# Patient Record
Sex: FEMALE | Race: BLACK OR AFRICAN AMERICAN | NOT HISPANIC OR LATINO | Employment: FULL TIME | URBAN - METROPOLITAN AREA
[De-identification: names, ages, dates, MRNs, and addresses within clinical notes are randomized per-mention and may not be internally consistent; named-entity substitution may affect disease eponyms.]

---

## 2017-05-15 ENCOUNTER — HOSPITAL ENCOUNTER (INPATIENT)
Facility: HOSPITAL | Age: 37
LOS: 1 days | Discharge: HOME | DRG: 287 | End: 2017-05-16
Attending: INTERNAL MEDICINE | Admitting: INTERNAL MEDICINE
Payer: MEDICAID

## 2017-05-15 ENCOUNTER — HOSPITAL ENCOUNTER (EMERGENCY)
Facility: OTHER | Age: 37
Discharge: ANOTHER HOSPITAL | End: 2017-05-15
Attending: EMERGENCY MEDICINE
Payer: MEDICAID

## 2017-05-15 VITALS
SYSTOLIC BLOOD PRESSURE: 163 MMHG | WEIGHT: 185 LBS | BODY MASS INDEX: 29.03 KG/M2 | OXYGEN SATURATION: 97 % | HEART RATE: 103 BPM | RESPIRATION RATE: 20 BRPM | DIASTOLIC BLOOD PRESSURE: 109 MMHG | HEIGHT: 67 IN

## 2017-05-15 DIAGNOSIS — R07.9 CHEST PAIN: ICD-10-CM

## 2017-05-15 DIAGNOSIS — I11.9 HYPERTENSIVE HEART DISEASE WITHOUT HEART FAILURE: ICD-10-CM

## 2017-05-15 DIAGNOSIS — I16.0 HYPERTENSIVE URGENCY: ICD-10-CM

## 2017-05-15 DIAGNOSIS — I21.3 ST ELEVATION MYOCARDIAL INFARCTION (STEMI), UNSPECIFIED ARTERY: Primary | ICD-10-CM

## 2017-05-15 DIAGNOSIS — I44.7 NEW ONSET LEFT BUNDLE BRANCH BLOCK (LBBB): Primary | ICD-10-CM

## 2017-05-15 LAB
ALBUMIN SERPL-MCNC: 3.5 G/DL (ref 3.3–5.5)
ALP SERPL-CCNC: 73 U/L (ref 42–141)
BILIRUB SERPL-MCNC: 0.5 MG/DL (ref 0.2–1.6)
BUN SERPL-MCNC: 14 MG/DL (ref 7–22)
CALCIUM SERPL-MCNC: 9.1 MG/DL (ref 8–10.3)
CHLORIDE SERPL-SCNC: 103 MMOL/L (ref 98–108)
CREAT SERPL-MCNC: 1 MG/DL (ref 0.6–1.2)
GLUCOSE SERPL-MCNC: 100 MG/DL (ref 73–118)
POC ALT (SGPT): 19 U/L (ref 10–47)
POC AST (SGOT): 26 U/L (ref 11–38)
POC B-TYPE NATRIURETIC PEPTIDE: 49.1 PG/ML (ref 0–100)
POC CARDIAC TROPONIN I: 0 NG/ML
POC PTINR: 1 (ref 0.9–1.2)
POC PTWBT: 11.7 SEC (ref 9.7–14.3)
POC TCO2: 26 MMOL/L (ref 18–33)
POTASSIUM BLD-SCNC: 3.7 MMOL/L (ref 3.6–5.1)
PROTEIN, POC: 7.8 G/DL (ref 6.4–8.1)
SAMPLE: NORMAL
SAMPLE: NORMAL
SODIUM BLD-SCNC: 140 MMOL/L (ref 128–145)

## 2017-05-15 PROCEDURE — 96374 THER/PROPH/DIAG INJ IV PUSH: CPT

## 2017-05-15 PROCEDURE — 25000003 PHARM REV CODE 250

## 2017-05-15 PROCEDURE — B215YZZ FLUOROSCOPY OF LEFT HEART USING OTHER CONTRAST: ICD-10-PCS | Performed by: INTERNAL MEDICINE

## 2017-05-15 PROCEDURE — 96375 TX/PRO/DX INJ NEW DRUG ADDON: CPT

## 2017-05-15 PROCEDURE — 25000003 PHARM REV CODE 250: Performed by: EMERGENCY MEDICINE

## 2017-05-15 PROCEDURE — 25000003 PHARM REV CODE 250: Performed by: INTERNAL MEDICINE

## 2017-05-15 PROCEDURE — 20000000 HC ICU ROOM

## 2017-05-15 PROCEDURE — 99285 EMERGENCY DEPT VISIT HI MDM: CPT

## 2017-05-15 PROCEDURE — C1769 GUIDE WIRE: HCPCS

## 2017-05-15 PROCEDURE — 99291 CRITICAL CARE FIRST HOUR: CPT | Mod: 25

## 2017-05-15 PROCEDURE — 63600175 PHARM REV CODE 636 W HCPCS: Performed by: INTERNAL MEDICINE

## 2017-05-15 PROCEDURE — B211YZZ FLUOROSCOPY OF MULTIPLE CORONARY ARTERIES USING OTHER CONTRAST: ICD-10-PCS | Performed by: INTERNAL MEDICINE

## 2017-05-15 PROCEDURE — 99152 MOD SED SAME PHYS/QHP 5/>YRS: CPT | Mod: ,,, | Performed by: INTERNAL MEDICINE

## 2017-05-15 PROCEDURE — 93010 ELECTROCARDIOGRAM REPORT: CPT | Mod: ,,, | Performed by: INTERNAL MEDICINE

## 2017-05-15 PROCEDURE — 99999 HC NO LEVEL OF SERVICE - ED ONLY: CPT

## 2017-05-15 PROCEDURE — 93005 ELECTROCARDIOGRAM TRACING: CPT

## 2017-05-15 PROCEDURE — 99152 MOD SED SAME PHYS/QHP 5/>YRS: CPT

## 2017-05-15 PROCEDURE — 4A023N7 MEASUREMENT OF CARDIAC SAMPLING AND PRESSURE, LEFT HEART, PERCUTANEOUS APPROACH: ICD-10-PCS | Performed by: INTERNAL MEDICINE

## 2017-05-15 PROCEDURE — 93458 L HRT ARTERY/VENTRICLE ANGIO: CPT | Mod: 26,,, | Performed by: INTERNAL MEDICINE

## 2017-05-15 PROCEDURE — 63600175 PHARM REV CODE 636 W HCPCS

## 2017-05-15 RX ORDER — HYDROCODONE BITARTRATE AND ACETAMINOPHEN 5; 325 MG/1; MG/1
1 TABLET ORAL EVERY 4 HOURS PRN
Status: DISCONTINUED | OUTPATIENT
Start: 2017-05-15 | End: 2017-05-16 | Stop reason: HOSPADM

## 2017-05-15 RX ORDER — ONDANSETRON 2 MG/ML
4 INJECTION INTRAMUSCULAR; INTRAVENOUS EVERY 12 HOURS PRN
Status: DISCONTINUED | OUTPATIENT
Start: 2017-05-15 | End: 2017-05-16 | Stop reason: HOSPADM

## 2017-05-15 RX ORDER — ASPIRIN 325 MG
TABLET ORAL
Status: COMPLETED
Start: 2017-05-15 | End: 2017-05-15

## 2017-05-15 RX ORDER — CLOPIDOGREL 300 MG/1
600 TABLET, FILM COATED ORAL
Status: COMPLETED | OUTPATIENT
Start: 2017-05-15 | End: 2017-05-15

## 2017-05-15 RX ORDER — DIPHENHYDRAMINE HCL 25 MG
25 CAPSULE ORAL EVERY 6 HOURS PRN
Status: DISCONTINUED | OUTPATIENT
Start: 2017-05-15 | End: 2017-05-16 | Stop reason: HOSPADM

## 2017-05-15 RX ORDER — HEPARIN SODIUM 5000 [USP'U]/ML
5000 INJECTION, SOLUTION INTRAVENOUS; SUBCUTANEOUS
Status: COMPLETED | OUTPATIENT
Start: 2017-05-15 | End: 2017-05-15

## 2017-05-15 RX ORDER — ENOXAPARIN SODIUM 100 MG/ML
40 INJECTION SUBCUTANEOUS EVERY 24 HOURS
Status: DISCONTINUED | OUTPATIENT
Start: 2017-05-15 | End: 2017-05-16 | Stop reason: HOSPADM

## 2017-05-15 RX ORDER — ACETAMINOPHEN 325 MG/1
650 TABLET ORAL EVERY 4 HOURS PRN
Status: DISCONTINUED | OUTPATIENT
Start: 2017-05-15 | End: 2017-05-16 | Stop reason: HOSPADM

## 2017-05-15 RX ORDER — ASPIRIN 81 MG/1
81 TABLET ORAL DAILY
Status: DISCONTINUED | OUTPATIENT
Start: 2017-05-16 | End: 2017-05-16 | Stop reason: HOSPADM

## 2017-05-15 RX ORDER — NITROGLYCERIN 0.4 MG/1
TABLET SUBLINGUAL
Status: COMPLETED
Start: 2017-05-15 | End: 2017-05-15

## 2017-05-15 RX ORDER — ATROPINE SULFATE 0.1 MG/ML
0.5 INJECTION INTRAVENOUS
Status: DISCONTINUED | OUTPATIENT
Start: 2017-05-15 | End: 2017-05-16 | Stop reason: HOSPADM

## 2017-05-15 RX ORDER — ASPIRIN 325 MG
325 TABLET ORAL
Status: COMPLETED | OUTPATIENT
Start: 2017-05-15 | End: 2017-05-15

## 2017-05-15 RX ORDER — MAG HYDROX/ALUMINUM HYD/SIMETH 200-200-20
15 SUSPENSION, ORAL (FINAL DOSE FORM) ORAL EVERY 6 HOURS PRN
Status: DISCONTINUED | OUTPATIENT
Start: 2017-05-15 | End: 2017-05-16 | Stop reason: HOSPADM

## 2017-05-15 RX ORDER — ZOLPIDEM TARTRATE 5 MG/1
5 TABLET ORAL NIGHTLY PRN
Status: DISCONTINUED | OUTPATIENT
Start: 2017-05-15 | End: 2017-05-16 | Stop reason: HOSPADM

## 2017-05-15 RX ORDER — METOPROLOL TARTRATE 1 MG/ML
5 INJECTION, SOLUTION INTRAVENOUS
Status: COMPLETED | OUTPATIENT
Start: 2017-05-15 | End: 2017-05-15

## 2017-05-15 RX ORDER — AMLODIPINE BESYLATE 5 MG/1
10 TABLET ORAL DAILY
Status: DISCONTINUED | OUTPATIENT
Start: 2017-05-15 | End: 2017-05-16 | Stop reason: HOSPADM

## 2017-05-15 RX ORDER — ALPRAZOLAM 0.25 MG/1
0.25 TABLET ORAL EVERY 8 HOURS PRN
Status: DISCONTINUED | OUTPATIENT
Start: 2017-05-15 | End: 2017-05-16 | Stop reason: HOSPADM

## 2017-05-15 RX ORDER — METOPROLOL SUCCINATE 25 MG/1
25 TABLET, EXTENDED RELEASE ORAL DAILY
Status: DISCONTINUED | OUTPATIENT
Start: 2017-05-15 | End: 2017-05-16 | Stop reason: HOSPADM

## 2017-05-15 RX ORDER — SODIUM CHLORIDE 9 MG/ML
1.5 INJECTION, SOLUTION INTRAVENOUS CONTINUOUS
Status: ACTIVE | OUTPATIENT
Start: 2017-05-15 | End: 2017-05-16

## 2017-05-15 RX ORDER — MORPHINE SULFATE 10 MG/ML
2 INJECTION INTRAMUSCULAR; INTRAVENOUS; SUBCUTANEOUS EVERY 10 MIN PRN
Status: DISCONTINUED | OUTPATIENT
Start: 2017-05-15 | End: 2017-05-16 | Stop reason: HOSPADM

## 2017-05-15 RX ADMIN — Medication 325 MG: at 06:05

## 2017-05-15 RX ADMIN — ENOXAPARIN SODIUM 40 MG: 100 INJECTION SUBCUTANEOUS at 09:05

## 2017-05-15 RX ADMIN — HEPARIN SODIUM 5000 UNITS: 5000 INJECTION, SOLUTION INTRAVENOUS; SUBCUTANEOUS at 06:05

## 2017-05-15 RX ADMIN — CLOPIDOGREL BISULFATE 600 MG: 300 TABLET, FILM COATED ORAL at 06:05

## 2017-05-15 RX ADMIN — SODIUM CHLORIDE 1.5 ML/KG/HR: 0.9 INJECTION, SOLUTION INTRAVENOUS at 08:05

## 2017-05-15 RX ADMIN — ASPIRIN 325 MG ORAL TABLET 325 MG: 325 PILL ORAL at 06:05

## 2017-05-15 RX ADMIN — METOPROLOL SUCCINATE 25 MG: 25 TABLET, EXTENDED RELEASE ORAL at 09:05

## 2017-05-15 RX ADMIN — NITROGLYCERIN 1 INCH: 20 OINTMENT TOPICAL at 06:05

## 2017-05-15 RX ADMIN — METOPROLOL TARTRATE 5 MG: 1 INJECTION, SOLUTION INTRAVENOUS at 06:05

## 2017-05-15 RX ADMIN — AMLODIPINE BESYLATE 10 MG: 5 TABLET ORAL at 09:05

## 2017-05-15 RX ADMIN — NITROGLYCERIN 0.4 MG: 0.4 TABLET SUBLINGUAL at 06:05

## 2017-05-15 NOTE — ED PROVIDER NOTES
Encounter Date: 5/15/2017       History   No chief complaint on file.    Review of patient's allergies indicates:  No Known Allergies  Patient is a 36 y.o. female presenting with the following complaint: chest pain. The history is provided by the patient.   Chest Pain   The current episode started 2 to 3 hours ago. Chest pain occurs intermittently. The chest pain is worsening. The pain is associated with exertion. At its most intense, the chest pain is at 10/10. The chest pain is currently at 7/10. The quality of the pain is described as tightness and pressure-like. The pain does not radiate. Chest pain is worsened by exertion. Primary symptoms include shortness of breath and nausea.   The shortness of breath began today. The shortness of breath developed suddenly.   Nausea began today. She tried nothing for the symptoms. Risk factors include obesity.   Her past medical history is significant for hypertension (Not treated).     Past Medical History:   Diagnosis Date    Hypertension      No past surgical history on file.  Family History   Problem Relation Age of Onset    Hypertension Mother     Thyroid disease Mother     Appendicitis Mother     Hypertension Father      Social History   Substance Use Topics    Smoking status: Never Smoker    Smokeless tobacco: Not on file    Alcohol use Yes      Comment: Occasionally     Review of Systems   Constitutional: Negative.    HENT: Negative.    Eyes: Negative.    Respiratory: Positive for shortness of breath.    Cardiovascular: Positive for chest pain.   Gastrointestinal: Positive for nausea.   Endocrine: Negative.    Genitourinary: Negative.    Musculoskeletal: Negative.    Skin: Negative.    Allergic/Immunologic: Negative.    Neurological: Negative.    Hematological: Negative.    Psychiatric/Behavioral: Negative.    All other systems reviewed and are negative.      Physical Exam   Initial Vitals   BP Pulse Resp Temp SpO2   -- -- -- -- --            Physical  Exam    Nursing note and vitals reviewed.  Constitutional: Vital signs are normal. She appears well-developed. She is active and cooperative.   Very anxious   HENT:   Head: Normocephalic and atraumatic.   Eyes: Conjunctivae, EOM and lids are normal. Pupils are equal, round, and reactive to light.   Neck: Trachea normal and full passive range of motion without pain. Neck supple. No thyroid mass present.   Cardiovascular: Regular rhythm, S1 normal, S2 normal, normal heart sounds, intact distal pulses and normal pulses. Tachycardia present.    Abdominal: Soft. Normal appearance, normal aorta and bowel sounds are normal.   Musculoskeletal: Normal range of motion.   Lymphadenopathy:     She has no axillary adenopathy.   Neurological: She is alert and oriented to person, place, and time.   Skin: Skin is warm, dry and intact.   Psychiatric: She has a normal mood and affect. Her speech is normal and behavior is normal. Judgment and thought content normal. Cognition and memory are normal.         ED Course   Procedures  Labs Reviewed   POCT CBC   POCT CMP   POCT PROTIME-INR   POCT B-TYPE NATRIURETIC PEPTIDE (BNP)   POCT TROPONIN     EKG Readings: (Independently Interpreted)   Initial Reading: STEMI. Rhythm: Sinus Tachycardia. Heart Rate: 120. Ectopy: No Ectopy. Conduction: LBBB. ST Segment Elevation: AVR, V1 and V2. Axis: Normal. Clinical Impression: Normal Sinus Rhythm with LBBB Other Impression: Cannot rule out STEMI as I have no old EKG          Medical Decision Making:   ED Management:  Contacted the transfer center immediately.  I then spoke with the emergency department at the [facility] to expedite patient care in transfer.    The patient received aspirin and Nitropaste heparin Plavix and Lopressor.  Patient has been accepted by cardiology at [facility] to go directly to the catheter lab.                   ED Course     Clinical Impression:   The encounter diagnosis was ST elevation myocardial infarction  (STEMI), unspecified artery.          Dez Gabriel MD  05/15/17 5956

## 2017-05-15 NOTE — IP AVS SNAPSHOT
[place] [address] [place] [address]  Phone: [phone]           Patient Discharge Instructions   Our goal is to set you up for success. This packet includes information on your condition, medications, and your home care.  It will help you care for yourself to prevent having to return to the hospital.     Please ask your nurse if you have any questions.      There are many details to remember when preparing to leave the hospital. Here is what you will need to do:    1. Take your medicine. If you are prescribed medications, review your Medication List on the following pages. You may have new medications to  at the pharmacy and others that you'll need to stop taking. Review the instructions for how and when to take your medications. Talk with your doctor or nurses if you are unsure of what to do.     2. Go to your follow-up appointments. Specific follow-up information is listed in the following pages. Your may be contacted by a nurse or clinical provider about future appointments. Be sure we have all of the phone numbers to reach you. Please contact your provider's office if you are unable to make an appointment.     3. Watch for warning signs. Your doctor or nurse will give you detailed warning signs to watch for and when to call for assistance. These instructions may also include educational information about your condition. If you experience any of warning signs to your health, call your doctor.           [facility]  Unless otherwise directed by your provider, please   contact [facility], our nurse care line   that is available for 24/7 assistance.     [phone] (toll-free)     Registered nurses in the [facility]   provide: appointment scheduling, clinical advisement, health education, and other advisory services.                  ** Verify the list of medication(s) below is accurate and up to date. Carry this with you in case of emergency.  If your medications have changed, please notify your healthcare provider.             Medication List      START taking these medications        Additional Info                      metoprolol succinate 25 MG 24 hr tablet   Commonly known as:  TOPROL-XL   Quantity:  30 tablet   Refills:  11   Dose:  25 mg    Last time this was given:  25 mg on 5/16/2017  9:32 AM   Instructions:  Take 1 tablet (25 mg total) by mouth once daily.     Begin Date    AM    Noon    PM    Bedtime         CHANGE how you take these medications        Additional Info                      * amlodipine 10 MG tablet   Commonly known as:  NORVASC   Quantity:  30 tablet   Refills:  2   Dose:  10 mg   What changed:  Another medication with the same name was added. Make sure you understand how and when to take each.   Comments:  This prescription was filled today(2/24/2017). Any refills authorized will be placed on file.    Last time this was given:  10 mg on 5/16/2017  9:32 AM   Instructions:  Take 1 tablet (10 mg total) by mouth once daily.     Begin Date    AM    Noon    PM    Bedtime       * amlodipine 10 MG tablet   Commonly known as:  NORVASC   Quantity:  30 tablet   Refills:  11   Dose:  10 mg   What changed:  You were already taking a medication with the same name, and this prescription was added. Make sure you understand how and when to take each.    Last time this was given:  10 mg on 5/16/2017  9:32 AM   Instructions:  Take 1 tablet (10 mg total) by mouth once daily.     Begin Date    AM    Noon    PM    Bedtime       * Notice:  This list has 2 medication(s) that are the same as other medications prescribed for you. Read the directions carefully, and ask your doctor or other care provider to review them with you.      CONTINUE taking these medications        Additional Info                      amitriptyline 10 MG tablet   Commonly known as:  ELAVIL   Quantity:  30 tablet   Refills:  1   Dose:  10 mg    Instructions:  Take 1 tablet (10 mg  total) by mouth nightly as needed for Pain.     Begin Date    AM    Noon    PM    Bedtime       * azithromycin 250 MG tablet   Commonly known as:  Z-LAMONT   Quantity:  6 tablet   Refills:  0   Dose:  250 mg    Instructions:  Take 1 tablet (250 mg total) by mouth once daily. 1 pack, take as directed     Begin Date    AM    Noon    PM    Bedtime       * azithromycin 250 MG tablet   Commonly known as:  Z-LAMONT   Quantity:  6 tablet   Refills:  0   Dose:  250 mg    Instructions:  Take 1 tablet (250 mg total) by mouth once daily. 1 pack, take as directed     Begin Date    AM    Noon    PM    Bedtime       * cyclobenzaprine 5 MG tablet   Commonly known as:  FLEXERIL   Quantity:  30 tablet   Refills:  0   Dose:  5 mg    Instructions:  Take 1 tablet (5 mg total) by mouth nightly.     Begin Date    AM    Noon    PM    Bedtime       * cyclobenzaprine 10 MG tablet   Commonly known as:  FLEXERIL   Quantity:  30 tablet   Refills:  0   Dose:  10 mg    Instructions:  Take 1 tablet (10 mg total) by mouth every evening.     Begin Date    AM    Noon    PM    Bedtime       * cyclobenzaprine 10 MG tablet   Commonly known as:  FLEXERIL   Quantity:  30 tablet   Refills:  0   Dose:  10 mg    Instructions:  Take 1 tablet (10 mg total) by mouth every evening.     Begin Date    AM    Noon    PM    Bedtime       ENGERIX-B (PF) 20 mcg/mL Susp   Refills:  0   Generic drug:  hepatitis B virus vacc.rec(PF)      Begin Date    AM    Noon    PM    Bedtime       ergocalciferol 50,000 unit Cap   Commonly known as:  ERGOCALCIFEROL   Quantity:  12 capsule   Refills:  2   Dose:  28110 Units    Instructions:  Take 1 capsule (50,000 Units total) by mouth every 7 days.     Begin Date    AM    Noon    PM    Bedtime       * fluticasone 50 mcg/actuation nasal spray   Commonly known as:  FLONASE   Quantity:  16 g   Refills:  1   Dose:  1 spray    Instructions:  1 spray by Each Nare route once daily.     Begin Date    AM    Noon    PM    Bedtime       * fluticasone  50 mcg/actuation nasal spray   Commonly known as:  FLONASE   Quantity:  16 g   Refills:  1   Dose:  1 spray    Instructions:  1 spray by Each Nare route once daily.     Begin Date    AM    Noon    PM    Bedtime       * fluticasone 50 mcg/actuation nasal spray   Commonly known as:  FLONASE   Quantity:  16 g   Refills:  1   Dose:  1 spray    Instructions:  1 spray by Each Nare route once daily.     Begin Date    AM    Noon    PM    Bedtime       * loratadine 10 mg tablet   Commonly known as:  CLARITIN   Quantity:  30 tablet   Refills:  0   Dose:  10 mg    Instructions:  Take 1 tablet (10 mg total) by mouth once daily.     Begin Date    AM    Noon    PM    Bedtime       * loratadine 10 mg tablet   Commonly known as:  CLARITIN   Quantity:  30 tablet   Refills:  0   Dose:  10 mg    Instructions:  Take 1 tablet (10 mg total) by mouth once daily.     Begin Date    AM    Noon    PM    Bedtime       * naproxen 500 MG tablet   Commonly known as:  NAPROSYN   Quantity:  30 tablet   Refills:  1   Dose:  500 mg    Instructions:  Take 1 tablet (500 mg total) by mouth 2 (two) times daily with meals.     Begin Date    AM    Noon    PM    Bedtime       * naproxen 500 MG tablet   Commonly known as:  NAPROSYN   Quantity:  30 tablet   Refills:  1   Dose:  500 mg    Instructions:  Take 1 tablet (500 mg total) by mouth 2 (two) times daily with meals.     Begin Date    AM    Noon    PM    Bedtime       omeprazole 20 MG capsule   Commonly known as:  PRILOSEC   Quantity:  30 capsule   Refills:  1   Dose:  20 mg    Instructions:  Take 1 capsule (20 mg total) by mouth once daily.     Begin Date    AM    Noon    PM    Bedtime       * Notice:  This list has 12 medication(s) that are the same as other medications prescribed for you. Read the directions carefully, and ask your doctor or other care provider to review them with you.         Where to Get Your Medications      These medications were sent to [facility] - KIMI Brown  - [address] [place]  [address] [place] [id number]     Phone:  [phone]     amlodipine 10 MG tablet    metoprolol succinate 25 MG 24 hr tablet                  Please bring to all follow up appointments:    1. A copy of your discharge instructions.  2. All medicines you are currently taking in their original bottles.  3. Identification and insurance card.    Please arrive 15 minutes ahead of scheduled appointment time.    Please call 24 hours in advance if you must reschedule your appointment and/or time.        Your Scheduled Appointments     May 23, 2017  2:00 PM CDT   Established Patient Visit with Dez Hayden MD   [facility] - Cardiology ([facility])    [address] [facility] [address] [place] [address] [phone]            May 26, 2017 12:00 PM CDT   Follow Up with Hebert Shepherd MD   [facility] ([facility])    [address] [place] [address] [phone]              Follow-up Information     Follow up with Dez Hayden MD On 5/23/2017.    Specialties:  Cardiology, INTERVENTIONAL CARDIOLOGY    Why:  2 pm (Tuesday)--arrive 15 minutes early, bring ALL your medications, your discharge summary, your picture ID and insurance cards    Contact information:    [address] [place] [address] [address]  [place] [id number]  [phone]          Follow up with Hebert Shepherd MD On 5/26/2017.    Specialty:  Family Medicine    Why:  noon-(Friday) as previously scheduled--arrive 15 minutes early, bring ALL your medications, your discharge summary, your picture ID and your insurance card    Contact information:    [address] [place] [address] [phone]            Primary Diagnosis     Your primary diagnosis was:  New Onset Left Bundle Branch Block (Lbbb)      Admission Information     Date & Time Provider Department CSN    5/15/2017  7:07 PM Dez Hayden MD [facility] [id number]      Care Providers     Provider Role  "Specialty Primary office phone    Dez Hayden MD Attending Provider Cardiology [phone]      Your Vitals Were     BP Pulse Temp Resp Height Weight    140/79 (BP Location: Left arm, Patient Position: Lying, BP Method: Automatic) 82 98.3 °F (36.8 °C) (Oral) 16 5' 7" (1.702 m) 84.3 kg (185 lb 13.6 oz)    SpO2 BMI             100% 29.11 kg/m2         Recent Lab Values        3/28/2017                           9:42 AM           A1C 5.8 (H)           Comment for A1C at  9:42 AM on 3/28/2017:           Pre-diabetes: 5.7 - 6.4           Diabetes: >6.4           Glycemic control for adults with diabetes: <7.0        Allergies as of 5/16/2017     No Known Allergies      Advance Directives     An advance directive is a document which, in the event you are no longer able to make decisions for yourself, tells your healthcare team what kind of treatment you do or do not want to receive, or who you would like to make those decisions for you.  If you do not currently have an advance directive, [facility] encourages you to create one.  For more information call:  [phone]), [phone] ([phone]),  or log on to [email or web address].        Language Assistance Services     ATTENTION: Language assistance services are available, free of charge. Please call [phone].      ATENCIÓN: Si habla español, tiene a lindsay disposición servicios gratuitos de asistencia lingüística. Llame al [phone].     [facility] Ý: N?u b?n nói Ti?ng Vi?t, có các d?ch v? h? tr? ngôn ng? mi?n phí dành cho b?n. G?i s? [phone].        [facility] Sign-Up     Activating your [facility] account is as easy as 1-2-3!     1) Visit [email or web address], select Sign Up Now, enter this activation code and your date of birth, then select Next.  [email or web address]  Expires: 6/10/2017 11:32 AM      2) Create a username and password to use when you visit [facility] in the future and select a security question in case you lose your password " and select Next.    3) Enter your e-mail address and click Sign Up!    Additional Information  If you have questions, please e-mail [email or web address] or call [phone] to talk to our [facility] staff. Remember, [facility] is NOT to be used for urgent needs. For medical emergencies, dial 911.          [facility] complies with applicable Federal civil rights laws and does not discriminate on the basis of race, color, national origin, age, disability, or sex.

## 2017-05-15 NOTE — ED AVS SNAPSHOT
[facility] EMERGENCY DEPARTMENT  [address]  [place] [address]               Hollie RODRIGUEZ Les   5/15/2017  6:01 PM   ED    Description:  Female : 1980   Department:  [facility] Emergency Department           Your Care was Coordinated By:     Provider Role From To    Dez Gabriel MD Attending Provider 05/15/17 0746 --      Diagnoses this Visit        Comments    ST elevation myocardial infarction (STEMI), unspecified artery    -  Primary       ED Disposition     ED Disposition Condition Comment    Transfer to Another Facility  Hollie Saldana should be transferred out to [facility] cardiology.           To Do List           [facility]     [facility] On Call Nurse Care Line -  Assistance  Unless otherwise directed by your provider, please contact [facility], our nurse care line that is available for  assistance.     Registered nurses in the [facility] On Call Center provide: appointment scheduling, clinical advisement, health education, and other advisory services.  Call: [phone] (toll free)               Medications           These medications were administered today        Dose Freq    aspirin 325 MG tablet      Notes to Pharmacy: Created by cabinet override    nitroGLYCERIN (NITROSTAT) 0.4 MG SL tablet      Notes to Pharmacy: Created by cabinet override    aspirin tablet 325 mg 325 mg ED 1 Time    Sig: Take 1 tablet (325 mg total) by mouth ED 1 Time.    Class: Normal    Route: Oral    nitroGLYCERIN 2% TD oint ointment 1 inch 1 inch ED 1 Time    Sig: Apply 1 inch topically ED 1 Time.    Class: Normal    Route: Topical    heparin (porcine) injection 5,000 Units 5,000 Units ED 1 Time    Sig: Inject 1 mL (5,000 Units total) into the vein ED 1 Time.    Class: Normal    Route: Intravenous    clopidogrel tablet 600 mg 600 mg ED 1 Time    Sig: Take 2 tablets (600 mg total) by mouth ED 1 Time.    Class: Normal    Route: Oral    metoprolol injection 5 mg 5 mg ED  1 Time    Sig: Inject 5 mLs (5 mg total) into the vein ED 1 Time.    Class: Normal    Route: Intravenous           Verify that the below list of medications is an accurate representation of the medications you are currently taking.  If none reported, the list may be blank. If incorrect, please contact your healthcare provider. Carry this list with you in case of emergency.           Current Medications     amitriptyline (ELAVIL) 10 MG tablet Take 1 tablet (10 mg total) by mouth nightly as needed for Pain.    amlodipine (NORVASC) 10 MG tablet Take 1 tablet (10 mg total) by mouth once daily.    aspirin tablet 325 mg Take 1 tablet (325 mg total) by mouth ED 1 Time.    azithromycin (Z-LAMONT) 250 MG tablet Take 1 tablet (250 mg total) by mouth once daily. 1 pack, take as directed    azithromycin (Z-LAMONT) 250 MG tablet Take 1 tablet (250 mg total) by mouth once daily. 1 pack, take as directed    cyclobenzaprine (FLEXERIL) 10 MG tablet Take 1 tablet (10 mg total) by mouth every evening.    cyclobenzaprine (FLEXERIL) 10 MG tablet Take 1 tablet (10 mg total) by mouth every evening.    cyclobenzaprine (FLEXERIL) 5 MG tablet Take 1 tablet (5 mg total) by mouth nightly.    dexamethasone injection 8 mg Inject 2 mLs (8 mg total) into the muscle once.    ENGERIX-B, PF, 20 mcg/mL Susp     ergocalciferol (ERGOCALCIFEROL) 50,000 unit Cap Take 1 capsule (50,000 Units total) by mouth every 7 days.    fluticasone (FLONASE) 50 mcg/actuation nasal spray 1 spray by Each Nare route once daily.    fluticasone (FLONASE) 50 mcg/actuation nasal spray 1 spray by Each Nare route once daily.    fluticasone (FLONASE) 50 mcg/actuation nasal spray 1 spray by Each Nare route once daily.    loratadine (CLARITIN) 10 mg tablet Take 1 tablet (10 mg total) by mouth once daily.    loratadine (CLARITIN) 10 mg tablet Take 1 tablet (10 mg total) by mouth once daily.    metoprolol injection 5 mg Inject 5 mLs (5 mg total) into the vein ED 1 Time.    naproxen  "(NAPROSYN) 500 MG tablet Take 1 tablet (500 mg total) by mouth 2 (two) times daily with meals.    naproxen (NAPROSYN) 500 MG tablet Take 1 tablet (500 mg total) by mouth 2 (two) times daily with meals.    omeprazole (PRILOSEC) 20 MG capsule Take 1 capsule (20 mg total) by mouth once daily.           Clinical Reference Information           Your Vitals Were     Height Weight BMI          5' 7" (1.702 m) 83.9 kg (185 lb) 28.98 kg/m2        Allergies as of 5/15/2017     No Known Allergies      Immunizations Administered on Date of Encounter - 5/15/2017     None      ED Micro, Lab, POCT     Start Ordered       Status Ordering Provider    05/15/17 1815 05/15/17 1815  POCT CMP  Once      Final result     05/15/17 1810 05/15/17 1810  POCT Troponin  Once      Completed     05/15/17 1809 05/15/17 1810  POCT CBC  Once      Final result     05/15/17 1809 05/15/17 1810  POCT CMP  Once      Completed     05/15/17 1809 05/15/17 1810  POCT Protime-INR  Once      Completed     05/15/17 1809 05/15/17 1810  POCT B-type natriuretic peptide (BNP)  Once      Completed       ED Imaging Orders     Start Ordered       Status Ordering Provider    05/15/17 1810 05/15/17 1810  X-Ray Chest AP Portable  1 time imaging      Final result       Your Scheduled Appointments     May 26, 2017 12:00 PM CDT   Follow Up with Hebert Shepherd MD   [facility] ([facility])    [address] [place] [address] [phone]              [facility] Sign-Up     Activating your [facility] account is as easy as 1-2-3!     1) Visit [email or web address], select Sign Up Now, enter this activation code and your date of birth, then select Next.  [email or web address]  Expires: 6/10/2017 11:32 AM      2) Create a username and password to use when you visit [facility] in the future and select a security question in case you lose your password and select Next.    3) Enter your e-mail address and click Sign Up!    Additional Information  If you have " questions, please e-mail [email or web address] or call [phone] to talk to our [facility] staff. Remember, [facility] is NOT to be used for urgent needs. For medical emergencies, dial 911.          [facility] Emergency Department complies with applicable Federal civil rights laws and does not discriminate on the basis of race, color, national origin, age, disability, or sex.        Language Assistance Services     ATTENTION: Language assistance services are available, free of charge. Please call [phone].      ATENCIÓN: Si habla español, tiene a lindsay disposición servicios gratuitos de asistencia lingüística. Llame al [phone].     CHÚ Ý: N?u b?n nói Ti?ng Vi?t, có các d?ch v? h? tr? ngôn ng? mi?n phí dành cho b?n. G?i s? [phone].

## 2017-05-15 NOTE — ED NOTES
"Upon pt arrival to waiting  informed of chest pain / pt ambulatory to room #1  And EKG done / [place] to room and abnormal EKG noted / CDOE STEMI ACTIVATION    Pt identifiers checked and correct.    LOC: The patient is awake, alert and aware of environment with an appropriate affect, the patient is oriented x 3 and speaking appropriately.   APPEARANCE: Patient resting comfortably and in no acute distress, patient is clean and well groomed, patient's clothing is properly fastened.   SKIN: The skin is warm and dry, color consistent with ethnicity, patient has normal skin turgor and moist mucus membranes, skin intact, no breakdown or bruising noted.   MUSCULOSKELETAL: Patient moving all extremities spontaneously, no obvious swelling or deformities noted.   RESPIRATORY: Airway is open and patent, respirations are spontaneous, patient has a normal effort and rate, no accessory muscle use noted.   CARDIAC: Patient has a rapid rate and regular rhythm, no periphreal edema noted, capillary refill < 3 seconds. C/O "pressure" to the chest   ABDOMEN: Soft and non tender to palpation, no distention noted, active bowel sounds present in all four quadrants.   NEUROLOGIC: PERRL, 3 mm bilaterally, eyes open spontaneously, behavior appropriate to situation, follows commands, facial expression symmetrical, bilateral hand grasp equal and even, purposeful motor response noted, normal sensation in all extremities when touched with a finger.    "

## 2017-05-15 NOTE — ED NOTES
Called Referral Line and EMS ETA Reports dispatching EMS / [facility] arrived and to transport pt

## 2017-05-15 NOTE — ED NOTES
Report called to Marzena OLIVAS Charge at [facility] and informed pt should be arriving to go to cath lab

## 2017-05-16 VITALS
RESPIRATION RATE: 16 BRPM | WEIGHT: 185.88 LBS | HEIGHT: 67 IN | HEART RATE: 82 BPM | TEMPERATURE: 99 F | BODY MASS INDEX: 29.17 KG/M2 | OXYGEN SATURATION: 100 % | DIASTOLIC BLOOD PRESSURE: 79 MMHG | SYSTOLIC BLOOD PRESSURE: 140 MMHG

## 2017-05-16 LAB
ANION GAP SERPL CALC-SCNC: 6 MMOL/L
BASOPHILS # BLD AUTO: 0.02 K/UL
BASOPHILS NFR BLD: 0.3 %
BUN SERPL-MCNC: 8 MG/DL
CALCIUM SERPL-MCNC: 8.8 MG/DL
CHLORIDE SERPL-SCNC: 107 MMOL/L
CHOLEST/HDLC SERPL: 2.7 {RATIO}
CK MB SERPL-MCNC: 3.3 NG/ML
CK MB SERPL-RTO: 2.5 %
CK SERPL-CCNC: 133 U/L
CO2 SERPL-SCNC: 24 MMOL/L
CREAT SERPL-MCNC: 0.7 MG/DL
DIASTOLIC DYSFUNCTION: YES
DIFFERENTIAL METHOD: ABNORMAL
EOSINOPHIL # BLD AUTO: 0.1 K/UL
EOSINOPHIL NFR BLD: 1.6 %
ERYTHROCYTE [DISTWIDTH] IN BLOOD BY AUTOMATED COUNT: 16.1 %
EST. GFR  (AFRICAN AMERICAN): >60 ML/MIN/1.73 M^2
EST. GFR  (NON AFRICAN AMERICAN): >60 ML/MIN/1.73 M^2
ESTIMATED PA SYSTOLIC PRESSURE: 13.37
GLUCOSE SERPL-MCNC: 84 MG/DL
HCT VFR BLD AUTO: 37.5 %
HDL/CHOLESTEROL RATIO: 37.2 %
HDLC SERPL-MCNC: 199 MG/DL
HDLC SERPL-MCNC: 74 MG/DL
HGB BLD-MCNC: 12.3 G/DL
LDLC SERPL CALC-MCNC: 114 MG/DL
LYMPHOCYTES # BLD AUTO: 2.8 K/UL
LYMPHOCYTES NFR BLD: 34.9 %
MCH RBC QN AUTO: 26.5 PG
MCHC RBC AUTO-ENTMCNC: 32.8 %
MCV RBC AUTO: 81 FL
MITRAL VALVE REGURGITATION: ABNORMAL
MONOCYTES # BLD AUTO: 0.6 K/UL
MONOCYTES NFR BLD: 7.9 %
NEUTROPHILS # BLD AUTO: 4.4 K/UL
NEUTROPHILS NFR BLD: 55.2 %
NONHDLC SERPL-MCNC: 125 MG/DL
PLATELET # BLD AUTO: 304 K/UL
PMV BLD AUTO: 10.4 FL
POTASSIUM SERPL-SCNC: 3.3 MMOL/L
RBC # BLD AUTO: 4.64 M/UL
RETIRED EF AND QEF - SEE NOTES: 45 (ref 55–65)
SODIUM SERPL-SCNC: 137 MMOL/L
TRICUSPID VALVE REGURGITATION: ABNORMAL
TRIGL SERPL-MCNC: 55 MG/DL
TROPONIN I SERPL DL<=0.01 NG/ML-MCNC: 0.2 NG/ML
WBC # BLD AUTO: 7.89 K/UL

## 2017-05-16 PROCEDURE — 80048 BASIC METABOLIC PNL TOTAL CA: CPT

## 2017-05-16 PROCEDURE — 93306 TTE W/DOPPLER COMPLETE: CPT

## 2017-05-16 PROCEDURE — 99238 HOSP IP/OBS DSCHRG MGMT 30/<: CPT | Mod: ,,, | Performed by: INTERNAL MEDICINE

## 2017-05-16 PROCEDURE — 93306 TTE W/DOPPLER COMPLETE: CPT | Mod: 26,,, | Performed by: INTERNAL MEDICINE

## 2017-05-16 PROCEDURE — 25000003 PHARM REV CODE 250: Performed by: INTERNAL MEDICINE

## 2017-05-16 PROCEDURE — 85025 COMPLETE CBC W/AUTO DIFF WBC: CPT

## 2017-05-16 PROCEDURE — 80061 LIPID PANEL: CPT

## 2017-05-16 PROCEDURE — 84484 ASSAY OF TROPONIN QUANT: CPT

## 2017-05-16 PROCEDURE — 36415 COLL VENOUS BLD VENIPUNCTURE: CPT

## 2017-05-16 PROCEDURE — 82553 CREATINE MB FRACTION: CPT

## 2017-05-16 RX ORDER — AMLODIPINE BESYLATE 10 MG/1
10 TABLET ORAL DAILY
Qty: 30 TABLET | Refills: 11 | Status: SHIPPED | OUTPATIENT
Start: 2017-05-16 | End: 2017-05-23

## 2017-05-16 RX ORDER — METOPROLOL SUCCINATE 25 MG/1
25 TABLET, EXTENDED RELEASE ORAL DAILY
Qty: 30 TABLET | Refills: 11 | Status: SHIPPED | OUTPATIENT
Start: 2017-05-16 | End: 2018-08-21 | Stop reason: SDUPTHER

## 2017-05-16 RX ADMIN — ASPIRIN 81 MG: 81 TABLET, COATED ORAL at 09:05

## 2017-05-16 RX ADMIN — METOPROLOL SUCCINATE 25 MG: 25 TABLET, EXTENDED RELEASE ORAL at 09:05

## 2017-05-16 RX ADMIN — ZOLPIDEM TARTRATE 5 MG: 5 TABLET, FILM COATED ORAL at 12:05

## 2017-05-16 RX ADMIN — AMLODIPINE BESYLATE 10 MG: 5 TABLET ORAL at 09:05

## 2017-05-16 NOTE — ASSESSMENT & PLAN NOTE
BP now better controlled with IV lopressor given in cath lab  CP resolved  Cath with essentially normal cors  Will cont norvasc and initiate Toprol XL  Repeat CE in am to exclude coronary event  Check echo in am

## 2017-05-16 NOTE — PLAN OF CARE
05/16/17 1023   Final Note   Assessment Type Final Discharge Note   Discharge Disposition Home   Discharge planning education complete? Yes   What phone number can be called within the next 1-3 days to see how you are doing after discharge? [phone]   Hospital Follow Up  Appt(s) scheduled? Yes   Discharge plans and expectations educations in teach back method with documentation complete? Yes   Offered [facility] -- Bedside Delivery? n/a   Discharge/Hospital Encounter Summary to (non-[facility]) PCP Yes   Referral to Outpatient Case Management complete? n/a   Referral to / orders for Home Health Complete? n/a   30 day supply of medicines given at discharge, if documented non-compliance / non-adherence? n/a   Any social issues identified prior to discharge? n/a   Did you assess the readiness or willingness of the family or caregiver to support self management of care? n/a   Right Care Referral Info   Post Acute Recommendation No Care   Educated regarding appointment schedule. Inquired regarding understanding of signs/symptoms of problems. Informed charge BRENDON Andino that KAMILAH has completed d/c tasks.

## 2017-05-16 NOTE — NURSING
Upon placement of discharge orders, IV's were removed. Follow-up appointments; signs & symptoms were explained; medication dosing explained, patient verbalized understanding to all. Patient was able to dress herself. RN wheeled patient down to [facility] where her  picked her up in their personal vehicle.

## 2017-05-16 NOTE — SUBJECTIVE & OBJECTIVE
Past Medical History:   Diagnosis Date    Hypertension        No past surgical history on file.    Review of patient's allergies indicates:  No Known Allergies    Current Facility-Administered Medications on File Prior to Encounter   Medication    [COMPLETED] aspirin tablet 325 mg    [COMPLETED] clopidogrel tablet 600 mg    [COMPLETED] heparin (porcine) injection 5,000 Units    [COMPLETED] metoprolol injection 5 mg    [COMPLETED] nitroGLYCERIN (NITROSTAT) 0.4 MG SL tablet    [COMPLETED] nitroGLYCERIN 2% TD oint ointment 1 inch     Current Outpatient Prescriptions on File Prior to Encounter   Medication Sig    amitriptyline (ELAVIL) 10 MG tablet Take 1 tablet (10 mg total) by mouth nightly as needed for Pain.    amlodipine (NORVASC) 10 MG tablet Take 1 tablet (10 mg total) by mouth once daily.    azithromycin (Z-LAMONT) 250 MG tablet Take 1 tablet (250 mg total) by mouth once daily. 1 pack, take as directed    azithromycin (Z-LAMONT) 250 MG tablet Take 1 tablet (250 mg total) by mouth once daily. 1 pack, take as directed    cyclobenzaprine (FLEXERIL) 10 MG tablet Take 1 tablet (10 mg total) by mouth every evening.    cyclobenzaprine (FLEXERIL) 10 MG tablet Take 1 tablet (10 mg total) by mouth every evening.    cyclobenzaprine (FLEXERIL) 5 MG tablet Take 1 tablet (5 mg total) by mouth nightly.    ENGERIX-B, PF, 20 mcg/mL Susp     ergocalciferol (ERGOCALCIFEROL) 50,000 unit Cap Take 1 capsule (50,000 Units total) by mouth every 7 days.    fluticasone (FLONASE) 50 mcg/actuation nasal spray 1 spray by Each Nare route once daily.    fluticasone (FLONASE) 50 mcg/actuation nasal spray 1 spray by Each Nare route once daily.    fluticasone (FLONASE) 50 mcg/actuation nasal spray 1 spray by Each Nare route once daily.    loratadine (CLARITIN) 10 mg tablet Take 1 tablet (10 mg total) by mouth once daily.    loratadine (CLARITIN) 10 mg tablet Take 1 tablet (10 mg total) by mouth once daily.    naproxen (NAPROSYN)  500 MG tablet Take 1 tablet (500 mg total) by mouth 2 (two) times daily with meals.    naproxen (NAPROSYN) 500 MG tablet Take 1 tablet (500 mg total) by mouth 2 (two) times daily with meals.    omeprazole (PRILOSEC) 20 MG capsule Take 1 capsule (20 mg total) by mouth once daily.     Family History     Problem Relation (Age of Onset)    Appendicitis Mother    Hypertension Mother, Father    Thyroid disease Mother        Social History Main Topics    Smoking status: Never Smoker    Smokeless tobacco: Not on file    Alcohol use Yes      Comment: Occasionally    Drug use: No    Sexual activity: Not on file     Review of Systems   Unable to perform ROS: acuity of condition     Objective:     Vital Signs (Most Recent):  Temp:  (unable to obtain) (05/15/17 1904)  Pulse: 99 (05/15/17 1904)  Resp: 18 (05/15/17 1904)  BP: (!) 163/105 (05/15/17 1904)  SpO2: 100 % (05/15/17 1904) Vital Signs (24h Range):  Pulse:  [] 99  Resp:  [18-20] 18  SpO2:  [97 %-100 %] 100 %  BP: (163)/(105-109) 163/105     Weight: 84.4 kg (186 lb)  Body mass index is 29.13 kg/(m^2).    SpO2: 100 %  O2 Device (Oxygen Therapy): room air    No intake or output data in the 24 hours ending 05/15/17 1943    Lines/Drains/Airways     Peripheral Intravenous Line                 Peripheral IV - Single Lumen 05/15/17 1819 Left Antecubital less than 1 day         Peripheral IV - Single Lumen 05/15/17 1820 Right Antecubital less than 1 day         Peripheral IV - Single Lumen 05/15/17 1830 Left Antecubital less than 1 day         Peripheral IV - Single Lumen 05/15/17 1830 Right Antecubital less than 1 day                Physical Exam   Constitutional: She is oriented to person, place, and time. She appears well-developed and well-nourished. No distress.   HENT:   Head: Normocephalic and atraumatic.   Mouth/Throat: No oropharyngeal exudate.   Eyes: Conjunctivae and EOM are normal. Pupils are equal, round, and reactive to light. No scleral icterus.   Neck:  Normal range of motion. Neck supple. No JVD present. No tracheal deviation present.   Cardiovascular: Normal rate, regular rhythm, S1 normal and S2 normal.  Exam reveals no gallop and no friction rub.    No murmur heard.  R rad [place] normal   Pulmonary/Chest: Effort normal and breath sounds normal. No respiratory distress. She has no wheezes. She has no rales. She exhibits no tenderness.   Abdominal: Soft. Bowel sounds are normal. There is no tenderness.   Musculoskeletal: Normal range of motion. She exhibits no edema.   Neurological: She is alert and oriented to person, place, and time. No cranial nerve deficit.   Skin: Skin is warm and dry. She is not diaphoretic.   Psychiatric: She has a normal mood and affect. Her behavior is normal. Judgment normal.       Current Medications:   amlodipine  10 mg Oral Daily        zolpidem    Laboratory:  CBC:    Recent Labs  Lab 03/28/17  0942   WHITE BLOOD CELL COUNT 5.3   HEMOGLOBIN 12.8   HEMATOCRIT 40.1   PLATELETS 353       CHEMISTRIES:    Recent Labs  Lab 03/28/17  0942   GLUCOSE 85   SODIUM 139   POTASSIUM 3.7   BUN BLD 10   CREATININE 0.75   EGFR IF NON- 104   CALCIUM 9.5       CARDIAC BIOMARKERS:        COAGS:        LIPIDS/LFTS:    Recent Labs  Lab 03/28/17  0942   CHOLESTEROL 213 H   TRIGLYCERIDES 45      LDL CALC 98   AST 14   ALT 12           Diagnostic Results:  ECG (personally reviewed tracings):   5/15/17 EDMA , LBBB (presumably new)    Chest X-Ray (personally reviewed image(s)): 5/15/17 NAD    Cath 5/15/17:   EDP 23  EF 55%, normal WM  LM: normal  LAD: normal with MLI at apex  Ramus: normal  LCx: Normal  RCA: (dom) normal  Imp:   CP with new LBBB  Essentially normal cors/LVEF  R rad vasband for hemostasis  Plan:  Observe in ICU  BP control  Likely home in 24-48 hrs

## 2017-05-16 NOTE — H&P
"[facility]  Cardiology  History and Physical     Patient Name: Hollie Saldana  MRN: [id number]  Admission Date: 5/15/2017  Code Status: No Order   Attending Provider: Dez Hayden MD   Primary Care Physician: Hebert Shepherd MD  Principal Problem:New onset left bundle branch block (LBBB)    Patient information was obtained from patient and ER records.     Subjective:     Chief Complaint:  CP/new LBBB    HPI:  Chief complaint: Chest pain, new left bundle branch block    The patient is a very pleasant 36-year-old -American woman with no prior cardiovascular history, and reported history of hypertension on Norvasc.  She presented to the [place] emergency room complaining of chest discomfort "all day".  There she was noted to have sinus tachycardia and hypertension with a blood pressure 170/110 per my discussion with the ER attending.  NuLev bundle branch block was noted on her electrocardiogram, when compared with an old EKG from [facility] from 1 year prior.  Given the ongoing chest pain, new left bundle-branch block, there was obvious concern for ST elevation myocardial infarction.  The patient was emergently transferred to the [facility] catheter lab.  Risks benefits and alternatives of catheterization were discussed with the patient, and she agreed to proceed with diagnostic catheterization and potential coronary intervention.  Permit was signed.  Her chest discomfort was described as a pressure sensation, however her chest discomfort had resolved upon arrival to the catheter lab.  That being said, her EKG still noted a wide QRS complex consistent with left bundle branch block.    Risks, benefits and alternatives of the catheterization procedure were discussed with the patient which include but are not limited to: bleeding, infection, death, heart attack, arrhythmia, kidney failure, stroke, need for emergency surgery, etc.  Patient understands and and agrees to " proceed.  Consent was placed on the chart.      Diagnostic catheterization revealed ejection fraction of 55% without wall motion abnormalities.  End-diastolic pressure was 23 mmHg.  There was no significant coronary artery disease, and in fact the coronary arteries were essentially normal save for the transapical LAD which appeared to have minor irregularity.  A right radial artery fast band was placed, the patient was transferred to the intensive care unit in stable condition.    Family history appears to be negative for premature CAD.    The patient does not appear to be a current smoker.      Past Medical History:   Diagnosis Date    Hypertension        No past surgical history on file.    Review of patient's allergies indicates:  No Known Allergies    Current Facility-Administered Medications on File Prior to Encounter   Medication    [COMPLETED] aspirin tablet 325 mg    [COMPLETED] clopidogrel tablet 600 mg    [COMPLETED] heparin (porcine) injection 5,000 Units    [COMPLETED] metoprolol injection 5 mg    [COMPLETED] nitroGLYCERIN (NITROSTAT) 0.4 MG SL tablet    [COMPLETED] nitroGLYCERIN 2% TD oint ointment 1 inch     Current Outpatient Prescriptions on File Prior to Encounter   Medication Sig    amitriptyline (ELAVIL) 10 MG tablet Take 1 tablet (10 mg total) by mouth nightly as needed for Pain.    amlodipine (NORVASC) 10 MG tablet Take 1 tablet (10 mg total) by mouth once daily.    azithromycin (Z-LAMONT) 250 MG tablet Take 1 tablet (250 mg total) by mouth once daily. 1 pack, take as directed    azithromycin (Z-LAMONT) 250 MG tablet Take 1 tablet (250 mg total) by mouth once daily. 1 pack, take as directed    cyclobenzaprine (FLEXERIL) 10 MG tablet Take 1 tablet (10 mg total) by mouth every evening.    cyclobenzaprine (FLEXERIL) 10 MG tablet Take 1 tablet (10 mg total) by mouth every evening.    cyclobenzaprine (FLEXERIL) 5 MG tablet Take 1 tablet (5 mg total) by mouth nightly.    ENGERIX-B, PF, 20  mcg/mL Susp     ergocalciferol (ERGOCALCIFEROL) 50,000 unit Cap Take 1 capsule (50,000 Units total) by mouth every 7 days.    fluticasone (FLONASE) 50 mcg/actuation nasal spray 1 spray by Each Nare route once daily.    fluticasone (FLONASE) 50 mcg/actuation nasal spray 1 spray by Each Nare route once daily.    fluticasone (FLONASE) 50 mcg/actuation nasal spray 1 spray by Each Nare route once daily.    loratadine (CLARITIN) 10 mg tablet Take 1 tablet (10 mg total) by mouth once daily.    loratadine (CLARITIN) 10 mg tablet Take 1 tablet (10 mg total) by mouth once daily.    naproxen (NAPROSYN) 500 MG tablet Take 1 tablet (500 mg total) by mouth 2 (two) times daily with meals.    naproxen (NAPROSYN) 500 MG tablet Take 1 tablet (500 mg total) by mouth 2 (two) times daily with meals.    omeprazole (PRILOSEC) 20 MG capsule Take 1 capsule (20 mg total) by mouth once daily.     Family History     Problem Relation (Age of Onset)    Appendicitis Mother    Hypertension Mother, Father    Thyroid disease Mother        Social History Main Topics    Smoking status: Never Smoker    Smokeless tobacco: Not on file    Alcohol use Yes      Comment: Occasionally    Drug use: No    Sexual activity: Not on file     Review of Systems   Unable to perform ROS: acuity of condition     Objective:     Vital Signs (Most Recent):  Temp:  (unable to obtain) (05/15/17 1904)  Pulse: 99 (05/15/17 1904)  Resp: 18 (05/15/17 1904)  BP: (!) 163/105 (05/15/17 1904)  SpO2: 100 % (05/15/17 1904) Vital Signs (24h Range):  Pulse:  [] 99  Resp:  [18-20] 18  SpO2:  [97 %-100 %] 100 %  BP: (163)/(105-109) 163/105     Weight: 84.4 kg (186 lb)  Body mass index is 29.13 kg/(m^2).    SpO2: 100 %  O2 Device (Oxygen Therapy): room air    No intake or output data in the 24 hours ending 05/15/17 1943    Lines/Drains/Airways     Peripheral Intravenous Line                 Peripheral IV - Single Lumen 05/15/17 1819 Left Antecubital less than 1 day          Peripheral IV - Single Lumen 05/15/17 1820 Right Antecubital less than 1 day         Peripheral IV - Single Lumen 05/15/17 1830 Left Antecubital less than 1 day         Peripheral IV - Single Lumen 05/15/17 1830 Right Antecubital less than 1 day                Physical Exam   Constitutional: She is oriented to person, place, and time. She appears well-developed and well-nourished. No distress.   HENT:   Head: Normocephalic and atraumatic.   Mouth/Throat: No oropharyngeal exudate.   Eyes: Conjunctivae and EOM are normal. Pupils are equal, round, and reactive to light. No scleral icterus.   Neck: Normal range of motion. Neck supple. No JVD present. No tracheal deviation present.   Cardiovascular: Normal rate, regular rhythm, S1 normal and S2 normal.  Exam reveals no gallop and no friction rub.    No murmur heard.  R rad [place] normal   Pulmonary/Chest: Effort normal and breath sounds normal. No respiratory distress. She has no wheezes. She has no rales. She exhibits no tenderness.   Abdominal: Soft. Bowel sounds are normal. There is no tenderness.   Musculoskeletal: Normal range of motion. She exhibits no edema.   Neurological: She is alert and oriented to person, place, and time. No cranial nerve deficit.   Skin: Skin is warm and dry. She is not diaphoretic.   Psychiatric: She has a normal mood and affect. Her behavior is normal. Judgment normal.       Current Medications:   amlodipine  10 mg Oral Daily        zolpidem    Laboratory:  CBC:    Recent Labs  Lab 03/28/17  0942   WHITE BLOOD CELL COUNT 5.3   HEMOGLOBIN 12.8   HEMATOCRIT 40.1   PLATELETS 353       CHEMISTRIES:    Recent Labs  Lab 03/28/17  0942   GLUCOSE 85   SODIUM 139   POTASSIUM 3.7   BUN BLD 10   CREATININE 0.75   EGFR IF NON- 104   CALCIUM 9.5       CARDIAC BIOMARKERS:        COAGS:        LIPIDS/LFTS:    Recent Labs  Lab 03/28/17  0942   CHOLESTEROL 213 H   TRIGLYCERIDES 45      LDL CALC 98   AST 14   ALT 12            Diagnostic Results:  ECG (personally reviewed tracings):   5/15/17 EDMA , LBBB (presumably new)    Chest X-Ray (personally reviewed image(s)): 5/15/17 NAD    Cath 5/15/17:   EDP 23  EF 55%, normal WM  LM: normal  LAD: normal with MLI at apex  Ramus: normal  LCx: Normal  RCA: (dom) normal  Imp:   CP with new LBBB  Essentially normal cors/LVEF  R rad vasband for hemostasis  Plan:  Observe in ICU  BP control  Likely home in 24-48 hrs    Assessment and Plan:     Hypertensive urgency  BP now better controlled with IV lopressor given in cath lab  CP resolved  Cath with essentially normal cors  Will cont norvasc and initiate Toprol XL  Repeat CE in am to exclude coronary event  Check echo in am    * New onset left bundle branch block (LBBB)  As above  Check echo      VTE Risk Mitigation     None        Will add enox 40mg qd for VTE prophylaxis    Critical care time 35 min (including d/w other providers, exclusive of cath)    Dez Hayden MD  Cardiology   [facility]

## 2017-05-16 NOTE — PLAN OF CARE
Problem: Patient Care Overview  Goal: Plan of Care Review  Outcome: Ongoing (interventions implemented as appropriate)  Pt remain stable in the ICU throughout the night. NADN. Remain free from injury. S/P emergent cath via right radial site. No interventions noted. VATS band removed per protocol and pressure dressing applied to site. Site CDI. No hematoma noted. No chest pain noted. Possible echo this morning. Plan of care reviewed with patient.

## 2017-05-16 NOTE — DISCHARGE SUMMARY
"[facility] [place]  Discharge Summary      Admit Date: 5/15/2017    Discharge Date and Time:  05/16/2017 8:43 AM    Attending Physician: Dez Hayden MD     Reason for Admission: New LBBB    Procedures Performed: * No surgery found *    Hospital Course (synopsis of major diagnoses, care, treatment, and services provided during the course of the hospital stay):     The patient is a very pleasant 36-year-old -American woman with no prior cardiovascular history, and reported history of hypertension on Norvasc. She presented to the [place] emergency room complaining of chest discomfort "all day". There she was noted to have sinus tachycardia and hypertension with a blood pressure 170/110 per my discussion with the ER attending. NuLev bundle branch block was noted on her electrocardiogram, when compared with an old EKG from [facility] from 1 year prior. Given the ongoing chest pain, new left bundle-branch block, there was obvious concern for ST elevation myocardial infarction. The patient was emergently transferred to the [facility] catheter lab. Risks benefits and alternatives of catheterization were discussed with the patient, and she agreed to proceed with diagnostic catheterization and potential coronary intervention. Permit was signed. Her chest discomfort was described as a pressure sensation, however her chest discomfort had resolved upon arrival to the catheter lab. That being said, her EKG still noted a wide QRS complex consistent with left bundle branch block.     Risks, benefits and alternatives of the catheterization procedure were discussed with the patient which include but are not limited to: bleeding, infection, death, heart attack, arrhythmia, kidney failure, stroke, need for emergency surgery, etc. Patient understands and and agrees to proceed. Consent was placed on the chart.        Diagnostic catheterization revealed ejection fraction of 55% without wall " motion abnormalities. End-diastolic pressure was 23 mmHg. There was no significant coronary artery disease, and in fact the coronary arteries were essentially normal save for the transapical LAD which appeared to have minor irregularity. A right radial artery fast band was placed, the patient was transferred to the intensive care unit in stable condition.    Echo prelim - low normal EF, no major valvular abnormalities    Consults: none    Significant Diagnostic Studies: Labs: All labs within the past 24 hours have been reviewed    Final Diagnoses:    Principal Problem: New onset left bundle branch block (LBBB)   Secondary Diagnoses:   Active Hospital Problems    Diagnosis  POA    *New onset left bundle branch block (LBBB) [I44.7]  Yes    Hypertensive urgency [I16.0]  Yes      Resolved Hospital Problems    Diagnosis Date Resolved POA   No resolved problems to display.       Discharged Condition: good    Disposition: Home or Self Care    Follow Up/Patient Instructions:     Medications:  Reconciled Home Medications:   Current Discharge Medication List      START taking these medications    Details   metoprolol succinate (TOPROL-XL) 25 MG 24 hr tablet Take 1 tablet (25 mg total) by mouth once daily.  Qty: 30 tablet, Refills: 11         CONTINUE these medications which have CHANGED    Details   !! amlodipine (NORVASC) 10 MG tablet Take 1 tablet (10 mg total) by mouth once daily.  Qty: 30 tablet, Refills: 11       !! - Potential duplicate medications found. Please discuss with provider.      CONTINUE these medications which have NOT CHANGED    Details   amitriptyline (ELAVIL) 10 MG tablet Take 1 tablet (10 mg total) by mouth nightly as needed for Pain.  Qty: 30 tablet, Refills: 1      !! amlodipine (NORVASC) 10 MG tablet Take 1 tablet (10 mg total) by mouth once daily.  Qty: 30 tablet, Refills: 2    Comments: This prescription was filled today(2/24/2017). Any refills authorized will be placed on file.      !!  azithromycin (Z-LAMONT) 250 MG tablet Take 1 tablet (250 mg total) by mouth once daily. 1 pack, take as directed  Qty: 6 tablet, Refills: 0      !! azithromycin (Z-LAMONT) 250 MG tablet Take 1 tablet (250 mg total) by mouth once daily. 1 pack, take as directed  Qty: 6 tablet, Refills: 0      !! cyclobenzaprine (FLEXERIL) 10 MG tablet Take 1 tablet (10 mg total) by mouth every evening.  Qty: 30 tablet, Refills: 0      !! cyclobenzaprine (FLEXERIL) 10 MG tablet Take 1 tablet (10 mg total) by mouth every evening.  Qty: 30 tablet, Refills: 0      !! cyclobenzaprine (FLEXERIL) 5 MG tablet Take 1 tablet (5 mg total) by mouth nightly.  Qty: 30 tablet, Refills: 0      ENGERIX-B, PF, 20 mcg/mL Susp       ergocalciferol (ERGOCALCIFEROL) 50,000 unit Cap Take 1 capsule (50,000 Units total) by mouth every 7 days.  Qty: 12 capsule, Refills: 2      !! fluticasone (FLONASE) 50 mcg/actuation nasal spray 1 spray by Each Nare route once daily.  Qty: 16 g, Refills: 1      !! fluticasone (FLONASE) 50 mcg/actuation nasal spray 1 spray by Each Nare route once daily.  Qty: 16 g, Refills: 1      !! fluticasone (FLONASE) 50 mcg/actuation nasal spray 1 spray by Each Nare route once daily.  Qty: 16 g, Refills: 1      !! loratadine (CLARITIN) 10 mg tablet Take 1 tablet (10 mg total) by mouth once daily.  Qty: 30 tablet, Refills: 0      !! loratadine (CLARITIN) 10 mg tablet Take 1 tablet (10 mg total) by mouth once daily.  Qty: 30 tablet, Refills: 0      !! naproxen (NAPROSYN) 500 MG tablet Take 1 tablet (500 mg total) by mouth 2 (two) times daily with meals.  Qty: 30 tablet, Refills: 1      !! naproxen (NAPROSYN) 500 MG tablet Take 1 tablet (500 mg total) by mouth 2 (two) times daily with meals.  Qty: 30 tablet, Refills: 1      omeprazole (PRILOSEC) 20 MG capsule Take 1 capsule (20 mg total) by mouth once daily.  Qty: 30 capsule, Refills: 1       !! - Potential duplicate medications found. Please discuss with provider.        Activity ad matthew  OV with  Dr Hayden 1 week  Cardiac diet  Medications as above      No discharge procedures on file.

## 2017-05-16 NOTE — PROGRESS NOTES
[facility]    WRITTEN HEALTHCARE and DISCHARGE INFORMATION   FROM YOUR CARE MANAGER  Follow-up Information     Follow up with Dez Hayden MD On 5/23/2017.    Specialties:  Cardiology, INTERVENTIONAL CARDIOLOGY    Why:  2 pm (Tuesday)--arrive 15 minutes early, bring ALL your medications, your discharge summary, your picture ID and insurance cards    Contact information:    [address]  [address]  [place] [address]  [phone]          Follow up with Hebert Shepherd MD On 5/26/2017.    Specialty:  Family Medicine    Why:  noon-(Friday) as previously scheduled--arrive 15 minutes early, bring ALL your medications, your discharge summary, your picture ID and your insurance card    Contact information:    [address] [address]  [phone]        [facility] Care Line - 24/7 Assistance  Registered [facility] nurses can provide appointment booking, health education, clinical advisement, and other advisory services.   Call for this free service at [phone].    Thank you for choosing [facility] for your care.  Within 48-72 hours after leaving the hospital you will receive a call from [facility] Nurses following up to see how you are doing. The team will ask you a few questions and the call will last approximately 20 minutes.     Please answer any calls you may receive from [facility] we want to continue to support you as you manage your healthcare needs. [facility] is happy to have the opportunity to serve you.     Sincerely,  Your [facility] Team,   THANK YOU FOR LETTING ME ASSIST WITH YOUR DISCHARGE PLANNING,     Radha Newton [facility]   [place]  [phone]

## 2017-05-23 ENCOUNTER — OFFICE VISIT (OUTPATIENT)
Dept: CARDIOLOGY | Facility: CLINIC | Age: 37
End: 2017-05-23
Payer: MEDICAID

## 2017-05-23 VITALS
SYSTOLIC BLOOD PRESSURE: 136 MMHG | OXYGEN SATURATION: 100 % | WEIGHT: 185.63 LBS | BODY MASS INDEX: 29.83 KG/M2 | HEIGHT: 66 IN | HEART RATE: 88 BPM | RESPIRATION RATE: 15 BRPM | DIASTOLIC BLOOD PRESSURE: 79 MMHG

## 2017-05-23 DIAGNOSIS — I44.7 NEW ONSET LEFT BUNDLE BRANCH BLOCK (LBBB): Primary | ICD-10-CM

## 2017-05-23 DIAGNOSIS — I11.9 BENIGN HYPERTENSIVE HEART DISEASE WITHOUT HEART FAILURE: ICD-10-CM

## 2017-05-23 DIAGNOSIS — E66.9 NON MORBID OBESITY, UNSPECIFIED OBESITY TYPE: ICD-10-CM

## 2017-05-23 PROCEDURE — 99999 PR PBB SHADOW E&M-EST. PATIENT-LVL III: CPT | Mod: PBBFAC,,, | Performed by: INTERNAL MEDICINE

## 2017-05-23 PROCEDURE — 99214 OFFICE O/P EST MOD 30 MIN: CPT | Mod: S$PBB,,, | Performed by: INTERNAL MEDICINE

## 2017-05-23 PROCEDURE — 99213 OFFICE O/P EST LOW 20 MIN: CPT | Mod: PBBFAC | Performed by: INTERNAL MEDICINE

## 2017-05-23 NOTE — PROGRESS NOTES
CARDIOVASCULAR PROGRESS NOTE    REASON FOR CONSULT:   Hollie Saldana is a 36 y.o. female who presents for follow up of recent hospitalization.    PCP: Joao  HISTORY OF PRESENT ILLNESS:   The patient comes into the office today for follow-up of her catheterization and recent hospitalization with chest pain, hypertension, and presumably new left bundle-branch block.  She was referred from the [place] ED for emergent catheterization which revealed essentially normal coronaries.  The patient's had no further chest pain, nor any shortness of breath.  She denies palpitations, lightheadedness, dizziness, or syncope.  There's been no PND, orthopnea, or lower extremity edema.  She denies melena, hematuria, or claudicant symptoms.  There've been no comp occasions related to her right radial access site.    CARDIOVASCULAR HISTORY:   LBBB (normal cath 5/2017)    PAST MEDICAL HISTORY:     Past Medical History:   Diagnosis Date    Hypertension        PAST SURGICAL HISTORY:   History reviewed. No pertinent surgical history.    ALLERGIES AND MEDICATION:   Review of patient's allergies indicates:  No Known Allergies  Previous Medications    AMLODIPINE (NORVASC) 10 MG TABLET    Take 1 tablet (10 mg total) by mouth once daily.    METOPROLOL SUCCINATE (TOPROL-XL) 25 MG 24 HR TABLET    Take 1 tablet (25 mg total) by mouth once daily.       SOCIAL HISTORY:     Social History     Social History    Marital status: Single     Spouse name: N/A    Number of children: N/A    Years of education: N/A     Occupational History    Not on file.     Social History Main Topics    Smoking status: Never Smoker    Smokeless tobacco: Not on file    Alcohol use Yes      Comment: Occasionally    Drug use: No    Sexual activity: Not on file     Other Topics Concern    Not on file     Social History Narrative    No narrative on file       FAMILY HISTORY:     Family History   Problem Relation Age of Onset    Hypertension Mother     Thyroid  "disease Mother     Appendicitis Mother     Hypertension Father        REVIEW OF SYSTEMS:   Review of Systems   Constitutional: Negative for chills, diaphoresis and fever.   HENT: Negative for nosebleeds.    Eyes: Negative for blurred vision, double vision and photophobia.   Respiratory: Negative for hemoptysis, shortness of breath and wheezing.    Cardiovascular: Negative for chest pain, palpitations, orthopnea, claudication, leg swelling and PND.   Gastrointestinal: Negative for abdominal pain, blood in stool, heartburn, melena, nausea and vomiting.   Genitourinary: Negative for flank pain and hematuria.   Musculoskeletal: Negative for falls, myalgias and neck pain.   Skin: Negative for rash.   Neurological: Negative for dizziness, seizures, loss of consciousness, weakness and headaches.   Endo/Heme/Allergies: Negative for polydipsia. Does not bruise/bleed easily.   Psychiatric/Behavioral: Negative for depression and memory loss. The patient is not nervous/anxious.        PHYSICAL EXAM:     Vitals:    05/23/17 1409   BP: 136/79   Pulse: 88   Resp: 15    Body mass index is 29.96 kg/m².  Weight: 84.2 kg (185 lb 10 oz)   Height: 5' 6" (167.6 cm)     Physical Exam   Constitutional: She is oriented to person, place, and time. She appears well-developed and well-nourished. She is cooperative.  Non-toxic appearance. No distress.   HENT:   Head: Normocephalic and atraumatic.   Eyes: Conjunctivae and EOM are normal. Pupils are equal, round, and reactive to light. No scleral icterus.   Neck: Trachea normal and normal range of motion. Neck supple. Normal carotid pulses and no JVD present. Carotid bruit is not present. No no neck rigidity. No edema present. No thyromegaly present.   Cardiovascular: Normal rate, regular rhythm, S1 normal and S2 normal.  PMI is not displaced.  Exam reveals no gallop and no friction rub.    No murmur heard.  Pulses:       Carotid pulses are 2+ on the right side, and 2+ on the left side.  R " radial access site c/d/i.  No hematoma.   Pulmonary/Chest: Effort normal and breath sounds normal. No respiratory distress. She has no wheezes. She has no rales. She exhibits no tenderness.   Abdominal: Soft. Bowel sounds are normal. She exhibits no distension and no mass. There is no hepatosplenomegaly. There is no tenderness.   Musculoskeletal: She exhibits no edema or tenderness.   Feet:   Right Foot:   Skin Integrity: Negative for ulcer.   Left Foot:   Skin Integrity: Negative for ulcer.   Neurological: She is alert and oriented to person, place, and time. No cranial nerve deficit.   Skin: Skin is warm and dry. No rash noted. No erythema.   Psychiatric: She has a normal mood and affect. Her speech is normal and behavior is normal.   Vitals reviewed.      DATA:   EKG: (personally reviewed tracing)  5/15/17 EDMA , LBBB (presumably new)    Laboratory:  CBC:    Recent Labs  Lab 03/28/17  0942 05/16/17  0351   WHITE BLOOD CELL COUNT 5.3 7.89   HEMOGLOBIN 12.8 12.3   HEMATOCRIT 40.1 37.5   PLATELETS 353 304       CHEMISTRIES:    Recent Labs  Lab 03/28/17  0942 05/16/17  0351   GLUCOSE 85 84   SODIUM 139 137   POTASSIUM 3.7 3.3 L   BUN BLD 10 8   CREATININE 0.75 0.7   EGFR IF   --  >60   EGFR IF NON- 104 >60   CALCIUM 9.5 8.8       CARDIAC BIOMARKERS:    Recent Labs  Lab 05/16/17  0351      CPK MB 3.3   TROPONIN I 0.198 H       COAGS:        LIPIDS/LFTS:    Recent Labs  Lab 03/28/17  0942 05/16/17  0351   CHOLESTEROL 213 H 199   TRIGLYCERIDES 45 55    74   LDL CALC 98  --    LDL CHOLESTEROL  --  114.0   NON-HDL CHOLESTEROL  --  125   AST 14  --    ALT 12  --        Cardiovascular Testing:  Echo 5/16/17    1 - Mildly depressed left ventricular systolic function (EF 45-50%).     2 - Impaired LV relaxation, normal LAP (grade 1 diastolic dysfunction).     3 - Trivial mitral regurgitation.     4 - Trivial tricuspid regurgitation.     5 - Trivial pulmonic regurgitation.      Cath 5/15/17:   EDP 23  EF 55%, normal WM  LM: normal  LAD: normal with MLI at apex  Ramus: normal  LCx: Normal  RCA: (dom) normal  Imp:   CP with new LBBB  Essentially normal cors/LVEF  R rad vasband for hemostasis    ASSESSMENT:   # HTN, controlled  # LBBB  # BMI 30    PLAN:   Cont med rx htn  Diet/exercise/weight loss  Avoid Na+  RTC prn    Dez Hayden MD, FACC

## 2018-05-26 ENCOUNTER — HOSPITAL ENCOUNTER (EMERGENCY)
Facility: HOSPITAL | Age: 38
Discharge: HOME | End: 2018-05-26
Attending: EMERGENCY MEDICINE
Payer: MEDICAID

## 2018-05-26 VITALS
WEIGHT: 185 LBS | DIASTOLIC BLOOD PRESSURE: 92 MMHG | OXYGEN SATURATION: 99 % | BODY MASS INDEX: 29.86 KG/M2 | RESPIRATION RATE: 18 BRPM | SYSTOLIC BLOOD PRESSURE: 140 MMHG | TEMPERATURE: 98 F | HEART RATE: 90 BPM

## 2018-05-26 DIAGNOSIS — J30.2 SEASONAL ALLERGIC RHINITIS, UNSPECIFIED TRIGGER: Primary | ICD-10-CM

## 2018-05-26 DIAGNOSIS — J30.9 ALLERGIC RHINITIS WITH POSTNASAL DRIP: ICD-10-CM

## 2018-05-26 DIAGNOSIS — R09.82 ALLERGIC RHINITIS WITH POSTNASAL DRIP: ICD-10-CM

## 2018-05-26 DIAGNOSIS — J01.00 ACUTE MAXILLARY SINUSITIS, RECURRENCE NOT SPECIFIED: ICD-10-CM

## 2018-05-26 LAB
B-HCG UR QL: NEGATIVE
BILIRUBIN, POC UA: NEGATIVE
BLOOD, POC UA: ABNORMAL
CLARITY, POC UA: CLEAR
COLOR, POC UA: YELLOW
CTP QC/QA: YES
GLUCOSE, POC UA: NEGATIVE
KETONES, POC UA: NEGATIVE
LEUKOCYTE EST, POC UA: NEGATIVE
NITRITE, POC UA: NEGATIVE
PH UR STRIP: 7.5 [PH]
PROTEIN, POC UA: ABNORMAL
SPECIFIC GRAVITY, POC UA: 1.02
UROBILINOGEN, POC UA: 1 E.U./DL

## 2018-05-26 PROCEDURE — 81003 URINALYSIS AUTO W/O SCOPE: CPT

## 2018-05-26 PROCEDURE — 99284 EMERGENCY DEPT VISIT MOD MDM: CPT | Mod: 25

## 2018-05-26 PROCEDURE — 81025 URINE PREGNANCY TEST: CPT | Performed by: EMERGENCY MEDICINE

## 2018-05-26 RX ORDER — LORATADINE 10 MG/1
10 TABLET ORAL DAILY
Qty: 60 TABLET | Refills: 0 | Status: SHIPPED | OUTPATIENT
Start: 2018-05-26 | End: 2021-01-31 | Stop reason: ALTCHOICE

## 2018-05-26 RX ORDER — BENZONATATE 100 MG/1
100 CAPSULE ORAL 3 TIMES DAILY PRN
Qty: 20 CAPSULE | Refills: 0 | Status: SHIPPED | OUTPATIENT
Start: 2018-05-26 | End: 2018-06-05

## 2018-05-26 RX ORDER — AMOXICILLIN AND CLAVULANATE POTASSIUM 875; 125 MG/1; MG/1
1 TABLET, FILM COATED ORAL 2 TIMES DAILY
Qty: 20 TABLET | Refills: 0 | Status: SHIPPED | OUTPATIENT
Start: 2018-05-26 | End: 2018-06-05

## 2018-05-26 RX ORDER — FLUTICASONE PROPIONATE 50 MCG
1 SPRAY, SUSPENSION (ML) NASAL 2 TIMES DAILY
Qty: 15 G | Refills: 0 | Status: SHIPPED | OUTPATIENT
Start: 2018-05-26 | End: 2018-06-16

## 2018-05-26 RX ORDER — IBUPROFEN 600 MG/1
600 TABLET ORAL EVERY 6 HOURS PRN
Qty: 20 TABLET | Refills: 0 | Status: SHIPPED | OUTPATIENT
Start: 2018-05-26 | End: 2020-02-06 | Stop reason: CLARIF

## 2018-05-26 NOTE — ED PROVIDER NOTES
Encounter Date: 5/26/2018       History     Chief Complaint   Patient presents with    Generalized Body Aches     Pt reports body aches, congestion, and dry cough since Thursday, taking benadryl w/o relief.     37-year-old female chief complaint runny nose, congestion, sinus is acting up.  Now she has a cough.  Patient states she has a nonproductive cough, body aches, and postnasal drip.  Patient states symptoms have been going on for the last few days.  Patient states a nonproductive cough.  Patient denies any fevers.          Review of patient's allergies indicates:  No Known Allergies  Past Medical History:   Diagnosis Date    Hypertension     LBBB (left bundle branch block)      Past Surgical History:   Procedure Laterality Date    CARDIAC CATHETERIZATION       Family History   Problem Relation Age of Onset    Hypertension Mother     Thyroid disease Mother     Appendicitis Mother     Hypertension Father      Social History   Substance Use Topics    Smoking status: Never Smoker    Smokeless tobacco: Not on file    Alcohol use Yes      Comment: Occasionally     Review of Systems   Constitutional: Positive for chills. Negative for fever.   HENT: Positive for congestion, rhinorrhea and sinus pressure. Negative for sore throat.    Respiratory: Positive for cough. Negative for shortness of breath and wheezing.    Cardiovascular: Negative for chest pain.   Gastrointestinal: Negative for nausea.   Genitourinary: Negative for dysuria.   Musculoskeletal: Negative for back pain.   Skin: Negative for rash.   Neurological: Negative for weakness.   Hematological: Does not bruise/bleed easily.   All other systems reviewed and are negative.      Physical Exam     Initial Vitals [05/26/18 1731]   BP Pulse Resp Temp SpO2   (!) 153/111 105 18 98.2 °F (36.8 °C) 100 %      MAP       125         Physical Exam    Nursing note and vitals reviewed.  Constitutional: She appears well-developed and well-nourished.   HENT:    Head: Normocephalic and atraumatic.   Right Ear: Tympanic membrane and external ear normal.   Left Ear: Tympanic membrane and external ear normal.   Nose: Mucosal edema and rhinorrhea present. Right sinus exhibits maxillary sinus tenderness. Left sinus exhibits maxillary sinus tenderness.   Mouth/Throat: Uvula is midline and mucous membranes are normal. Posterior oropharyngeal erythema present. No oropharyngeal exudate or posterior oropharyngeal edema.   Postnasal drip.   Eyes: Conjunctivae and EOM are normal. Pupils are equal, round, and reactive to light. Right eye exhibits no discharge. Left eye exhibits no discharge.   Neck: Normal range of motion. Neck supple.   Cardiovascular: Normal rate, regular rhythm, normal heart sounds and intact distal pulses. Exam reveals no gallop and no friction rub.    No murmur heard.  Pulmonary/Chest: Breath sounds normal. No respiratory distress. She has no wheezes. She has no rhonchi. She has no rales. She exhibits no tenderness.   Abdominal: Soft. Bowel sounds are normal. She exhibits no distension and no mass. There is no tenderness. There is no rebound and no guarding.   Musculoskeletal: Normal range of motion. She exhibits no edema or tenderness.   Lymphadenopathy:     She has no cervical adenopathy.   Neurological: She is alert and oriented to person, place, and time. She has normal strength and normal reflexes. She displays normal reflexes. No cranial nerve deficit or sensory deficit.   Skin: Skin is warm and dry. No rash and no abscess noted. No erythema. No pallor.   Psychiatric: She has a normal mood and affect.         ED Course   Procedures  Labs Reviewed   POCT URINALYSIS W/O SCOPE - Abnormal; Notable for the following:        Result Value    Glucose, UA Negative (*)     Bilirubin, UA Negative (*)     Ketones, UA Negative (*)     Blood, UA 1+ (*)     Protein, UA 1+ (*)     Nitrite, UA Negative (*)     Leukocytes, UA Negative (*)     All other components within  normal limits   POCT URINE PREGNANCY   POCT URINALYSIS W/O SCOPE                             Medical decision making   Chief complaint:  Sinus acting up, runny nose, postnasal drip, congestion, and nonproductive cough.  Differential diagnosis:  URI, viral illness, exudative pharyngitis, seasonal allergies, postnasal drip, bronchitis, pneumonia.  Treatment in the ED Physical Exam   Discussed labs, and imaging results.    Fill and take prescriptions as directed.  Return to the ED if symptoms worsen or do not resolve.   Answered questions and discussed discharge plan.    Patient feels much better and is ready for discharge.  Follow up with PCP in 1 days.       Clinical Impression:   The primary encounter diagnosis was Seasonal allergic rhinitis, unspecified trigger. Diagnoses of Allergic rhinitis with postnasal drip and Acute maxillary sinusitis, recurrence not specified were also pertinent to this visit.                           Fariha Barragan DO  05/27/18 1815

## 2018-09-06 ENCOUNTER — OFFICE VISIT (OUTPATIENT)
Dept: URGENT CARE | Facility: CLINIC | Age: 38
End: 2018-09-06
Payer: MEDICAID

## 2018-09-06 VITALS
DIASTOLIC BLOOD PRESSURE: 94 MMHG | TEMPERATURE: 99 F | SYSTOLIC BLOOD PRESSURE: 153 MMHG | BODY MASS INDEX: 30.18 KG/M2 | WEIGHT: 187 LBS | HEART RATE: 87 BPM | OXYGEN SATURATION: 98 %

## 2018-09-06 DIAGNOSIS — M25.561 ACUTE PAIN OF RIGHT KNEE: Primary | ICD-10-CM

## 2018-09-06 PROCEDURE — 99214 OFFICE O/P EST MOD 30 MIN: CPT | Mod: S$GLB,,, | Performed by: SURGERY

## 2018-09-06 RX ORDER — TRAMADOL HYDROCHLORIDE 50 MG/1
50 TABLET ORAL EVERY 4 HOURS PRN
Qty: 15 TABLET | Refills: 0 | Status: SHIPPED | OUTPATIENT
Start: 2018-09-06 | End: 2018-09-09

## 2018-09-06 RX ORDER — DEXAMETHASONE SODIUM PHOSPHATE 100 MG/10ML
10 INJECTION INTRAMUSCULAR; INTRAVENOUS ONCE
Status: COMPLETED | OUTPATIENT
Start: 2018-09-06 | End: 2018-09-06

## 2018-09-06 RX ADMIN — DEXAMETHASONE SODIUM PHOSPHATE 10 MG: 100 INJECTION INTRAMUSCULAR; INTRAVENOUS at 09:09

## 2018-09-06 NOTE — PATIENT INSTRUCTIONS
Knee Pain  Knee pain is very common. Its especially common in active people who put a lot of pressure on their knees, like runners. It affects women more often than men.  Your kneecap (patella) is a thick, round bone. It covers and protects the front portion of your knee joint. It moves along a groove in your thighbone (femur) as part of the patellofemoral joint. A layer of cartilage surrounds the underside of your kneecap. This layer protects it from grinding against your femur.  When this cartilage softens and breaks down, it can cause knee pain. This is partly because of repetitive stress. The stress irritates the lining of the joint. This causes pain in the underlying bone.  What causes knee pain?  Many things can cause knee pain. You may have more than one cause. Some of these include:  · Overuse of the knee joint  · The kneecap doesnt line up with the tissue around it  · Damage to small nerves in the area  · Damage to the ligament-like structure that holds the kneecap in place (retinaculum)  · Breakdown of the bone under the cartilage  · Swelling in the soft tissues around the kneecap  · Injury  You might be more likely to have knee pain if you:  · Exercise a lot  · Recently increased the intensity of your workouts  · Have a body mass index (BMI) greater than 25  · Have poor alignment of your kneecap  · Walk with your feet turned overly outward or inward  · Have weakness in surrounding muscle groups (inner quad or hip adductor muscles)  · Have too much tightness in surrounding muscle groups (hamstrings or iliotibial band)  · Have a recent history of injury to the area  · Are female  Symptoms of knee pain  This type of knee pain is a dull, aching pain in the front of the knee in the area under and around the kneecap. This pain may start quickly or slowly. Your pain might be worse when you squat, run, or sit for a long time. You might also sometimes feel like your knee is giving out. You may have symptoms in  one or both of your knees.  Diagnosing knee pain  Your healthcare provider will ask about your medical history and your symptoms. Be sure to describe any activities that make your knee pain worse. He or she will look at your knee. This will include tests of your range of motion, strength, and areas of pain of your knee. Your knee alignment will be checked.  Your healthcare provider will need to rule out other causes of your knee pain, such as arthritis. You may need an imaging test, such as an X-ray or MRI.  Treatment for knee pain  Treatments that can help ease your symptoms may include:  · Avoiding activities for a while that make your pain worse, returning to activity over time  · Icing the outside of your knee when it causes you pain  · Taking over-the-counter pain medicine  · Wearing a knee brace or taping your knee to support it  · Wearing special shoe inserts to help keep your feet in the proper alignment  · Doing special exercises to stretch and strengthen the muscles around your hip and your knee  These steps help most people manage knee pain. But some cases of knee pain need to be treated with surgery. You may need surgery right away. Or you may need it later if other treatments dont work. Your healthcare provider may refer you to an orthopedic surgeon. He or she will talk with you about your choices.  Preventing knee pain  Losing weight and correcting excess muscle tightness or muscle weakness may help lower your risk.  In some cases, you can prevent knee pain. To help prevent a flare-up of knee pain, you do these things:  · Regularly do all the exercises your doctor or physical therapist advises  · Support your knee as advised by your doctor or physical therapist  · Increase training gradually, and ease up on training when needed  · Have an expert check your gait for running or other sporting activities  · Stretch properly before and after exercise  · Replace your running shoes regularly  · Lose excess  weight     When to call your healthcare provider  Call your healthcare provider right away if:  · Your symptoms dont get better after a few weeks of treatment  · You have any new symptoms   Date Last Reviewed: 4/1/2017  © 4312-7048 [employer]. [address] [place] [address], [place] [address]. All rights reserved. This information is not intended as a substitute for professional medical care. Always follow your healthcare professional's instructions.

## 2018-09-06 NOTE — PROGRESS NOTES
Subjective:       Patient ID: Hollie Saldana is a 37 y.o. female.    Vitals:  weight is 84.8 kg (187 lb). Her temperature is 98.6 °F (37 °C). Her blood pressure is 153/94 (abnormal) and her pulse is 87. Her oxygen saturation is 98%.     Chief Complaint: Leg Pain    Leg Pain    The incident occurred 12 to 24 hours ago. There was no injury mechanism. The pain is present in the right leg. The quality of the pain is described as burning, aching, cramping, shooting and stabbing. The pain is at a severity of 6/10. The pain is moderate. Associated symptoms include tingling. Associated symptoms comments: Ankle area. She reports no foreign bodies present. The symptoms are aggravated by movement and weight bearing. She has tried NSAIDs and heat for the symptoms. The treatment provided no relief.     Review of Systems   Constitution: Negative for chills and fever.   HENT: Negative for sore throat.    Eyes: Negative for blurred vision.   Cardiovascular: Negative for chest pain.   Respiratory: Negative for shortness of breath.    Skin: Negative for rash.   Musculoskeletal: Negative for back pain and joint pain.   Gastrointestinal: Negative for abdominal pain, diarrhea, nausea and vomiting.   Neurological: Positive for tingling. Negative for headaches.   Psychiatric/Behavioral: The patient is not nervous/anxious.        Objective:      Physical Exam   Constitutional: She is oriented to person, place, and time. She appears well-developed and well-nourished. She is cooperative.  Non-toxic appearance. She does not appear ill. No distress.   HENT:   Head: Normocephalic and atraumatic.   Right Ear: Hearing, tympanic membrane, external ear and ear canal normal.   Left Ear: Hearing, tympanic membrane, external ear and ear canal normal.   Nose: Nose normal. No mucosal edema, rhinorrhea or nasal deformity. No epistaxis. Right sinus exhibits no maxillary sinus tenderness and no frontal sinus tenderness. Left sinus exhibits no maxillary  sinus tenderness and no frontal sinus tenderness.   Mouth/Throat: Uvula is midline, oropharynx is clear and moist and mucous membranes are normal. No trismus in the jaw. Normal dentition. No uvula swelling. No posterior oropharyngeal erythema.   Eyes: Conjunctivae and lids are normal. Right eye exhibits no discharge. Left eye exhibits no discharge. No scleral icterus.   Sclera clear bilat   Neck: Trachea normal, normal range of motion, full passive range of motion without pain and phonation normal. Neck supple.   Cardiovascular: Normal rate, regular rhythm, normal heart sounds, intact distal pulses and normal pulses.   Pulmonary/Chest: Effort normal and breath sounds normal. No respiratory distress.   Abdominal: Soft. Normal appearance and bowel sounds are normal. She exhibits no distension, no pulsatile midline mass and no mass. There is no tenderness.   Musculoskeletal: She exhibits no edema.        Right knee: She exhibits decreased range of motion. She exhibits no swelling and no deformity. Tenderness found. Medial joint line tenderness noted.   Neurological: She is alert and oriented to person, place, and time. She exhibits normal muscle tone. Coordination normal.   Skin: Skin is warm, dry and intact. She is not diaphoretic. No pallor.   Psychiatric: She has a normal mood and affect. Her speech is normal and behavior is normal. Judgment and thought content normal. Cognition and memory are normal.   Nursing note and vitals reviewed.      Assessment:       1. Acute pain of right knee        Plan:         Acute pain of right knee  -     dexamethasone injection 10 mg; Inject 1 mL (10 mg total) into the muscle once.  -     KNEE BRACE FOR HOME USE  -     traMADol (ULTRAM) 50 mg tablet; Take 1 tablet (50 mg total) by mouth every 4 (four) hours as needed for Pain.  Dispense: 15 tablet; Refill: 0      Patient Instructions       Knee Pain  Knee pain is very common. Its especially common in active people who put a lot of  pressure on their knees, like runners. It affects women more often than men.  Your kneecap (patella) is a thick, round bone. It covers and protects the front portion of your knee joint. It moves along a groove in your thighbone (femur) as part of the patellofemoral joint. A layer of cartilage surrounds the underside of your kneecap. This layer protects it from grinding against your femur.  When this cartilage softens and breaks down, it can cause knee pain. This is partly because of repetitive stress. The stress irritates the lining of the joint. This causes pain in the underlying bone.  What causes knee pain?  Many things can cause knee pain. You may have more than one cause. Some of these include:  · Overuse of the knee joint  · The kneecap doesnt line up with the tissue around it  · Damage to small nerves in the area  · Damage to the ligament-like structure that holds the kneecap in place (retinaculum)  · Breakdown of the bone under the cartilage  · Swelling in the soft tissues around the kneecap  · Injury  You might be more likely to have knee pain if you:  · Exercise a lot  · Recently increased the intensity of your workouts  · Have a body mass index (BMI) greater than 25  · Have poor alignment of your kneecap  · Walk with your feet turned overly outward or inward  · Have weakness in surrounding muscle groups (inner quad or hip adductor muscles)  · Have too much tightness in surrounding muscle groups (hamstrings or iliotibial band)  · Have a recent history of injury to the area  · Are female  Symptoms of knee pain  This type of knee pain is a dull, aching pain in the front of the knee in the area under and around the kneecap. This pain may start quickly or slowly. Your pain might be worse when you squat, run, or sit for a long time. You might also sometimes feel like your knee is giving out. You may have symptoms in one or both of your knees.  Diagnosing knee pain  Your healthcare provider will ask about your  medical history and your symptoms. Be sure to describe any activities that make your knee pain worse. He or she will look at your knee. This will include tests of your range of motion, strength, and areas of pain of your knee. Your knee alignment will be checked.  Your healthcare provider will need to rule out other causes of your knee pain, such as arthritis. You may need an imaging test, such as an X-ray or MRI.  Treatment for knee pain  Treatments that can help ease your symptoms may include:  · Avoiding activities for a while that make your pain worse, returning to activity over time  · Icing the outside of your knee when it causes you pain  · Taking over-the-counter pain medicine  · Wearing a knee brace or taping your knee to support it  · Wearing special shoe inserts to help keep your feet in the proper alignment  · Doing special exercises to stretch and strengthen the muscles around your hip and your knee  These steps help most people manage knee pain. But some cases of knee pain need to be treated with surgery. You may need surgery right away. Or you may need it later if other treatments dont work. Your healthcare provider may refer you to an orthopedic surgeon. He or she will talk with you about your choices.  Preventing knee pain  Losing weight and correcting excess muscle tightness or muscle weakness may help lower your risk.  In some cases, you can prevent knee pain. To help prevent a flare-up of knee pain, you do these things:  · Regularly do all the exercises your doctor or physical therapist advises  · Support your knee as advised by your doctor or physical therapist  · Increase training gradually, and ease up on training when needed  · Have an expert check your gait for running or other sporting activities  · Stretch properly before and after exercise  · Replace your running shoes regularly  · Lose excess weight     When to call your healthcare provider  Call your healthcare provider right away  if:  · Your symptoms dont get better after a few weeks of treatment  · You have any new symptoms   Date Last Reviewed: 4/1/2017  © 9627-0820 [employer] [address] [place] [address], [place] [address]. All rights reserved. This information is not intended as a substitute for professional medical care. Always follow your healthcare professional's instructions.

## 2019-01-25 ENCOUNTER — OCCUPATIONAL HEALTH (OUTPATIENT)
Dept: URGENT CARE | Facility: CLINIC | Age: 39
End: 2019-01-25

## 2019-01-25 DIAGNOSIS — Z02.1 PRE-EMPLOYMENT DRUG SCREENING: Primary | ICD-10-CM

## 2019-01-25 PROCEDURE — 80305 PR DRUG SCREEN - 1: ICD-10-PCS | Mod: S$GLB,,, | Performed by: NURSE PRACTITIONER

## 2019-01-25 PROCEDURE — 80305 DRUG TEST PRSMV DIR OPT OBS: CPT | Mod: S$GLB,,, | Performed by: NURSE PRACTITIONER

## 2019-02-07 ENCOUNTER — HOSPITAL ENCOUNTER (EMERGENCY)
Facility: HOSPITAL | Age: 39
Discharge: HOME | End: 2019-02-07
Attending: EMERGENCY MEDICINE
Payer: MEDICAID

## 2019-02-07 VITALS
SYSTOLIC BLOOD PRESSURE: 140 MMHG | HEART RATE: 78 BPM | DIASTOLIC BLOOD PRESSURE: 80 MMHG | TEMPERATURE: 98 F | BODY MASS INDEX: 28.12 KG/M2 | OXYGEN SATURATION: 100 % | HEIGHT: 66 IN | RESPIRATION RATE: 20 BRPM | WEIGHT: 175 LBS

## 2019-02-07 DIAGNOSIS — R07.9 CHEST PAIN: ICD-10-CM

## 2019-02-07 DIAGNOSIS — R07.89 CHEST WALL PAIN: Primary | ICD-10-CM

## 2019-02-07 LAB
ALBUMIN SERPL-MCNC: 3.5 G/DL (ref 3.3–5.5)
ALP SERPL-CCNC: 65 U/L (ref 42–141)
B-HCG UR QL: NEGATIVE
BILIRUB SERPL-MCNC: 0.3 MG/DL (ref 0.2–1.6)
BUN SERPL-MCNC: 8 MG/DL (ref 7–22)
CALCIUM SERPL-MCNC: 8.9 MG/DL (ref 8–10.3)
CHLORIDE SERPL-SCNC: 103 MMOL/L (ref 98–108)
CREAT SERPL-MCNC: 0.6 MG/DL (ref 0.6–1.2)
CTP QC/QA: YES
GLUCOSE SERPL-MCNC: 90 MG/DL (ref 73–118)
POC ALT (SGPT): 23 U/L (ref 10–47)
POC AST (SGOT): 34 U/L (ref 11–38)
POC B-TYPE NATRIURETIC PEPTIDE: 58.8 PG/ML (ref 0–100)
POC CARDIAC TROPONIN I: 0 NG/ML
POC TCO2: 24 MMOL/L (ref 18–33)
POTASSIUM BLD-SCNC: 3.8 MMOL/L (ref 3.6–5.1)
PROTEIN, POC: 7.6 G/DL (ref 6.4–8.1)
SAMPLE: NORMAL
SODIUM BLD-SCNC: 139 MMOL/L (ref 128–145)

## 2019-02-07 PROCEDURE — 83880 ASSAY OF NATRIURETIC PEPTIDE: CPT | Mod: ER

## 2019-02-07 PROCEDURE — 63600175 PHARM REV CODE 636 W HCPCS: Mod: ER | Performed by: EMERGENCY MEDICINE

## 2019-02-07 PROCEDURE — 99285 EMERGENCY DEPT VISIT HI MDM: CPT | Mod: 25,ER

## 2019-02-07 PROCEDURE — 96372 THER/PROPH/DIAG INJ SC/IM: CPT | Mod: 59,ER

## 2019-02-07 PROCEDURE — 80053 COMPREHEN METABOLIC PANEL: CPT | Mod: ER

## 2019-02-07 PROCEDURE — 93010 ELECTROCARDIOGRAM REPORT: CPT | Mod: ,,, | Performed by: INTERNAL MEDICINE

## 2019-02-07 PROCEDURE — 84484 ASSAY OF TROPONIN QUANT: CPT | Mod: ER

## 2019-02-07 PROCEDURE — 93005 ELECTROCARDIOGRAM TRACING: CPT | Mod: ER

## 2019-02-07 PROCEDURE — 85025 COMPLETE CBC W/AUTO DIFF WBC: CPT | Mod: ER

## 2019-02-07 PROCEDURE — 93010 EKG 12-LEAD: ICD-10-PCS | Mod: ,,, | Performed by: INTERNAL MEDICINE

## 2019-02-07 PROCEDURE — 81025 URINE PREGNANCY TEST: CPT | Mod: ER | Performed by: EMERGENCY MEDICINE

## 2019-02-07 RX ORDER — INDOMETHACIN 25 MG/1
50 CAPSULE ORAL
Qty: 30 CAPSULE | Refills: 0 | Status: SHIPPED | OUTPATIENT
Start: 2019-02-07 | End: 2020-02-06 | Stop reason: CLARIF

## 2019-02-07 RX ORDER — KETOROLAC TROMETHAMINE 30 MG/ML
60 INJECTION, SOLUTION INTRAMUSCULAR; INTRAVENOUS
Status: COMPLETED | OUTPATIENT
Start: 2019-02-07 | End: 2019-02-07

## 2019-02-07 RX ORDER — KETOROLAC TROMETHAMINE 30 MG/ML
30 INJECTION, SOLUTION INTRAMUSCULAR; INTRAVENOUS
Status: DISCONTINUED | OUTPATIENT
Start: 2019-02-07 | End: 2019-02-07

## 2019-02-07 RX ADMIN — KETOROLAC TROMETHAMINE 60 MG: 30 INJECTION, SOLUTION INTRAMUSCULAR at 09:02

## 2019-02-07 NOTE — ED PROVIDER NOTES
"Encounter Date: 2/7/2019    SCRIBE #1 NOTE: I, Mishel Donnelly, am scribing for, and in the presence of,  Dr. Ford. I have scribed the following portions of the note - Other sections scribed: HPI, ROS, PE.       History     Chief Complaint   Patient presents with    Chest Pain     patient reports having chest pain left sided, patient reports pain as constant pain.  patient reports pain present X2 days.       Hollie Saldana is a 38 y.o. female with history of HTN and LBBB who presents to the ED complaining of intermittent left upper chest pain for years, but the pain is worse with movement this morning. Describes pain as "stomping". Pain has been constant since this morning, but varies in intensity. [place]azariegos is worse with deep breathing and bending over. Pain is 5/10 currently. Denies any strenuous activity or exercising. Denies taking medication for pain. Pain typically lasts for more than 2 hours and resolves on its own. She has had this same pain before and had cardiac workup at [facility] in 2017 with angiogram. No acute findings. Saw a cardiologist and was told she didn't need to follow up.    She states she has some sinus problems, mainly sneezing. Denies pain or swelling to legs, cough, fever, shortness of breath, urinary symptoms, headache, dizziness, abdominal pain, and N/V/D.          The history is provided by the patient. No  was used.     Review of patient's allergies indicates:  No Known Allergies  Past Medical History:   Diagnosis Date    Hypertension     LBBB (left bundle branch block)      Past Surgical History:   Procedure Laterality Date    CARDIAC CATHETERIZATION       Family History   Problem Relation Age of Onset    Hypertension Mother     Thyroid disease Mother     Appendicitis Mother     Hypertension Father      Social History     Tobacco Use    Smoking status: Never Smoker    Smokeless tobacco: Never Used   Substance Use Topics    Alcohol use: Yes     " Comment: Occasionally    Drug use: No     Review of Systems   Constitutional: Negative for fever.   HENT: Positive for sneezing.    Respiratory: Negative for cough and shortness of breath.    Cardiovascular: Positive for chest pain. Negative for leg swelling.   Gastrointestinal: Negative for abdominal pain, diarrhea, nausea and vomiting.   Genitourinary: Negative for dysuria, frequency and hematuria.   Neurological: Negative for dizziness and headaches.       Physical Exam     Initial Vitals   BP Pulse Resp Temp SpO2   02/07/19 0838 02/07/19 0837 02/07/19 0838 02/07/19 0838 02/07/19 0837   (!) 171/98 (!) 114 18 98.4 °F (36.9 °C) 100 %      MAP       --                Physical Exam    Nursing note and vitals reviewed.  Constitutional: She appears well-developed and well-nourished.   HENT:   Head: Normocephalic and atraumatic.   Eyes: Conjunctivae are normal.   Neck: Normal range of motion. Neck supple. No JVD present.   Cardiovascular: Normal rate, regular rhythm, normal heart sounds and intact distal pulses. Exam reveals no gallop and no friction rub.    No murmur heard.  Pulmonary/Chest: Effort normal and breath sounds normal. No respiratory distress. She has no wheezes. She has no rhonchi. She has no rales. She exhibits tenderness.       Abdominal: Soft. There is no tenderness.   Musculoskeletal: Normal range of motion. She exhibits no edema or tenderness.   No calf tenderness.   Neurological: She is alert and oriented to person, place, and time.   Skin: Skin is warm and dry.   Psychiatric: She has a normal mood and affect.         ED Course   Procedures  Labs Reviewed   TROPONIN ISTAT   POCT URINE PREGNANCY   POCT CBC   POCT CMP   POCT TROPONIN   POCT B-TYPE NATRIURETIC PEPTIDE (BNP)   POCT CMP   POCT B-TYPE NATRIURETIC PEPTIDE (BNP)     EKG Readings: (Independently Interpreted)   Initial Reading: No STEMI. Previous EKG: Compared with most recent EKG Previous EKG Date: 2018. Rhythm: Normal Sinus Rhythm.  Conduction: LBBB.   Left bundle branch block, heart rate 99, normal sinus rhythm, no ST segment elevation     ECG Results          EKG 12-lead (In process)  Result time 02/07/19 09:17:41    In process by Interface, Lab In [facility] (02/07/19 09:17:41)                 Narrative:    Test Reason : cp    Vent. Rate : 099 BPM     Atrial Rate : 099 BPM     P-R Int : 152 ms          QRS Dur : 132 ms      QT Int : 386 ms       P-R-T Axes : 066 010 035 degrees     QTc Int : 495 ms    Normal sinus rhythm with sinus arrhythmia  Possible Left atrial enlargement  Left bundle branch block  Abnormal ECG  When compared with ECG of 26-MAY-2018 18:08,  T wave inversion now evident in Lateral leads    Referred By: [email or web address]   SELF           Confirmed By:                             Imaging Results          X-Ray Chest PA And Lateral (Final result)  Result time 02/07/19 09:13:23    Final result by Nathen Ashby MD (02/07/19 09:13:23)                 Impression:      No acute chest disease identified.      Electronically signed by: Nathen Ashby MD  Date:    02/07/2019  Time:    09:13             Narrative:    EXAMINATION:  XR CHEST PA AND LATERAL    CLINICAL HISTORY:  Chest pain, unspecified    TECHNIQUE:  PA and lateral views of the chest were performed.    COMPARISON:  05/26/2018.    FINDINGS:  The cardiac silhouette and superior mediastinal structures are unremarkable. Pulmonary vasculature is within normal limits. The lungs are well aerated and free of focal consolidations. There is no evidence for pneumothorax or pleural effusions. Bony structures are grossly intact.                                 Medical Decision Making:   History:   Old Medical Records: I decided to obtain old medical records.  Initial Assessment:   This is a 38 y.o. female who presents to the ED complaining of intermittent left upper chest pain for years, worse today. History of LBBB and HTN. Had normal  cardiac work up in 2017. Denies pain or swelling to  legs, cough, fever, shortness of breath, urinary symptoms, headache, dizziness, abdominal pain, and N/V/D.  Physical Exam is positive for chest tenderness. No calf tenderness.  Independently Interpreted Test(s):   I have ordered and independently interpreted EKG Reading(s) - see prior notes  Clinical Tests:   Lab Tests: Ordered and Reviewed  Radiological Study: Ordered and Reviewed  Medical Tests: Ordered and Reviewed  ED Management:  Will order EKG, CXR, and labs including BNP, Troponin, CMP, UA, and CBC. Will treat with Toradol.     All labs within normal limits. No acute findings on chest x-ray.  Chart review shows that the patient did have a normal angiogram in 2017.  She had a left bundle branch block present on previous EKGs.  Will discharge patient home with Indocin.            Scribe Attestation:   Scribe #1: I performed the above scribed service and the documentation accurately describes the services I performed. I attest to the accuracy of the note.       I, Dr. Sandi Ford, personally performed the services described in this documentation. All medical record entries made by the scribe were at my direction and in my presence.  I have reviewed the chart and agree that the record reflects my personal performance and is accurate and complete. Sandi Ford MD.  1:22 PM 02/07/2019             Clinical Impression:     1. Chest wall pain    2. Chest pain                                   Sandi Ford MD  02/07/19 2022

## 2019-02-07 NOTE — ED NOTES
Patient stated about 8 pm last night she was laying down and she started with left sided chest pain   Patient stated she started with chest pain 2 years ago and found out she had a left bundle branch block  Patient's pain is a 5/10 now

## 2020-01-16 ENCOUNTER — OFFICE VISIT (OUTPATIENT)
Dept: URGENT CARE | Facility: CLINIC | Age: 40
End: 2020-01-16
Payer: MEDICAID

## 2020-01-16 VITALS
HEART RATE: 66 BPM | SYSTOLIC BLOOD PRESSURE: 140 MMHG | OXYGEN SATURATION: 99 % | HEIGHT: 66 IN | BODY MASS INDEX: 31.34 KG/M2 | TEMPERATURE: 97 F | WEIGHT: 195 LBS | DIASTOLIC BLOOD PRESSURE: 90 MMHG

## 2020-01-16 DIAGNOSIS — J32.9 BACTERIAL SINUSITIS: Primary | ICD-10-CM

## 2020-01-16 DIAGNOSIS — B96.89 BACTERIAL SINUSITIS: Primary | ICD-10-CM

## 2020-01-16 PROCEDURE — 99214 PR OFFICE/OUTPT VISIT, EST, LEVL IV, 30-39 MIN: ICD-10-PCS | Mod: S$GLB,,, | Performed by: FAMILY MEDICINE

## 2020-01-16 PROCEDURE — 99214 OFFICE O/P EST MOD 30 MIN: CPT | Mod: S$GLB,,, | Performed by: FAMILY MEDICINE

## 2020-01-16 RX ORDER — AMOXICILLIN AND CLAVULANATE POTASSIUM 875; 125 MG/1; MG/1
1 TABLET, FILM COATED ORAL EVERY 12 HOURS
Qty: 14 TABLET | Refills: 0 | Status: SHIPPED | OUTPATIENT
Start: 2020-01-16 | End: 2020-01-23

## 2020-01-16 NOTE — PROGRESS NOTES
"Subjective:       Patient ID: Hollie Saldana is a 39 y.o. female.    Vitals:  height is 5' 6" (1.676 m) and weight is 88.5 kg (195 lb). Her temperature is 97.1 °F (36.2 °C). Her blood pressure is 140/90 (abnormal) and her pulse is 66. Her oxygen saturation is 99%.     Chief Complaint: Sinus Problem    Pt states for 2 weeks having a cough, congestion, post nasal drip. Sneezing, sinus pressure. Worsening. Doing coricidin and zyrtec and nyquil    Sinus Problem   This is a new problem. The current episode started 1 to 4 weeks ago. The problem has been gradually worsening since onset. Associated symptoms include congestion, coughing, sinus pressure and sneezing. Pertinent negatives include no chills, headaches, shortness of breath or sore throat. Treatments tried: nyquil,  The treatment provided mild relief.       Constitution: Negative for chills, fatigue and fever.   HENT: Positive for congestion, postnasal drip and sinus pressure. Negative for sore throat.    Neck: Negative for painful lymph nodes.   Cardiovascular: Negative for chest pain and leg swelling.   Eyes: Negative for double vision and blurred vision.   Respiratory: Positive for cough and sputum production. Negative for shortness of breath.    Gastrointestinal: Negative for nausea, vomiting and diarrhea.   Genitourinary: Negative for dysuria, frequency, urgency and history of kidney stones.   Musculoskeletal: Negative for joint pain, joint swelling, muscle cramps and muscle ache.   Skin: Negative for color change, pale, rash and bruising.   Allergic/Immunologic: Positive for sneezing. Negative for seasonal allergies.   Neurological: Negative for dizziness, history of vertigo, light-headedness, passing out and headaches.   Hematologic/Lymphatic: Negative for swollen lymph nodes.   Psychiatric/Behavioral: Negative for nervous/anxious, sleep disturbance and depression. The patient is not nervous/anxious.        Objective:      Physical Exam   Constitutional: " She is oriented to person, place, and time. She appears well-developed and well-nourished. She is cooperative.  Non-toxic appearance. She does not have a sickly appearance. She does not appear ill. No distress.   HENT:   Head: Normocephalic and atraumatic.   Right Ear: Hearing, tympanic membrane, external ear and ear canal normal. No middle ear effusion.   Left Ear: Hearing, tympanic membrane, external ear and ear canal normal.  No middle ear effusion.   Nose: Mucosal edema present. No rhinorrhea or nasal deformity. No epistaxis. Right sinus exhibits no maxillary sinus tenderness and no frontal sinus tenderness. Left sinus exhibits no maxillary sinus tenderness and no frontal sinus tenderness.   Mouth/Throat: Uvula is midline, oropharynx is clear and moist and mucous membranes are normal. No trismus in the jaw. Normal dentition. No uvula swelling. No oropharyngeal exudate, posterior oropharyngeal edema or posterior oropharyngeal erythema.   Bilateral maxillary sinus pressure   Eyes: Conjunctivae and lids are normal. No scleral icterus.   Neck: Trachea normal, full passive range of motion without pain and phonation normal. Neck supple. No neck rigidity. No edema and no erythema present.   Cardiovascular: Normal rate, regular rhythm, normal heart sounds, intact distal pulses and normal pulses.   Pulmonary/Chest: Effort normal and breath sounds normal. No respiratory distress. She has no decreased breath sounds. She has no wheezes. She has no rhonchi. She has no rales.   Abdominal: Normal appearance.   Musculoskeletal: Normal range of motion. She exhibits no edema or deformity.   Neurological: She is alert and oriented to person, place, and time. She exhibits normal muscle tone. Coordination normal.   Skin: Skin is warm, dry, intact, not diaphoretic and not pale.   Psychiatric: She has a normal mood and affect. Her speech is normal and behavior is normal. Judgment and thought content normal. Cognition and memory are  normal.   Nursing note and vitals reviewed.        Assessment:       1. Bacterial sinusitis        Plan:         Bacterial sinusitis  -     amoxicillin-clavulanate 875-125mg (AUGMENTIN) 875-125 mg per tablet; Take 1 tablet by mouth every 12 (twelve) hours. for 7 days  Dispense: 14 tablet; Refill: 0      Patient Instructions   PLEASE READ YOUR DISCHARGE INSTRUCTIONS ENTIRELY AS IT CONTAINS IMPORTANT INFORMATION.      Please drink plenty of fluids.    Please get plenty of rest.    Please return here or go to the Emergency Department for any concerns or worsening of condition.    Please take an over the counter antihistamine medication (allegra/Claritin/Zyrtec) of your choice as directed.      If you do have Hypertension or palpitations, it is safe to take Coricidin HBP for relief of sinus symptoms.    If not allergic, please take over the counter Tylenol (Acetaminophen) and/or Motrin (Ibuprofen) as directed for control of pain and/or fever.  Please follow up with your primary care doctor or specialist as needed.    Sore throat recommendations: Warm fluids, warm salt water gargles, throat lozenges, tea, honey, soup, rest, hydration.    Use over the counter flonase: one spray each nostril twice daily OR two sprays each nostril once daily.     Sinus rinses DO NOT USE TAP WATER, if you must, water must be a rolling boil for 1 minute, let it cool, then use.  May use distilled water, or over the counter nasal saline rinses.  Vics vapor rub in shower to help open nasal passages.  May use nasal gel to keep passages moisturized.  May use Nasal saline sprays during the day for added relief of congestion.   For those who go to the gym, please do not use the sauna or steam room now to clear sinuses.    If you  smoke, please stop smoking.      Please return or see your primary care doctor if you develop new or worsening symptoms.     Please arrange follow up with your primary medical clinic as soon as possible. You must understand  that you've received an Urgent Care treatment only and that you may be released before all of your medical problems are known or treated. You, the patient, will arrange for follow up as instructed. If your symptoms worsen or fail to improve you should go to the Emergency Room.    Sinusitis (Antibiotic Treatment)    The sinuses are air-filled spaces within the bones of the face. They connect to the inside of the nose. Sinusitis is an inflammation of the tissue lining the sinus cavity. Sinus inflammation can occur during a cold. It can also be due to allergies to pollens and other particles in the air. Sinusitis can cause symptoms of sinus congestion and fullness. A sinus infection causes fever, headache and facial pain. There is often green or yellow drainage from the nose or into the back of the throat (post-nasal drip). You have been given antibiotics to treat this condition.  Home care:  · Take the full course of antibiotics as instructed. Do not stop taking them, even if you feel better.  · Drink plenty of water, hot tea, and other liquids. This may help thin mucus. It also may promote sinus drainage.  · Heat may help soothe painful areas of the face. Use a towel soaked in hot water. Or,  the shower and direct the hot spray onto your face. Using a vaporizer along with a menthol rub at night may also help.   · An expectorant containing guaifenesin may help thin the mucus and promote drainage from the sinuses.  · Over-the-counter decongestants may be used unless a similar medicine was prescribed. Nasal sprays work the fastest. Use one that contains phenylephrine or oxymetazoline. First blow the nose gently. Then use the spray. Do not use these medicines more often than directed on the label or symptoms may get worse. You may also use tablets containing pseudoephedrine. Avoid products that combine ingredients, because side effects may be increased. Read labels. You can also ask the pharmacist for help.  (NOTE: Persons with high blood pressure should not use decongestants. They can raise blood pressure.)  · Over-the-counter antihistamines may help if allergies contributed to your sinusitis.    · Do not use nasal rinses or irrigation during an acute sinus infection, unless told to by your health care provider. Rinsing may spread the infection to other sinuses.  · Use acetaminophen or ibuprofen to control pain, unless another pain medicine was prescribed. (If you have chronic liver or kidney disease or ever had a stomach ulcer, talk with your doctor before using these medicines. Aspirin should never be used in anyone under 18 years of age who is ill with a fever. It may cause severe liver damage.)  · Don't smoke. This can worsen symptoms.  Follow-up care  Follow up with your healthcare provider or our staff if you are not improving within the next week.  When to seek medical advice  Call your healthcare provider if any of these occur:  · Facial pain or headache becoming more severe  · Stiff neck  · Unusual drowsiness or confusion  · Swelling of the forehead or eyelids  · Vision problems, including blurred or double vision  · Fever of 100.4ºF (38ºC) or higher, or as directed by your healthcare provider  · Seizure  · Breathing problems  · Symptoms not resolving within 10 days  Date Last Reviewed: 4/13/2015  © 6939-9987 [employer]. [address] [place] [address], [place] [address]. All rights reserved. This information is not intended as a substitute for professional medical care. Always follow your healthcare professional's instructions.

## 2020-01-16 NOTE — PATIENT INSTRUCTIONS

## 2020-02-06 ENCOUNTER — HOSPITAL ENCOUNTER (EMERGENCY)
Facility: HOSPITAL | Age: 40
Discharge: HOME | End: 2020-02-06
Attending: EMERGENCY MEDICINE
Payer: MEDICAID

## 2020-02-06 VITALS
DIASTOLIC BLOOD PRESSURE: 85 MMHG | TEMPERATURE: 98 F | HEIGHT: 66 IN | OXYGEN SATURATION: 100 % | BODY MASS INDEX: 29.73 KG/M2 | SYSTOLIC BLOOD PRESSURE: 147 MMHG | HEART RATE: 86 BPM | RESPIRATION RATE: 20 BRPM | WEIGHT: 185 LBS

## 2020-02-06 DIAGNOSIS — R03.0 ELEVATED BLOOD PRESSURE READING: ICD-10-CM

## 2020-02-06 DIAGNOSIS — R07.89 CHEST WALL DISCOMFORT: ICD-10-CM

## 2020-02-06 DIAGNOSIS — K21.9 GASTROESOPHAGEAL REFLUX DISEASE WITHOUT ESOPHAGITIS: ICD-10-CM

## 2020-02-06 DIAGNOSIS — R07.9 LEFT-SIDED CHEST PAIN: Primary | ICD-10-CM

## 2020-02-06 LAB
ALBUMIN SERPL-MCNC: 3.6 G/DL (ref 3.3–5.5)
ALP SERPL-CCNC: 83 U/L (ref 42–141)
B-HCG UR QL: NEGATIVE
BILIRUB SERPL-MCNC: 0.4 MG/DL (ref 0.2–1.6)
BUN SERPL-MCNC: 9 MG/DL (ref 7–22)
CALCIUM SERPL-MCNC: 9 MG/DL (ref 8–10.3)
CHLORIDE SERPL-SCNC: 103 MMOL/L (ref 98–108)
CREAT SERPL-MCNC: 0.8 MG/DL (ref 0.6–1.2)
CTP QC/QA: YES
GLUCOSE SERPL-MCNC: 105 MG/DL (ref 73–118)
POC ALT (SGPT): 26 U/L (ref 10–47)
POC AST (SGOT): 31 U/L (ref 11–38)
POC B-TYPE NATRIURETIC PEPTIDE: 29.8 PG/ML (ref 0–100)
POC CARDIAC TROPONIN I: 0 NG/ML
POC TCO2: 29 MMOL/L (ref 18–33)
POTASSIUM BLD-SCNC: 3.6 MMOL/L (ref 3.6–5.1)
PROTEIN, POC: 7.7 G/DL (ref 6.4–8.1)
SAMPLE: NORMAL
SODIUM BLD-SCNC: 140 MMOL/L (ref 128–145)

## 2020-02-06 PROCEDURE — 36000 PLACE NEEDLE IN VEIN: CPT | Mod: ER

## 2020-02-06 PROCEDURE — 93010 ELECTROCARDIOGRAM REPORT: CPT | Mod: ,,, | Performed by: INTERNAL MEDICINE

## 2020-02-06 PROCEDURE — 83880 ASSAY OF NATRIURETIC PEPTIDE: CPT | Mod: ER

## 2020-02-06 PROCEDURE — 84484 ASSAY OF TROPONIN QUANT: CPT | Mod: ER

## 2020-02-06 PROCEDURE — 80053 COMPREHEN METABOLIC PANEL: CPT | Mod: ER

## 2020-02-06 PROCEDURE — 93005 ELECTROCARDIOGRAM TRACING: CPT | Mod: ER

## 2020-02-06 PROCEDURE — 81025 URINE PREGNANCY TEST: CPT | Mod: ER | Performed by: EMERGENCY MEDICINE

## 2020-02-06 PROCEDURE — 85025 COMPLETE CBC W/AUTO DIFF WBC: CPT | Mod: ER

## 2020-02-06 PROCEDURE — 99284 EMERGENCY DEPT VISIT MOD MDM: CPT | Mod: 25,ER

## 2020-02-06 PROCEDURE — 93010 EKG 12-LEAD: ICD-10-PCS | Mod: ,,, | Performed by: INTERNAL MEDICINE

## 2020-02-06 PROCEDURE — 25000003 PHARM REV CODE 250: Mod: ER | Performed by: EMERGENCY MEDICINE

## 2020-02-06 RX ORDER — FAMOTIDINE 20 MG/1
20 TABLET, FILM COATED ORAL 2 TIMES DAILY
Qty: 20 TABLET | Refills: 0 | Status: SHIPPED | OUTPATIENT
Start: 2020-02-06 | End: 2020-07-21 | Stop reason: SDUPTHER

## 2020-02-06 RX ORDER — ASPIRIN 325 MG
325 TABLET ORAL
Status: COMPLETED | OUTPATIENT
Start: 2020-02-06 | End: 2020-02-06

## 2020-02-06 RX ORDER — SYRING-NEEDL,DISP,INSUL,0.3 ML 29 G X1/2"
296 SYRINGE, EMPTY DISPOSABLE MISCELLANEOUS ONCE AS NEEDED
Qty: 295 ML | Refills: 0 | COMMUNITY
Start: 2020-02-06 | End: 2020-02-06

## 2020-02-06 RX ORDER — NAPROXEN SODIUM 220 MG/1
81 TABLET, FILM COATED ORAL DAILY
Qty: 30 TABLET | Refills: 0 | Status: SHIPPED | OUTPATIENT
Start: 2020-02-06 | End: 2020-07-21 | Stop reason: SDUPTHER

## 2020-02-06 RX ADMIN — ASPIRIN 325 MG ORAL TABLET 325 MG: 325 PILL ORAL at 08:02

## 2020-02-07 NOTE — ED PROVIDER NOTES
"Encounter Date: 2/6/2020    SCRIBE #1 NOTE: I, Michelle De La Garza, am scribing for, and in the presence of,  Dr. Andujar. I have scribed the following portions of the note - Other sections scribed: HPI, ROS, PE.       History     Chief Complaint   Patient presents with    CHEST DISCOMFORT     PT REPORTS PINCHING SENSATION TO LEFT CHEST EVERY 10 SECONDS SINCE 0500 THIS AM, DENIES ANY OTHER ASSOCIATED SYMPTOMS, PT REPORTS CURRENTLY ON AMOXICILLIN FOR SINUS INFECTION FOR APPROX 1 WEEK     Hollie Saldana is a 39 y.o. female who presents to the ED complaining of acute left-sided "pinching" pain to chest, onset this morning when she was lying down. Each episode would last for a few seconds, but the pain continued until 16:00. Patient also reports having severe "acid reflux" last night while she 1st laid out of bed but denies taking medication for acid reflux.  Symptoms currently resolved. Patient states that when she presses on the area, it feels like a muscular pain. Denies any recent injury or strain. She is right-handed. Denies back pain, neck pain, arm pain, shortness of breath, n/v, dizziness, numbness. She has had a similar problem in the past, and had a left bundle branch block in 2017. She has not seen her cardiologist since 2017. Denies smoking, drug use. Occasionally drinks alcohol.    The history is provided by the patient. No  was used.     Review of patient's allergies indicates:  No Known Allergies  Past Medical History:   Diagnosis Date    Hypertension     LBBB (left bundle branch block)      Past Surgical History:   Procedure Laterality Date    CARDIAC CATHETERIZATION  2017     Family History   Problem Relation Age of Onset    Hypertension Mother     Thyroid disease Mother     Appendicitis Mother     Hypertension Father      Social History     Tobacco Use    Smoking status: Never Smoker    Smokeless tobacco: Never Used   Substance Use Topics    Alcohol use: Yes     Comment: " Occasionally    Drug use: No     Review of Systems   Respiratory: Negative for shortness of breath.    Cardiovascular: Positive for chest pain.   Gastrointestinal: Positive for constipation. Negative for abdominal pain, diarrhea, nausea and vomiting.   Musculoskeletal: Negative for back pain, myalgias and neck pain.   Neurological: Negative for dizziness and numbness.   All other systems reviewed and are negative.      Physical Exam     Initial Vitals [02/06/20 1935]   BP Pulse Resp Temp SpO2   (!) 171/100 88 20 98 °F (36.7 °C) 99 %      MAP       --         Physical Exam    Nursing note and vitals reviewed.  Constitutional: She appears well-developed and well-nourished.   HENT:   Head: Normocephalic and atraumatic.   Eyes: Conjunctivae are normal.   Neck: Normal range of motion and phonation normal. Neck supple.   Cardiovascular: Normal rate, regular rhythm and intact distal pulses.   Murmur heard.   Diastolic murmur is present.  Pulmonary/Chest: Effort normal. No stridor. No respiratory distress. She exhibits no tenderness.   Musculoskeletal: Normal range of motion.   Neurological: She is alert and oriented to person, place, and time.   Skin: Skin is warm and dry. Capillary refill takes less than 2 seconds. No rash noted.   Psychiatric: She has a normal mood and affect. Her behavior is normal.         ED Course   Procedures  Labs Reviewed   TROPONIN ISTAT   POCT URINE PREGNANCY   POCT CBC   POCT CMP   POCT B-TYPE NATRIURETIC PEPTIDE (BNP)   POCT TROPONIN   POCT CMP   POCT B-TYPE NATRIURETIC PEPTIDE (BNP)     EKG Readings: (Independently Interpreted)   Initial Reading: No STEMI. Rhythm: Normal Sinus Rhythm. Heart Rate: 86. Ectopy: No Ectopy. Conduction: LBBB. ST Segments: Normal ST Segments. T Waves: Normal. Clinical Impression: Normal Sinus Rhythm Other Impression: Compared with prior EKG on 02-07-19. LBBB and left atrial enlargement also appear on previous EKG.       Imaging Results          X-Ray Chest PA And  Lateral (Final result)  Result time 02/06/20 20:29:50    Final result by Carmelo Cooney MD (02/06/20 20:29:50)                 Impression:      1. No acute cardiopulmonary process.      Electronically signed by: Carmelo Cooney MD  Date:    02/06/2020  Time:    20:29             Narrative:    EXAMINATION:  XR CHEST PA AND LATERAL    CLINICAL HISTORY:  Chest pain, unspecified    TECHNIQUE:  PA and lateral views of the chest were performed.    COMPARISON:  02/07/2019    FINDINGS:  The cardiomediastinal silhouette is not enlarged..  There is no pleural effusion.  The trachea is midline.  The lungs are symmetrically expanded bilaterally without evidence of acute parenchymal process. No large focal consolidation seen.  There is no pneumothorax.  The osseous structures are unremarkable.                                 Medical Decision Making:   History:   Old Medical Records: I decided to obtain old medical records.  Independently Interpreted Test(s):   I have ordered and independently interpreted X-rays - see prior notes.  I have ordered and independently interpreted EKG Reading(s) - see prior notes  Clinical Tests:   Lab Tests: Ordered and Reviewed  Radiological Study: Ordered and Reviewed  Medical Tests: Ordered and Reviewed  ED Management:  Chart review: 2D echo done 05-16-17. Grade I diastolic dysfunction. EF 45-50%. Trivial regurgitation of M/T/P valves.         Labs Reviewed  Admission on 02/06/2020   Component Date Value Ref Range Status    POC Preg Test, Ur 02/06/2020 Negative  Negative Final     Acceptable 02/06/2020 Yes   Final    Albumin, POC 02/06/2020 3.6  3.3 - 5.5 g/dL Final    Alkaline Phosphatase, POC 02/06/2020 83  42 - 141 U/L Final    ALT (SGPT), POC 02/06/2020 26  10 - 47 U/L Final    AST (SGOT), POC 02/06/2020 31  11 - 38 U/L Final    POC BUN 02/06/2020 9  7 - 22 mg/dL Final    Calcium, POC 02/06/2020 9.0  8 - 10.3 mg/dL Final    POC Chloride 02/06/2020 103  98 - 108  mmol/L Final    POC Creatinine 02/06/2020 0.8  0.6 - 1.2 mg/dL Final    POC Glucose 02/06/2020 105  73 - 118 mg/dL Final    POC Potassium 02/06/2020 3.6  3.6 - 5.1 mmol/L Final    POC Sodium 02/06/2020 140  128 - 145 mmol/L Final    Bilirubin 02/06/2020 0.4  0.2 - 1.6 mg/dL Final    POC TCO2 02/06/2020 29  18 - 33 mmol/L Final    Protein 02/06/2020 7.7  6.4 - 8.1 g/dL Final    POC Cardiac Troponin I 02/06/2020 0.00  <0.09 ng/mL Final    Sample 02/06/2020 unknown   Final    POC B-Type Natriuretic Peptide 02/06/2020 29.8  0 - 100 pg/mL Final        Imaging Reviewed    Imaging Results          X-Ray Chest PA And Lateral (Final result)  Result time 02/06/20 20:29:50    Final result by Carmelo Cooney MD (02/06/20 20:29:50)                 Impression:      1. No acute cardiopulmonary process.      Electronically signed by: Carmelo Cooney MD  Date:    02/06/2020  Time:    20:29             Narrative:    EXAMINATION:  XR CHEST PA AND LATERAL    CLINICAL HISTORY:  Chest pain, unspecified    TECHNIQUE:  PA and lateral views of the chest were performed.    COMPARISON:  02/07/2019    FINDINGS:  The cardiomediastinal silhouette is not enlarged..  There is no pleural effusion.  The trachea is midline.  The lungs are symmetrically expanded bilaterally without evidence of acute parenchymal process. No large focal consolidation seen.  There is no pneumothorax.  The osseous structures are unremarkable.                                Medications given in ED    Medications   (pyxis) gi cocktail (mylanta 30 mL, lidocaine 2 % viscous 10 mL, dicyclomine 10 mL) 50 mL (has no administration in time range)   aspirin tablet 325 mg (325 mg Oral Given 2/6/20 2059)       This document was produced by a scribe under my direction and in my presence. I agree with the content of the note and have made any necessary edits.     Elsie Andujar MD         Note was created using voice recognition software. Note may have occasional  typographical errors that may not have been identified and edited despite good jimbo initial review prior to signing.            Scribe Attestation:   Scribe #1: I performed the above scribed service and the documentation accurately describes the services I performed. I attest to the accuracy of the note.                       Discharge Medications     Medication List with Changes/Refills   New Medications    ASPIRIN 81 MG CHEW    Take 1 tablet (81 mg total) by mouth once daily.    FAMOTIDINE (PEPCID) 20 MG TABLET    Take 1 tablet (20 mg total) by mouth 2 (two) times daily.    MAGNESIUM CITRATE SOLUTION    Take 296 mLs by mouth once as needed (constipation).   Current Medications    AMLODIPINE (NORVASC) 10 MG TABLET    Take 1 tablet (10 mg total) by mouth once daily.    CETIRIZINE (ZYRTEC) 10 MG TABLET    Take 1 tablet (10 mg total) by mouth every evening.    ERGOCALCIFEROL (ERGOCALCIFEROL) 50,000 UNIT CAP    Take 1 capsule (50,000 Units total) by mouth every 7 days.    LORATADINE (CLARITIN) 10 MG TABLET    Take 1 tablet (10 mg total) by mouth once daily.    METOPROLOL SUCCINATE (TOPROL-XL) 50 MG 24 HR TABLET    Take 1 tablet (50 mg total) by mouth once daily.    PHENTERMINE (ADIPEX-P) 37.5 MG TABLET    Take 0.5 tablets (18.75 mg total) by mouth before breakfast.   Discontinued Medications    CYCLOBENZAPRINE (FLEXERIL) 10 MG TABLET    Take 1 tablet (10 mg total) by mouth every evening.    DOCUSATE SODIUM (COLACE) 100 MG CAPSULE    Take 1 capsule (100 mg total) by mouth once daily.    FLUTICASONE (FLONASE) 50 MCG/ACTUATION NASAL SPRAY    1 spray (50 mcg total) by Each Nare route once daily.    IBUPROFEN (ADVIL,MOTRIN) 600 MG TABLET    Take 1 tablet (600 mg total) by mouth every 6 (six) hours as needed for Pain (Take with food as needed for mild-to-moderate pain).    INDOMETHACIN (INDOCIN) 25 MG CAPSULE    Take 2 capsules (50 mg total) by mouth 3 (three) times daily with meals.    NAPROXEN (NAPROSYN) 500 MG TABLET     Take 1 tablet (500 mg total) by mouth 2 (two) times daily with meals.             Patient discharged to home in stable condition with instructions to:   1. Please take all meds as prescribed.  2. Follow-up with your primary care doctor   3. Return precautions discussed and patient and/or family/caretaker understands to return to the emergency room for any concerns including worsening of your current symptoms, fever, chills, night sweats, worsening pain, chest pain, shortness of breath, nausea, vomiting, diarrhea, bleeding, headache, difficulty talking, visual disturbances, weakness, numbness or any other acute concerns      Clinical Impression:     1. Left-sided chest pain    2. Chest wall discomfort    3. Elevated blood pressure reading    4. Gastroesophageal reflux disease without esophagitis            Disposition:   Disposition: Discharged  Condition: Stable                     Elsie Andujar MD  02/10/20 [id number]

## 2020-09-27 ENCOUNTER — HOSPITAL ENCOUNTER (EMERGENCY)
Facility: HOSPITAL | Age: 40
Discharge: LEFT BEFORE DISCHARGE | End: 2020-09-27
Attending: EMERGENCY MEDICINE
Payer: MEDICAID

## 2020-09-27 VITALS
HEART RATE: 80 BPM | DIASTOLIC BLOOD PRESSURE: 80 MMHG | SYSTOLIC BLOOD PRESSURE: 140 MMHG | RESPIRATION RATE: 18 BRPM | OXYGEN SATURATION: 99 % | WEIGHT: 180 LBS | TEMPERATURE: 98 F | HEIGHT: 66 IN | BODY MASS INDEX: 28.93 KG/M2

## 2020-09-27 DIAGNOSIS — R07.9 CHEST PAIN: ICD-10-CM

## 2020-09-27 DIAGNOSIS — I10 HYPERTENSION, UNSPECIFIED TYPE: Primary | ICD-10-CM

## 2020-09-27 DIAGNOSIS — I44.7 LBBB (LEFT BUNDLE BRANCH BLOCK): ICD-10-CM

## 2020-09-27 LAB
ALBUMIN SERPL-MCNC: 4 G/DL (ref 3.3–5.5)
ALP SERPL-CCNC: 82 U/L (ref 42–141)
B-HCG UR QL: NEGATIVE
BILIRUB SERPL-MCNC: 0.6 MG/DL (ref 0.2–1.6)
BUN SERPL-MCNC: 10 MG/DL (ref 7–22)
CALCIUM SERPL-MCNC: 8.9 MG/DL (ref 8–10.3)
CHLORIDE SERPL-SCNC: 103 MMOL/L (ref 98–108)
CREAT SERPL-MCNC: 0.7 MG/DL (ref 0.6–1.2)
CTP QC/QA: YES
CTP QC/QA: YES
GLUCOSE SERPL-MCNC: 92 MG/DL (ref 73–118)
POC ALT (SGPT): 28 U/L (ref 10–47)
POC AST (SGOT): 30 U/L (ref 11–38)
POC B-TYPE NATRIURETIC PEPTIDE: 37.8 PG/ML (ref 0–100)
POC CARDIAC TROPONIN I: 0 NG/ML
POC CARDIAC TROPONIN I: 0.01 NG/ML
POC PTINR: 0.9 (ref 0.9–1.2)
POC PTWBT: 11.2 SEC (ref 9.7–14.3)
POC TCO2: 25 MMOL/L (ref 18–33)
POTASSIUM BLD-SCNC: 3.3 MMOL/L (ref 3.6–5.1)
PROTEIN, POC: 8 G/DL (ref 6.4–8.1)
SAMPLE: NORMAL
SARS-COV-2 RDRP RESP QL NAA+PROBE: NEGATIVE
SODIUM BLD-SCNC: 140 MMOL/L (ref 128–145)

## 2020-09-27 PROCEDURE — 81025 URINE PREGNANCY TEST: CPT | Mod: ER | Performed by: EMERGENCY MEDICINE

## 2020-09-27 PROCEDURE — 93010 EKG 12-LEAD: ICD-10-PCS | Mod: ,,, | Performed by: INTERNAL MEDICINE

## 2020-09-27 PROCEDURE — 25000242 PHARM REV CODE 250 ALT 637 W/ HCPCS: Mod: ER | Performed by: EMERGENCY MEDICINE

## 2020-09-27 PROCEDURE — 93005 ELECTROCARDIOGRAM TRACING: CPT | Mod: ER

## 2020-09-27 PROCEDURE — 25000003 PHARM REV CODE 250: Mod: ER | Performed by: EMERGENCY MEDICINE

## 2020-09-27 PROCEDURE — U0002 COVID-19 LAB TEST NON-CDC: HCPCS | Mod: ER | Performed by: EMERGENCY MEDICINE

## 2020-09-27 PROCEDURE — 84484 ASSAY OF TROPONIN QUANT: CPT | Mod: ER

## 2020-09-27 PROCEDURE — 36000 PLACE NEEDLE IN VEIN: CPT | Mod: ER

## 2020-09-27 PROCEDURE — 85610 PROTHROMBIN TIME: CPT | Mod: ER

## 2020-09-27 PROCEDURE — 80053 COMPREHEN METABOLIC PANEL: CPT | Mod: ER

## 2020-09-27 PROCEDURE — 83880 ASSAY OF NATRIURETIC PEPTIDE: CPT | Mod: ER

## 2020-09-27 PROCEDURE — 93010 ELECTROCARDIOGRAM REPORT: CPT | Mod: ,,, | Performed by: INTERNAL MEDICINE

## 2020-09-27 PROCEDURE — 99291 CRITICAL CARE FIRST HOUR: CPT | Mod: 25,ER

## 2020-09-27 PROCEDURE — 85025 COMPLETE CBC W/AUTO DIFF WBC: CPT | Mod: ER

## 2020-09-27 RX ORDER — POTASSIUM CHLORIDE 20 MEQ/1
40 TABLET, EXTENDED RELEASE ORAL
Status: COMPLETED | OUTPATIENT
Start: 2020-09-27 | End: 2020-09-27

## 2020-09-27 RX ORDER — ASPIRIN 325 MG
325 TABLET ORAL
Status: COMPLETED | OUTPATIENT
Start: 2020-09-27 | End: 2020-09-27

## 2020-09-27 RX ORDER — NITROGLYCERIN 0.4 MG/1
0.4 TABLET SUBLINGUAL EVERY 5 MIN PRN
Status: DISCONTINUED | OUTPATIENT
Start: 2020-09-27 | End: 2020-09-27 | Stop reason: HOSPADM

## 2020-09-27 RX ADMIN — ASPIRIN 325 MG ORAL TABLET 325 MG: 325 PILL ORAL at 12:09

## 2020-09-27 RX ADMIN — POTASSIUM CHLORIDE 40 MEQ: 1500 TABLET, EXTENDED RELEASE ORAL at 02:09

## 2020-09-27 RX ADMIN — NITROGLYCERIN 0.4 MG: 0.4 TABLET, ORALLY DISINTEGRATING SUBLINGUAL at 12:09

## 2020-09-27 NOTE — Clinical Note
"Hollie Morrellra"Les was seen and treated in our emergency department on 9/27/2020.  She may return to work after being cleared by follow-up physician 09/30/2020.  May return to work after being cleared by Cardiology     If you have any questions or concerns, please don't hesitate to call.      Fariha Barragan, DO"

## 2020-09-27 NOTE — LETTER
Patient: Hollie Saldana  YOB: 1980  Date: 9/27/2020 Time: 4:24 PM  Location: [facility] Emergency Department    Leaving the Hospital Against Medical Advice    Chart #:[id number]    This will certify that I, the undersigned,    ______________________________________________________________________    A patient in the above named medical center, having requested discharge and removal from the medical center against the advice of my attending physician(s), hereby release [facility], [place] physicians, officers and employees, severally and individually, from any and all liability of any nature whatsoever for any injury or harm or complication of any kind that may result directly or indirectly, by reason of my terminating my stay as a patient at [facility] Emergency Department and my departure from [facility], and hereby waive any and all rights of action I may now have or later acquire as a result of my voluntary departure from [facility] and the termination of my stay as a patient therein.    This release is made with the full knowledge of the danger that may result from the action which I am taking.      Date:_______________________                         ___________________________                                                                                    Patient/Legal Representative    Witness:        ____________________________                          ___________________________  Nurse                                                                        Physician

## 2020-09-27 NOTE — ED PROVIDER NOTES
"Encounter Date: 9/27/2020    SCRIBE #1 NOTE: I, Ekta Lazo, am scribing for, and in the presence of,  Dr. Barragan. I have scribed the following portions of the note - Other sections scribed: HPI, ROS, PE.       History     Chief Complaint   Patient presents with    Chest Pain     "when I push on left the left side my chest it hurts, it feels like a pulled muscle." Since yesterday.      Hollie Saldana is a 39 y.o. female with HTN who presents to the ED complaining of a constant "pinching" left sided chest pain that began yesterday evening around 7pm. Pain does not radiate. Rates 6/10 in ED. No exacerbating factors. States she had similar pain previously and was told it was muscular pain. Denies fever, chills, cough, nausea, vomiting, diarrhea, and dysuria. No FHx of MI. Doesn't smoke or use drugs. Occasional alcohol. No known sick contacts. No known allergies.    The history is provided by the patient. No  was used.     Review of patient's allergies indicates:  No Known Allergies  Past Medical History:   Diagnosis Date    Hypertension     LBBB (left bundle branch block)      Past Surgical History:   Procedure Laterality Date    CARDIAC CATHETERIZATION  2017     Family History   Problem Relation Age of Onset    Hypertension Mother     Thyroid disease Mother     Appendicitis Mother     Hypertension Father      Social History     Tobacco Use    Smoking status: Never Smoker    Smokeless tobacco: Never Used   Substance Use Topics    Alcohol use: Yes     Comment: Occasionally    Drug use: No     Review of Systems   Constitutional: Negative for chills and fever.   Respiratory: Negative for cough and shortness of breath.    Cardiovascular: Positive for chest pain.   Gastrointestinal: Negative for diarrhea, nausea and vomiting.   Genitourinary: Negative for dysuria.   All other systems reviewed and are negative.      Physical Exam     Initial Vitals [09/27/20 1234]   BP Pulse Resp Temp " SpO2   (!) 170/94 84 20 98.3 °F (36.8 °C) 100 %      MAP       --         Patient gave consent to have physical exam performed.    Physical Exam    Nursing note and vitals reviewed.  Constitutional: She appears well-developed and well-nourished.   HENT:   Head: Normocephalic and atraumatic.   Right Ear: External ear normal.   Left Ear: External ear normal.   Nose: Nose normal.   Mouth/Throat: Oropharynx is clear and moist.   Eyes: Conjunctivae and EOM are normal. Pupils are equal, round, and reactive to light.   Neck: Normal range of motion and phonation normal. Neck supple.   Cardiovascular: Normal rate, regular rhythm, normal heart sounds and intact distal pulses. Exam reveals no gallop and no friction rub.    No murmur heard.  Pulmonary/Chest: Effort normal and breath sounds normal. No stridor. No respiratory distress. She has no wheezes. She has no rhonchi. She has no rales. She exhibits no tenderness.   Abdominal: Soft. Bowel sounds are normal. She exhibits no distension. There is no abdominal tenderness. There is no rigidity, no rebound and no guarding.   Musculoskeletal: Normal range of motion. No tenderness or edema.   Neurological: She is alert and oriented to person, place, and time. She has normal strength. No cranial nerve deficit or sensory deficit. GCS score is 15. GCS eye subscore is 4. GCS verbal subscore is 5. GCS motor subscore is 6.   Skin: Skin is warm and dry. Capillary refill takes less than 2 seconds. No rash noted.   Psychiatric: She has a normal mood and affect. Her behavior is normal.         ED Course   Critical Care    Date/Time: 9/27/2020 4:29 PM  Performed by: Fariha Barragan DO  Authorized by: Fariha Barragan DO   Direct patient critical care time: 8 minutes  Additional history critical care time: 8 minutes  Ordering / reviewing critical care time: 8 minutes  Documentation critical care time: 8 minutes  Consulting other physicians critical care time: 8 minutes  Total critical care time  (exclusive of procedural time) : 40 minutes  Critical care was necessary to treat or prevent imminent or life-threatening deterioration of the following conditions: cardiac failure.  Critical care was time spent personally by me on the following activities: blood draw for specimens, development of treatment plan with patient or surrogate, discussions with consultants, interpretation of cardiac output measurements, evaluation of patient's response to treatment, examination of patient, obtaining history from patient or surrogate, ordering and performing treatments and interventions, ordering and review of laboratory studies, ordering and review of radiographic studies, pulse oximetry, re-evaluation of patient's condition and review of old charts.        Labs Reviewed   POCT CMP - Abnormal; Notable for the following components:       Result Value    POC Potassium 3.3 (*)     All other components within normal limits   TROPONIN ISTAT   TROPONIN ISTAT   POCT URINE PREGNANCY   POCT CBC   SARS-COV-2 RDRP GENE   POCT CMP   POCT PROTIME-INR   POCT TROPONIN   POCT B-TYPE NATRIURETIC PEPTIDE (BNP)   ISTAT PROCEDURE   POCT B-TYPE NATRIURETIC PEPTIDE (BNP)   POCT TROPONIN         EKG Readings: (Independently Interpreted)   Initial Reading: No STEMI. Rhythm: Normal Sinus Rhythm. Heart Rate: 80. Conduction: LBBB. Axis: Normal.   Abnormal EKG. QTc normal at 472. When compared to prior EKG dated 2/6/2020 rate decreased by 6 bpm and LBBB was previously present.     Other EKG Interpretations: Repeat EKG at 14:54  No STEMI. Rate of 74. Normal Sinus Rhythm. LBBB. Normal Axis. Abnormal EKG. QTc normal at 488. When compared to earlier EKG rate decreased by 6 bpm.       Imaging Results          X-Ray Chest PA And Lateral (Final result)  Result time 09/27/20 13:21:49    Final result by Alejandra Thomas MD (09/27/20 13:21:49)                 Impression:      No acute cardiopulmonary abnormality.      Electronically signed by: Alejandra  William  Date:    09/27/2020  Time:    13:21             Narrative:    EXAMINATION:  XR CHEST PA AND LATERAL    CLINICAL HISTORY:  Chest Pain;    TECHNIQUE:  PA and lateral views of the chest were performed.    COMPARISON:  Multiple priors, most recent 02/06/2020    FINDINGS:  The lungs are clear.  No focal consolidation, pleural effusion or pneumothorax.    Normal cardiomediastinal contour.    No acute or aggressive osseous abnormality.                                 Medical Decision Making:   History:   Old Medical Records: I decided to obtain old medical records.  Clinical Tests:   Lab Tests: Ordered and Reviewed  Radiological Study: Ordered and Reviewed  Medical Tests: Ordered and Reviewed  Chief complaint: chest pain  Differential diagnosis: STEMI, NSTEMI, pneumonia, musculoskeletal pain, pregnancy  Treatment in the ED: PE, nitroglycerin, aspirin, potassium chloride  Patient reports feeling better after treatment in the ER.      Rates chest pain 2/10 after nitroglycerin.   Patient preffered not to be admitted to hospital today and would like 2 hour repeat of cardiac enzymes.     Discussed treatment, prescriptions, labs, and imaging results.    3:42 PM Patient is now agreeable to transfer and admission to [facility].    I spoke with utilization Management, at 3:43 PM.   Requesting consultation with hospitalist for services not available at this facility.   Discussed patient's presentation, past medical history, physical exam, labs, radiology results, vital signs, and ED course.  Consultation with Neil CRUZ on call for DR Perez for transfer and admission at [facility].  At this time patient will be transferred & admitted.  Patient will be transferred via EMS to accepting facility.      Prior to transfer she informed me that she does not want to be admitted the hospital.  Spoke at length with kit patient regarding concerns for death, disability, worsening condition, loss of heart function, acute myocardial  "infarction, patient wants to sign out AMA.  Patient is awake alert orient x4 speaking clearly in full sentences.  Patient states she will follow-up with her primary care and Cardiology tomorrow.  I advised patient to call 911 and return immediately to the ED if her chest pain returns or worsens.  Patient reports significant relief of chest pain after nitroglycerin.    Additional MDM:   Heart Score:    History:          Moderately suspicious.  ECG:             Nonspecific repolarisation disturbance  Age:               Less than 45 years  Risk factors: >= 3 risk factors or history of atherosclerotic disease  Troponin:       Less than or equal to normal limit  Final Score: 4      Date: 9/27/2020 Patient: Hollie Saldana Admitted: 9/27/2020 12:26 PM Attending Provider: Fariha Barragan DO Hollie Saldana or her authorized caregiver has made the decision for the patient to leave the emergency department against the advice of the emergency department staff. She or her authorized caregiver has been informed and understands the inherent risks, including death, disability worsening condition, heart failure, heart attack, and loss of heart function. She or her authorized caregiver has decided to accept the responsibility for this decision. Hollie Saldana and all necessary parties have been advised that she may return for further evaluation or treatment. Her condition at time of discharge was awake alert orient x4 speaking clearly in full sentences. Hollie Saldana had current vital signs as follows: /83  Pulse 83  Temp 98.3 °F (36.8 °C) (Oral)  Resp 20  Ht 5' 6" (1.676 m)  Wt 81.6 kg (180 lb)  LMP 09/17/2020        Scribe Attestation:   Scribe #1: I performed the above scribed service and the documentation accurately describes the services I performed. I attest to the accuracy of the note.     I, Dr. Fariha Barragan, personally performed the services described in this documentation. This document was " produced by a scribe under my direction and in my presence. All medical record entries made by the scribe were at my direction and in my presence.  I have reviewed the chart and agree that the record reflects my personal performance and is accurate and complete. Fariha Barragan DO.     09/27/2020 3:11 PM                    Clinical Impression:       ICD-10-CM ICD-9-CM   1. Hypertension, unspecified type  I10 401.9   2. Chest pain  R07.9 786.50   3. LBBB (left bundle branch block)  I44.7 426.3                                               Fariha Barragan DO  09/29/20 1245

## 2020-09-27 NOTE — DISCHARGE INSTRUCTIONS
I am concernedYou are having a heart attack today.  If her chest pain persists or does not resolve Please return to the ED immediately for further care and evaluation of your chest pain.  Please call 911 and return to the ER.

## 2020-09-27 NOTE — ED NOTES
Pt states does not want to stay to be admitted due to , MD at bedside for discussion. PT agrees to sign AMA.

## 2020-09-28 ENCOUNTER — HOSPITAL ENCOUNTER (EMERGENCY)
Facility: HOSPITAL | Age: 40
Discharge: HOME | End: 2020-09-28
Attending: EMERGENCY MEDICINE
Payer: MEDICAID

## 2020-09-28 VITALS
OXYGEN SATURATION: 100 % | WEIGHT: 180 LBS | BODY MASS INDEX: 28.93 KG/M2 | HEART RATE: 71 BPM | TEMPERATURE: 99 F | DIASTOLIC BLOOD PRESSURE: 77 MMHG | RESPIRATION RATE: 21 BRPM | HEIGHT: 66 IN | SYSTOLIC BLOOD PRESSURE: 136 MMHG

## 2020-09-28 DIAGNOSIS — R07.9 CHEST PAIN: ICD-10-CM

## 2020-09-28 DIAGNOSIS — S29.011A MUSCLE STRAIN OF CHEST WALL, INITIAL ENCOUNTER: Primary | ICD-10-CM

## 2020-09-28 LAB
ALBUMIN SERPL BCP-MCNC: 3.9 G/DL (ref 3.5–5.2)
ALP SERPL-CCNC: 82 U/L (ref 55–135)
ALT SERPL W/O P-5'-P-CCNC: 17 U/L (ref 10–44)
ANION GAP SERPL CALC-SCNC: 11 MMOL/L (ref 8–16)
AST SERPL-CCNC: 20 U/L (ref 10–40)
BASOPHILS # BLD AUTO: 0.05 K/UL (ref 0–0.2)
BASOPHILS NFR BLD: 0.7 % (ref 0–1.9)
BILIRUB SERPL-MCNC: 0.4 MG/DL (ref 0.1–1)
BNP SERPL-MCNC: 51 PG/ML (ref 0–99)
BUN SERPL-MCNC: 8 MG/DL (ref 6–20)
CALCIUM SERPL-MCNC: 9 MG/DL (ref 8.7–10.5)
CHLORIDE SERPL-SCNC: 104 MMOL/L (ref 95–110)
CO2 SERPL-SCNC: 23 MMOL/L (ref 23–29)
CREAT SERPL-MCNC: 0.8 MG/DL (ref 0.5–1.4)
DIFFERENTIAL METHOD: ABNORMAL
EOSINOPHIL # BLD AUTO: 0.2 K/UL (ref 0–0.5)
EOSINOPHIL NFR BLD: 2.2 % (ref 0–8)
ERYTHROCYTE [DISTWIDTH] IN BLOOD BY AUTOMATED COUNT: 15.7 % (ref 11.5–14.5)
EST. GFR  (AFRICAN AMERICAN): >60 ML/MIN/1.73 M^2
EST. GFR  (NON AFRICAN AMERICAN): >60 ML/MIN/1.73 M^2
GLUCOSE SERPL-MCNC: 96 MG/DL (ref 70–110)
HCT VFR BLD AUTO: 38.2 % (ref 37–48.5)
HGB BLD-MCNC: 12.2 G/DL (ref 12–16)
IMM GRANULOCYTES # BLD AUTO: 0.03 K/UL (ref 0–0.04)
IMM GRANULOCYTES NFR BLD AUTO: 0.4 % (ref 0–0.5)
LYMPHOCYTES # BLD AUTO: 2 K/UL (ref 1–4.8)
LYMPHOCYTES NFR BLD: 28.1 % (ref 18–48)
MCH RBC QN AUTO: 26.5 PG (ref 27–31)
MCHC RBC AUTO-ENTMCNC: 31.9 G/DL (ref 32–36)
MCV RBC AUTO: 83 FL (ref 82–98)
MONOCYTES # BLD AUTO: 0.8 K/UL (ref 0.3–1)
MONOCYTES NFR BLD: 11.4 % (ref 4–15)
NEUTROPHILS # BLD AUTO: 4.1 K/UL (ref 1.8–7.7)
NEUTROPHILS NFR BLD: 57.2 % (ref 38–73)
NRBC BLD-RTO: 0 /100 WBC
PLATELET # BLD AUTO: 325 K/UL (ref 150–350)
PMV BLD AUTO: 9.4 FL (ref 9.2–12.9)
POTASSIUM SERPL-SCNC: 3.3 MMOL/L (ref 3.5–5.1)
PROT SERPL-MCNC: 8 G/DL (ref 6–8.4)
RBC # BLD AUTO: 4.6 M/UL (ref 4–5.4)
SODIUM SERPL-SCNC: 138 MMOL/L (ref 136–145)
TROPONIN I SERPL DL<=0.01 NG/ML-MCNC: <0.006 NG/ML (ref 0–0.03)
WBC # BLD AUTO: 7.22 K/UL (ref 3.9–12.7)

## 2020-09-28 PROCEDURE — 99285 EMERGENCY DEPT VISIT HI MDM: CPT | Mod: 25

## 2020-09-28 PROCEDURE — 93005 ELECTROCARDIOGRAM TRACING: CPT

## 2020-09-28 PROCEDURE — 93010 EKG 12-LEAD: ICD-10-PCS | Mod: ,,, | Performed by: INTERNAL MEDICINE

## 2020-09-28 PROCEDURE — 80053 COMPREHEN METABOLIC PANEL: CPT

## 2020-09-28 PROCEDURE — 93010 ELECTROCARDIOGRAM REPORT: CPT | Mod: ,,, | Performed by: INTERNAL MEDICINE

## 2020-09-28 PROCEDURE — 25000003 PHARM REV CODE 250: Performed by: PHYSICIAN ASSISTANT

## 2020-09-28 PROCEDURE — 83880 ASSAY OF NATRIURETIC PEPTIDE: CPT

## 2020-09-28 PROCEDURE — 85025 COMPLETE CBC W/AUTO DIFF WBC: CPT

## 2020-09-28 PROCEDURE — 84484 ASSAY OF TROPONIN QUANT: CPT

## 2020-09-28 RX ORDER — ASPIRIN 325 MG
325 TABLET ORAL
Status: COMPLETED | OUTPATIENT
Start: 2020-09-28 | End: 2020-09-28

## 2020-09-28 RX ADMIN — SODIUM CHLORIDE 500 ML: 0.9 INJECTION, SOLUTION INTRAVENOUS at 06:09

## 2020-09-28 RX ADMIN — ASPIRIN 325 MG ORAL TABLET 325 MG: 325 PILL ORAL at 06:09

## 2020-09-28 NOTE — ED TRIAGE NOTES
Pt arrived to the ED complaining of a headache and left sided chest pains that started Saturday. Pt starts that she went to [facility] yesterday and they wanted to keep her because her cardiac enzymes were elevated. Pt denies SOB and n/v.

## 2020-09-28 NOTE — Clinical Note
"Hollie Morrelllaura Saldana was seen and treated in our emergency department on 9/28/2020.  She may return to work on 09/29/2020.       If you have any questions or concerns, please don't hesitate to call.      Kina Thayer RN    "

## 2020-09-28 NOTE — ED PROVIDER NOTES
"Encounter Date: 9/28/2020    SCRIBE #1 NOTE: I, Lorena Perez, am scribing for, and in the presence of,  QUIANA Martínez. I have scribed the following portions of the note - Other sections scribed: HPI, ROS.       History     Chief Complaint   Patient presents with    Chest Pain     Seen at [facility] on yesterday, reports having an elevated Troponin level, but had to leave AMA.  Presents this morning with same symptoms as yesterday and wants to continue with workup. Nonradiating LCW pain. Denies SOB. Hx of HTN      This is a 39 y.o. female with a PMHx of HTN and LBBB who presents to the ED complaining of intermittent left-sided chest pain that started 2 days ago. She reports that the pain is sharp and positional. She notes that she went to the [facility] ED yesterday for the pain but she had to leave AMA. She states that the pain suddenly started again yesterday at 2100 and only lasted for a few seconds. She notes that it's worse with movement and palpation. She denies having any pain currently. She reports that she took an Aspirin last night but none this morning. She states that she had a cardiac stent placed in 2017 and at that time she had a constant "pinching" chest pain. She notes that this pain does feel like a "pinching" pain too but it's not constant. She denies a PMHx of DVT or PE. She denies taking any birth control medication. She denies any recent long distance travel. She denies tobacco use. Denies fever, rhinorrhea, cough, SOB, nausea, and vomiting. No other associated symptoms.     The history is provided by the patient. No  was used.     Review of patient's allergies indicates:  No Known Allergies  Past Medical History:   Diagnosis Date    Hypertension     LBBB (left bundle branch block)      Past Surgical History:   Procedure Laterality Date    CARDIAC CATHETERIZATION  2017     Family History   Problem Relation Age of Onset    Hypertension Mother     Thyroid " disease Mother     Appendicitis Mother     Hypertension Father      Social History     Tobacco Use    Smoking status: Never Smoker    Smokeless tobacco: Never Used   Substance Use Topics    Alcohol use: Yes     Comment: Occasionally    Drug use: No     Review of Systems   Constitutional: Negative for fever.   HENT: Negative for rhinorrhea and sore throat.    Respiratory: Negative for cough and shortness of breath.    Cardiovascular: Positive for chest pain (resolved).   Gastrointestinal: Negative for nausea and vomiting.   Genitourinary: Negative for dysuria.   Musculoskeletal: Negative for back pain.   Skin: Negative for rash.   Neurological: Negative for weakness.   Hematological: Does not bruise/bleed easily.       Physical Exam     Initial Vitals [09/28/20 0601]   BP Pulse Resp Temp SpO2   (!) 158/97 105 20 98.8 °F (37.1 °C) 99 %      MAP       --         Physical Exam    Nursing note and vitals reviewed.  Constitutional: She appears well-developed and well-nourished. She is not diaphoretic. No distress.   HENT:   Head: Normocephalic and atraumatic.   Nose: Nose normal.   Eyes: Conjunctivae and EOM are normal. Right eye exhibits no discharge. Left eye exhibits no discharge.   Neck: Normal range of motion. No tracheal deviation present. No JVD present.   Cardiovascular: Normal rate, regular rhythm and normal heart sounds. Exam reveals no friction rub.    No murmur heard.  Pulmonary/Chest: Breath sounds normal. No stridor. No respiratory distress. She has no wheezes. She has no rhonchi. She has no rales. She exhibits no tenderness.   Musculoskeletal: No edema.      Comments: No lower extremity swelling or TTP.     Reproducible L chest wall pain with palpation and distracting movements.    Neurological: She is alert and oriented to person, place, and time.   Skin: Skin is warm and dry. No rash and no abscess noted. No erythema. No pallor.         ED Course   Procedures  Labs Reviewed   CBC W/ AUTO  DIFFERENTIAL - Abnormal; Notable for the following components:       Result Value    Mean Corpuscular Hemoglobin 26.5 (*)     Mean Corpuscular Hemoglobin Conc 31.9 (*)     RDW 15.7 (*)     All other components within normal limits   COMPREHENSIVE METABOLIC PANEL - Abnormal; Notable for the following components:    Potassium 3.3 (*)     All other components within normal limits   TROPONIN I   B-TYPE NATRIURETIC PEPTIDE   POCT URINE PREGNANCY     EKG Readings: (Independently Interpreted)   Initial Reading: No STEMI. Previous EKG: Compared with most recent EKG Rhythm: Normal Sinus Rhythm. Heart Rate: 97. Axis: Normal.   LBBB stable from yesterday and 2/2020     ECG Results          EKG 12-lead (Chest Pain) Age >30 (In process)  Result time 09/28/20 06:43:59    In process by Interface, Lab In [facility] (09/28/20 06:43:59)                 Narrative:    Test Reason : R07.9,    Vent. Rate : 097 BPM     Atrial Rate : 097 BPM     P-R Int : 158 ms          QRS Dur : 132 ms      QT Int : 378 ms       P-R-T Axes : 081 039 066 degrees     QTc Int : 480 ms    Normal sinus rhythm  Possible Left atrial enlargement  Left bundle branch block  Abnormal ECG  When compared with ECG of 27-SEP-2020 14:54,  No significant change was found    Referred By: [email or web address]   SELF           Confirmed By:                             Imaging Results          X-Ray Chest AP Portable (Final result)  Result time 09/28/20 07:24:10    Final result by Melvin Goodwin MD (09/28/20 07:24:10)                 Impression:      Stable normal chest.      Electronically signed by: Melvin Goodwin MD  Date:    09/28/2020  Time:    07:24             Narrative:    EXAMINATION:  XR CHEST AP PORTABLE    CLINICAL HISTORY:  Chest Pain;    TECHNIQUE:  Single frontal view of the chest was performed.    COMPARISON:  09/27/2020    FINDINGS:  Heart normal.  Lungs clear.                                 Medical Decision Making:   History:   Old Medical Records: I decided  to obtain old medical records.  Independently Interpreted Test(s):   I have ordered and independently interpreted X-rays - see prior notes.  I have ordered and independently interpreted EKG Reading(s) - see prior notes  Clinical Tests:   Lab Tests: Ordered and Reviewed  Radiological Study: Ordered and Reviewed  Medical Tests: Ordered and Reviewed  ED Management:  Patient is completely asymptomatic and well appearing while in the ED. Vital stable.  Chest pain consistent with musculoskeletal process.  HEART = 2.  PERC negative.  Chest x-ray shows no pneumonia, pneumothorax, effusion, or widening mediastinum.  No pericarditis.  EKG shows stable LBBB.     Advising follow-up with PCP. Strict return precautions discussed with patient who is agreeable to the plan.  Other:   I have discussed this case with another health care provider.       <> Summary of the Discussion: Discussed with attending    Additional MDM:   PERC Rule:   Age is greater than or equal to 50 = 0.0  Heart Rate is greater than or equal to 100 = 0.0  SaO2 on room air < 95% = 0.0  Unilateral leg swelling = 0.0  Hemoptysis = 0.0  Recent surgery or trauma = 0.0  Prior PE or DVT =  0.0  Hormone use = 0.00  PERC Score = 0  Heart Score:    History:          Slightly suspicious.  ECG:             Nonspecific repolarisation disturbance  Age:               Less than 45 years  Risk factors: 1-2 risk factors  Troponin:       Less than or equal to normal limit  Final Score: 2             Scribe Attestation:   Scribe #1: I performed the above scribed service and the documentation accurately describes the services I performed. I attest to the accuracy of the note.                      Clinical Impression:       ICD-10-CM ICD-9-CM   1. Muscle strain of chest wall, initial encounter  S29.011A 848.8   2. Chest pain  R07.9 786.50                          ED Disposition Condition    Discharge Stable        ED Prescriptions     None        Follow-up Information     Follow up  With Specialties Details Why Contact Info    Hebert Shepherd MD Family Medicine Schedule an appointment as soon as possible for a visit in 1 day For re-evaluation [address] [facility] [id number] [phone]      [facility] Emergency Medicine Go to  If symptoms worsen [address] [place] [address] [phone]                      Scribnory attestation: I, Shahab Park PA-C, personally performed the services described in this documentation. All medical record entries made by the scribe were at my direction and in my presence. I have reviewed the chart and agree that the record reflects my personal performance and is accurate and complete.                 Shahab Park PA-C  09/28/20 0719

## 2020-10-23 ENCOUNTER — OFFICE VISIT (OUTPATIENT)
Dept: CARDIOLOGY | Facility: CLINIC | Age: 40
End: 2020-10-23
Payer: MEDICAID

## 2020-10-23 VITALS
SYSTOLIC BLOOD PRESSURE: 162 MMHG | DIASTOLIC BLOOD PRESSURE: 106 MMHG | BODY MASS INDEX: 29.9 KG/M2 | OXYGEN SATURATION: 99 % | RESPIRATION RATE: 15 BRPM | WEIGHT: 186.06 LBS | HEART RATE: 84 BPM | HEIGHT: 66 IN

## 2020-10-23 DIAGNOSIS — I44.7 LBBB (LEFT BUNDLE BRANCH BLOCK): ICD-10-CM

## 2020-10-23 DIAGNOSIS — E66.9 NON MORBID OBESITY, UNSPECIFIED OBESITY TYPE: ICD-10-CM

## 2020-10-23 DIAGNOSIS — I10 ESSENTIAL HYPERTENSION: Primary | ICD-10-CM

## 2020-10-23 DIAGNOSIS — Z76.89 ESTABLISHING CARE WITH NEW DOCTOR, ENCOUNTER FOR: ICD-10-CM

## 2020-10-23 DIAGNOSIS — I42.8 NONISCHEMIC CARDIOMYOPATHY: ICD-10-CM

## 2020-10-23 PROCEDURE — 93005 ELECTROCARDIOGRAM TRACING: CPT | Mod: PBBFAC,PO | Performed by: INTERNAL MEDICINE

## 2020-10-23 PROCEDURE — 93010 ELECTROCARDIOGRAM REPORT: CPT | Mod: S$PBB,,, | Performed by: INTERNAL MEDICINE

## 2020-10-23 PROCEDURE — 93010 EKG 12-LEAD: ICD-10-PCS | Mod: S$PBB,,, | Performed by: INTERNAL MEDICINE

## 2020-10-23 PROCEDURE — 99999 PR PBB SHADOW E&M-EST. PATIENT-LVL IV: CPT | Mod: PBBFAC,,, | Performed by: INTERNAL MEDICINE

## 2020-10-23 PROCEDURE — 99204 PR OFFICE/OUTPT VISIT, NEW, LEVL IV, 45-59 MIN: ICD-10-PCS | Mod: S$PBB,,, | Performed by: INTERNAL MEDICINE

## 2020-10-23 PROCEDURE — 99214 OFFICE O/P EST MOD 30 MIN: CPT | Mod: PBBFAC,PO | Performed by: INTERNAL MEDICINE

## 2020-10-23 PROCEDURE — 99204 OFFICE O/P NEW MOD 45 MIN: CPT | Mod: S$PBB,,, | Performed by: INTERNAL MEDICINE

## 2020-10-23 PROCEDURE — 99999 PR PBB SHADOW E&M-EST. PATIENT-LVL IV: ICD-10-PCS | Mod: PBBFAC,,, | Performed by: INTERNAL MEDICINE

## 2020-10-23 RX ORDER — METOPROLOL SUCCINATE 100 MG/1
100 TABLET, EXTENDED RELEASE ORAL DAILY
Qty: 90 TABLET | Refills: 3 | Status: SHIPPED | OUTPATIENT
Start: 2020-10-23 | End: 2021-08-05 | Stop reason: SDUPTHER

## 2020-10-23 NOTE — PROGRESS NOTES
CARDIOVASCULAR CONSULTATION    REASON FOR CONSULT:   Hollie Saldana is a 39 y.o. female who presents for follow up of HTN, LBBB, ER visit.    PCP: Joao  HISTORY OF PRESENT ILLNESS:   Last seen May 2017.    Patient comes in today for follow-up of her recent ER visit.  Her last visit with me was greater than 3 years ago.  She presented to the emergency room with complaints of chest discomfort.  Her troponin was negative.  She does have a chronic left bundle-branch block.  She tells me the chest discomfort was of a tightness type sensation.  She is not having this at present.  There has been no palpitations or syncope.  She denies PND, orthopnea, or lower extremity edema.  There has been no melena, hematuria, or claudicant symptoms.  The patient does have a normal heart catheterization from May 2017.    Family history is negative for premature CAD in first-degree relatives.    The patient is a nonsmoker.  She denies alcohol excess or illicit drug use.  She works as a  in retail.    CARDIOVASCULAR HISTORY:   LBBB (normal cath 5/2017)    PAST MEDICAL HISTORY:     Past Medical History:   Diagnosis Date    Hypertension     LBBB (left bundle branch block)        PAST SURGICAL HISTORY:     Past Surgical History:   Procedure Laterality Date    CARDIAC CATHETERIZATION  2017       ALLERGIES AND MEDICATION:   Review of patient's allergies indicates:  No Known Allergies  Previous Medications    AMLODIPINE (NORVASC) 10 MG TABLET    Take 1 tablet (10 mg total) by mouth once daily.    AMOXICILLIN (AMOXIL) 875 MG TABLET    TAKE 1 TABLET BY MOUTH EVERY 12 hours FOR 7 days    ASPIRIN (ECOTRIN) 81 MG EC TABLET    TAKE 1 TABLET BY MOUTH ONCE A DAY    CETIRIZINE (ZYRTEC) 10 MG TABLET    Take 1 tablet (10 mg total) by mouth every evening.    ERGOCALCIFEROL (ERGOCALCIFEROL) 50,000 UNIT CAP    Take 1 capsule (50,000 Units total) by mouth every 7 days.    FAMOTIDINE (PEPCID) 20 MG TABLET    Take 1 tablet (20 mg total) by mouth  2 (two) times daily.    LORATADINE (CLARITIN) 10 MG TABLET    Take 1 tablet (10 mg total) by mouth once daily.    METOPROLOL SUCCINATE (TOPROL-XL) 50 MG 24 HR TABLET    Take 1 tablet (50 mg total) by mouth once daily.       SOCIAL HISTORY:     Social History     Socioeconomic History    Marital status: Single     Spouse name: Not on file    Number of children: Not on file    Years of education: Not on file    Highest education level: Not on file   Occupational History    Not on file   Social Needs    Financial resource strain: Not on file    Food insecurity     Worry: Not on file     Inability: Not on file    Transportation needs     Medical: Not on file     Non-medical: Not on file   Tobacco Use    Smoking status: Never Smoker    Smokeless tobacco: Never Used   Substance and Sexual Activity    Alcohol use: Yes     Comment: Occasionally    Drug use: No    Sexual activity: Not on file   Lifestyle    Physical activity     Days per week: Not on file     Minutes per session: Not on file    Stress: Not on file   Relationships    Social connections     Talks on phone: Not on file     Gets together: Not on file     Attends [employer]ism service: Not on file     Active member of club or organization: Not on file     Attends meetings of clubs or organizations: Not on file     Relationship status: Not on file   Other Topics Concern    Not on file   Social History Narrative    Not on file       FAMILY HISTORY:     Family History   Problem Relation Age of Onset    Hypertension Mother     Thyroid disease Mother     Appendicitis Mother     Hypertension Father        REVIEW OF SYSTEMS:   Review of Systems   Constitutional: Negative for chills, diaphoresis and fever.   HENT: Negative for nosebleeds.    Eyes: Negative for blurred vision, double vision and photophobia.   Respiratory: Negative for hemoptysis, shortness of breath and wheezing.    Cardiovascular: Positive for chest pain. Negative for palpitations,  "orthopnea, claudication, leg swelling and PND.   Gastrointestinal: Negative for abdominal pain, blood in stool, heartburn, melena, nausea and vomiting.   Genitourinary: Negative for flank pain and hematuria.   Musculoskeletal: Negative for falls, myalgias and neck pain.   Skin: Negative for rash.   Neurological: Negative for dizziness, seizures, loss of consciousness, weakness and headaches.   Endo/Heme/Allergies: Negative for polydipsia. Does not bruise/bleed easily.   Psychiatric/Behavioral: Negative for depression and memory loss. The patient is not nervous/anxious.        PHYSICAL EXAM:     Vitals:    10/23/20 1434   BP: (!) 162/106   Pulse: 84   Resp: 15    Body mass index is 30.03 kg/m².  Weight: 84.4 kg (186 lb 1.1 oz)   Height: 5' 6" (167.6 cm)     Physical Exam  Vitals signs reviewed.   Constitutional:       General: She is not in acute distress.     Appearance: She is well-developed. She is obese. She is not ill-appearing, toxic-appearing or diaphoretic.   HENT:      Head: Normocephalic and atraumatic.   Eyes:      General: No scleral icterus.     Conjunctiva/sclera: Conjunctivae normal.      Pupils: Pupils are equal, round, and reactive to light.   Neck:      Musculoskeletal: Normal range of motion and neck supple. No edema or neck rigidity.      Thyroid: No thyromegaly.      Vascular: Normal carotid pulses. No carotid bruit or JVD.      Trachea: Trachea normal.   Cardiovascular:      Rate and Rhythm: Normal rate and regular rhythm.      Pulses:           Carotid pulses are 2+ on the right side and 2+ on the left side.     Heart sounds: S1 normal and S2 normal. No murmur. No friction rub. No gallop.    Pulmonary:      Effort: Pulmonary effort is normal. No respiratory distress.      Breath sounds: Normal breath sounds. No stridor. No wheezing, rhonchi or rales.   Chest:      Chest wall: No tenderness.   Abdominal:      General: There is no distension.      Palpations: Abdomen is soft.   Musculoskeletal: " Normal range of motion.         General: No swelling or tenderness.   Feet:      Right foot:      Skin integrity: No ulcer.      Left foot:      Skin integrity: No ulcer.   Skin:     General: Skin is warm and dry.      Findings: No erythema or rash.   Neurological:      Mental Status: She is alert and oriented to person, place, and time.      Cranial Nerves: No cranial nerve deficit.   Psychiatric:         Speech: Speech normal.         Behavior: Behavior normal. Behavior is cooperative.         DATA:   EKG: (personally reviewed tracing(s))  10/23/20 SR 76, LBBB, similar to 9/28/20    Laboratory:  CBC:  Recent Labs   Lab 07/19/18  1554 01/28/20  1413 09/28/20  0619   WBC 5.9 6.8 7.22   Hemoglobin 12.9 12.4 12.2   Hematocrit 41.2 39.1 38.2   Platelets 379 334 325       CHEMISTRIES:  Recent Labs   Lab 07/19/18  1554 01/28/20  1413 09/28/20  0619   Glucose 85 83 96   Sodium 140 137 138   Potassium 3.8 4.1 3.3 L   BUN, Bld 10 8 8   Creatinine 0.86 0.84 0.8   eGFR if   --   --  >60   eGFR if non  87 88 >60   Calcium 9.4 9.3 9.0       CARDIAC BIOMARKERS:  Recent Labs   Lab 09/28/20  0619   Troponin I <0.006       COAGS:        LIPIDS/LFTS:  Recent Labs   Lab 07/19/18  1554 01/28/20  1413 09/28/20  0619   Cholesterol 207 H 215 H  --    Triglycerides 95 82  --    HDL 74 80  --    LDL Calculated 114 H 119 H  --    AST 17 25 20   ALT 13 23 17       Cardiovascular Testing:  Echo 5/16/17    1 - Mildly depressed left ventricular systolic function (EF 45-50%).     2 - Impaired LV relaxation, normal LAP (grade 1 diastolic dysfunction).     3 - Trivial mitral regurgitation.     4 - Trivial tricuspid regurgitation.     5 - Trivial pulmonic regurgitation.     Cath 5/15/17:   EDP 23  EF 55%, normal WM  LM: normal  LAD: normal with MLI at apex  Ramus: normal  LCx: Normal  RCA: (dom) normal  Imp:   CP with new LBBB  Essentially normal cors/LVEF  R rad vasband for hemostasis    ASSESSMENT:   # CP, atyp.   Cath 2017 with normal cors.  ?r/t uncontrolled HTN or GI.  # HTN, uncontrolled  # LBBB, old  # NICM, last EF 45%  # BMI 30    PLAN:   Cont med rx  Inc toprol 100mg qd  Check echo  Check renal art US  Diet/exercise/weight loss  RTC 1 month    Dez Hayden MD, FACC

## 2020-11-10 ENCOUNTER — HOSPITAL ENCOUNTER (OUTPATIENT)
Dept: CARDIOLOGY | Facility: HOSPITAL | Age: 40
Discharge: HOME | End: 2020-11-10
Attending: INTERNAL MEDICINE
Payer: MEDICAID

## 2020-11-10 VITALS — WEIGHT: 186 LBS | HEIGHT: 66 IN | BODY MASS INDEX: 29.89 KG/M2

## 2020-11-10 DIAGNOSIS — I10 ESSENTIAL HYPERTENSION: ICD-10-CM

## 2020-11-10 LAB
ABDOMINAL AORTA PROX EDV: 0 CM/S
ABDOMINAL AORTA PROX PSV: 75 CM/S
AORTIC ROOT ANNULUS: 2.88 CM
AORTIC VALVE CUSP SEPERATION: 2.12 CM
ASCENDING AORTA: 2.35 CM
AV INDEX (PROSTH): 0.6
AV MEAN GRADIENT: 7 MMHG
AV PEAK GRADIENT: 11 MMHG
AV VALVE AREA: 1.39 CM2
AV VELOCITY RATIO: 0.67
BSA FOR ECHO PROCEDURE: 1.98 M2
CV ECHO LV RWT: 0.5 CM
DOP CALC AO PEAK VEL: 1.68 M/S
DOP CALC AO VTI: 38.79 CM
DOP CALC LVOT AREA: 2.3 CM2
DOP CALC LVOT DIAMETER: 1.71 CM
DOP CALC LVOT PEAK VEL: 1.12 M/S
DOP CALC LVOT STROKE VOLUME: 53.85 CM3
DOP CALCLVOT PEAK VEL VTI: 23.46 CM
E WAVE DECELERATION TIME: 139.28 MSEC
E/A RATIO: 1.04
ECHO LV POSTERIOR WALL: 1.23 CM (ref 0.6–1.1)
FRACTIONAL SHORTENING: 29 % (ref 28–44)
INTERVENTRICULAR SEPTUM: 1.28 CM (ref 0.6–1.1)
IVRT: 156.99 MSEC
LA MAJOR: 5.6 CM
LA MINOR: 5.28 CM
LA WIDTH: 4.8 CM
LEFT ATRIUM SIZE: 3.53 CM
LEFT ATRIUM VOLUME INDEX: 40.4 ML/M2
LEFT ATRIUM VOLUME: 78.28 CM3
LEFT INTERNAL DIMENSION IN SYSTOLE: 3.47 CM (ref 2.1–4)
LEFT RENAL DIST DIAS: 21 CM/S
LEFT RENAL DIST SYS: 52 CM/S
LEFT RENAL MID DIAS: 20 CM/S
LEFT RENAL MID SYS: 59 CM/S
LEFT RENAL ORIGIN DIAS: 14 CM/S
LEFT RENAL ORIGIN SYS: 44 CM/S
LEFT RENAL PROX DIAS: 21 CM/S
LEFT RENAL PROX RAR: 0.69
LEFT RENAL PROX SYS: 52 CM/S
LEFT RENAL ULTRASOUND ACCELERATION TIME MEASUREMENT 1: 198 MS
LEFT RENAL ULTRASOUND ACCELERATION TIME MEASUREMENT 2: 162 MS
LEFT RENAL ULTRASOUND ACCELERATION TIME MEASUREMENT 3: 174 MS
LEFT RENAL ULTRASOUND ACCELERATION TIME MEASUREMENT AVERAGE: 198 MS
LEFT RENAL ULTRASOUND KIDNEY SIZE MEASUREMENT 1: 10.8 CM
LEFT RENAL ULTRASOUND KIDNEY SIZE MEASUREMENT AVERAGE: 10.8 CM
LEFT RENAL ULTRASOUND RESISTIVE INDEX MEASUREMENT 1: 0.59
LEFT RENAL ULTRASOUND RESISTIVE INDEX MEASUREMENT 2: 0.58
LEFT RENAL ULTRASOUND RESISTIVE INDEX MEASUREMENT 3: 0.56
LEFT RENAL ULTRASOUND RESISTIVE INDEX MEASUREMENT AVERAGE: 0.59
LEFT VENTRICLE DIASTOLIC VOLUME INDEX: 57.94 ML/M2
LEFT VENTRICLE DIASTOLIC VOLUME: 112.36 ML
LEFT VENTRICLE MASS INDEX: 124 G/M2
LEFT VENTRICLE SYSTOLIC VOLUME INDEX: 25.7 ML/M2
LEFT VENTRICLE SYSTOLIC VOLUME: 49.89 ML
LEFT VENTRICULAR INTERNAL DIMENSION IN DIASTOLE: 4.89 CM (ref 3.5–6)
LEFT VENTRICULAR MASS: 240.46 G
MV PEAK A VEL: 0.74 M/S
MV PEAK E VEL: 0.77 M/S
MV STENOSIS PRESSURE HALF TIME: 40.39 MS
MV VALVE AREA P 1/2 METHOD: 5.45 CM2
OHS CV LEFT RENAL RAR: 0.79
OHS CV RIGHT RENAL RAR: 1.13
OHS CV US LEFT RENAL HIGHEST EDV: 21
OHS CV US LEFT RENAL HIGHEST PSV: 59
OHS CV US RIGHT RENAL HIGHEST EDV: 41
OHS CV US RIGHT RENAL HIGHEST PSV: 85
PISA TR MAX VEL: 2.03 M/S
PULM VEIN S/D RATIO: 2.09
PV PEAK D VEL: 0.23 M/S
PV PEAK S VEL: 0.48 M/S
PV PEAK VELOCITY: 1.36 CM/S
RA MAJOR: 5.31 CM
RA PRESSURE: 8 MMHG
RA WIDTH: 4.53 CM
RIGHT RENAL DIST DIAS: 41 CM/S
RIGHT RENAL DIST SYS: 85 CM/S
RIGHT RENAL MID DIAS: 41 CM/S
RIGHT RENAL MID SYS: 81 CM/S
RIGHT RENAL ORIGIN DIAS: 19 CM/S
RIGHT RENAL ORIGIN SYS: 48 CM/S
RIGHT RENAL PROX DIAS: 24 CM/S
RIGHT RENAL PROX RAR: 0.79
RIGHT RENAL PROX SYS: 59 CM/S
RIGHT RENAL ULTRASOUND ACCELERATION TIME MEASUREMENT 1: 222 MS
RIGHT RENAL ULTRASOUND ACCELERATION TIME MEASUREMENT 2: 168 MS
RIGHT RENAL ULTRASOUND ACCELERATION TIME MEASUREMENT 3: 180 MS
RIGHT RENAL ULTRASOUND ACCELERATION TIME MEASUREMENT AVERAGE: 222 MS
RIGHT RENAL ULTRASOUND KIDNEY SIZE MEASUREMENT 1: 10.7 CM
RIGHT RENAL ULTRASOUND KIDNEY SIZE MEASUREMENT AVERAGE: 10.7 CM
RIGHT RENAL ULTRASOUND RESISTIVE INDEX MEASUREMENT 1: 0.67
RIGHT RENAL ULTRASOUND RESISTIVE INDEX MEASUREMENT 2: 0.55
RIGHT RENAL ULTRASOUND RESISTIVE INDEX MEASUREMENT 3: 0.61
RIGHT RENAL ULTRASOUND RESISTIVE INDEX MEASUREMENT AVERAGE: 0.67
RIGHT VENTRICULAR END-DIASTOLIC DIMENSION: 3.64 CM
SINUS: 2.8 CM
STJ: 2.48 CM
TR MAX PG: 16 MMHG
TRICUSPID ANNULAR PLANE SYSTOLIC EXCURSION: 2.24 CM
TV REST PULMONARY ARTERY PRESSURE: 24 MMHG

## 2020-11-10 PROCEDURE — 93306 ECHO (CUPID ONLY): ICD-10-PCS | Mod: 26,,, | Performed by: INTERNAL MEDICINE

## 2020-11-10 PROCEDURE — 93306 TTE W/DOPPLER COMPLETE: CPT | Mod: 26,,, | Performed by: INTERNAL MEDICINE

## 2020-11-10 PROCEDURE — 93975 VASCULAR STUDY: CPT | Mod: 26,,, | Performed by: INTERNAL MEDICINE

## 2020-11-10 PROCEDURE — 93306 TTE W/DOPPLER COMPLETE: CPT

## 2020-11-10 PROCEDURE — 93975 VASCULAR STUDY: CPT

## 2020-11-10 PROCEDURE — 93975 CV US RENAL ARTERY STENOSIS HYPERTENSION COMPLETE (CUPID ONLY): ICD-10-PCS | Mod: 26,,, | Performed by: INTERNAL MEDICINE

## 2020-11-23 ENCOUNTER — OFFICE VISIT (OUTPATIENT)
Dept: CARDIOLOGY | Facility: CLINIC | Age: 40
End: 2020-11-23
Payer: MEDICAID

## 2020-11-23 VITALS
DIASTOLIC BLOOD PRESSURE: 90 MMHG | HEART RATE: 75 BPM | RESPIRATION RATE: 15 BRPM | WEIGHT: 194.88 LBS | BODY MASS INDEX: 31.32 KG/M2 | HEIGHT: 66 IN | OXYGEN SATURATION: 98 % | SYSTOLIC BLOOD PRESSURE: 142 MMHG

## 2020-11-23 DIAGNOSIS — I42.8 NONISCHEMIC CARDIOMYOPATHY: Primary | ICD-10-CM

## 2020-11-23 DIAGNOSIS — I44.7 LBBB (LEFT BUNDLE BRANCH BLOCK): ICD-10-CM

## 2020-11-23 DIAGNOSIS — I11.9 BENIGN HYPERTENSIVE HEART DISEASE WITHOUT HEART FAILURE: ICD-10-CM

## 2020-11-23 DIAGNOSIS — E66.9 NON MORBID OBESITY, UNSPECIFIED OBESITY TYPE: ICD-10-CM

## 2020-11-23 DIAGNOSIS — I10 ESSENTIAL HYPERTENSION: ICD-10-CM

## 2020-11-23 PROCEDURE — 99214 PR OFFICE/OUTPT VISIT, EST, LEVL IV, 30-39 MIN: ICD-10-PCS | Mod: S$PBB,,, | Performed by: INTERNAL MEDICINE

## 2020-11-23 PROCEDURE — 99999 PR PBB SHADOW E&M-EST. PATIENT-LVL III: ICD-10-PCS | Mod: PBBFAC,,, | Performed by: INTERNAL MEDICINE

## 2020-11-23 PROCEDURE — 99213 OFFICE O/P EST LOW 20 MIN: CPT | Mod: PBBFAC | Performed by: INTERNAL MEDICINE

## 2020-11-23 PROCEDURE — 99999 PR PBB SHADOW E&M-EST. PATIENT-LVL III: CPT | Mod: PBBFAC,,, | Performed by: INTERNAL MEDICINE

## 2020-11-23 PROCEDURE — 99214 OFFICE O/P EST MOD 30 MIN: CPT | Mod: S$PBB,,, | Performed by: INTERNAL MEDICINE

## 2020-11-23 RX ORDER — LISINOPRIL 10 MG/1
10 TABLET ORAL DAILY
Qty: 90 TABLET | Refills: 3 | Status: SHIPPED | OUTPATIENT
Start: 2020-11-23 | End: 2020-12-29 | Stop reason: SDUPTHER

## 2020-11-23 NOTE — PROGRESS NOTES
CARDIOVASCULAR PROGRESS NOTE    REASON FOR CONSULT:   Hollie Saldana is a 39 y.o. female who presents for follow up of HTN, LBBB, testing.    PCP: Joao  HISTORY OF PRESENT ILLNESS:   Patient returns for follow-up of her testing.  She continues to complain of episodic pinching left-sided chest discomfort which will occur predominantly at rest.  She denies any exertional symptoms.  She has had no dyspnea, palpitations, or syncope.  She denies any PND, orthopnea, or lower extremity edema.  There has been no melena, hematuria, or claudicant symptoms.    I reviewed the results of her echocardiogram and renal artery ultrasound.  The renal ultrasound was negative for renal artery stenosis.  The echo notes an EF of 35% which is down from 45% back in 2017.    CARDIOVASCULAR HISTORY:   LBBB (normal cath 5/2017)  NICM    PAST MEDICAL HISTORY:     Past Medical History:   Diagnosis Date    Hypertension     LBBB (left bundle branch block)        PAST SURGICAL HISTORY:     Past Surgical History:   Procedure Laterality Date    CARDIAC CATHETERIZATION  2017       ALLERGIES AND MEDICATION:   Review of patient's allergies indicates:  No Known Allergies  Previous Medications    AMLODIPINE (NORVASC) 10 MG TABLET    Take 1 tablet (10 mg total) by mouth once daily.    AMOXICILLIN (AMOXIL) 875 MG TABLET    TAKE 1 TABLET BY MOUTH EVERY 12 hours FOR 7 days    ASPIRIN (ECOTRIN) 81 MG EC TABLET    TAKE 1 TABLET BY MOUTH ONCE A DAY    CETIRIZINE (ZYRTEC) 10 MG TABLET    Take 1 tablet (10 mg total) by mouth every evening.    ERGOCALCIFEROL (ERGOCALCIFEROL) 50,000 UNIT CAP    Take 1 capsule (50,000 Units total) by mouth every 7 days.    FAMOTIDINE (PEPCID) 20 MG TABLET    Take 1 tablet (20 mg total) by mouth 2 (two) times daily.    LORATADINE (CLARITIN) 10 MG TABLET    Take 1 tablet (10 mg total) by mouth once daily.    METOPROLOL SUCCINATE (TOPROL-XL) 100 MG 24 HR TABLET    Take 1 tablet (100 mg total) by mouth once daily.       SOCIAL  HISTORY:     Social History     Socioeconomic History    Marital status: Single     Spouse name: Not on file    Number of children: Not on file    Years of education: Not on file    Highest education level: Not on file   Occupational History    Not on file   Social Needs    Financial resource strain: Not on file    Food insecurity     Worry: Not on file     Inability: Not on file    Transportation needs     Medical: Not on file     Non-medical: Not on file   Tobacco Use    Smoking status: Never Smoker    Smokeless tobacco: Never Used   Substance and Sexual Activity    Alcohol use: Yes     Comment: Occasionally    Drug use: No    Sexual activity: Not on file   Lifestyle    Physical activity     Days per week: Not on file     Minutes per session: Not on file    Stress: Not on file   Relationships    Social connections     Talks on phone: Not on file     Gets together: Not on file     Attends Scientology service: Not on file     Active member of club or organization: Not on file     Attends meetings of clubs or organizations: Not on file     Relationship status: Not on file   Other Topics Concern    Not on file   Social History Narrative    Not on file       FAMILY HISTORY:     Family History   Problem Relation Age of Onset    Hypertension Mother     Thyroid disease Mother     Appendicitis Mother     Hypertension Father        REVIEW OF SYSTEMS:   Review of Systems   Constitutional: Negative for chills, diaphoresis and fever.   HENT: Negative for nosebleeds.    Eyes: Negative for blurred vision, double vision and photophobia.   Respiratory: Negative for hemoptysis, shortness of breath and wheezing.    Cardiovascular: Positive for chest pain. Negative for palpitations, orthopnea, claudication, leg swelling and PND.   Gastrointestinal: Negative for abdominal pain, blood in stool, heartburn, melena, nausea and vomiting.   Genitourinary: Negative for flank pain and hematuria.   Musculoskeletal: Negative  "for falls, myalgias and neck pain.   Skin: Negative for rash.   Neurological: Negative for dizziness, seizures, loss of consciousness, weakness and headaches.   Endo/Heme/Allergies: Negative for polydipsia. Does not bruise/bleed easily.   Psychiatric/Behavioral: Negative for depression and memory loss. The patient is not nervous/anxious.        PHYSICAL EXAM:     Vitals:    11/23/20 0958   BP: (!) 142/90   Pulse: 75   Resp: 15    Body mass index is 31.46 kg/m².  Weight: 88.4 kg (194 lb 14.2 oz)   Height: 5' 6" (167.6 cm)     Physical Exam  Vitals signs reviewed.   Constitutional:       General: She is not in acute distress.     Appearance: She is well-developed. She is obese. She is not ill-appearing, toxic-appearing or diaphoretic.   HENT:      Head: Normocephalic and atraumatic.   Eyes:      General: No scleral icterus.     Conjunctiva/sclera: Conjunctivae normal.      Pupils: Pupils are equal, round, and reactive to light.   Neck:      Musculoskeletal: Normal range of motion and neck supple. No edema or neck rigidity.      Thyroid: No thyromegaly.      Vascular: Normal carotid pulses. No carotid bruit or JVD.      Trachea: Trachea normal.   Cardiovascular:      Rate and Rhythm: Normal rate and regular rhythm.      Pulses:           Carotid pulses are 2+ on the right side and 2+ on the left side.     Heart sounds: S1 normal and S2 normal. No murmur. No friction rub. No gallop.    Pulmonary:      Effort: Pulmonary effort is normal. No respiratory distress.      Breath sounds: Normal breath sounds. No stridor. No wheezing, rhonchi or rales.   Chest:      Chest wall: No tenderness.   Abdominal:      General: There is no distension.      Palpations: Abdomen is soft.   Musculoskeletal: Normal range of motion.         General: No swelling or tenderness.   Feet:      Right foot:      Skin integrity: No ulcer.      Left foot:      Skin integrity: No ulcer.   Skin:     General: Skin is warm and dry.      Findings: No " erythema or rash.   Neurological:      Mental Status: She is alert and oriented to person, place, and time.      Cranial Nerves: No cranial nerve deficit.   Psychiatric:         Speech: Speech normal.         Behavior: Behavior normal. Behavior is cooperative.         DATA:   EKG: (personally reviewed tracing(s))  10/23/20 SR 76, LBBB, similar to 9/28/20    Laboratory:  CBC:  Recent Labs   Lab 07/19/18  1554 01/28/20  1413 09/28/20  0619   WBC 5.9 6.8 7.22   Hemoglobin 12.9 12.4 12.2   Hematocrit 41.2 39.1 38.2   Platelets 379 334 325       CHEMISTRIES:  Recent Labs   Lab 07/19/18  1554 01/28/20  1413 09/28/20  0619   Glucose 85 83 96   Sodium 140 137 138   Potassium 3.8 4.1 3.3 L   BUN 10 8 8   Creatinine 0.86 0.84 0.8   eGFR if   --   --  >60   eGFR if non  87 88 >60   Calcium 9.4 9.3 9.0       CARDIAC BIOMARKERS:  Recent Labs   Lab 09/28/20  0619   Troponin I <0.006       COAGS:        LIPIDS/LFTS:  Recent Labs   Lab 07/19/18  1554 01/28/20  1413 09/28/20  0619   Cholesterol 207 H 215 H  --    Triglycerides 95 82  --    HDL 74 80  --    LDL Calculated 114 H 119 H  --    AST 17 25 20   ALT 13 23 17     Lab Results   Component Value Date    TSH 0.580 08/20/2020         Cardiovascular Testing:  Echo 11/10/20 (EF decreased from 45% on report 5/2017)  · The left ventricle is normal in size with moderately decreased systolic function. The estimated ejection fraction is 35%.  · There is abnormal septal wall motion consistent with left bundle branch block. Global hypokinesis.  · There is mild left ventricular concentric hypertrophy.  · Grade I diastolic dysfunction.  · Normal right ventricular size with normal right ventricular systolic function.  · Mild mitral regurgitation.  · The estimated PA systolic pressure is 24 mmHg.    Renal art US 11/10/20  · There is insignificant stenosis (0-59%) in the Right Renal Artery.  · Right kidney 10.70 cm.  · There is insignificant stenosis (0-59%) in  the Left Renal Artery.  · Left kidney 10.80 cm.    Cath 5/15/17:   EDP 23  EF 55%, normal WM  LM: normal  LAD: normal with MLI at apex  Ramus: normal  LCx: Normal  RCA: (dom) normal  Imp:   CP with new LBBB  Essentially normal cors/LVEF  R rad vasband for hemostasis    ASSESSMENT:   # CP, atyp.  Cath 2017 with normal cors.  ?r/t uncontrolled HTN or GI.  # HTN, uncontrolled.  Renal art US 11/2020 neg.  # LBBB, old  # NICM, EF now 35% on echo 11/2020  # BMI 31, up 1 unit vs last OV    PLAN:   Cont med rx  Add lisinopril 10mg qd  Check BMP 2 weeks  Diet/exercise/weight loss  Consider PPI +/- Gi eval if CP persists despite control of BP  RTC 1 month  Repeat echo 3 months (late Feb 2021).  If EF remains depressed, consider repeat cath +/- RHC.    Dez Hayden MD, FACC

## 2020-12-08 ENCOUNTER — LAB VISIT (OUTPATIENT)
Dept: LAB | Facility: HOSPITAL | Age: 40
End: 2020-12-08
Attending: INTERNAL MEDICINE
Payer: MEDICAID

## 2020-12-08 DIAGNOSIS — I11.9 BENIGN HYPERTENSIVE HEART DISEASE WITHOUT HEART FAILURE: ICD-10-CM

## 2020-12-08 DIAGNOSIS — I10 ESSENTIAL HYPERTENSION: ICD-10-CM

## 2020-12-08 DIAGNOSIS — I42.8 NONISCHEMIC CARDIOMYOPATHY: ICD-10-CM

## 2020-12-08 LAB
ANION GAP SERPL CALC-SCNC: 8 MMOL/L (ref 8–16)
BUN SERPL-MCNC: 13 MG/DL (ref 6–20)
CALCIUM SERPL-MCNC: 8.5 MG/DL (ref 8.7–10.5)
CHLORIDE SERPL-SCNC: 106 MMOL/L (ref 95–110)
CO2 SERPL-SCNC: 28 MMOL/L (ref 23–29)
CREAT SERPL-MCNC: 1 MG/DL (ref 0.5–1.4)
EST. GFR  (AFRICAN AMERICAN): >60 ML/MIN/1.73 M^2
EST. GFR  (NON AFRICAN AMERICAN): >60 ML/MIN/1.73 M^2
GLUCOSE SERPL-MCNC: 73 MG/DL (ref 70–110)
POTASSIUM SERPL-SCNC: 4 MMOL/L (ref 3.5–5.1)
SODIUM SERPL-SCNC: 142 MMOL/L (ref 136–145)

## 2020-12-08 PROCEDURE — 36415 COLL VENOUS BLD VENIPUNCTURE: CPT

## 2020-12-08 PROCEDURE — 80048 BASIC METABOLIC PNL TOTAL CA: CPT

## 2020-12-29 ENCOUNTER — OFFICE VISIT (OUTPATIENT)
Dept: CARDIOLOGY | Facility: CLINIC | Age: 40
End: 2020-12-29
Payer: MEDICAID

## 2020-12-29 VITALS
HEART RATE: 78 BPM | BODY MASS INDEX: 31.22 KG/M2 | WEIGHT: 194.25 LBS | RESPIRATION RATE: 15 BRPM | DIASTOLIC BLOOD PRESSURE: 90 MMHG | HEIGHT: 66 IN | OXYGEN SATURATION: 99 % | SYSTOLIC BLOOD PRESSURE: 144 MMHG

## 2020-12-29 DIAGNOSIS — K21.9 GASTROESOPHAGEAL REFLUX DISEASE, UNSPECIFIED WHETHER ESOPHAGITIS PRESENT: ICD-10-CM

## 2020-12-29 DIAGNOSIS — E66.9 NON MORBID OBESITY, UNSPECIFIED OBESITY TYPE: ICD-10-CM

## 2020-12-29 DIAGNOSIS — I10 ESSENTIAL HYPERTENSION: ICD-10-CM

## 2020-12-29 DIAGNOSIS — I11.9 BENIGN HYPERTENSIVE HEART DISEASE WITHOUT HEART FAILURE: ICD-10-CM

## 2020-12-29 DIAGNOSIS — I42.8 NONISCHEMIC CARDIOMYOPATHY: Primary | ICD-10-CM

## 2020-12-29 DIAGNOSIS — I44.7 LBBB (LEFT BUNDLE BRANCH BLOCK): ICD-10-CM

## 2020-12-29 PROCEDURE — 99214 OFFICE O/P EST MOD 30 MIN: CPT | Mod: S$PBB,,, | Performed by: INTERNAL MEDICINE

## 2020-12-29 PROCEDURE — 99214 PR OFFICE/OUTPT VISIT, EST, LEVL IV, 30-39 MIN: ICD-10-PCS | Mod: S$PBB,,, | Performed by: INTERNAL MEDICINE

## 2020-12-29 PROCEDURE — 99999 PR PBB SHADOW E&M-EST. PATIENT-LVL III: CPT | Mod: PBBFAC,,, | Performed by: INTERNAL MEDICINE

## 2020-12-29 PROCEDURE — 99999 PR PBB SHADOW E&M-EST. PATIENT-LVL III: ICD-10-PCS | Mod: PBBFAC,,, | Performed by: INTERNAL MEDICINE

## 2020-12-29 PROCEDURE — 99213 OFFICE O/P EST LOW 20 MIN: CPT | Mod: PBBFAC | Performed by: INTERNAL MEDICINE

## 2020-12-29 RX ORDER — LISINOPRIL 20 MG/1
20 TABLET ORAL DAILY
Qty: 90 TABLET | Refills: 3 | Status: SHIPPED | OUTPATIENT
Start: 2020-12-29 | End: 2021-08-05 | Stop reason: SDUPTHER

## 2020-12-29 NOTE — PROGRESS NOTES
CARDIOVASCULAR PROGRESS NOTE    REASON FOR CONSULT:   Hollie Saldana is a 40 y.o. female who presents for follow up of HTN, LBBB, testing.    PCP: Joao  HISTORY OF PRESENT ILLNESS:   Patient returns for follow-up.  She denies intercurrent angina or dyspnea, but does continue to describe some pinching left-sided nonexertional chest pain.  There has been no palpitations or syncope.  She denies any PND, orthopnea, or lower extremity edema.  There has been no melena, hematuria, or claudicant symptoms.  When queried regarding sleep apnea symptoms, the patient does report snoring, and coughing in the middle of night.  I have asked her to ask her sleeping partner if she seems to stop breathing or starts choking in the middle the night.    CARDIOVASCULAR HISTORY:   LBBB (normal cath 5/2017)  NICM    PAST MEDICAL HISTORY:     Past Medical History:   Diagnosis Date    Hypertension     LBBB (left bundle branch block)        PAST SURGICAL HISTORY:     Past Surgical History:   Procedure Laterality Date    CARDIAC CATHETERIZATION  2017       ALLERGIES AND MEDICATION:   Review of patient's allergies indicates:  No Known Allergies  Previous Medications    AMLODIPINE (NORVASC) 10 MG TABLET    Take 1 tablet (10 mg total) by mouth once daily.    ASPIRIN 81 MG CHEW    TAKE 1 TABLET BY MOUTH ONCE A DAY    ERGOCALCIFEROL (ERGOCALCIFEROL) 50,000 UNIT CAP    Take 1 capsule (50,000 Units total) by mouth every 7 days.    LISINOPRIL 10 MG TABLET    Take 1 tablet (10 mg total) by mouth once daily.    LORATADINE (CLARITIN) 10 MG TABLET    Take 1 tablet (10 mg total) by mouth once daily.    METOPROLOL SUCCINATE (TOPROL-XL) 100 MG 24 HR TABLET    Take 1 tablet (100 mg total) by mouth once daily.       SOCIAL HISTORY:     Social History     Socioeconomic History    Marital status: Single     Spouse name: Not on file    Number of children: Not on file    Years of education: Not on file    Highest education level: Not on file    Occupational History    Not on file   Social Needs    Financial resource strain: Not on file    Food insecurity     Worry: Not on file     Inability: Not on file    Transportation needs     Medical: Not on file     Non-medical: Not on file   Tobacco Use    Smoking status: Never Smoker    Smokeless tobacco: Never Used   Substance and Sexual Activity    Alcohol use: Yes     Comment: Occasionally    Drug use: No    Sexual activity: Not on file   Lifestyle    Physical activity     Days per week: Not on file     Minutes per session: Not on file    Stress: Not on file   Relationships    Social connections     Talks on phone: Not on file     Gets together: Not on file     Attends Buddhist service: Not on file     Active member of club or organization: Not on file     Attends meetings of clubs or organizations: Not on file     Relationship status: Not on file   Other Topics Concern    Not on file   Social History Narrative    Not on file       FAMILY HISTORY:     Family History   Problem Relation Age of Onset    Hypertension Mother     Thyroid disease Mother     Appendicitis Mother     Hypertension Father        REVIEW OF SYSTEMS:   Review of Systems   Constitutional: Negative for chills, diaphoresis and fever.   HENT: Negative for nosebleeds.    Eyes: Negative for blurred vision, double vision and photophobia.   Respiratory: Negative for hemoptysis, shortness of breath and wheezing.    Cardiovascular: Positive for chest pain. Negative for palpitations, orthopnea, claudication, leg swelling and PND.   Gastrointestinal: Negative for abdominal pain, blood in stool, heartburn, melena, nausea and vomiting.   Genitourinary: Negative for flank pain and hematuria.   Musculoskeletal: Negative for falls, myalgias and neck pain.   Skin: Negative for rash.   Neurological: Negative for dizziness, seizures, loss of consciousness, weakness and headaches.   Endo/Heme/Allergies: Negative for polydipsia. Does not  "bruise/bleed easily.   Psychiatric/Behavioral: Negative for depression and memory loss. The patient is not nervous/anxious.        PHYSICAL EXAM:     Vitals:    12/29/20 0952   BP: (!) 144/90   Pulse: 78   Resp: 15    Body mass index is 31.35 kg/m².  Weight: 88.1 kg (194 lb 3.6 oz)   Height: 5' 6" (167.6 cm)     Physical Exam  Vitals signs reviewed.   Constitutional:       General: She is not in acute distress.     Appearance: She is well-developed. She is obese. She is not ill-appearing, toxic-appearing or diaphoretic.   HENT:      Head: Normocephalic and atraumatic.   Eyes:      General: No scleral icterus.     Conjunctiva/sclera: Conjunctivae normal.      Pupils: Pupils are equal, round, and reactive to light.   Neck:      Musculoskeletal: Normal range of motion and neck supple. No edema or neck rigidity.      Thyroid: No thyromegaly.      Vascular: Normal carotid pulses. No carotid bruit or JVD.      Trachea: Trachea normal.   Cardiovascular:      Rate and Rhythm: Normal rate and regular rhythm.      Pulses:           Carotid pulses are 2+ on the right side and 2+ on the left side.     Heart sounds: S1 normal and S2 normal. No murmur. No friction rub. No gallop.    Pulmonary:      Effort: Pulmonary effort is normal. No respiratory distress.      Breath sounds: Normal breath sounds. No stridor. No wheezing, rhonchi or rales.   Chest:      Chest wall: No tenderness.   Abdominal:      General: There is no distension.      Palpations: Abdomen is soft.   Musculoskeletal: Normal range of motion.         General: No swelling or tenderness.   Feet:      Right foot:      Skin integrity: No ulcer.      Left foot:      Skin integrity: No ulcer.   Skin:     General: Skin is warm and dry.      Findings: No erythema or rash.   Neurological:      Mental Status: She is alert and oriented to person, place, and time.      Cranial Nerves: No cranial nerve deficit.   Psychiatric:         Speech: Speech normal.         Behavior: " Behavior normal. Behavior is cooperative.         DATA:   EKG: (personally reviewed tracing(s))  10/23/20 SR 76, LBBB, similar to 9/28/20    Laboratory:  CBC:  Recent Labs   Lab 07/19/18  1554 01/28/20  1413 09/28/20  0619   WBC 5.9 6.8 7.22   Hemoglobin 12.9 12.4 12.2   Hematocrit 41.2 39.1 38.2   Platelets 379 334 325       CHEMISTRIES:  Recent Labs   Lab 01/28/20  1413 09/28/20  0619 12/08/20  0946   Glucose 83 96 73   Sodium 137 138 142   Potassium 4.1 3.3 L 4.0   BUN 8 8 13   Creatinine 0.84 0.8 1.0   eGFR if African American  --  >60 >60   eGFR if non  88 >60 >60   Calcium 9.3 9.0 8.5 L       CARDIAC BIOMARKERS:  Recent Labs   Lab 09/28/20  0619   Troponin I <0.006       COAGS:        LIPIDS/LFTS:  Recent Labs   Lab 07/19/18  1554 01/28/20  1413 09/28/20  0619   Cholesterol 207 H 215 H  --    Triglycerides 95 82  --    HDL 74 80  --    LDL Calculated 114 H 119 H  --    AST 17 25 20   ALT 13 23 17     Lab Results   Component Value Date    TSH 0.580 08/20/2020         Cardiovascular Testing:  Echo 11/10/20 (EF decreased from 45% on report 5/2017)  · The left ventricle is normal in size with moderately decreased systolic function. The estimated ejection fraction is 35%.  · There is abnormal septal wall motion consistent with left bundle branch block. Global hypokinesis.  · There is mild left ventricular concentric hypertrophy.  · Grade I diastolic dysfunction.  · Normal right ventricular size with normal right ventricular systolic function.  · Mild mitral regurgitation.  · The estimated PA systolic pressure is 24 mmHg.    Renal art US 11/10/20  · There is insignificant stenosis (0-59%) in the Right Renal Artery.  · Right kidney 10.70 cm.  · There is insignificant stenosis (0-59%) in the Left Renal Artery.  · Left kidney 10.80 cm.    Cath 5/15/17:   EDP 23  EF 55%, normal WM  LM: normal  LAD: normal with MLI at apex  Ramus: normal  LCx: Normal  RCA: (dom) normal  Imp:   CP with new  LBBB  Essentially normal cors/LVEF  R rad vasband for hemostasis    ASSESSMENT:   # CP, atyp, nonexertional, persistent.  Cath 2017 with normal cors.  ?r/t uncontrolled HTN or GERD.  # HTN, uncontrolled.  Renal art US 11/2020 neg.  ?TAHIR.  # LBBB, old  # NICM, EF now 35% on echo 11/2020  # BMI 31, stable vs last OV    PLAN:   Cont med rx  Inc lisinopril 20mg qd  Check BMP 2 weeks  Diet/exercise/weight loss  TAHIR eval as contingency.  Pt declines direct referral now but will considerint he future.  Prilosec OTC x2 weeks for symptomatic rx of ?GERD  Consider GI eval if CP persists despite control of BP  RTC 1 month  Repeat echo 2 months (late Feb 2021).  If EF remains depressed, consider repeat cath +/- RHC.    Dez Hayden MD, FACC

## 2021-01-21 ENCOUNTER — LAB VISIT (OUTPATIENT)
Dept: LAB | Facility: HOSPITAL | Age: 41
End: 2021-01-21
Attending: INTERNAL MEDICINE
Payer: MEDICAID

## 2021-01-21 DIAGNOSIS — I11.9 BENIGN HYPERTENSIVE HEART DISEASE WITHOUT HEART FAILURE: ICD-10-CM

## 2021-01-21 DIAGNOSIS — I42.8 NONISCHEMIC CARDIOMYOPATHY: ICD-10-CM

## 2021-01-21 DIAGNOSIS — I10 ESSENTIAL HYPERTENSION: ICD-10-CM

## 2021-01-21 LAB
ANION GAP SERPL CALC-SCNC: 9 MMOL/L (ref 8–16)
BUN SERPL-MCNC: 11 MG/DL (ref 6–20)
CALCIUM SERPL-MCNC: 8.6 MG/DL (ref 8.7–10.5)
CHLORIDE SERPL-SCNC: 106 MMOL/L (ref 95–110)
CO2 SERPL-SCNC: 23 MMOL/L (ref 23–29)
CREAT SERPL-MCNC: 0.8 MG/DL (ref 0.5–1.4)
EST. GFR  (AFRICAN AMERICAN): >60 ML/MIN/1.73 M^2
EST. GFR  (NON AFRICAN AMERICAN): >60 ML/MIN/1.73 M^2
GLUCOSE SERPL-MCNC: 92 MG/DL (ref 70–110)
POTASSIUM SERPL-SCNC: 4.1 MMOL/L (ref 3.5–5.1)
SODIUM SERPL-SCNC: 138 MMOL/L (ref 136–145)

## 2021-01-21 PROCEDURE — 36415 COLL VENOUS BLD VENIPUNCTURE: CPT

## 2021-01-21 PROCEDURE — 80048 BASIC METABOLIC PNL TOTAL CA: CPT

## 2021-01-26 ENCOUNTER — OFFICE VISIT (OUTPATIENT)
Dept: CARDIOLOGY | Facility: CLINIC | Age: 41
End: 2021-01-26
Payer: MEDICAID

## 2021-01-26 VITALS
RESPIRATION RATE: 15 BRPM | SYSTOLIC BLOOD PRESSURE: 122 MMHG | DIASTOLIC BLOOD PRESSURE: 68 MMHG | HEIGHT: 66 IN | BODY MASS INDEX: 31.57 KG/M2 | HEART RATE: 82 BPM | OXYGEN SATURATION: 100 % | WEIGHT: 196.44 LBS

## 2021-01-26 DIAGNOSIS — I44.7 LBBB (LEFT BUNDLE BRANCH BLOCK): ICD-10-CM

## 2021-01-26 DIAGNOSIS — I11.9 BENIGN HYPERTENSIVE HEART DISEASE WITHOUT HEART FAILURE: Primary | ICD-10-CM

## 2021-01-26 DIAGNOSIS — I42.8 NONISCHEMIC CARDIOMYOPATHY: ICD-10-CM

## 2021-01-26 DIAGNOSIS — G47.33 OSA (OBSTRUCTIVE SLEEP APNEA): ICD-10-CM

## 2021-01-26 DIAGNOSIS — I10 ESSENTIAL HYPERTENSION: ICD-10-CM

## 2021-01-26 DIAGNOSIS — E66.9 NON MORBID OBESITY, UNSPECIFIED OBESITY TYPE: ICD-10-CM

## 2021-01-26 PROCEDURE — 99214 OFFICE O/P EST MOD 30 MIN: CPT | Mod: S$PBB,,, | Performed by: INTERNAL MEDICINE

## 2021-01-26 PROCEDURE — 99999 PR PBB SHADOW E&M-EST. PATIENT-LVL IV: ICD-10-PCS | Mod: PBBFAC,,, | Performed by: INTERNAL MEDICINE

## 2021-01-26 PROCEDURE — 99214 OFFICE O/P EST MOD 30 MIN: CPT | Mod: PBBFAC | Performed by: INTERNAL MEDICINE

## 2021-01-26 PROCEDURE — 99999 PR PBB SHADOW E&M-EST. PATIENT-LVL IV: CPT | Mod: PBBFAC,,, | Performed by: INTERNAL MEDICINE

## 2021-01-26 PROCEDURE — 99214 PR OFFICE/OUTPT VISIT, EST, LEVL IV, 30-39 MIN: ICD-10-PCS | Mod: S$PBB,,, | Performed by: INTERNAL MEDICINE

## 2021-01-31 ENCOUNTER — OFFICE VISIT (OUTPATIENT)
Dept: URGENT CARE | Facility: CLINIC | Age: 41
End: 2021-01-31
Payer: MEDICAID

## 2021-01-31 VITALS
DIASTOLIC BLOOD PRESSURE: 86 MMHG | HEART RATE: 82 BPM | WEIGHT: 196 LBS | BODY MASS INDEX: 31.5 KG/M2 | HEIGHT: 66 IN | TEMPERATURE: 99 F | SYSTOLIC BLOOD PRESSURE: 139 MMHG | OXYGEN SATURATION: 98 %

## 2021-01-31 DIAGNOSIS — J02.9 SORE THROAT: ICD-10-CM

## 2021-01-31 DIAGNOSIS — J30.9 ALLERGIC RHINITIS, UNSPECIFIED SEASONALITY, UNSPECIFIED TRIGGER: Primary | ICD-10-CM

## 2021-01-31 LAB
CTP QC/QA: YES
CTP QC/QA: YES
MOLECULAR STREP A: NEGATIVE
SARS-COV-2 RDRP RESP QL NAA+PROBE: NEGATIVE

## 2021-01-31 PROCEDURE — U0002 COVID-19 LAB TEST NON-CDC: HCPCS | Mod: QW,S$GLB,, | Performed by: PHYSICIAN ASSISTANT

## 2021-01-31 PROCEDURE — 87651 POCT STREP A MOLECULAR: ICD-10-PCS | Mod: QW,S$GLB,, | Performed by: PHYSICIAN ASSISTANT

## 2021-01-31 PROCEDURE — 99214 OFFICE O/P EST MOD 30 MIN: CPT | Mod: S$GLB,,, | Performed by: PHYSICIAN ASSISTANT

## 2021-01-31 PROCEDURE — 99214 PR OFFICE/OUTPT VISIT, EST, LEVL IV, 30-39 MIN: ICD-10-PCS | Mod: S$GLB,,, | Performed by: PHYSICIAN ASSISTANT

## 2021-01-31 PROCEDURE — 87651 STREP A DNA AMP PROBE: CPT | Mod: QW,S$GLB,, | Performed by: PHYSICIAN ASSISTANT

## 2021-01-31 PROCEDURE — U0002: ICD-10-PCS | Mod: QW,S$GLB,, | Performed by: PHYSICIAN ASSISTANT

## 2021-01-31 RX ORDER — FLUTICASONE PROPIONATE 50 MCG
1 SPRAY, SUSPENSION (ML) NASAL DAILY
Qty: 9.9 ML | Refills: 0 | Status: SHIPPED | OUTPATIENT
Start: 2021-01-31 | End: 2022-11-15

## 2021-01-31 RX ORDER — LEVOCETIRIZINE DIHYDROCHLORIDE 5 MG/1
5 TABLET, FILM COATED ORAL NIGHTLY
Qty: 60 TABLET | Refills: 0 | Status: SHIPPED | OUTPATIENT
Start: 2021-01-31 | End: 2022-11-01

## 2021-02-09 ENCOUNTER — HOSPITAL ENCOUNTER (OUTPATIENT)
Dept: CARDIOLOGY | Facility: HOSPITAL | Age: 41
Discharge: HOME | End: 2021-02-09
Attending: INTERNAL MEDICINE
Payer: MEDICAID

## 2021-02-09 VITALS — WEIGHT: 196 LBS | HEIGHT: 66 IN | BODY MASS INDEX: 31.5 KG/M2

## 2021-02-09 DIAGNOSIS — I42.8 NONISCHEMIC CARDIOMYOPATHY: ICD-10-CM

## 2021-02-09 DIAGNOSIS — I44.7 LBBB (LEFT BUNDLE BRANCH BLOCK): ICD-10-CM

## 2021-02-09 DIAGNOSIS — I11.9 BENIGN HYPERTENSIVE HEART DISEASE WITHOUT HEART FAILURE: ICD-10-CM

## 2021-02-09 LAB
AORTIC ROOT ANNULUS: 2.39 CM
AORTIC VALVE CUSP SEPERATION: 1.83 CM
ASCENDING AORTA: 2.43 CM
AV INDEX (PROSTH): 0.67
AV MEAN GRADIENT: 7 MMHG
AV PEAK GRADIENT: 10 MMHG
AV VALVE AREA: 2.09 CM2
AV VELOCITY RATIO: 0.72
BSA FOR ECHO PROCEDURE: 2.03 M2
CV ECHO LV RWT: 0.35 CM
DOP CALC AO PEAK VEL: 1.62 M/S
DOP CALC AO VTI: 31.93 CM
DOP CALC LVOT AREA: 3.1 CM2
DOP CALC LVOT DIAMETER: 2 CM
DOP CALC LVOT PEAK VEL: 1.16 M/S
DOP CALC LVOT STROKE VOLUME: 66.73 CM3
DOP CALCLVOT PEAK VEL VTI: 21.25 CM
E WAVE DECELERATION TIME: 189.9 MSEC
E/A RATIO: 1.38
E/E' RATIO: 7.52 M/S
ECHO LV POSTERIOR WALL: 0.9 CM (ref 0.6–1.1)
FRACTIONAL SHORTENING: 21 % (ref 28–44)
INTERVENTRICULAR SEPTUM: 0.76 CM (ref 0.6–1.1)
IVRT: 104.66 MSEC
LA MAJOR: 5.94 CM
LA MINOR: 6.06 CM
LA WIDTH: 4.05 CM
LEFT ATRIUM SIZE: 3.88 CM
LEFT ATRIUM VOLUME INDEX: 40.5 ML/M2
LEFT ATRIUM VOLUME: 80.13 CM3
LEFT INTERNAL DIMENSION IN SYSTOLE: 4.03 CM (ref 2.1–4)
LEFT VENTRICLE DIASTOLIC VOLUME INDEX: 63.13 ML/M2
LEFT VENTRICLE DIASTOLIC VOLUME: 125 ML
LEFT VENTRICLE MASS INDEX: 75 G/M2
LEFT VENTRICLE SYSTOLIC VOLUME INDEX: 35.9 ML/M2
LEFT VENTRICLE SYSTOLIC VOLUME: 71.08 ML
LEFT VENTRICULAR INTERNAL DIMENSION IN DIASTOLE: 5.12 CM (ref 3.5–6)
LEFT VENTRICULAR MASS: 148.24 G
LV LATERAL E/E' RATIO: 5.53 M/S
LV SEPTAL E/E' RATIO: 11.75 M/S
MV PEAK A VEL: 0.68 M/S
MV PEAK E VEL: 0.94 M/S
PISA TR MAX VEL: 2.17 M/S
PULM VEIN S/D RATIO: 2
PV PEAK D VEL: 0.3 M/S
PV PEAK S VEL: 0.6 M/S
PV PEAK VELOCITY: 1.44 CM/S
RA MAJOR: 4.85 CM
RA PRESSURE: 8 MMHG
RA WIDTH: 3.99 CM
RIGHT VENTRICULAR END-DIASTOLIC DIMENSION: 2.73 CM
RV TISSUE DOPPLER FREE WALL SYSTOLIC VELOCITY 1 (APICAL 4 CHAMBER VIEW): 16.26 CM/S
SINUS: 2.7 CM
STJ: 2.1 CM
TDI LATERAL: 0.17 M/S
TDI SEPTAL: 0.08 M/S
TDI: 0.13 M/S
TR MAX PG: 19 MMHG
TRICUSPID ANNULAR PLANE SYSTOLIC EXCURSION: 2.57 CM
TV REST PULMONARY ARTERY PRESSURE: 27 MMHG

## 2021-02-09 PROCEDURE — 93306 TTE W/DOPPLER COMPLETE: CPT | Mod: 26,,, | Performed by: INTERNAL MEDICINE

## 2021-02-09 PROCEDURE — 93306 ECHO (CUPID ONLY): ICD-10-PCS | Mod: 26,,, | Performed by: INTERNAL MEDICINE

## 2021-02-09 PROCEDURE — 93306 TTE W/DOPPLER COMPLETE: CPT

## 2021-03-12 ENCOUNTER — OFFICE VISIT (OUTPATIENT)
Dept: CARDIOLOGY | Facility: CLINIC | Age: 41
End: 2021-03-12
Payer: MEDICAID

## 2021-03-12 VITALS
OXYGEN SATURATION: 98 % | HEART RATE: 76 BPM | SYSTOLIC BLOOD PRESSURE: 136 MMHG | BODY MASS INDEX: 31.5 KG/M2 | DIASTOLIC BLOOD PRESSURE: 72 MMHG | HEIGHT: 66 IN | WEIGHT: 196 LBS | RESPIRATION RATE: 15 BRPM

## 2021-03-12 DIAGNOSIS — I10 ESSENTIAL HYPERTENSION: ICD-10-CM

## 2021-03-12 DIAGNOSIS — I42.8 NONISCHEMIC CARDIOMYOPATHY: Primary | ICD-10-CM

## 2021-03-12 DIAGNOSIS — I44.7 LBBB (LEFT BUNDLE BRANCH BLOCK): ICD-10-CM

## 2021-03-12 DIAGNOSIS — E66.9 NON MORBID OBESITY, UNSPECIFIED OBESITY TYPE: ICD-10-CM

## 2021-03-12 PROCEDURE — 99214 OFFICE O/P EST MOD 30 MIN: CPT | Mod: S$PBB,,, | Performed by: INTERNAL MEDICINE

## 2021-03-12 PROCEDURE — 99214 OFFICE O/P EST MOD 30 MIN: CPT | Mod: PBBFAC,PO | Performed by: INTERNAL MEDICINE

## 2021-03-12 PROCEDURE — 99999 PR PBB SHADOW E&M-EST. PATIENT-LVL IV: CPT | Mod: PBBFAC,,, | Performed by: INTERNAL MEDICINE

## 2021-03-12 PROCEDURE — 99214 PR OFFICE/OUTPT VISIT, EST, LEVL IV, 30-39 MIN: ICD-10-PCS | Mod: S$PBB,,, | Performed by: INTERNAL MEDICINE

## 2021-03-12 PROCEDURE — 99999 PR PBB SHADOW E&M-EST. PATIENT-LVL IV: ICD-10-PCS | Mod: PBBFAC,,, | Performed by: INTERNAL MEDICINE

## 2021-04-15 ENCOUNTER — PATIENT MESSAGE (OUTPATIENT)
Dept: RESEARCH | Facility: HOSPITAL | Age: 41
End: 2021-04-15

## 2021-04-15 ENCOUNTER — OFFICE VISIT (OUTPATIENT)
Dept: URGENT CARE | Facility: CLINIC | Age: 41
End: 2021-04-15
Payer: MEDICAID

## 2021-04-15 VITALS
OXYGEN SATURATION: 97 % | TEMPERATURE: 98 F | WEIGHT: 196 LBS | SYSTOLIC BLOOD PRESSURE: 142 MMHG | DIASTOLIC BLOOD PRESSURE: 94 MMHG | HEART RATE: 73 BPM | RESPIRATION RATE: 18 BRPM | BODY MASS INDEX: 31.5 KG/M2 | HEIGHT: 66 IN

## 2021-04-15 DIAGNOSIS — M54.50 ACUTE LEFT-SIDED LOW BACK PAIN WITHOUT SCIATICA: Primary | ICD-10-CM

## 2021-04-15 DIAGNOSIS — R31.9 HEMATURIA, UNSPECIFIED TYPE: ICD-10-CM

## 2021-04-15 LAB
B-HCG UR QL: NEGATIVE
BILIRUB UR QL STRIP: NEGATIVE
CTP QC/QA: YES
GLUCOSE UR QL STRIP: NEGATIVE
KETONES UR QL STRIP: NEGATIVE
LEUKOCYTE ESTERASE UR QL STRIP: NEGATIVE
PH, POC UA: 5.5 (ref 5–8)
POC BLOOD, URINE: POSITIVE
POC NITRATES, URINE: NEGATIVE
PROT UR QL STRIP: NEGATIVE
SP GR UR STRIP: 1.02 (ref 1–1.03)
UROBILINOGEN UR STRIP-ACNC: ABNORMAL (ref 0.1–1.1)

## 2021-04-15 PROCEDURE — 81025 POCT URINE PREGNANCY: ICD-10-PCS | Mod: S$GLB,,, | Performed by: PHYSICIAN ASSISTANT

## 2021-04-15 PROCEDURE — 81025 URINE PREGNANCY TEST: CPT | Mod: S$GLB,,, | Performed by: PHYSICIAN ASSISTANT

## 2021-04-15 PROCEDURE — 99214 PR OFFICE/OUTPT VISIT, EST, LEVL IV, 30-39 MIN: ICD-10-PCS | Mod: 25,S$GLB,, | Performed by: PHYSICIAN ASSISTANT

## 2021-04-15 PROCEDURE — 81003 URINALYSIS AUTO W/O SCOPE: CPT | Mod: QW,S$GLB,, | Performed by: PHYSICIAN ASSISTANT

## 2021-04-15 PROCEDURE — 81003 POCT URINALYSIS, DIPSTICK, AUTOMATED, W/O SCOPE: ICD-10-PCS | Mod: QW,S$GLB,, | Performed by: PHYSICIAN ASSISTANT

## 2021-04-15 PROCEDURE — 99214 OFFICE O/P EST MOD 30 MIN: CPT | Mod: 25,S$GLB,, | Performed by: PHYSICIAN ASSISTANT

## 2021-04-15 RX ORDER — METHOCARBAMOL 500 MG/1
1000 TABLET, FILM COATED ORAL 3 TIMES DAILY
Qty: 30 TABLET | Refills: 0 | OUTPATIENT
Start: 2021-04-15 | End: 2021-04-20

## 2021-04-15 RX ORDER — IBUPROFEN 600 MG/1
600 TABLET ORAL EVERY 6 HOURS PRN
Qty: 20 TABLET | Refills: 0 | OUTPATIENT
Start: 2021-04-15 | End: 2021-04-20

## 2021-04-20 ENCOUNTER — HOSPITAL ENCOUNTER (EMERGENCY)
Facility: HOSPITAL | Age: 41
Discharge: HOME | End: 2021-04-20
Attending: EMERGENCY MEDICINE
Payer: MEDICAID

## 2021-04-20 VITALS
TEMPERATURE: 98 F | DIASTOLIC BLOOD PRESSURE: 89 MMHG | RESPIRATION RATE: 17 BRPM | SYSTOLIC BLOOD PRESSURE: 121 MMHG | BODY MASS INDEX: 28.93 KG/M2 | OXYGEN SATURATION: 100 % | WEIGHT: 180 LBS | HEART RATE: 69 BPM | HEIGHT: 66 IN

## 2021-04-20 DIAGNOSIS — M54.50 BILATERAL LOW BACK PAIN WITHOUT SCIATICA, UNSPECIFIED CHRONICITY: Primary | ICD-10-CM

## 2021-04-20 LAB
B-HCG UR QL: NEGATIVE
BILIRUBIN, POC UA: NEGATIVE
BLOOD, POC UA: ABNORMAL
CLARITY, POC UA: CLEAR
COLOR, POC UA: YELLOW
CTP QC/QA: YES
GLUCOSE, POC UA: NEGATIVE
KETONES, POC UA: NEGATIVE
LEUKOCYTE EST, POC UA: NEGATIVE
NITRITE, POC UA: NEGATIVE
PH UR STRIP: 6.5 [PH]
PROTEIN, POC UA: NEGATIVE
SPECIFIC GRAVITY, POC UA: >=1.03
UROBILINOGEN, POC UA: 1 E.U./DL

## 2021-04-20 PROCEDURE — 25000003 PHARM REV CODE 250: Mod: ER | Performed by: NURSE PRACTITIONER

## 2021-04-20 PROCEDURE — 99284 EMERGENCY DEPT VISIT MOD MDM: CPT | Mod: 25,ER

## 2021-04-20 PROCEDURE — 81003 URINALYSIS AUTO W/O SCOPE: CPT | Mod: ER

## 2021-04-20 PROCEDURE — 63600175 PHARM REV CODE 636 W HCPCS: Mod: ER | Performed by: NURSE PRACTITIONER

## 2021-04-20 PROCEDURE — 96372 THER/PROPH/DIAG INJ SC/IM: CPT | Mod: ER

## 2021-04-20 PROCEDURE — 81025 URINE PREGNANCY TEST: CPT | Mod: ER | Performed by: NURSE PRACTITIONER

## 2021-04-20 RX ORDER — CYCLOBENZAPRINE HCL 10 MG
10 TABLET ORAL 3 TIMES DAILY PRN
Qty: 15 TABLET | Refills: 0 | Status: SHIPPED | OUTPATIENT
Start: 2021-04-20 | End: 2021-04-25

## 2021-04-20 RX ORDER — LIDOCAINE 50 MG/G
2 PATCH TOPICAL
Status: DISCONTINUED | OUTPATIENT
Start: 2021-04-20 | End: 2021-04-20 | Stop reason: HOSPADM

## 2021-04-20 RX ORDER — KETOROLAC TROMETHAMINE 30 MG/ML
10 INJECTION, SOLUTION INTRAMUSCULAR; INTRAVENOUS
Status: COMPLETED | OUTPATIENT
Start: 2021-04-20 | End: 2021-04-20

## 2021-04-20 RX ORDER — SULINDAC 150 MG/1
150 TABLET ORAL 2 TIMES DAILY
Qty: 10 TABLET | Refills: 0 | Status: SHIPPED | OUTPATIENT
Start: 2021-04-20 | End: 2021-04-25

## 2021-04-20 RX ADMIN — LIDOCAINE 2 PATCH: 50 PATCH TOPICAL at 01:04

## 2021-04-20 RX ADMIN — KETOROLAC TROMETHAMINE 10 MG: 30 INJECTION, SOLUTION INTRAMUSCULAR at 01:04

## 2021-07-31 ENCOUNTER — OFFICE VISIT (OUTPATIENT)
Dept: URGENT CARE | Facility: CLINIC | Age: 41
End: 2021-07-31
Payer: MEDICAID

## 2021-07-31 VITALS
HEIGHT: 66 IN | HEART RATE: 82 BPM | OXYGEN SATURATION: 98 % | DIASTOLIC BLOOD PRESSURE: 92 MMHG | WEIGHT: 180 LBS | RESPIRATION RATE: 18 BRPM | BODY MASS INDEX: 28.93 KG/M2 | SYSTOLIC BLOOD PRESSURE: 158 MMHG | TEMPERATURE: 98 F

## 2021-07-31 DIAGNOSIS — R51.9 ACUTE NONINTRACTABLE HEADACHE, UNSPECIFIED HEADACHE TYPE: ICD-10-CM

## 2021-07-31 DIAGNOSIS — Z20.822 CLOSE EXPOSURE TO COVID-19 VIRUS: Primary | ICD-10-CM

## 2021-07-31 DIAGNOSIS — R05.9 COUGH: ICD-10-CM

## 2021-07-31 PROBLEM — Z98.51 S/P TUBAL LIGATION: Status: ACTIVE | Noted: 2019-09-18

## 2021-07-31 LAB
CTP QC/QA: YES
SARS-COV-2 RDRP RESP QL NAA+PROBE: NEGATIVE

## 2021-07-31 PROCEDURE — 71046 X-RAY EXAM CHEST 2 VIEWS: CPT | Mod: S$GLB,,, | Performed by: RADIOLOGY

## 2021-07-31 PROCEDURE — 99214 PR OFFICE/OUTPT VISIT, EST, LEVL IV, 30-39 MIN: ICD-10-PCS | Mod: S$GLB,,, | Performed by: PHYSICIAN ASSISTANT

## 2021-07-31 PROCEDURE — U0002: ICD-10-PCS | Mod: QW,S$GLB,, | Performed by: PHYSICIAN ASSISTANT

## 2021-07-31 PROCEDURE — U0002 COVID-19 LAB TEST NON-CDC: HCPCS | Mod: QW,S$GLB,, | Performed by: PHYSICIAN ASSISTANT

## 2021-07-31 PROCEDURE — 71046 XR CHEST PA AND LATERAL: ICD-10-PCS | Mod: S$GLB,,, | Performed by: RADIOLOGY

## 2021-07-31 PROCEDURE — 99214 OFFICE O/P EST MOD 30 MIN: CPT | Mod: S$GLB,,, | Performed by: PHYSICIAN ASSISTANT

## 2021-07-31 RX ORDER — BENZONATATE 100 MG/1
200 CAPSULE ORAL 3 TIMES DAILY PRN
Qty: 40 CAPSULE | Refills: 0 | Status: SHIPPED | OUTPATIENT
Start: 2021-07-31 | End: 2021-08-07

## 2021-07-31 RX ORDER — ACETAMINOPHEN 500 MG
1000 TABLET ORAL
Status: COMPLETED | OUTPATIENT
Start: 2021-07-31 | End: 2021-07-31

## 2021-07-31 RX ORDER — ALBUTEROL SULFATE 90 UG/1
2 AEROSOL, METERED RESPIRATORY (INHALATION) EVERY 4 HOURS PRN
Qty: 6.7 G | Refills: 0 | Status: SHIPPED | OUTPATIENT
Start: 2021-07-31 | End: 2022-11-15

## 2021-07-31 RX ADMIN — Medication 1000 MG: at 11:07

## 2021-10-01 ENCOUNTER — PATIENT MESSAGE (OUTPATIENT)
Dept: CARDIOLOGY | Facility: CLINIC | Age: 41
End: 2021-10-01

## 2021-12-01 NOTE — PLAN OF CARE
"   05/16/17 0945   Discharge Assessment   Assessment Type Discharge Planning Assessment   Confirmed/corrected address and phone number on facesheet? Yes   Assessment information obtained from? Patient;Medical Record   Expected Length of Stay (days) 1   Communicated expected length of stay with patient/caregiver yes   Type of Healthcare Directive Received (none)   Prior to hospitilization cognitive status: Alert/Oriented   Prior to hospitalization functional status: Independent   Current cognitive status: Alert/Oriented   Current Functional Status: Independent   Arrived From home or self-care   Lives With child(ivanna), dependent   How many people do you have in your home that can help with your care after discharge? 3   Who are your caregiver(s) and their phone number(s)? her children are [phone]--mother Paulette Carlos [phone] can come help if children cannot   Patient's perception of discharge disposition home or selfcare   Readmission Within The Last 30 Days no previous admission in last 30 days   Patient currently being followed by outpatient case management? No   Patient currently receives home health services? No   Does the patient currently use HME? No   Patient currently receives private duty nursing? No   Equipment Currently Used at Home none   Do you have any problems affording any of your prescribed medications? No   Is the patient taking medications as prescribed? yes   Do you have any financial concerns preventing you from receiving the healthcare you need? No   Does the patient have transportation to healthcare appointments? Yes   Transportation Available car   On Dialysis? No   Does the patient receive services at the [facility]madin Clinic? No   Discharge Plan A Home with family   Patient/Family In Agreement With Plan yes   SW met with patient in ICU, explained role of SW/CM with treatment team, provided contact information with discussion of "discharge planning begins on admission". Patient expects to " discharge this morning. Confirmed information in demographics: updated. SW reviewed the discharge planning information, inquired if patient could explain symptoms to watch/contact provider or return to ER. SW will follow in ICU and assist as needed.   Car seat

## 2021-12-16 ENCOUNTER — PATIENT MESSAGE (OUTPATIENT)
Dept: ADMINISTRATIVE | Facility: OTHER | Age: 41
End: 2021-12-16
Payer: MEDICAID

## 2021-12-16 ENCOUNTER — OFFICE VISIT (OUTPATIENT)
Dept: CARDIOLOGY | Facility: CLINIC | Age: 41
End: 2021-12-16
Payer: MEDICAID

## 2021-12-16 VITALS
WEIGHT: 194 LBS | HEART RATE: 67 BPM | OXYGEN SATURATION: 100 % | SYSTOLIC BLOOD PRESSURE: 140 MMHG | RESPIRATION RATE: 15 BRPM | HEIGHT: 66 IN | DIASTOLIC BLOOD PRESSURE: 76 MMHG | BODY MASS INDEX: 31.18 KG/M2

## 2021-12-16 DIAGNOSIS — I44.7 LBBB (LEFT BUNDLE BRANCH BLOCK): ICD-10-CM

## 2021-12-16 DIAGNOSIS — G47.33 OSA (OBSTRUCTIVE SLEEP APNEA): ICD-10-CM

## 2021-12-16 DIAGNOSIS — I10 ESSENTIAL HYPERTENSION: ICD-10-CM

## 2021-12-16 DIAGNOSIS — I42.8 NONISCHEMIC CARDIOMYOPATHY: Primary | ICD-10-CM

## 2021-12-16 DIAGNOSIS — E66.9 NON MORBID OBESITY, UNSPECIFIED OBESITY TYPE: ICD-10-CM

## 2021-12-16 PROCEDURE — 4010F ACE/ARB THERAPY RXD/TAKEN: CPT | Mod: CPTII,,, | Performed by: INTERNAL MEDICINE

## 2021-12-16 PROCEDURE — 4010F PR ACE/ARB THEARPY RXD/TAKEN: ICD-10-PCS | Mod: CPTII,,, | Performed by: INTERNAL MEDICINE

## 2021-12-16 PROCEDURE — 99214 PR OFFICE/OUTPT VISIT, EST, LEVL IV, 30-39 MIN: ICD-10-PCS | Mod: S$PBB,,, | Performed by: INTERNAL MEDICINE

## 2021-12-16 PROCEDURE — 93010 ELECTROCARDIOGRAM REPORT: CPT | Mod: S$PBB,,, | Performed by: INTERNAL MEDICINE

## 2021-12-16 PROCEDURE — 93005 ELECTROCARDIOGRAM TRACING: CPT | Mod: PBBFAC | Performed by: INTERNAL MEDICINE

## 2021-12-16 PROCEDURE — 93010 EKG 12-LEAD: ICD-10-PCS | Mod: S$PBB,,, | Performed by: INTERNAL MEDICINE

## 2021-12-16 PROCEDURE — 99214 OFFICE O/P EST MOD 30 MIN: CPT | Mod: PBBFAC | Performed by: INTERNAL MEDICINE

## 2021-12-16 PROCEDURE — 99214 OFFICE O/P EST MOD 30 MIN: CPT | Mod: S$PBB,,, | Performed by: INTERNAL MEDICINE

## 2021-12-16 PROCEDURE — 99999 PR PBB SHADOW E&M-EST. PATIENT-LVL IV: ICD-10-PCS | Mod: PBBFAC,,, | Performed by: INTERNAL MEDICINE

## 2021-12-16 PROCEDURE — 99999 PR PBB SHADOW E&M-EST. PATIENT-LVL IV: CPT | Mod: PBBFAC,,, | Performed by: INTERNAL MEDICINE

## 2021-12-16 RX ORDER — AMLODIPINE BESYLATE 10 MG/1
10 TABLET ORAL DAILY
Qty: 90 TABLET | Refills: 3 | Status: SHIPPED | OUTPATIENT
Start: 2021-12-16 | End: 2022-07-26 | Stop reason: SDUPTHER

## 2021-12-23 ENCOUNTER — LAB VISIT (OUTPATIENT)
Dept: PRIMARY CARE CLINIC | Facility: OTHER | Age: 41
End: 2021-12-23

## 2021-12-23 DIAGNOSIS — R52 BODY ACHES: Primary | ICD-10-CM

## 2021-12-23 LAB
CTP QC/QA: YES
SARS-COV-2 AG RESP QL IA.RAPID: NEGATIVE

## 2021-12-24 ENCOUNTER — OFFICE VISIT (OUTPATIENT)
Dept: URGENT CARE | Facility: CLINIC | Age: 41
End: 2021-12-24
Payer: MEDICAID

## 2021-12-24 VITALS
DIASTOLIC BLOOD PRESSURE: 90 MMHG | RESPIRATION RATE: 18 BRPM | WEIGHT: 192 LBS | OXYGEN SATURATION: 98 % | BODY MASS INDEX: 30.86 KG/M2 | HEART RATE: 99 BPM | HEIGHT: 66 IN | TEMPERATURE: 98 F | SYSTOLIC BLOOD PRESSURE: 154 MMHG

## 2021-12-24 DIAGNOSIS — R51.9 NONINTRACTABLE HEADACHE, UNSPECIFIED CHRONICITY PATTERN, UNSPECIFIED HEADACHE TYPE: ICD-10-CM

## 2021-12-24 DIAGNOSIS — R52 BODY ACHES: Primary | ICD-10-CM

## 2021-12-24 DIAGNOSIS — U07.1 COVID-19: ICD-10-CM

## 2021-12-24 LAB
CTP QC/QA: YES
CTP QC/QA: YES
POC MOLECULAR INFLUENZA A AGN: NEGATIVE
POC MOLECULAR INFLUENZA B AGN: NEGATIVE
SARS-COV-2 RDRP RESP QL NAA+PROBE: POSITIVE

## 2021-12-24 PROCEDURE — 3008F BODY MASS INDEX DOCD: CPT | Mod: CPTII,S$GLB,,

## 2021-12-24 PROCEDURE — 4010F PR ACE/ARB THEARPY RXD/TAKEN: ICD-10-PCS | Mod: CPTII,S$GLB,,

## 2021-12-24 PROCEDURE — 1160F RVW MEDS BY RX/DR IN RCRD: CPT | Mod: CPTII,S$GLB,,

## 2021-12-24 PROCEDURE — 1160F PR REVIEW ALL MEDS BY PRESCRIBER/CLIN PHARMACIST DOCUMENTED: ICD-10-PCS | Mod: CPTII,S$GLB,,

## 2021-12-24 PROCEDURE — 99213 PR OFFICE/OUTPT VISIT, EST, LEVL III, 20-29 MIN: ICD-10-PCS | Mod: S$GLB,,,

## 2021-12-24 PROCEDURE — 1159F PR MEDICATION LIST DOCUMENTED IN MEDICAL RECORD: ICD-10-PCS | Mod: CPTII,S$GLB,,

## 2021-12-24 PROCEDURE — 3077F PR MOST RECENT SYSTOLIC BLOOD PRESSURE >= 140 MM HG: ICD-10-PCS | Mod: CPTII,S$GLB,,

## 2021-12-24 PROCEDURE — 3080F PR MOST RECENT DIASTOLIC BLOOD PRESSURE >= 90 MM HG: ICD-10-PCS | Mod: CPTII,S$GLB,,

## 2021-12-24 PROCEDURE — 3008F PR BODY MASS INDEX (BMI) DOCUMENTED: ICD-10-PCS | Mod: CPTII,S$GLB,,

## 2021-12-24 PROCEDURE — 87502 POCT INFLUENZA A/B MOLECULAR: ICD-10-PCS | Mod: QW,S$GLB,,

## 2021-12-24 PROCEDURE — 3077F SYST BP >= 140 MM HG: CPT | Mod: CPTII,S$GLB,,

## 2021-12-24 PROCEDURE — 99213 OFFICE O/P EST LOW 20 MIN: CPT | Mod: S$GLB,,,

## 2021-12-24 PROCEDURE — 87502 INFLUENZA DNA AMP PROBE: CPT | Mod: QW,S$GLB,,

## 2021-12-24 PROCEDURE — 1159F MED LIST DOCD IN RCRD: CPT | Mod: CPTII,S$GLB,,

## 2021-12-24 PROCEDURE — U0002: ICD-10-PCS | Mod: QW,S$GLB,,

## 2021-12-24 PROCEDURE — 3080F DIAST BP >= 90 MM HG: CPT | Mod: CPTII,S$GLB,,

## 2021-12-24 PROCEDURE — U0002 COVID-19 LAB TEST NON-CDC: HCPCS | Mod: QW,S$GLB,,

## 2021-12-24 PROCEDURE — 4010F ACE/ARB THERAPY RXD/TAKEN: CPT | Mod: CPTII,S$GLB,,

## 2021-12-24 RX ORDER — ACETAMINOPHEN, ASPIRIN (NSAID), AND CAFFEINE 250; 250; 65 MG/1; MG/1; MG/1
1 TABLET, FILM COATED ORAL EVERY 6 HOURS PRN
Qty: 40 TABLET | Refills: 0 | Status: SHIPPED | OUTPATIENT
Start: 2021-12-24 | End: 2022-01-03

## 2022-02-09 ENCOUNTER — LAB VISIT (OUTPATIENT)
Dept: LAB | Facility: HOSPITAL | Age: 42
End: 2022-02-09
Payer: MEDICAID

## 2022-02-09 DIAGNOSIS — Z00.00 ANNUAL PHYSICAL EXAM: ICD-10-CM

## 2022-02-09 LAB
25(OH)D3+25(OH)D2 SERPL-MCNC: 18 NG/ML (ref 30–96)
ALBUMIN SERPL BCP-MCNC: 3.5 G/DL (ref 3.5–5.2)
ALP SERPL-CCNC: 70 U/L (ref 55–135)
ALT SERPL W/O P-5'-P-CCNC: 10 U/L (ref 10–44)
ANION GAP SERPL CALC-SCNC: 7 MMOL/L (ref 8–16)
AST SERPL-CCNC: 15 U/L (ref 10–40)
BASOPHILS # BLD AUTO: 0.04 K/UL (ref 0–0.2)
BASOPHILS NFR BLD: 0.7 % (ref 0–1.9)
BILIRUB SERPL-MCNC: 0.3 MG/DL (ref 0.1–1)
BUN SERPL-MCNC: 9 MG/DL (ref 6–20)
CALCIUM SERPL-MCNC: 9.4 MG/DL (ref 8.7–10.5)
CHLORIDE SERPL-SCNC: 103 MMOL/L (ref 95–110)
CHOLEST SERPL-MCNC: 173 MG/DL (ref 120–199)
CHOLEST/HDLC SERPL: 2.6 {RATIO} (ref 2–5)
CO2 SERPL-SCNC: 24 MMOL/L (ref 23–29)
CREAT SERPL-MCNC: 0.8 MG/DL (ref 0.5–1.4)
DIFFERENTIAL METHOD: ABNORMAL
EOSINOPHIL # BLD AUTO: 0.2 K/UL (ref 0–0.5)
EOSINOPHIL NFR BLD: 3.2 % (ref 0–8)
ERYTHROCYTE [DISTWIDTH] IN BLOOD BY AUTOMATED COUNT: 18.1 % (ref 11.5–14.5)
EST. GFR  (AFRICAN AMERICAN): >60 ML/MIN/1.73 M^2
EST. GFR  (NON AFRICAN AMERICAN): >60 ML/MIN/1.73 M^2
ESTIMATED AVG GLUCOSE: 108 MG/DL (ref 68–131)
GLUCOSE SERPL-MCNC: 89 MG/DL (ref 70–110)
HBA1C MFR BLD: 5.4 % (ref 4–5.6)
HCT VFR BLD AUTO: 34.5 % (ref 37–48.5)
HDLC SERPL-MCNC: 67 MG/DL (ref 40–75)
HDLC SERPL: 38.7 % (ref 20–50)
HGB BLD-MCNC: 10.7 G/DL (ref 12–16)
IMM GRANULOCYTES # BLD AUTO: 0.01 K/UL (ref 0–0.04)
IMM GRANULOCYTES NFR BLD AUTO: 0.2 % (ref 0–0.5)
LDLC SERPL CALC-MCNC: 95.2 MG/DL (ref 63–159)
LYMPHOCYTES # BLD AUTO: 2.6 K/UL (ref 1–4.8)
LYMPHOCYTES NFR BLD: 44.2 % (ref 18–48)
MCH RBC QN AUTO: 25 PG (ref 27–31)
MCHC RBC AUTO-ENTMCNC: 31 G/DL (ref 32–36)
MCV RBC AUTO: 81 FL (ref 82–98)
MONOCYTES # BLD AUTO: 0.5 K/UL (ref 0.3–1)
MONOCYTES NFR BLD: 8.1 % (ref 4–15)
NEUTROPHILS # BLD AUTO: 2.6 K/UL (ref 1.8–7.7)
NEUTROPHILS NFR BLD: 43.6 % (ref 38–73)
NONHDLC SERPL-MCNC: 106 MG/DL
NRBC BLD-RTO: 0 /100 WBC
PLATELET # BLD AUTO: 381 K/UL (ref 150–450)
PMV BLD AUTO: 10 FL (ref 9.2–12.9)
POTASSIUM SERPL-SCNC: 3.8 MMOL/L (ref 3.5–5.1)
PROT SERPL-MCNC: 7.4 G/DL (ref 6–8.4)
RBC # BLD AUTO: 4.28 M/UL (ref 4–5.4)
SODIUM SERPL-SCNC: 134 MMOL/L (ref 136–145)
T3 SERPL-MCNC: 104 NG/DL (ref 60–180)
T4 FREE SERPL-MCNC: 0.9 NG/DL (ref 0.71–1.51)
TRIGL SERPL-MCNC: 54 MG/DL (ref 30–150)
TSH SERPL DL<=0.005 MIU/L-ACNC: 0.59 UIU/ML (ref 0.4–4)
WBC # BLD AUTO: 5.9 K/UL (ref 3.9–12.7)

## 2022-02-09 PROCEDURE — 84439 ASSAY OF FREE THYROXINE: CPT | Performed by: FAMILY MEDICINE

## 2022-02-09 PROCEDURE — 36415 COLL VENOUS BLD VENIPUNCTURE: CPT | Mod: PO | Performed by: FAMILY MEDICINE

## 2022-02-09 PROCEDURE — 82306 VITAMIN D 25 HYDROXY: CPT | Performed by: FAMILY MEDICINE

## 2022-02-09 PROCEDURE — 80061 LIPID PANEL: CPT | Performed by: FAMILY MEDICINE

## 2022-02-09 PROCEDURE — 80053 COMPREHEN METABOLIC PANEL: CPT | Performed by: FAMILY MEDICINE

## 2022-02-09 PROCEDURE — 83036 HEMOGLOBIN GLYCOSYLATED A1C: CPT | Performed by: FAMILY MEDICINE

## 2022-02-09 PROCEDURE — 84480 ASSAY TRIIODOTHYRONINE (T3): CPT | Performed by: FAMILY MEDICINE

## 2022-02-09 PROCEDURE — 84443 ASSAY THYROID STIM HORMONE: CPT | Performed by: FAMILY MEDICINE

## 2022-02-09 PROCEDURE — 85025 COMPLETE CBC W/AUTO DIFF WBC: CPT | Performed by: FAMILY MEDICINE

## 2022-02-24 DIAGNOSIS — R50.9 FEVER, UNSPECIFIED FEVER CAUSE: Primary | ICD-10-CM

## 2022-02-24 DIAGNOSIS — J02.9 SORE THROAT: ICD-10-CM

## 2022-02-24 LAB
CTP QC/QA: YES
SARS-COV-2 AG RESP QL IA.RAPID: NEGATIVE

## 2022-03-25 ENCOUNTER — OFFICE VISIT (OUTPATIENT)
Dept: FAMILY MEDICINE | Facility: CLINIC | Age: 42
End: 2022-03-25
Payer: MEDICAID

## 2022-03-25 VITALS
HEART RATE: 90 BPM | SYSTOLIC BLOOD PRESSURE: 138 MMHG | WEIGHT: 188.63 LBS | TEMPERATURE: 98 F | OXYGEN SATURATION: 95 % | BODY MASS INDEX: 30.31 KG/M2 | HEIGHT: 66 IN | DIASTOLIC BLOOD PRESSURE: 82 MMHG

## 2022-03-25 DIAGNOSIS — R39.9 UTI SYMPTOMS: Primary | ICD-10-CM

## 2022-03-25 DIAGNOSIS — I10 ESSENTIAL HYPERTENSION: ICD-10-CM

## 2022-03-25 LAB
BACTERIA #/AREA URNS AUTO: ABNORMAL /HPF
BILIRUB SERPL-MCNC: NEGATIVE MG/DL
BILIRUB UR QL STRIP: NEGATIVE
BLOOD URINE, POC: ABNORMAL
CLARITY UR REFRACT.AUTO: ABNORMAL
CLARITY, POC UA: ABNORMAL
COLOR UR AUTO: YELLOW
COLOR, POC UA: YELLOW
GLUCOSE UR QL STRIP: NEGATIVE
GLUCOSE UR QL STRIP: NORMAL
HGB UR QL STRIP: ABNORMAL
KETONES UR QL STRIP: NEGATIVE
KETONES UR QL STRIP: NEGATIVE
LEUKOCYTE ESTERASE UR QL STRIP: NEGATIVE
LEUKOCYTE ESTERASE URINE, POC: NEGATIVE
MICROSCOPIC COMMENT: ABNORMAL
NITRITE UR QL STRIP: NEGATIVE
NITRITE, POC UA: NEGATIVE
PH UR STRIP: 5 [PH] (ref 5–8)
PH, POC UA: ABNORMAL
PROT UR QL STRIP: NEGATIVE
PROTEIN, POC: ABNORMAL
RBC #/AREA URNS AUTO: 5 /HPF (ref 0–4)
SP GR UR STRIP: 1.02 (ref 1–1.03)
SPECIFIC GRAVITY, POC UA: 1.02
SQUAMOUS #/AREA URNS AUTO: 16 /HPF
URN SPEC COLLECT METH UR: ABNORMAL
UROBILINOGEN, POC UA: NORMAL
WBC #/AREA URNS AUTO: 11 /HPF (ref 0–5)

## 2022-03-25 PROCEDURE — 4010F PR ACE/ARB THEARPY RXD/TAKEN: ICD-10-PCS | Mod: CPTII,,, | Performed by: NURSE PRACTITIONER

## 2022-03-25 PROCEDURE — 1160F PR REVIEW ALL MEDS BY PRESCRIBER/CLIN PHARMACIST DOCUMENTED: ICD-10-PCS | Mod: CPTII,,, | Performed by: NURSE PRACTITIONER

## 2022-03-25 PROCEDURE — 3075F PR MOST RECENT SYSTOLIC BLOOD PRESS GE 130-139MM HG: ICD-10-PCS | Mod: CPTII,,, | Performed by: NURSE PRACTITIONER

## 2022-03-25 PROCEDURE — 81001 URINALYSIS AUTO W/SCOPE: CPT | Performed by: NURSE PRACTITIONER

## 2022-03-25 PROCEDURE — 87086 URINE CULTURE/COLONY COUNT: CPT | Performed by: NURSE PRACTITIONER

## 2022-03-25 PROCEDURE — 1160F RVW MEDS BY RX/DR IN RCRD: CPT | Mod: CPTII,,, | Performed by: NURSE PRACTITIONER

## 2022-03-25 PROCEDURE — 3008F PR BODY MASS INDEX (BMI) DOCUMENTED: ICD-10-PCS | Mod: CPTII,,, | Performed by: NURSE PRACTITIONER

## 2022-03-25 PROCEDURE — 1159F MED LIST DOCD IN RCRD: CPT | Mod: CPTII,,, | Performed by: NURSE PRACTITIONER

## 2022-03-25 PROCEDURE — 3075F SYST BP GE 130 - 139MM HG: CPT | Mod: CPTII,,, | Performed by: NURSE PRACTITIONER

## 2022-03-25 PROCEDURE — 3008F BODY MASS INDEX DOCD: CPT | Mod: CPTII,,, | Performed by: NURSE PRACTITIONER

## 2022-03-25 PROCEDURE — 99999 PR PBB SHADOW E&M-EST. PATIENT-LVL III: ICD-10-PCS | Mod: PBBFAC,,, | Performed by: NURSE PRACTITIONER

## 2022-03-25 PROCEDURE — 81002 URINALYSIS NONAUTO W/O SCOPE: CPT | Mod: PBBFAC,PO | Performed by: NURSE PRACTITIONER

## 2022-03-25 PROCEDURE — 99999 PR PBB SHADOW E&M-EST. PATIENT-LVL III: CPT | Mod: PBBFAC,,, | Performed by: NURSE PRACTITIONER

## 2022-03-25 PROCEDURE — 99213 OFFICE O/P EST LOW 20 MIN: CPT | Mod: PBBFAC,PO | Performed by: NURSE PRACTITIONER

## 2022-03-25 PROCEDURE — 99214 OFFICE O/P EST MOD 30 MIN: CPT | Mod: S$PBB,,, | Performed by: NURSE PRACTITIONER

## 2022-03-25 PROCEDURE — 4010F ACE/ARB THERAPY RXD/TAKEN: CPT | Mod: CPTII,,, | Performed by: NURSE PRACTITIONER

## 2022-03-25 PROCEDURE — 99214 PR OFFICE/OUTPT VISIT, EST, LEVL IV, 30-39 MIN: ICD-10-PCS | Mod: S$PBB,,, | Performed by: NURSE PRACTITIONER

## 2022-03-25 PROCEDURE — 3079F DIAST BP 80-89 MM HG: CPT | Mod: CPTII,,, | Performed by: NURSE PRACTITIONER

## 2022-03-25 PROCEDURE — 3044F HG A1C LEVEL LT 7.0%: CPT | Mod: CPTII,,, | Performed by: NURSE PRACTITIONER

## 2022-03-25 PROCEDURE — 3044F PR MOST RECENT HEMOGLOBIN A1C LEVEL <7.0%: ICD-10-PCS | Mod: CPTII,,, | Performed by: NURSE PRACTITIONER

## 2022-03-25 PROCEDURE — 1159F PR MEDICATION LIST DOCUMENTED IN MEDICAL RECORD: ICD-10-PCS | Mod: CPTII,,, | Performed by: NURSE PRACTITIONER

## 2022-03-25 PROCEDURE — 3079F PR MOST RECENT DIASTOLIC BLOOD PRESSURE 80-89 MM HG: ICD-10-PCS | Mod: CPTII,,, | Performed by: NURSE PRACTITIONER

## 2022-03-25 RX ORDER — PHENTERMINE HYDROCHLORIDE 37.5 MG/1
TABLET ORAL
COMMUNITY
Start: 2022-03-12 | End: 2022-11-01

## 2022-03-25 RX ORDER — SULFAMETHOXAZOLE AND TRIMETHOPRIM 800; 160 MG/1; MG/1
1 TABLET ORAL DAILY
Qty: 10 TABLET | Refills: 0 | Status: SHIPPED | OUTPATIENT
Start: 2022-03-25 | End: 2022-03-30

## 2022-03-26 LAB — BACTERIA UR CULT: NORMAL

## 2022-04-11 ENCOUNTER — HOSPITAL ENCOUNTER (EMERGENCY)
Facility: HOSPITAL | Age: 42
Discharge: HOME | End: 2022-04-12
Attending: EMERGENCY MEDICINE
Payer: MEDICAID

## 2022-04-11 DIAGNOSIS — R07.9 CHEST PAIN, UNSPECIFIED TYPE: Primary | ICD-10-CM

## 2022-04-11 DIAGNOSIS — R07.9 CHEST PAIN: ICD-10-CM

## 2022-04-11 PROCEDURE — 80053 COMPREHEN METABOLIC PANEL: CPT | Mod: ER

## 2022-04-11 PROCEDURE — 84484 ASSAY OF TROPONIN QUANT: CPT | Mod: ER

## 2022-04-11 PROCEDURE — 85025 COMPLETE CBC W/AUTO DIFF WBC: CPT | Mod: ER

## 2022-04-11 PROCEDURE — 93010 EKG 12-LEAD: ICD-10-PCS | Mod: ,,, | Performed by: INTERNAL MEDICINE

## 2022-04-11 PROCEDURE — 93005 ELECTROCARDIOGRAM TRACING: CPT | Mod: ER

## 2022-04-11 PROCEDURE — 99285 EMERGENCY DEPT VISIT HI MDM: CPT | Mod: 25,ER

## 2022-04-11 PROCEDURE — 93010 ELECTROCARDIOGRAM REPORT: CPT | Mod: ,,, | Performed by: INTERNAL MEDICINE

## 2022-04-11 PROCEDURE — 85610 PROTHROMBIN TIME: CPT | Mod: ER

## 2022-04-11 PROCEDURE — 85379 FIBRIN DEGRADATION QUANT: CPT | Mod: ER

## 2022-04-11 RX ORDER — ASPIRIN 325 MG
325 TABLET ORAL
Status: COMPLETED | OUTPATIENT
Start: 2022-04-12 | End: 2022-04-11

## 2022-04-11 RX ADMIN — ASPIRIN 325 MG ORAL TABLET 325 MG: 325 PILL ORAL at 11:04

## 2022-04-12 ENCOUNTER — CLINICAL SUPPORT (OUTPATIENT)
Dept: OTHER | Facility: CLINIC | Age: 42
End: 2022-04-12
Payer: MEDICAID

## 2022-04-12 VITALS
TEMPERATURE: 99 F | HEART RATE: 72 BPM | RESPIRATION RATE: 20 BRPM | WEIGHT: 185 LBS | SYSTOLIC BLOOD PRESSURE: 131 MMHG | BODY MASS INDEX: 29.73 KG/M2 | HEIGHT: 66 IN | OXYGEN SATURATION: 97 % | DIASTOLIC BLOOD PRESSURE: 79 MMHG

## 2022-04-12 DIAGNOSIS — Z00.8 ENCOUNTER FOR OTHER GENERAL EXAMINATION: ICD-10-CM

## 2022-04-12 LAB
ALBUMIN SERPL-MCNC: 3.3 G/DL (ref 3.3–5.5)
ALP SERPL-CCNC: 77 U/L (ref 42–141)
B-HCG UR QL: NEGATIVE
BILIRUB SERPL-MCNC: 0.4 MG/DL (ref 0.2–1.6)
BUN SERPL-MCNC: 14 MG/DL (ref 7–22)
CALCIUM SERPL-MCNC: 9.4 MG/DL (ref 8–10.3)
CHLORIDE SERPL-SCNC: 106 MMOL/L (ref 98–108)
CREAT SERPL-MCNC: 1.1 MG/DL (ref 0.6–1.2)
CTP QC/QA: YES
GLUCOSE SERPL-MCNC: 88 MG/DL (ref 73–118)
POC ALT (SGPT): 15 U/L (ref 10–47)
POC AST (SGOT): 32 U/L (ref 11–38)
POC CARDIAC TROPONIN I: 0.01 NG/ML
POC D-DI: 746 NG/ML (ref 0–450)
POC PTINR: 1.3 (ref 0.9–1.2)
POC PTWBT: 15.6 SEC (ref 9.7–14.3)
POC TCO2: 27 MMOL/L (ref 18–33)
POTASSIUM BLD-SCNC: 3.9 MMOL/L (ref 3.6–5.1)
PROTEIN, POC: 7.5 G/DL (ref 6.4–8.1)
SAMPLE: ABNORMAL
SAMPLE: NORMAL
SODIUM BLD-SCNC: 145 MMOL/L (ref 128–145)

## 2022-04-12 PROCEDURE — 81025 URINE PREGNANCY TEST: CPT | Mod: ER | Performed by: EMERGENCY MEDICINE

## 2022-04-12 PROCEDURE — 25000003 PHARM REV CODE 250: Mod: ER | Performed by: EMERGENCY MEDICINE

## 2022-04-12 PROCEDURE — 25500020 PHARM REV CODE 255: Mod: ER | Performed by: EMERGENCY MEDICINE

## 2022-04-12 RX ORDER — NAPROXEN 500 MG/1
500 TABLET ORAL 2 TIMES DAILY WITH MEALS
Qty: 20 TABLET | Refills: 0 | Status: SHIPPED | OUTPATIENT
Start: 2022-04-12 | End: 2022-11-01

## 2022-04-12 RX ADMIN — IOHEXOL 100 ML: 350 INJECTION, SOLUTION INTRAVENOUS at 01:04

## 2022-04-12 NOTE — ED PROVIDER NOTES
"Encounter Date: 4/11/2022    SCRIBE #1 NOTE: I, Kendal Washburn, am scribing for, and in the presence of,  Dez Gabriel MD. I have scribed the following portions of the note - Other sections scribed: HPI, ROS, PE.       History     Chief Complaint   Patient presents with    Chest Pain     Left anterior chest pain since 7 pm tonight. Took ASA at home without pain relief. States feels "like a pinch in my left chest."     41 y.o. female with Hx of HTN who presents to the ED with chief complaint of acute left upper chest pain constant but varying in intensity since 7:00 PM. Pain is described as pinching and is unchanged by breathing. Took ASA at home with no relief. Denies other associated symptoms. No further complaints at this time.    The history is provided by the patient. No  was used.     Review of patient's allergies indicates:  No Known Allergies  Past Medical History:   Diagnosis Date    History of tubal ligation     Hypertension     LBBB (left bundle branch block)      Past Surgical History:   Procedure Laterality Date    CARDIAC CATHETERIZATION  2017     Family History   Problem Relation Age of Onset    Hypertension Mother     Thyroid disease Mother     Appendicitis Mother     Hypertension Father      Social History     Tobacco Use    Smoking status: Never Smoker    Smokeless tobacco: Never Used   Substance Use Topics    Alcohol use: Yes     Comment: Occasionally    Drug use: No     Review of Systems   Constitutional: Negative.  Negative for fever.   HENT: Negative.  Negative for sore throat.    Eyes: Negative.    Respiratory: Negative.  Negative for shortness of breath.    Cardiovascular: Positive for chest pain.   Gastrointestinal: Negative.  Negative for nausea and vomiting.   Endocrine: Negative.    Genitourinary: Negative.  Negative for dysuria.   Musculoskeletal: Negative.  Negative for myalgias.   Skin: Negative.  Negative for rash.   Allergic/Immunologic: Negative. "    Neurological: Negative.  Negative for headaches.   Hematological: Negative.  Negative for adenopathy.   Psychiatric/Behavioral: Negative.  Negative for behavioral problems.   All other systems reviewed and are negative.      Physical Exam     Initial Vitals [04/11/22 2335]   BP Pulse Resp Temp SpO2   130/88 77 18 98.8 °F (37.1 °C) 97 %      MAP       --         Physical Exam    Nursing note and vitals reviewed.  Constitutional: She appears well-developed and well-nourished.   HENT:   Head: Normocephalic and atraumatic.   Right Ear: External ear normal.   Left Ear: External ear normal.   Nose: Nose normal.   Eyes: Conjunctivae are normal.   Neck: Neck supple.   Normal range of motion.  Cardiovascular: Normal rate, regular rhythm and intact distal pulses. Exam reveals no gallop and no friction rub.    No murmur heard.  Pulmonary/Chest: Effort normal. No respiratory distress.   Abdominal: Abdomen is soft. There is no abdominal tenderness.   Musculoskeletal:         General: Normal range of motion.      Cervical back: Normal range of motion and neck supple.     Neurological: She is alert and oriented to person, place, and time.   Skin: Skin is warm and dry. Capillary refill takes less than 2 seconds.   Psychiatric: She has a normal mood and affect. Her behavior is normal.         ED Course   Procedures  Labs Reviewed   ISTAT PROCEDURE - Abnormal; Notable for the following components:       Result Value    POC PTWBT 15.6 (*)     POC PTINR 1.3 (*)     All other components within normal limits   POCT D DIMER - Abnormal; Notable for the following components:    POC D- (*)     All other components within normal limits   TROPONIN ISTAT   POCT CBC   POCT URINE PREGNANCY   POCT CMP   POCT PROTIME-INR   POCT TROPONIN   POCT D DIMER   POCT CMP     EKG Readings: (Independently Interpreted)   Initial Reading: No STEMI. Rhythm: Normal Sinus Rhythm. Heart Rate: 73. Ectopy: No Ectopy. Conduction: Normal. ST Segments: Normal  ST Segments. T Waves: Normal. Axis: Normal. Clinical Impression: Normal Sinus Rhythm with LBBB Other Impression: No change compared to 12/6/21.       Imaging Results          CTA Chest Non-Coronary - PE Study (Final result)  Result time 04/12/22 01:18:57    Final result by Roscoe Monet MD (04/12/22 01:18:57)                 Impression:      No evidence of PE.  No acute intrathoracic abnormalities identified.      Electronically signed by: Roscoe Monet MD  Date:    04/12/2022  Time:    01:18             Narrative:    EXAMINATION:  CTA CHEST NON CORONARY    CLINICAL HISTORY:  Pulmonary embolism (PE) suspected, positive D-dimer;    TECHNIQUE:  Low dose axial images, sagittal and coronal reformations were obtained from the thoracic inlet to the lung bases following the IV administration of 100 mL of Omnipaque 350.  Contrast timing was optimized to evaluate the pulmonary arteries.  MIP images were performed.    COMPARISON:  None    FINDINGS:  Structures at the base of the neck are unremarkable.  Aorta is non-aneurysmal.  The heart is normal in size without pericardial effusion.  No intraluminal filling defects within the pulmonary arteries to suggest pulmonary thromboembolism.   There is no evidence of mediastinal, axillary, or hilar lymph node enlargement.  The esophagus is unremarkable along its course.    The trachea and bronchi are patent.  The lungs are symmetrically expanded.  Lungs show no consolidation, pleural effusion, pulmonary hemorrhage, or infarction.    The visualized abdominal structures are unremarkable.  No acute osseous abnormality identified.  Extrathoracic soft tissues are unremarkable.                                 Medications   aspirin tablet 325 mg (325 mg Oral Given 4/11/22 6949)   iohexoL (OMNIPAQUE 350) injection 100 mL (100 mLs Intravenous Given 4/12/22 0107)     Medical Decision Making:   History:   Old Medical Records: I decided to obtain old medical records.  Independently  Interpreted Test(s):   I have ordered and independently interpreted EKG Reading(s) - see prior notes  Clinical Tests:   Lab Tests: Ordered and Reviewed  Radiological Study: Ordered and Reviewed  Medical Tests: Ordered and Reviewed  ED Management:  GI, cardiopulmonary, vascular and musculoskeletal etiologies were all considered.        Additional MDM:   PERC Rule:   Age is greater than or equal to 50 = 0.0  Heart Rate is greater than or equal to 100 = 0.0  SaO2 on room air < 95% = 0.0  Unilateral leg swelling = 0.0  Hemoptysis = 0.0  Recent surgery or trauma = 0.0  Prior PE or DVT =  0.0  Hormone use = 0.00  PERC Score = 0    Well's Criteria Score:  -Clinical symptoms of DVT (leg swelling, pain with palpation) = 0.0  -Other diagnosis less likely than pulmonary embolism =            0.0  -Heart Rate >100 =   0.0  -Immobilization (= or > than 3 days) or surgery in the previous 4 weeks = 0.0  -Previous DVT/PE = 0.0  -Hemoptysis =          0.0  -Malignancy =           0.0  Well's Probability Score =    0      Heart Score:    ECG:             Normal  Age:               Less than 45 years  Risk factors: no risk factors known  Troponin:       Less than or equal to normal limit    GIORGIO Score:   Age over 65:                                    0.00   > or = to 3 CAD risk factors:           0.00  Established CAD:                            0.00  > or = to 2 anginal events in the past 24 hours: 0.00  Use of ASA in past 7 days:              0.00  Elevated Enzymes:                         0.00  ST Depression > or = to 0.05 mV:  0.00  GIORGIO score: 0       Scribe Attestation:   Scribe #1: I performed the above scribed service and the documentation accurately describes the services I performed. I attest to the accuracy of the note.               This document was produced by a scribe under my direction and in my presence. I agree with the content of the note and have made any necessary edits.     Dez Gabriel MD    04/12/2022  1:45 AM    Clinical Impression:   Final diagnoses:  [R07.9] Chest pain  [R07.9] Chest pain, unspecified type (Primary)          ED Disposition Condition    Discharge Stable        ED Prescriptions     Medication Sig Dispense Start Date End Date Auth. Provider    naproxen (NAPROSYN) 500 MG tablet Take 1 tablet (500 mg total) by mouth 2 (two) times daily with meals. 20 tablet 4/12/2022  Dez Gabriel MD        Follow-up Information     Follow up With Specialties Details Why Contact Info    Hebert Shepherd MD Family Medicine Schedule an appointment as soon as possible for a visit in 1 week  [address] [place] [id number] [phone]             Dez Gabriel MD  04/12/22 0141

## 2022-04-13 LAB
GLUCOSE SERPL-MCNC: 96 MG/DL (ref 60–140)
HDLC SERPL-MCNC: 78 MG/DL
POC CHOLESTEROL, LDL (DOCK): 80.85 MG/DL
POC CHOLESTEROL, TOTAL: 176 MG/DL
TRIGL SERPL-MCNC: 96 MG/DL

## 2022-05-05 VITALS
WEIGHT: 184 LBS | HEIGHT: 66 IN | SYSTOLIC BLOOD PRESSURE: 130 MMHG | BODY MASS INDEX: 29.57 KG/M2 | DIASTOLIC BLOOD PRESSURE: 82 MMHG

## 2022-07-08 ENCOUNTER — OFFICE VISIT (OUTPATIENT)
Dept: URGENT CARE | Facility: CLINIC | Age: 42
End: 2022-07-08
Payer: MEDICAID

## 2022-07-08 VITALS
HEART RATE: 68 BPM | DIASTOLIC BLOOD PRESSURE: 94 MMHG | BODY MASS INDEX: 30.86 KG/M2 | SYSTOLIC BLOOD PRESSURE: 143 MMHG | WEIGHT: 192 LBS | TEMPERATURE: 99 F | RESPIRATION RATE: 16 BRPM | OXYGEN SATURATION: 98 % | HEIGHT: 66 IN

## 2022-07-08 DIAGNOSIS — Z11.52 ENCOUNTER FOR SCREENING LABORATORY TESTING FOR COVID-19 VIRUS: ICD-10-CM

## 2022-07-08 DIAGNOSIS — R51.9 BILATERAL HEADACHES: Primary | ICD-10-CM

## 2022-07-08 DIAGNOSIS — U07.1 COVID-19 VIRUS INFECTION: Primary | ICD-10-CM

## 2022-07-08 DIAGNOSIS — J02.9 SORE THROAT: ICD-10-CM

## 2022-07-08 LAB
CTP QC/QA: YES
MOLECULAR STREP A: NEGATIVE
SARS-COV-2 AG RESP QL IA.RAPID: POSITIVE
SARS-COV-2 RDRP RESP QL NAA+PROBE: POSITIVE

## 2022-07-08 PROCEDURE — 3077F SYST BP >= 140 MM HG: CPT | Mod: CPTII,S$GLB,, | Performed by: PHYSICIAN ASSISTANT

## 2022-07-08 PROCEDURE — 3080F DIAST BP >= 90 MM HG: CPT | Mod: CPTII,S$GLB,, | Performed by: PHYSICIAN ASSISTANT

## 2022-07-08 PROCEDURE — U0002 COVID-19 LAB TEST NON-CDC: HCPCS | Mod: QW,S$GLB,, | Performed by: PHYSICIAN ASSISTANT

## 2022-07-08 PROCEDURE — 99213 OFFICE O/P EST LOW 20 MIN: CPT | Mod: S$GLB,,, | Performed by: PHYSICIAN ASSISTANT

## 2022-07-08 PROCEDURE — 1160F PR REVIEW ALL MEDS BY PRESCRIBER/CLIN PHARMACIST DOCUMENTED: ICD-10-PCS | Mod: CPTII,S$GLB,, | Performed by: PHYSICIAN ASSISTANT

## 2022-07-08 PROCEDURE — 87651 STREP A DNA AMP PROBE: CPT | Mod: QW,S$GLB,, | Performed by: PHYSICIAN ASSISTANT

## 2022-07-08 PROCEDURE — U0002: ICD-10-PCS | Mod: QW,S$GLB,, | Performed by: PHYSICIAN ASSISTANT

## 2022-07-08 PROCEDURE — 99213 PR OFFICE/OUTPT VISIT, EST, LEVL III, 20-29 MIN: ICD-10-PCS | Mod: S$GLB,,, | Performed by: PHYSICIAN ASSISTANT

## 2022-07-08 PROCEDURE — 87651 POCT STREP A MOLECULAR: ICD-10-PCS | Mod: QW,S$GLB,, | Performed by: PHYSICIAN ASSISTANT

## 2022-07-08 PROCEDURE — 3008F BODY MASS INDEX DOCD: CPT | Mod: CPTII,S$GLB,, | Performed by: PHYSICIAN ASSISTANT

## 2022-07-08 PROCEDURE — 4010F ACE/ARB THERAPY RXD/TAKEN: CPT | Mod: CPTII,S$GLB,, | Performed by: PHYSICIAN ASSISTANT

## 2022-07-08 PROCEDURE — 1159F PR MEDICATION LIST DOCUMENTED IN MEDICAL RECORD: ICD-10-PCS | Mod: CPTII,S$GLB,, | Performed by: PHYSICIAN ASSISTANT

## 2022-07-08 PROCEDURE — 4010F PR ACE/ARB THEARPY RXD/TAKEN: ICD-10-PCS | Mod: CPTII,S$GLB,, | Performed by: PHYSICIAN ASSISTANT

## 2022-07-08 PROCEDURE — 1159F MED LIST DOCD IN RCRD: CPT | Mod: CPTII,S$GLB,, | Performed by: PHYSICIAN ASSISTANT

## 2022-07-08 PROCEDURE — 3080F PR MOST RECENT DIASTOLIC BLOOD PRESSURE >= 90 MM HG: ICD-10-PCS | Mod: CPTII,S$GLB,, | Performed by: PHYSICIAN ASSISTANT

## 2022-07-08 PROCEDURE — 3044F PR MOST RECENT HEMOGLOBIN A1C LEVEL <7.0%: ICD-10-PCS | Mod: CPTII,S$GLB,, | Performed by: PHYSICIAN ASSISTANT

## 2022-07-08 PROCEDURE — 3008F PR BODY MASS INDEX (BMI) DOCUMENTED: ICD-10-PCS | Mod: CPTII,S$GLB,, | Performed by: PHYSICIAN ASSISTANT

## 2022-07-08 PROCEDURE — 1160F RVW MEDS BY RX/DR IN RCRD: CPT | Mod: CPTII,S$GLB,, | Performed by: PHYSICIAN ASSISTANT

## 2022-07-08 PROCEDURE — 3077F PR MOST RECENT SYSTOLIC BLOOD PRESSURE >= 140 MM HG: ICD-10-PCS | Mod: CPTII,S$GLB,, | Performed by: PHYSICIAN ASSISTANT

## 2022-07-08 PROCEDURE — 3044F HG A1C LEVEL LT 7.0%: CPT | Mod: CPTII,S$GLB,, | Performed by: PHYSICIAN ASSISTANT

## 2022-07-08 NOTE — PATIENT INSTRUCTIONS
You have tested POSITIVE for COVID-19 today.         ISOLATION    If you tested positive and you have no symptoms, you must isolate for 5 days starting on the day of the positive test.     If you tested positive and have symptoms, you must isolate for 5 days starting on the day of the first symptoms, not the day of the positive test.     This is the most important part, both the CDC and the [facility] emphasize that you do not test out of isolation.     After 5 days, if your symptoms have improved and you have not had fever on day 5, you can return to the community on day 6- NO TESTING REQUIRED!  In fact, we do not retest if you were positive in the last 90 days.    After your 5 days of isolation are completed, the CDC recommends strict mask use for the first 5 days that you come out of isolation.     - Rest.  - Drink plenty of fluids.  - Take Tylenol and/or Ibuprofen as directed as needed for fever/pain.  Do not take more than the recommended dose.  - follow up with your PCP within the next 1-2 weeks as needed.   - you need to monitor your blood pressure and may need to alter how you take the amlodipine should they drop while taking Paxlovid.  You can either take half normal does or take it every other day if your blood pressure start to drop.  - Take over-the-counter claritin, zyrtec, allegra, or xyzal as directed.  You should NOT use a decongestant form (D) of this medication if you have a history of hypertension or heart disease.   - Use over the counter Flonase as directed for sinus congestion and postnasal drip.  - use nasal saline prior to Flonase.  - stop using Flonase if you developed nosebleeds.   - Use Ocean Spray Nasal Saline 1-3 puffs each nostril every 2-3 hours then blow out onto tissue. This is to irrigate the nasal passage way to clear the sinus openings. Use until sinus problem resolved.   - You can do salt water gargles (1/4 to 1/2 teaspoon salt dissolved in an 8-ounce glass of warm water).  Do not  swallow.  - use over-the-counter throat lozenges as directed.  You should look for the ones with benzocaine in them.  This is a numbing medication.  Be careful with this as it can numb both the tongue and cheeks.  You should not eat  immediately after using one of these.   - You must understand that you have received an Urgent Care treatment only and that you may be released before all of your medical problems are known or treated.   - You, the patient, will arrange for follow up care as instructed.   - If your condition worsens or fails to improve we recommend that you receive another evaluation at the ER immediately or contact your PCP to discuss your concerns.   - You can call [phone] or [phone] to help schedule an appointment with the appropriate provider.

## 2022-07-08 NOTE — LETTER
[address] [facility], [address] ? [place] [address] ? Phone [phone] ? Fax [phone]             Return to Work/School    Patient: Hollie Saldana  YOB: 1980  Date: 07/08/2022        To Whom It May Concern:     Hollie Saldana was in contact with/seen in my office on 07/08/2022 . COVID-19 is present in our communities across the state. Not all patients are eligible or appropriate to be tested. In this situation, your student/employee meets the following criteria:     Hollie Saldana has met the criteria for COVID-19 testing and has a POSITIVE result. she can return to school/work once she is symptom improved and fever-free for 24 hours without the use of fever reducing medications AND at least 5 days from the start of symptoms (or from the first positive result if they have no symptoms).  They must mask for 10 days total.     If you have any questions or concerns, or if I can be of further assistance, please do not hesitate to contact me.     Sincerely,           Vanessa Sharif PA-C

## 2022-07-08 NOTE — PROGRESS NOTES
"Subjective:       Patient ID: Hollie Saldana is a 41 y.o. female.    Vitals:  height is 5' 6" (1.676 m) and weight is 87.1 kg (192 lb). Her oral temperature is 98.9 °F (37.2 °C). Her blood pressure is 143/94 (abnormal) and her pulse is 68. Her respiration is 16 and oxygen saturation is 98%.     Chief Complaint: Sore Throat    Patient presents with a complaint of sore throat, congestion, body aches, and fatigue that started 2 days ago.  Patient states she did have exposure to COVID approximately 4-5 days ago.  She denies fever chills.  She has not taken any medications for symptoms.  Patient did have COVID in December of 2021.     Sore Throat   This is a new problem. Episode onset: 2 days ago. The problem has been gradually worsening. There has been no fever. The pain is at a severity of 6/10. The pain is moderate. Associated symptoms include congestion, coughing and headaches. Pertinent negatives include no abdominal pain, diarrhea, drooling, ear discharge, ear pain, hoarse voice, plugged ear sensation, neck pain, shortness of breath, stridor, swollen glands, trouble swallowing or vomiting. She has had no exposure to strep or mono. She has tried nothing for the symptoms. The treatment provided no relief.       Constitution: Positive for fatigue. Negative for chills and fever.   HENT: Positive for congestion and sore throat. Negative for ear pain, ear discharge, drooling and trouble swallowing.    Neck: Negative for neck pain.   Respiratory: Positive for cough. Negative for shortness of breath and stridor.    Gastrointestinal: Negative for abdominal pain, vomiting and diarrhea.   Musculoskeletal: Positive for muscle ache.   Neurological: Positive for headaches.       Objective:      Physical Exam   Constitutional: She is oriented to person, place, and time. She appears well-developed. No distress.   HENT:   Head: Normocephalic and atraumatic.   Ears:   Right Ear: Hearing, tympanic membrane, external ear and ear " canal normal.   Left Ear: Hearing, tympanic membrane, external ear and ear canal normal.   Nose: Mucosal edema present.   Mouth/Throat: Uvula is midline and oropharynx is clear and moist.   Eyes: Conjunctivae are normal.   Cardiovascular: Normal rate, regular rhythm and normal heart sounds.   No murmur heard.Exam reveals no gallop and no friction rub.   Pulmonary/Chest: Effort normal and breath sounds normal. No respiratory distress. She has no wheezes.   Musculoskeletal: Normal range of motion.         General: No tenderness. Normal range of motion.   Neurological: She is alert and oriented to person, place, and time.   Skin: Skin is warm, dry and not diaphoretic.   Psychiatric: Her behavior is normal. Judgment and thought content normal.   Nursing note and vitals reviewed.        Results for orders placed or performed in visit on 07/08/22   POCT COVID-19 Rapid Screening   Result Value Ref Range    POC Rapid COVID Positive (A) Negative     Acceptable Yes    POCT Strep A, Molecular   Result Value Ref Range    Molecular Strep A, POC Negative Negative     Acceptable Yes        Assessment:       1. COVID-19 virus infection    2. Sore throat    3. Encounter for screening laboratory testing for COVID-19 virus          Plan:         COVID-19 virus infection  -     nirmatrelvir-ritonavir 150 mg x 2- 100 mg copackaged tablets (EUA); Take 3 tablets by mouth 2 (two) times daily for 5 days. Each dose contains 2 nirmatrelvir (pink tablets) and 1 ritonavir (white tablet). Take all 3 tablets together  Dispense: 30 tablet; Refill: 0    Sore throat  -     POCT Strep A, Molecular    Encounter for screening laboratory testing for COVID-19 virus  -     POCT COVID-19 Rapid Screening                 Patient Instructions   You have tested POSITIVE for COVID-19 today.         ISOLATION    If you tested positive and you have no symptoms, you must isolate for 5 days starting on the day of the positive test.      If you tested positive and have symptoms, you must isolate for 5 days starting on the day of the first symptoms, not the day of the positive test.     This is the most important part, both the CDC and the [facility] emphasize that you do not test out of isolation.     After 5 days, if your symptoms have improved and you have not had fever on day 5, you can return to the community on day 6- NO TESTING REQUIRED!  In fact, we do not retest if you were positive in the last 90 days.    After your 5 days of isolation are completed, the CDC recommends strict mask use for the first 5 days that you come out of isolation.     - Rest.  - Drink plenty of fluids.  - Take Tylenol and/or Ibuprofen as directed as needed for fever/pain.  Do not take more than the recommended dose.  - follow up with your PCP within the next 1-2 weeks as needed.   - you need to monitor your blood pressure and may need to alter how you take the amlodipine should they drop while taking Paxlovid.  You can either take half normal does or take it every other day if your blood pressure start to drop.  - Take over-the-counter claritin, zyrtec, allegra, or xyzal as directed.  You should NOT use a decongestant form (D) of this medication if you have a history of hypertension or heart disease.   - Use over the counter Flonase as directed for sinus congestion and postnasal drip.  - use nasal saline prior to Flonase.  - stop using Flonase if you developed nosebleeds.   - Use Ocean Spray Nasal Saline 1-3 puffs each nostril every 2-3 hours then blow out onto tissue. This is to irrigate the nasal passage way to clear the sinus openings. Use until sinus problem resolved.   - You can do salt water gargles (1/4 to 1/2 teaspoon salt dissolved in an 8-ounce glass of warm water).  Do not swallow.  - use over-the-counter throat lozenges as directed.  You should look for the ones with benzocaine in them.  This is a numbing medication.  Be careful with this as it can numb both  the tongue and cheeks.  You should not eat  immediately after using one of these.   - You must understand that you have received an Urgent Care treatment only and that you may be released before all of your medical problems are known or treated.   - You, the patient, will arrange for follow up care as instructed.   - If your condition worsens or fails to improve we recommend that you receive another evaluation at the ER immediately or contact your PCP to discuss your concerns.   - You can call [phone] or [phone] to help schedule an appointment with the appropriate provider.

## 2022-08-25 DIAGNOSIS — H65.00 ACUTE SEROUS OTITIS MEDIA, RECURRENCE NOT SPECIFIED, UNSPECIFIED LATERALITY: Primary | ICD-10-CM

## 2022-08-25 RX ORDER — AMOXICILLIN AND CLAVULANATE POTASSIUM 875; 125 MG/1; MG/1
1 TABLET, FILM COATED ORAL 2 TIMES DAILY
Qty: 20 TABLET | Refills: 0 | Status: SHIPPED | OUTPATIENT
Start: 2022-08-25 | End: 2022-09-04

## 2022-09-02 ENCOUNTER — OFFICE VISIT (OUTPATIENT)
Dept: URGENT CARE | Facility: CLINIC | Age: 42
End: 2022-09-02
Payer: MEDICAID

## 2022-09-02 VITALS
WEIGHT: 194 LBS | HEIGHT: 66 IN | TEMPERATURE: 99 F | OXYGEN SATURATION: 98 % | BODY MASS INDEX: 31.18 KG/M2 | SYSTOLIC BLOOD PRESSURE: 130 MMHG | DIASTOLIC BLOOD PRESSURE: 86 MMHG | HEART RATE: 79 BPM | RESPIRATION RATE: 18 BRPM

## 2022-09-02 DIAGNOSIS — H92.02 OTALGIA, LEFT EAR: Primary | ICD-10-CM

## 2022-09-02 DIAGNOSIS — J01.90 ACUTE SINUSITIS, RECURRENCE NOT SPECIFIED, UNSPECIFIED LOCATION: ICD-10-CM

## 2022-09-02 PROCEDURE — 1160F RVW MEDS BY RX/DR IN RCRD: CPT | Mod: CPTII,S$GLB,, | Performed by: NURSE PRACTITIONER

## 2022-09-02 PROCEDURE — 3008F BODY MASS INDEX DOCD: CPT | Mod: CPTII,S$GLB,, | Performed by: NURSE PRACTITIONER

## 2022-09-02 PROCEDURE — 3008F PR BODY MASS INDEX (BMI) DOCUMENTED: ICD-10-PCS | Mod: CPTII,S$GLB,, | Performed by: NURSE PRACTITIONER

## 2022-09-02 PROCEDURE — 4010F PR ACE/ARB THEARPY RXD/TAKEN: ICD-10-PCS | Mod: CPTII,S$GLB,, | Performed by: NURSE PRACTITIONER

## 2022-09-02 PROCEDURE — 99213 OFFICE O/P EST LOW 20 MIN: CPT | Mod: S$GLB,,, | Performed by: NURSE PRACTITIONER

## 2022-09-02 PROCEDURE — 3044F PR MOST RECENT HEMOGLOBIN A1C LEVEL <7.0%: ICD-10-PCS | Mod: CPTII,S$GLB,, | Performed by: NURSE PRACTITIONER

## 2022-09-02 PROCEDURE — 1159F MED LIST DOCD IN RCRD: CPT | Mod: CPTII,S$GLB,, | Performed by: NURSE PRACTITIONER

## 2022-09-02 PROCEDURE — 4010F ACE/ARB THERAPY RXD/TAKEN: CPT | Mod: CPTII,S$GLB,, | Performed by: NURSE PRACTITIONER

## 2022-09-02 PROCEDURE — 1160F PR REVIEW ALL MEDS BY PRESCRIBER/CLIN PHARMACIST DOCUMENTED: ICD-10-PCS | Mod: CPTII,S$GLB,, | Performed by: NURSE PRACTITIONER

## 2022-09-02 PROCEDURE — 3075F SYST BP GE 130 - 139MM HG: CPT | Mod: CPTII,S$GLB,, | Performed by: NURSE PRACTITIONER

## 2022-09-02 PROCEDURE — 3079F PR MOST RECENT DIASTOLIC BLOOD PRESSURE 80-89 MM HG: ICD-10-PCS | Mod: CPTII,S$GLB,, | Performed by: NURSE PRACTITIONER

## 2022-09-02 PROCEDURE — 1159F PR MEDICATION LIST DOCUMENTED IN MEDICAL RECORD: ICD-10-PCS | Mod: CPTII,S$GLB,, | Performed by: NURSE PRACTITIONER

## 2022-09-02 PROCEDURE — 3044F HG A1C LEVEL LT 7.0%: CPT | Mod: CPTII,S$GLB,, | Performed by: NURSE PRACTITIONER

## 2022-09-02 PROCEDURE — 3079F DIAST BP 80-89 MM HG: CPT | Mod: CPTII,S$GLB,, | Performed by: NURSE PRACTITIONER

## 2022-09-02 PROCEDURE — 99213 PR OFFICE/OUTPT VISIT, EST, LEVL III, 20-29 MIN: ICD-10-PCS | Mod: S$GLB,,, | Performed by: NURSE PRACTITIONER

## 2022-09-02 PROCEDURE — 3075F PR MOST RECENT SYSTOLIC BLOOD PRESS GE 130-139MM HG: ICD-10-PCS | Mod: CPTII,S$GLB,, | Performed by: NURSE PRACTITIONER

## 2022-09-02 RX ORDER — AZELASTINE 1 MG/ML
1 SPRAY, METERED NASAL 2 TIMES DAILY
Qty: 30 ML | Refills: 0 | Status: SHIPPED | OUTPATIENT
Start: 2022-09-02 | End: 2022-11-15

## 2022-09-02 RX ORDER — FLUTICASONE PROPIONATE 50 MCG
1 SPRAY, SUSPENSION (ML) NASAL 2 TIMES DAILY
Qty: 9.9 ML | Refills: 0 | Status: SHIPPED | OUTPATIENT
Start: 2022-09-02 | End: 2022-11-15

## 2022-09-02 RX ORDER — CETIRIZINE HYDROCHLORIDE 10 MG/1
10 TABLET ORAL DAILY
Qty: 30 TABLET | Refills: 0 | Status: SHIPPED | OUTPATIENT
Start: 2022-09-02 | End: 2023-01-26

## 2022-09-02 RX ORDER — IBUPROFEN 600 MG/1
600 TABLET ORAL EVERY 6 HOURS PRN
Qty: 30 TABLET | Refills: 0 | Status: SHIPPED | OUTPATIENT
Start: 2022-09-02 | End: 2022-09-10

## 2022-09-02 NOTE — PROGRESS NOTES
"Subjective:       Patient ID: Hollie Saldana is a 41 y.o. female.    Vitals:  height is 5' 6" (1.676 m) and weight is 88 kg (194 lb). Her oral temperature is 98.5 °F (36.9 °C). Her blood pressure is 130/86 and her pulse is 79. Her respiration is 18 and oxygen saturation is 98%.     Chief Complaint: Otalgia    41-year-old female presents to clinic for evaluation of left ear pain that radiates down the left side of her neck x2 weeks.  Patient is vaccinated for COVID, denies any recent sick contacts.  She states that she has been on Augmentin for the last 9 days for suspected left ear infection.  She states that she is still having pain in tenderness to left ear.  She denies cough or congestion.  She denies fever.  He is awake and alert, answers questions appropriately, acute distress noted on today's visit.    Otalgia   There is pain in the left ear. This is a recurrent problem. The current episode started 1 to 4 weeks ago. There has been no fever. The pain is at a severity of 10/10. The pain is severe. Pertinent negatives include no abdominal pain, coughing, sore throat or vomiting. Associated symptoms comments: nausea. She has tried antibiotics and acetaminophen for the symptoms. The treatment provided mild relief. There is no history of a chronic ear infection, hearing loss or a tympanostomy tube.     Constitution: Negative for activity change, appetite change, chills, sweating, fatigue and fever.   HENT:  Positive for ear pain. Negative for congestion and sore throat.    Cardiovascular:  Negative for chest pain, palpitations and sob on exertion.   Respiratory:  Negative for cough and shortness of breath.    Gastrointestinal:  Negative for abdominal pain, nausea and vomiting.   Neurological:  Negative for dizziness.     Objective:      Physical Exam   Constitutional: She is oriented to person, place, and time. She appears well-developed.  Non-toxic appearance. She does not appear ill. No distress.   HENT: "   Head: Normocephalic and atraumatic. Head is without abrasion, without contusion and without laceration.   Ears:   Right Ear: Tympanic membrane and external ear normal.   Left Ear: Tympanic membrane and external ear normal. There is tenderness. No drainage or swelling.   Nose: Mucosal edema and congestion present. No rhinorrhea.   Mouth/Throat: Mucous membranes are normal. Mucous membranes are moist. Posterior oropharyngeal erythema present. No oropharyngeal exudate. Oropharynx is clear.   Eyes: Conjunctivae, EOM and lids are normal. Right eye exhibits no discharge. Left eye exhibits no discharge.   Neck: Trachea normal and phonation normal.   Cardiovascular: Normal rate, regular rhythm and normal heart sounds.   Pulmonary/Chest: Effort normal. No respiratory distress.   Abdominal: Normal appearance.   Musculoskeletal: Normal range of motion.         General: Normal range of motion.   Neurological: She is alert and oriented to person, place, and time.   Skin: Skin is warm, dry, intact, not diaphoretic and not pale. No abrasion, No burn and No ecchymosis   Psychiatric: Her speech is normal and behavior is normal. Mood, judgment and thought content normal.   Nursing note and vitals reviewed.      Assessment:       1. Otalgia, left ear    2. Acute sinusitis, recurrence not specified, unspecified location          Plan:         Otalgia, left ear  -     ibuprofen (ADVIL,MOTRIN) 600 MG tablet; Take 1 tablet (600 mg total) by mouth every 6 (six) hours as needed for Pain.  Dispense: 30 tablet; Refill: 0    Acute sinusitis, recurrence not specified, unspecified location  -     fluticasone propionate (FLONASE) 50 mcg/actuation nasal spray; 1 spray (50 mcg total) by Each Nostril route 2 (two) times daily.  Dispense: 9.9 mL; Refill: 0  -     azelastine (ASTELIN) 137 mcg (0.1 %) nasal spray; 1 spray (137 mcg total) by Nasal route 2 (two) times daily. for 10 days  Dispense: 30 mL; Refill: 0  -     cetirizine (ZYRTEC) 10 MG  tablet; Take 1 tablet (10 mg total) by mouth once daily.  Dispense: 30 tablet; Refill: 0       - discussed with patient that current symptoms could be related to sinus congestion given evidence of nasal mucosa edema, and recommend daily antihistamine, followed by Flonase nasal spray twice a day.  Discussed finishing current antibiotic, and treating symptoms to see if there is any improvement, before changing or adding additional antibiotics at this time.  Follow-up with PCP, return to clinic as needed.  Patient verbalized understanding and is in agreement with plan.    Patient Instructions   - You must understand that you have received an Urgent Care treatment only and that you may be released before all of your medical problems are known or treated.   - You, the patient, will arrange for follow up care as instructed.   - If your condition worsens or fails to improve we recommend that you receive another evaluation at the ER immediately or contact your PCP to discuss your concerns or return here.

## 2022-11-01 ENCOUNTER — TELEPHONE (OUTPATIENT)
Dept: CARDIOLOGY | Facility: CLINIC | Age: 42
End: 2022-11-01

## 2022-11-01 ENCOUNTER — OFFICE VISIT (OUTPATIENT)
Dept: CARDIOLOGY | Facility: CLINIC | Age: 42
End: 2022-11-01
Payer: MEDICAID

## 2022-11-01 VITALS
HEIGHT: 66 IN | DIASTOLIC BLOOD PRESSURE: 84 MMHG | BODY MASS INDEX: 30.24 KG/M2 | HEART RATE: 96 BPM | RESPIRATION RATE: 18 BRPM | SYSTOLIC BLOOD PRESSURE: 132 MMHG | WEIGHT: 188.19 LBS | OXYGEN SATURATION: 99 %

## 2022-11-01 DIAGNOSIS — I10 ESSENTIAL HYPERTENSION: ICD-10-CM

## 2022-11-01 DIAGNOSIS — E66.9 NON MORBID OBESITY, UNSPECIFIED OBESITY TYPE: ICD-10-CM

## 2022-11-01 DIAGNOSIS — I42.8 NONISCHEMIC CARDIOMYOPATHY: ICD-10-CM

## 2022-11-01 DIAGNOSIS — I44.7 LBBB (LEFT BUNDLE BRANCH BLOCK): ICD-10-CM

## 2022-11-01 DIAGNOSIS — G47.33 OSA (OBSTRUCTIVE SLEEP APNEA): ICD-10-CM

## 2022-11-01 DIAGNOSIS — I11.9 BENIGN HYPERTENSIVE HEART DISEASE WITHOUT HEART FAILURE: ICD-10-CM

## 2022-11-01 DIAGNOSIS — R07.9 CHEST PAIN, UNSPECIFIED TYPE: Primary | ICD-10-CM

## 2022-11-01 PROCEDURE — 99999 PR PBB SHADOW E&M-EST. PATIENT-LVL V: CPT | Mod: PBBFAC,,, | Performed by: INTERNAL MEDICINE

## 2022-11-01 PROCEDURE — 1160F PR REVIEW ALL MEDS BY PRESCRIBER/CLIN PHARMACIST DOCUMENTED: ICD-10-PCS | Mod: CPTII,,, | Performed by: INTERNAL MEDICINE

## 2022-11-01 PROCEDURE — 3079F DIAST BP 80-89 MM HG: CPT | Mod: CPTII,,, | Performed by: INTERNAL MEDICINE

## 2022-11-01 PROCEDURE — 99214 PR OFFICE/OUTPT VISIT, EST, LEVL IV, 30-39 MIN: ICD-10-PCS | Mod: S$PBB,,, | Performed by: INTERNAL MEDICINE

## 2022-11-01 PROCEDURE — 99215 OFFICE O/P EST HI 40 MIN: CPT | Mod: PBBFAC | Performed by: INTERNAL MEDICINE

## 2022-11-01 PROCEDURE — 1159F MED LIST DOCD IN RCRD: CPT | Mod: CPTII,,, | Performed by: INTERNAL MEDICINE

## 2022-11-01 PROCEDURE — 93010 ELECTROCARDIOGRAM REPORT: CPT | Mod: S$PBB,,, | Performed by: INTERNAL MEDICINE

## 2022-11-01 PROCEDURE — 3079F PR MOST RECENT DIASTOLIC BLOOD PRESSURE 80-89 MM HG: ICD-10-PCS | Mod: CPTII,,, | Performed by: INTERNAL MEDICINE

## 2022-11-01 PROCEDURE — 99214 OFFICE O/P EST MOD 30 MIN: CPT | Mod: S$PBB,,, | Performed by: INTERNAL MEDICINE

## 2022-11-01 PROCEDURE — 3044F PR MOST RECENT HEMOGLOBIN A1C LEVEL <7.0%: ICD-10-PCS | Mod: CPTII,,, | Performed by: INTERNAL MEDICINE

## 2022-11-01 PROCEDURE — 1160F RVW MEDS BY RX/DR IN RCRD: CPT | Mod: CPTII,,, | Performed by: INTERNAL MEDICINE

## 2022-11-01 PROCEDURE — 1159F PR MEDICATION LIST DOCUMENTED IN MEDICAL RECORD: ICD-10-PCS | Mod: CPTII,,, | Performed by: INTERNAL MEDICINE

## 2022-11-01 PROCEDURE — 3075F PR MOST RECENT SYSTOLIC BLOOD PRESS GE 130-139MM HG: ICD-10-PCS | Mod: CPTII,,, | Performed by: INTERNAL MEDICINE

## 2022-11-01 PROCEDURE — 93010 EKG 12-LEAD: ICD-10-PCS | Mod: S$PBB,,, | Performed by: INTERNAL MEDICINE

## 2022-11-01 PROCEDURE — 93005 ELECTROCARDIOGRAM TRACING: CPT | Mod: PBBFAC | Performed by: INTERNAL MEDICINE

## 2022-11-01 PROCEDURE — 3044F HG A1C LEVEL LT 7.0%: CPT | Mod: CPTII,,, | Performed by: INTERNAL MEDICINE

## 2022-11-01 PROCEDURE — 99999 PR PBB SHADOW E&M-EST. PATIENT-LVL V: ICD-10-PCS | Mod: PBBFAC,,, | Performed by: INTERNAL MEDICINE

## 2022-11-01 PROCEDURE — 3008F PR BODY MASS INDEX (BMI) DOCUMENTED: ICD-10-PCS | Mod: CPTII,,, | Performed by: INTERNAL MEDICINE

## 2022-11-01 PROCEDURE — 4010F ACE/ARB THERAPY RXD/TAKEN: CPT | Mod: CPTII,,, | Performed by: INTERNAL MEDICINE

## 2022-11-01 PROCEDURE — 3075F SYST BP GE 130 - 139MM HG: CPT | Mod: CPTII,,, | Performed by: INTERNAL MEDICINE

## 2022-11-01 PROCEDURE — 3008F BODY MASS INDEX DOCD: CPT | Mod: CPTII,,, | Performed by: INTERNAL MEDICINE

## 2022-11-01 PROCEDURE — 4010F PR ACE/ARB THEARPY RXD/TAKEN: ICD-10-PCS | Mod: CPTII,,, | Performed by: INTERNAL MEDICINE

## 2022-11-01 RX ORDER — METOPROLOL SUCCINATE 200 MG/1
200 TABLET, EXTENDED RELEASE ORAL DAILY
Qty: 90 TABLET | Refills: 3 | Status: SHIPPED | OUTPATIENT
Start: 2022-11-01 | End: 2023-06-29 | Stop reason: SDUPTHER

## 2022-11-01 NOTE — TELEPHONE ENCOUNTER
----- Message from Talya Coe MA sent at 11/1/2022  3:41 PM CDT -----  MRN:  [id number] please sched on 11/15/ @1:40 with maciej at [facility] [place]    
I scheduled pt's appt as per Talya's request.   
<-- Click to add NO significant Past Surgical History

## 2022-11-01 NOTE — PROGRESS NOTES
CARDIOVASCULAR PROGRESS NOTE    REASON FOR CONSULT:   Hollie Saldana is a 41 y.o. female who presents for follow up of NICM, HTN, LBBB.    PCP: Joao  HISTORY OF PRESENT ILLNESS:   Last seen December 2021.     The patient returns for follow-up after a nearly 1 year hiatus.  She is describing episodic left-sided stabbing type chest discomfort.  This appears to be nonexertional.  She is also describing generalized fatigue.  She denies any philly dyspnea on exertion.  There has been no palpitations, lightheadedness, dizziness, or syncope.  She denies any PND, orthopnea, or lower extremity edema.  There has been no melena, hematuria, or claudicant symptoms.    CARDIOVASCULAR HISTORY:   LBBB (normal cath 5/2017)  NICM, EF 45% (echo 2/2021)    PAST MEDICAL HISTORY:     Past Medical History:   Diagnosis Date    History of tubal ligation     Hypertension     LBBB (left bundle branch block)        PAST SURGICAL HISTORY:     Past Surgical History:   Procedure Laterality Date    CARDIAC CATHETERIZATION  2017       ALLERGIES AND MEDICATION:   Review of patient's allergies indicates:  No Known Allergies  Previous Medications    ALBUTEROL (PROVENTIL/VENTOLIN HFA) 90 MCG/ACTUATION INHALER    Inhale 2 puffs into the lungs every 4 (four) hours as needed for Wheezing or Shortness of Breath. Rescue    AMLODIPINE (NORVASC) 10 MG TABLET    Take 1 tablet (10 mg total) by mouth once daily.    AZELASTINE (ASTELIN) 137 MCG (0.1 %) NASAL SPRAY    1 spray (137 mcg total) by Nasal route 2 (two) times daily. for 10 days    CETIRIZINE (ZYRTEC) 10 MG TABLET    Take 1 tablet (10 mg total) by mouth once daily.    DICLOFENAC SODIUM (VOLTAREN) 1 % GEL    apply 2 grams topically to painful area FOUR TIMES A DAY FOR 10 days    FAMOTIDINE (PEPCID) 20 MG TABLET    TAKE 1 TABLET BY MOUTH TWICE A DAY FOR stomach    FLUTICASONE PROPIONATE (FLONASE) 50 MCG/ACTUATION NASAL SPRAY    1 spray (50 mcg total) by Each Nostril route once daily.    FLUTICASONE  PROPIONATE (FLONASE) 50 MCG/ACTUATION NASAL SPRAY    1 spray (50 mcg total) by Each Nostril route 2 (two) times daily.    LISINOPRIL (PRINIVIL,ZESTRIL) 20 MG TABLET    Take 1 tablet (20 mg total) by mouth once daily.    METOPROLOL SUCCINATE (TOPROL-XL) 100 MG 24 HR TABLET    Take 1 tablet (100 mg total) by mouth once daily.    SEMAGLUTIDE (OZEMPIC) 1 MG/DOSE (4 MG/3 ML)    Inject 1 mg into the skin every 7 days.    SUMATRIPTAN (IMITREX) 25 MG TAB    Take 1 tablet (25 mg total) by mouth every 2 (two) hours as needed (migraines).    TOPIRAMATE (TOPAMAX) 25 MG TABLET    Take 1 tablet (25 mg total) by mouth every evening.    VITAMIN D2 1,250 MCG (50,000 UNIT) CAPSULE    TAKE 1 CAPSULE BY MOUTH once every 7 days       SOCIAL HISTORY:     Social History     Socioeconomic History    Marital status: Single   Tobacco Use    Smoking status: Never    Smokeless tobacco: Never   Substance and Sexual Activity    Alcohol use: Yes     Comment: Occasionally    Drug use: No       FAMILY HISTORY:     Family History   Problem Relation Age of Onset    Hypertension Mother     Thyroid disease Mother     Appendicitis Mother     Hypertension Father        REVIEW OF SYSTEMS:   Review of Systems   Constitutional:  Positive for malaise/fatigue. Negative for chills, diaphoresis and fever.   HENT:  Negative for nosebleeds.    Eyes:  Negative for blurred vision, double vision and photophobia.   Respiratory:  Negative for hemoptysis, shortness of breath and wheezing.    Cardiovascular:  Positive for chest pain. Negative for palpitations, orthopnea, claudication, leg swelling and PND.   Gastrointestinal:  Negative for abdominal pain, blood in stool, heartburn, melena, nausea and vomiting.   Genitourinary:  Negative for flank pain and hematuria.   Musculoskeletal:  Negative for falls, myalgias and neck pain.   Skin:  Negative for rash.   Neurological:  Negative for dizziness, seizures, loss of consciousness, weakness and headaches.  "  Endo/Heme/Allergies:  Negative for polydipsia. Does not bruise/bleed easily.   Psychiatric/Behavioral:  Negative for depression and memory loss. The patient is not nervous/anxious.      PHYSICAL EXAM:     Vitals:    11/01/22 1507   BP: 132/84   Pulse: 96   Resp: 18    Body mass index is 30.37 kg/m².  Weight: 85.3 kg (188 lb 2.6 oz)   Height: 5' 6" (167.6 cm)     Physical Exam  Vitals reviewed.   Constitutional:       General: She is not in acute distress.     Appearance: She is well-developed. She is obese. She is not ill-appearing, toxic-appearing or diaphoretic.   HENT:      Head: Normocephalic and atraumatic.   Eyes:      General: No scleral icterus.     Extraocular Movements: Extraocular movements intact.      Conjunctiva/sclera: Conjunctivae normal.      Pupils: Pupils are equal, round, and reactive to light.   Neck:      Thyroid: No thyromegaly.      Vascular: Normal carotid pulses. No carotid bruit or JVD.      Trachea: Trachea normal.   Cardiovascular:      Rate and Rhythm: Normal rate and regular rhythm.      Pulses:           Carotid pulses are 2+ on the right side and 2+ on the left side.     Heart sounds: S1 normal and S2 normal. No murmur heard.    No friction rub. No gallop.   Pulmonary:      Effort: Pulmonary effort is normal. No respiratory distress.      Breath sounds: Normal breath sounds. No stridor. No wheezing, rhonchi or rales.   Chest:      Chest wall: No tenderness.   Abdominal:      General: There is no distension.      Palpations: Abdomen is soft.   Musculoskeletal:         General: No swelling or tenderness. Normal range of motion.      Cervical back: Normal range of motion and neck supple. No edema or rigidity.      Right lower leg: No edema.      Left lower leg: No edema.   Feet:      Right foot:      Skin integrity: No ulcer.      Left foot:      Skin integrity: No ulcer.   Skin:     General: Skin is warm and dry.      Coloration: Skin is not jaundiced.   Neurological:      General: " No focal deficit present.      Mental Status: She is alert and oriented to person, place, and time.      Cranial Nerves: No cranial nerve deficit.   Psychiatric:         Mood and Affect: Mood normal.         Speech: Speech normal.         Behavior: Behavior normal. Behavior is cooperative.       DATA:   EKG: (personally reviewed tracing(s))  11/1/22 SR 89, LBBB, similar to 4/11/22    Laboratory:  CBC:  Recent Labs   Lab 01/28/20  1413 09/28/20  0619 02/09/22  0700   WBC 6.8 7.22 5.90   Hemoglobin 12.4 12.2 10.7 L   Hematocrit 39.1 38.2 34.5 L   Platelets 334 325 381         CHEMISTRIES:  Recent Labs   Lab 12/08/20  0946 01/21/21  0851 02/09/22  0700   Glucose 73 92 89   Sodium 142 138 134 L   Potassium 4.0 4.1 3.8   BUN 13 11 9   Creatinine 1.0 0.8 0.8   eGFR if African American >60 >60 >60.0   eGFR if non African American >60 >60 >60.0   Calcium 8.5 L 8.6 L 9.4         CARDIAC BIOMARKERS:  Recent Labs   Lab 09/28/20  0619   Troponin I <0.006         COAGS:        LIPIDS/LFTS:  Recent Labs   Lab 01/28/20  1413 09/28/20  0619 02/09/22  0700   Cholesterol 215 H  --  173   Triglycerides 82  --  54   HDL 80  --  67   LDL Calculated 119 H  --   --    LDL Cholesterol  --   --  95.2   Non-HDL Cholesterol  --   --  106   AST 25 20 15   ALT 23 17 10       Lab Results   Component Value Date    TSH 0.592 02/09/2022         Cardiovascular Testing:  Echo 2/9/21 (EF recovered from 35% on echo 11/2020)  Mildly decreased systolic function. The estimated ejection fraction is 45%  Moderate left atrial enlargement.  Grade I left ventricular diastolic dysfunction.  Normal right ventricular size with normal right ventricular systolic function.  Mild mitral regurgitation.  Mild pulmonic regurgitation.  Mild tricuspid regurgitation.  The estimated PA systolic pressure is 27 mmHg.  Intermediate central venous pressure (8 mmHg).  Mild right atrial enlargement.    Renal art US 11/10/20  There is insignificant stenosis (0-59%) in the Right  Renal Artery.  Right kidney 10.70 cm.  There is insignificant stenosis (0-59%) in the Left Renal Artery.  Left kidney 10.80 cm.    Cath 5/15/17:   EDP 23  EF 55%, normal WM  LM: normal  LAD: normal with MLI at apex  Ramus: normal  LCx: Normal  RCA: (dom) normal  Imp:   CP with new LBBB  Essentially normal cors/LVEF  R rad vasband for hemostasis    ASSESSMENT:   # CP, noncardiac.  Cath 2017 with normal cors.    # HTN, controlled.  Renal art US 11/2020 neg.  # LBBB, old  # NICM, EF now back to 45% on echo 2/2021  # BMI 30, down 1 unit(s) vs last OV  # ?TAHIR, missed sleep study in 4/2021    PLAN:   Cont med rx  Inc toprol 200mg qd  Echo  Holter  Diet/exercise/weight loss  TAHIR eval reordered  RTC 2 weeks  ?Change lisinopril to desiree Hayden MD, FACC

## 2022-11-07 ENCOUNTER — HOSPITAL ENCOUNTER (OUTPATIENT)
Dept: CARDIOLOGY | Facility: HOSPITAL | Age: 42
Discharge: HOME | End: 2022-11-07
Attending: INTERNAL MEDICINE
Payer: MEDICAID

## 2022-11-07 DIAGNOSIS — I44.7 LBBB (LEFT BUNDLE BRANCH BLOCK): ICD-10-CM

## 2022-11-07 DIAGNOSIS — I42.8 NONISCHEMIC CARDIOMYOPATHY: ICD-10-CM

## 2022-11-07 DIAGNOSIS — R07.9 CHEST PAIN, UNSPECIFIED TYPE: ICD-10-CM

## 2022-11-07 DIAGNOSIS — I10 ESSENTIAL HYPERTENSION: ICD-10-CM

## 2022-11-07 LAB
AV INDEX (PROSTH): 0.65
AV MEAN GRADIENT: 5 MMHG
AV PEAK GRADIENT: 8 MMHG
AV VALVE AREA: 1.59 CM2
AV VELOCITY RATIO: 0.72
CV ECHO LV RWT: 0.35 CM
DOP CALC AO PEAK VEL: 1.37 M/S
DOP CALC AO VTI: 24.1 CM
DOP CALC LVOT AREA: 2.5 CM2
DOP CALC LVOT DIAMETER: 1.77 CM
DOP CALC LVOT PEAK VEL: 0.98 M/S
DOP CALC LVOT STROKE VOLUME: 38.37 CM3
DOP CALCLVOT PEAK VEL VTI: 15.6 CM
E WAVE DECELERATION TIME: 214.96 MSEC
E/A RATIO: 0.81
E/E' RATIO: 6 M/S
ECHO LV POSTERIOR WALL: 0.85 CM (ref 0.6–1.1)
EJECTION FRACTION: 25 %
FRACTIONAL SHORTENING: 17 % (ref 28–44)
INTERVENTRICULAR SEPTUM: 0.78 CM (ref 0.6–1.1)
LA MAJOR: 5.45 CM
LA MINOR: 5.43 CM
LA WIDTH: 4 CM
LEFT ATRIUM SIZE: 4.81 CM
LEFT ATRIUM VOLUME: 88.97 CM3
LEFT INTERNAL DIMENSION IN SYSTOLE: 4.06 CM (ref 2.1–4)
LEFT VENTRICLE DIASTOLIC VOLUME: 111.75 ML
LEFT VENTRICLE SYSTOLIC VOLUME: 72.59 ML
LEFT VENTRICULAR INTERNAL DIMENSION IN DIASTOLE: 4.88 CM (ref 3.5–6)
LEFT VENTRICULAR MASS: 133.45 G
LV LATERAL E/E' RATIO: 4.15 M/S
LV SEPTAL E/E' RATIO: 10.8 M/S
LVOT MG: 2.34 MMHG
LVOT MV: 0.73 CM/S
MV PEAK A VEL: 0.67 M/S
MV PEAK E VEL: 0.54 M/S
MV STENOSIS PRESSURE HALF TIME: 62.34 MS
MV VALVE AREA P 1/2 METHOD: 3.53 CM2
PISA TR MAX VEL: 1.38 M/S
PV PEAK VELOCITY: 0.95 CM/S
RA MAJOR: 5.43 CM
RA PRESSURE: 8 MMHG
RA WIDTH: 3.9 CM
RIGHT VENTRICULAR END-DIASTOLIC DIMENSION: 3.02 CM
RV TISSUE DOPPLER FREE WALL SYSTOLIC VELOCITY 1 (APICAL 4 CHAMBER VIEW): 0.01 CM/S
SINUS: 2.76 CM
STJ: 2.21 CM
TDI LATERAL: 0.13 M/S
TDI SEPTAL: 0.05 M/S
TDI: 0.09 M/S
TR MAX PG: 8 MMHG
TRICUSPID ANNULAR PLANE SYSTOLIC EXCURSION: 1.59 CM
TV REST PULMONARY ARTERY PRESSURE: 16 MMHG

## 2022-11-07 PROCEDURE — 93227 HOLTER MONITOR - 24 HOUR (CUPID ONLY): ICD-10-PCS | Mod: ,,, | Performed by: INTERNAL MEDICINE

## 2022-11-07 PROCEDURE — 93226 XTRNL ECG REC<48 HR SCAN A/R: CPT

## 2022-11-07 PROCEDURE — 93306 TTE W/DOPPLER COMPLETE: CPT | Mod: 26,,, | Performed by: INTERNAL MEDICINE

## 2022-11-07 PROCEDURE — 93227 XTRNL ECG REC<48 HR R&I: CPT | Mod: ,,, | Performed by: INTERNAL MEDICINE

## 2022-11-07 PROCEDURE — 93306 TTE W/DOPPLER COMPLETE: CPT

## 2022-11-07 PROCEDURE — 93306 ECHO (CUPID ONLY): ICD-10-PCS | Mod: 26,,, | Performed by: INTERNAL MEDICINE

## 2022-11-09 LAB
OHS CV EVENT MONITOR DAY: 0
OHS CV HOLTER LENGTH DECIMAL HOURS: 23.98
OHS CV HOLTER LENGTH HOURS: 23
OHS CV HOLTER LENGTH MINUTES: 59
OHS CV HOLTER SINUS AVERAGE HR: 94
OHS CV HOLTER SINUS MAX HR: 138
OHS CV HOLTER SINUS MIN HR: 75

## 2022-11-14 ENCOUNTER — TELEPHONE (OUTPATIENT)
Dept: PLASTIC SURGERY | Facility: CLINIC | Age: 42
End: 2022-11-14
Payer: MEDICAID

## 2022-11-14 NOTE — TELEPHONE ENCOUNTER
Informed pt insurance was not accepted, and pt verbalized understanding. ----- Message from Pauline Flores MA sent at 11/14/2022  2:10 PM CST -----  Plastic surgery referral for breast reduction. I messaged the ordering provider to place correct referral.    Pauline  ----- Message -----  From: Jaclyn Burgos  Sent: 11/14/2022   1:59 PM CST  To: Kitty Bettencourt, [facility]    Good morning,  Please send all new pt appointments that need scheduling to the pool above as this is breast surgery for ep pts and we do not have access to schedule new pts.  Thanks,  Jaclyn Burgos    ----- Message -----  From: Kitty Bettencourt  Sent: 11/14/2022   1:50 PM CST  To: , Kain Saldana calling regarding Appointment Access from referral for breast surgery , call back [phone]

## 2022-11-15 ENCOUNTER — OFFICE VISIT (OUTPATIENT)
Dept: CARDIOLOGY | Facility: CLINIC | Age: 42
End: 2022-11-15
Payer: MEDICAID

## 2022-11-15 VITALS
BODY MASS INDEX: 29.32 KG/M2 | RESPIRATION RATE: 16 BRPM | HEART RATE: 90 BPM | WEIGHT: 181.69 LBS | DIASTOLIC BLOOD PRESSURE: 92 MMHG | OXYGEN SATURATION: 98 % | SYSTOLIC BLOOD PRESSURE: 132 MMHG

## 2022-11-15 DIAGNOSIS — I42.8 NONISCHEMIC CARDIOMYOPATHY: Primary | ICD-10-CM

## 2022-11-15 DIAGNOSIS — I44.7 LBBB (LEFT BUNDLE BRANCH BLOCK): ICD-10-CM

## 2022-11-15 DIAGNOSIS — R07.2 PRECORDIAL PAIN: ICD-10-CM

## 2022-11-15 DIAGNOSIS — G47.33 OSA (OBSTRUCTIVE SLEEP APNEA): ICD-10-CM

## 2022-11-15 PROCEDURE — 4010F PR ACE/ARB THEARPY RXD/TAKEN: ICD-10-PCS | Mod: CPTII,,, | Performed by: INTERNAL MEDICINE

## 2022-11-15 PROCEDURE — 3044F PR MOST RECENT HEMOGLOBIN A1C LEVEL <7.0%: ICD-10-PCS | Mod: CPTII,,, | Performed by: INTERNAL MEDICINE

## 2022-11-15 PROCEDURE — 1159F PR MEDICATION LIST DOCUMENTED IN MEDICAL RECORD: ICD-10-PCS | Mod: CPTII,,, | Performed by: INTERNAL MEDICINE

## 2022-11-15 PROCEDURE — 1160F PR REVIEW ALL MEDS BY PRESCRIBER/CLIN PHARMACIST DOCUMENTED: ICD-10-PCS | Mod: CPTII,,, | Performed by: INTERNAL MEDICINE

## 2022-11-15 PROCEDURE — 3044F HG A1C LEVEL LT 7.0%: CPT | Mod: CPTII,,, | Performed by: INTERNAL MEDICINE

## 2022-11-15 PROCEDURE — 3080F PR MOST RECENT DIASTOLIC BLOOD PRESSURE >= 90 MM HG: ICD-10-PCS | Mod: CPTII,,, | Performed by: INTERNAL MEDICINE

## 2022-11-15 PROCEDURE — 3075F PR MOST RECENT SYSTOLIC BLOOD PRESS GE 130-139MM HG: ICD-10-PCS | Mod: CPTII,,, | Performed by: INTERNAL MEDICINE

## 2022-11-15 PROCEDURE — 99999 PR PBB SHADOW E&M-EST. PATIENT-LVL IV: CPT | Mod: PBBFAC,,, | Performed by: INTERNAL MEDICINE

## 2022-11-15 PROCEDURE — 3008F PR BODY MASS INDEX (BMI) DOCUMENTED: ICD-10-PCS | Mod: CPTII,,, | Performed by: INTERNAL MEDICINE

## 2022-11-15 PROCEDURE — 99215 OFFICE O/P EST HI 40 MIN: CPT | Mod: S$PBB,,, | Performed by: INTERNAL MEDICINE

## 2022-11-15 PROCEDURE — 99999 PR PBB SHADOW E&M-EST. PATIENT-LVL IV: ICD-10-PCS | Mod: PBBFAC,,, | Performed by: INTERNAL MEDICINE

## 2022-11-15 PROCEDURE — 1160F RVW MEDS BY RX/DR IN RCRD: CPT | Mod: CPTII,,, | Performed by: INTERNAL MEDICINE

## 2022-11-15 PROCEDURE — 99215 PR OFFICE/OUTPT VISIT, EST, LEVL V, 40-54 MIN: ICD-10-PCS | Mod: S$PBB,,, | Performed by: INTERNAL MEDICINE

## 2022-11-15 PROCEDURE — 4010F ACE/ARB THERAPY RXD/TAKEN: CPT | Mod: CPTII,,, | Performed by: INTERNAL MEDICINE

## 2022-11-15 PROCEDURE — 3075F SYST BP GE 130 - 139MM HG: CPT | Mod: CPTII,,, | Performed by: INTERNAL MEDICINE

## 2022-11-15 PROCEDURE — 3008F BODY MASS INDEX DOCD: CPT | Mod: CPTII,,, | Performed by: INTERNAL MEDICINE

## 2022-11-15 PROCEDURE — 3080F DIAST BP >= 90 MM HG: CPT | Mod: CPTII,,, | Performed by: INTERNAL MEDICINE

## 2022-11-15 PROCEDURE — 99214 OFFICE O/P EST MOD 30 MIN: CPT | Mod: PBBFAC,PO | Performed by: INTERNAL MEDICINE

## 2022-11-15 PROCEDURE — 1159F MED LIST DOCD IN RCRD: CPT | Mod: CPTII,,, | Performed by: INTERNAL MEDICINE

## 2022-11-15 RX ORDER — SODIUM CHLORIDE 9 MG/ML
INJECTION, SOLUTION INTRAVENOUS CONTINUOUS
Status: CANCELLED | OUTPATIENT
Start: 2022-11-29 | End: 2022-11-29

## 2022-11-15 RX ORDER — DIPHENHYDRAMINE HCL 25 MG
25 CAPSULE ORAL ONCE
Status: CANCELLED | OUTPATIENT
Start: 2022-11-29

## 2022-11-15 RX ORDER — SACUBITRIL AND VALSARTAN 24; 26 MG/1; MG/1
1 TABLET, FILM COATED ORAL 2 TIMES DAILY
Qty: 180 TABLET | Refills: 3 | Status: ON HOLD | OUTPATIENT
Start: 2022-11-15 | End: 2022-11-29 | Stop reason: HOSPADM

## 2022-11-15 NOTE — H&P (VIEW-ONLY)
CARDIOVASCULAR PROGRESS NOTE    REASON FOR CONSULT:   Hollie Saldana is a 41 y.o. female who presents for follow up of NICM, HTN, LBBB.    PCP: Joao  HISTORY OF PRESENT ILLNESS:   The patient returns for follow-up of her testing.  She continues describe episodic left-sided nonexertional chest discomfort.  She denies any shortness of breath, palpitations, or syncope.  There has been no PND, orthopnea, or lower extremity edema.  She denies melena, hematuria, or claudicant symptoms.      I reviewed the results of her testing.  Her Holter was negative.  The echo notes new LV dysfunction with an ejection fraction of 25%.  We discussed further testing.  We discussed potential CT coronary angiography verses right and left heart catheterization.  Given the new LV dysfunction, I would like to exclude shunting, and I have suggested we move forward with the invasive angiogram with right and left heart catheterization.  The patient is agreeable.    CARDIOVASCULAR HISTORY:   LBBB (normal cath 5/2017)  NICM, EF 45->25% (echo 11/2022)    PAST MEDICAL HISTORY:     Past Medical History:   Diagnosis Date    History of tubal ligation     Hypertension     LBBB (left bundle branch block)        PAST SURGICAL HISTORY:     Past Surgical History:   Procedure Laterality Date    CARDIAC CATHETERIZATION  2017       ALLERGIES AND MEDICATION:   Review of patient's allergies indicates:  No Known Allergies  Previous Medications    AMLODIPINE (NORVASC) 10 MG TABLET    Take 1 tablet (10 mg total) by mouth once daily.    CETIRIZINE (ZYRTEC) 10 MG TABLET    Take 1 tablet (10 mg total) by mouth once daily.    FAMOTIDINE (PEPCID) 20 MG TABLET    TAKE 1 TABLET BY MOUTH TWICE A DAY FOR stomach    LISINOPRIL (PRINIVIL,ZESTRIL) 20 MG TABLET    Take 1 tablet (20 mg total) by mouth once daily.    METOPROLOL SUCCINATE (TOPROL-XL) 200 MG 24 HR TABLET    Take 1 tablet (200 mg total) by mouth once daily.    SEMAGLUTIDE (OZEMPIC) 1 MG/DOSE (4 MG/3 ML)     Inject 1 mg into the skin every 7 days.    SUMATRIPTAN (IMITREX) 25 MG TAB    Take 1 tablet (25 mg total) by mouth every 2 (two) hours as needed (migraines).    TOPIRAMATE (TOPAMAX) 25 MG TABLET    Take 1 tablet (25 mg total) by mouth every evening.    VITAMIN D2 1,250 MCG (50,000 UNIT) CAPSULE    TAKE 1 CAPSULE BY MOUTH once every 7 days       SOCIAL HISTORY:     Social History     Socioeconomic History    Marital status: Single   Tobacco Use    Smoking status: Never    Smokeless tobacco: Never   Substance and Sexual Activity    Alcohol use: Yes     Comment: Occasionally    Drug use: No       FAMILY HISTORY:     Family History   Problem Relation Age of Onset    Hypertension Mother     Thyroid disease Mother     Appendicitis Mother     Hypertension Father        REVIEW OF SYSTEMS:   Review of Systems   Constitutional:  Positive for malaise/fatigue. Negative for chills, diaphoresis and fever.   HENT:  Negative for nosebleeds.    Eyes:  Negative for blurred vision, double vision and photophobia.   Respiratory:  Negative for hemoptysis, shortness of breath and wheezing.    Cardiovascular:  Positive for chest pain. Negative for palpitations, orthopnea, claudication, leg swelling and PND.   Gastrointestinal:  Negative for abdominal pain, blood in stool, heartburn, melena, nausea and vomiting.   Genitourinary:  Negative for flank pain and hematuria.   Musculoskeletal:  Negative for falls, myalgias and neck pain.   Skin:  Negative for rash.   Neurological:  Negative for dizziness, seizures, loss of consciousness, weakness and headaches.   Endo/Heme/Allergies:  Negative for polydipsia. Does not bruise/bleed easily.   Psychiatric/Behavioral:  Negative for depression and memory loss. The patient is not nervous/anxious.      PHYSICAL EXAM:     Vitals:    11/15/22 1338   BP: (!) 132/92   Pulse: 90   Resp: 16    Body mass index is 29.32 kg/m².  Weight: 82.4 kg (181 lb 10.5 oz)         Physical Exam  Vitals reviewed.    Constitutional:       General: She is not in acute distress.     Appearance: Normal appearance. She is well-developed. She is not ill-appearing, toxic-appearing or diaphoretic.   HENT:      Head: Normocephalic and atraumatic.   Eyes:      General: No scleral icterus.     Extraocular Movements: Extraocular movements intact.      Conjunctiva/sclera: Conjunctivae normal.      Pupils: Pupils are equal, round, and reactive to light.   Neck:      Thyroid: No thyromegaly.      Vascular: Normal carotid pulses. No carotid bruit or JVD.      Trachea: Trachea normal.   Cardiovascular:      Rate and Rhythm: Normal rate and regular rhythm.      Pulses:           Carotid pulses are 2+ on the right side and 2+ on the left side.     Heart sounds: S1 normal and S2 normal. No murmur heard.    No friction rub. No gallop.   Pulmonary:      Effort: Pulmonary effort is normal. No respiratory distress.      Breath sounds: Normal breath sounds. No stridor. No wheezing, rhonchi or rales.   Chest:      Chest wall: No tenderness.   Abdominal:      General: There is no distension.      Palpations: Abdomen is soft.   Musculoskeletal:         General: No swelling or tenderness. Normal range of motion.      Cervical back: Normal range of motion and neck supple. No edema or rigidity.      Right lower leg: No edema.      Left lower leg: No edema.   Feet:      Right foot:      Skin integrity: No ulcer.      Left foot:      Skin integrity: No ulcer.   Skin:     General: Skin is warm and dry.      Coloration: Skin is not jaundiced.   Neurological:      General: No focal deficit present.      Mental Status: She is alert and oriented to person, place, and time.      Cranial Nerves: No cranial nerve deficit.   Psychiatric:         Mood and Affect: Mood normal.         Speech: Speech normal.         Behavior: Behavior normal. Behavior is cooperative.       DATA:   EKG: (personally reviewed tracing(s))  11/1/22 SR 89, LBBB, similar to  4/11/22    Laboratory:  CBC:  Recent Labs   Lab 01/28/20  1413 09/28/20  0619 02/09/22  0700   WBC 6.8 7.22 5.90   Hemoglobin 12.4 12.2 10.7 L   Hematocrit 39.1 38.2 34.5 L   Platelets 334 325 381         CHEMISTRIES:  Recent Labs   Lab 12/08/20  0946 01/21/21  0851 02/09/22  0700   Glucose 73 92 89   Sodium 142 138 134 L   Potassium 4.0 4.1 3.8   BUN 13 11 9   Creatinine 1.0 0.8 0.8   eGFR if African American >60 >60 >60.0   eGFR if non African American >60 >60 >60.0   Calcium 8.5 L 8.6 L 9.4         CARDIAC BIOMARKERS:  Recent Labs   Lab 09/28/20  0619   Troponin I <0.006         COAGS:        LIPIDS/LFTS:  Recent Labs   Lab 01/28/20  1413 09/28/20  0619 02/09/22  0700   Cholesterol 215 H  --  173   Triglycerides 82  --  54   HDL 80  --  67   LDL Calculated 119 H  --   --    LDL Cholesterol  --   --  95.2   Non-HDL Cholesterol  --   --  106   AST 25 20 15   ALT 23 17 10       Lab Results   Component Value Date    TSH 0.592 02/09/2022         Cardiovascular Testing:  Echo 11/7/22 (EF 45% on report 2/2021)  The left ventricle is normal in size with severely decreased systolic function.  The estimated ejection fraction is 25-30%.  There is abnormal septal wall motion consistent with left bundle branch block.  Grade I left ventricular diastolic dysfunction.  Normal right ventricular size with normal right ventricular systolic function.  Mild mitral regurgitation.    Holter 11/7/22  Sinus rhythm with heart rates varying between 75 and 138 BPM with an average of 94BPM.  There were very rare PACs totalling 7 and averaging 0.29 per hour.  The diary was not returned.    Renal art US 11/10/20  There is insignificant stenosis (0-59%) in the Right Renal Artery.  Right kidney 10.70 cm.  There is insignificant stenosis (0-59%) in the Left Renal Artery.  Left kidney 10.80 cm.    Cath 5/15/17:   EDP 23  EF 55%, normal WM  LM: normal  LAD: normal with MLI at apex  [place]amus: normal  LCx: Normal  RCA: (dom) normal  Imp:   CP with new  LBBB  Essentially normal cors/LVEF  R rad vasband for hemostasis    ASSESSMENT:   # CP, noncardiac, persistent.  Cath 2017 with normal cors.    # HTN, borderline controlled.  Renal art US 11/2020 neg.  # LBBB, old  # NICM, EF 45->25% on echo 11/2022  # ?TAHIR, missed sleep study in 4/2021    PLAN:   Cont med rx  Stop lisinopril (last dose already taken this am)  Start entresto 24/26mg bid  Lifevest ordered  TAHIR eval reordered (again)  R&L Cath (with shunt run) 11/29/22 9am, RVA/V access  RTC postcath.  Consider weaning down amlod to allow for titration of entresto +/- addition of aldactone.  Repeat echo in 3 months (Feb 2023) for reassessment of LVEF and need for ICD    Risks, benefits and alternatives of the catheterization procedure were discussed with the patient which include but are not limited to: bleeding, infection, death, heart attack, arrhythmia, kidney failure, stroke, need for emergency surgery, etc.  Patient understands and and agrees to proceed.  Consent was placed on the chart.        Dez Hayden MD, FACC

## 2022-11-23 ENCOUNTER — HOSPITAL ENCOUNTER (OUTPATIENT)
Dept: PREADMISSION TESTING | Facility: HOSPITAL | Age: 42
Discharge: HOME | End: 2022-11-23
Attending: INTERNAL MEDICINE
Payer: MEDICAID

## 2022-11-23 VITALS
DIASTOLIC BLOOD PRESSURE: 77 MMHG | OXYGEN SATURATION: 100 % | TEMPERATURE: 98 F | HEIGHT: 66 IN | HEART RATE: 85 BPM | WEIGHT: 187.19 LBS | RESPIRATION RATE: 20 BRPM | SYSTOLIC BLOOD PRESSURE: 118 MMHG | BODY MASS INDEX: 30.08 KG/M2

## 2022-11-23 DIAGNOSIS — R07.2 PRECORDIAL PAIN: ICD-10-CM

## 2022-11-23 DIAGNOSIS — I42.8 NONISCHEMIC CARDIOMYOPATHY: ICD-10-CM

## 2022-11-23 DIAGNOSIS — I44.7 LBBB (LEFT BUNDLE BRANCH BLOCK): ICD-10-CM

## 2022-11-23 LAB
ANION GAP SERPL CALC-SCNC: 11 MMOL/L (ref 8–16)
BASOPHILS # BLD AUTO: 0.04 K/UL (ref 0–0.2)
BASOPHILS NFR BLD: 0.7 % (ref 0–1.9)
BUN SERPL-MCNC: 12 MG/DL (ref 6–20)
CALCIUM SERPL-MCNC: 9.3 MG/DL (ref 8.7–10.5)
CHLORIDE SERPL-SCNC: 106 MMOL/L (ref 95–110)
CO2 SERPL-SCNC: 23 MMOL/L (ref 23–29)
CREAT SERPL-MCNC: 0.8 MG/DL (ref 0.5–1.4)
DIFFERENTIAL METHOD: ABNORMAL
EOSINOPHIL # BLD AUTO: 0.2 K/UL (ref 0–0.5)
EOSINOPHIL NFR BLD: 4.1 % (ref 0–8)
ERYTHROCYTE [DISTWIDTH] IN BLOOD BY AUTOMATED COUNT: 14.9 % (ref 11.5–14.5)
EST. GFR  (NO RACE VARIABLE): >60 ML/MIN/1.73 M^2
GLUCOSE SERPL-MCNC: 77 MG/DL (ref 70–110)
HCT VFR BLD AUTO: 35.7 % (ref 37–48.5)
HGB BLD-MCNC: 11.6 G/DL (ref 12–16)
IMM GRANULOCYTES # BLD AUTO: 0.01 K/UL (ref 0–0.04)
IMM GRANULOCYTES NFR BLD AUTO: 0.2 % (ref 0–0.5)
INR PPP: 0.9 (ref 0.8–1.2)
LYMPHOCYTES # BLD AUTO: 2.8 K/UL (ref 1–4.8)
LYMPHOCYTES NFR BLD: 50.7 % (ref 18–48)
MCH RBC QN AUTO: 26.9 PG (ref 27–31)
MCHC RBC AUTO-ENTMCNC: 32.5 G/DL (ref 32–36)
MCV RBC AUTO: 83 FL (ref 82–98)
MONOCYTES # BLD AUTO: 0.5 K/UL (ref 0.3–1)
MONOCYTES NFR BLD: 9.3 % (ref 4–15)
NEUTROPHILS # BLD AUTO: 2 K/UL (ref 1.8–7.7)
NEUTROPHILS NFR BLD: 35 % (ref 38–73)
NRBC BLD-RTO: 0 /100 WBC
PLATELET # BLD AUTO: 330 K/UL (ref 150–450)
PMV BLD AUTO: 9.8 FL (ref 9.2–12.9)
POTASSIUM SERPL-SCNC: 3.8 MMOL/L (ref 3.5–5.1)
PROTHROMBIN TIME: 10.2 SEC (ref 9–12.5)
RBC # BLD AUTO: 4.31 M/UL (ref 4–5.4)
SODIUM SERPL-SCNC: 140 MMOL/L (ref 136–145)
WBC # BLD AUTO: 5.6 K/UL (ref 3.9–12.7)

## 2022-11-23 PROCEDURE — 85025 COMPLETE CBC W/AUTO DIFF WBC: CPT | Performed by: INTERNAL MEDICINE

## 2022-11-23 PROCEDURE — 80048 BASIC METABOLIC PNL TOTAL CA: CPT | Performed by: INTERNAL MEDICINE

## 2022-11-23 PROCEDURE — 85610 PROTHROMBIN TIME: CPT | Performed by: INTERNAL MEDICINE

## 2022-11-23 NOTE — DISCHARGE INSTRUCTIONS

## 2022-11-29 ENCOUNTER — HOSPITAL ENCOUNTER (OUTPATIENT)
Facility: HOSPITAL | Age: 42
Discharge: HOME | End: 2022-11-29
Attending: INTERNAL MEDICINE | Admitting: INTERNAL MEDICINE
Payer: MEDICAID

## 2022-11-29 VITALS
DIASTOLIC BLOOD PRESSURE: 64 MMHG | BODY MASS INDEX: 30.2 KG/M2 | HEART RATE: 84 BPM | RESPIRATION RATE: 17 BRPM | WEIGHT: 187.13 LBS | OXYGEN SATURATION: 100 % | SYSTOLIC BLOOD PRESSURE: 110 MMHG | TEMPERATURE: 99 F

## 2022-11-29 DIAGNOSIS — G47.33 OSA (OBSTRUCTIVE SLEEP APNEA): ICD-10-CM

## 2022-11-29 DIAGNOSIS — R07.2 PRECORDIAL PAIN: ICD-10-CM

## 2022-11-29 DIAGNOSIS — I44.7 LBBB (LEFT BUNDLE BRANCH BLOCK): ICD-10-CM

## 2022-11-29 DIAGNOSIS — I42.8 NONISCHEMIC CARDIOMYOPATHY: Primary | ICD-10-CM

## 2022-11-29 LAB — B-HCG UR QL: NEGATIVE

## 2022-11-29 PROCEDURE — 25500020 PHARM REV CODE 255: Performed by: INTERNAL MEDICINE

## 2022-11-29 PROCEDURE — 99152 MOD SED SAME PHYS/QHP 5/>YRS: CPT | Mod: ,,, | Performed by: INTERNAL MEDICINE

## 2022-11-29 PROCEDURE — 63600175 PHARM REV CODE 636 W HCPCS: Performed by: INTERNAL MEDICINE

## 2022-11-29 PROCEDURE — 93460 PR CATH PLACE/CORON ANGIO, IMG SUPER/INTERP,R&L HRT CATH, L HRT VENTRIC: ICD-10-PCS | Mod: 26,,, | Performed by: INTERNAL MEDICINE

## 2022-11-29 PROCEDURE — C1751 CATH, INF, PER/CENT/MIDLINE: HCPCS | Performed by: INTERNAL MEDICINE

## 2022-11-29 PROCEDURE — 93460 R&L HRT ART/VENTRICLE ANGIO: CPT | Performed by: INTERNAL MEDICINE

## 2022-11-29 PROCEDURE — C1769 GUIDE WIRE: HCPCS | Performed by: INTERNAL MEDICINE

## 2022-11-29 PROCEDURE — 99152 MOD SED SAME PHYS/QHP 5/>YRS: CPT | Performed by: INTERNAL MEDICINE

## 2022-11-29 PROCEDURE — 81025 URINE PREGNANCY TEST: CPT | Performed by: INTERNAL MEDICINE

## 2022-11-29 PROCEDURE — 99900035 HC TECH TIME PER 15 MIN (STAT)

## 2022-11-29 PROCEDURE — 25000003 PHARM REV CODE 250: Performed by: INTERNAL MEDICINE

## 2022-11-29 PROCEDURE — 99153 MOD SED SAME PHYS/QHP EA: CPT | Performed by: INTERNAL MEDICINE

## 2022-11-29 PROCEDURE — 93460 R&L HRT ART/VENTRICLE ANGIO: CPT | Mod: 26,,, | Performed by: INTERNAL MEDICINE

## 2022-11-29 PROCEDURE — 99152 PR MOD CONSCIOUS SEDATION, SAME PHYS, 5+ YRS, FIRST 15 MIN: ICD-10-PCS | Mod: ,,, | Performed by: INTERNAL MEDICINE

## 2022-11-29 PROCEDURE — C1894 INTRO/SHEATH, NON-LASER: HCPCS | Performed by: INTERNAL MEDICINE

## 2022-11-29 PROCEDURE — C1760 CLOSURE DEV, VASC: HCPCS | Performed by: INTERNAL MEDICINE

## 2022-11-29 DEVICE — ANGIO-SEAL VIP VASCULAR CLOSURE DEVICE
Type: IMPLANTABLE DEVICE | Site: GROIN | Status: FUNCTIONAL
Brand: ANGIO-SEAL

## 2022-11-29 RX ORDER — DIPHENHYDRAMINE HCL 25 MG
25 CAPSULE ORAL ONCE
Status: COMPLETED | OUTPATIENT
Start: 2022-11-29 | End: 2022-11-29

## 2022-11-29 RX ORDER — MIDAZOLAM HYDROCHLORIDE 1 MG/ML
INJECTION, SOLUTION INTRAMUSCULAR; INTRAVENOUS
Status: DISCONTINUED | OUTPATIENT
Start: 2022-11-29 | End: 2022-11-29 | Stop reason: HOSPADM

## 2022-11-29 RX ORDER — MORPHINE SULFATE 4 MG/ML
2 INJECTION, SOLUTION INTRAMUSCULAR; INTRAVENOUS EVERY 10 MIN PRN
Status: DISCONTINUED | OUTPATIENT
Start: 2022-11-29 | End: 2022-11-29 | Stop reason: HOSPADM

## 2022-11-29 RX ORDER — FENTANYL CITRATE 50 UG/ML
INJECTION, SOLUTION INTRAMUSCULAR; INTRAVENOUS
Status: DISCONTINUED | OUTPATIENT
Start: 2022-11-29 | End: 2022-11-29 | Stop reason: HOSPADM

## 2022-11-29 RX ORDER — ATROPINE SULFATE 0.1 MG/ML
0.5 INJECTION INTRAVENOUS
Status: DISCONTINUED | OUTPATIENT
Start: 2022-11-29 | End: 2022-11-29 | Stop reason: HOSPADM

## 2022-11-29 RX ORDER — ACETAMINOPHEN 325 MG/1
650 TABLET ORAL ONCE
Status: COMPLETED | OUTPATIENT
Start: 2022-11-29 | End: 2022-11-29

## 2022-11-29 RX ORDER — SODIUM CHLORIDE 9 MG/ML
INJECTION, SOLUTION INTRAVENOUS CONTINUOUS
Status: ACTIVE | OUTPATIENT
Start: 2022-11-29 | End: 2022-11-29

## 2022-11-29 RX ORDER — LIDOCAINE HYDROCHLORIDE 10 MG/ML
INJECTION, SOLUTION EPIDURAL; INFILTRATION; INTRACAUDAL; PERINEURAL
Status: DISCONTINUED | OUTPATIENT
Start: 2022-11-29 | End: 2022-11-29 | Stop reason: HOSPADM

## 2022-11-29 RX ORDER — ONDANSETRON 8 MG/1
8 TABLET, ORALLY DISINTEGRATING ORAL EVERY 8 HOURS PRN
Status: DISCONTINUED | OUTPATIENT
Start: 2022-11-29 | End: 2022-11-29 | Stop reason: HOSPADM

## 2022-11-29 RX ORDER — SACUBITRIL AND VALSARTAN 49; 51 MG/1; MG/1
1 TABLET, FILM COATED ORAL 2 TIMES DAILY
Qty: 180 TABLET | Refills: 3 | Status: SHIPPED | OUTPATIENT
Start: 2022-11-29 | End: 2022-12-19

## 2022-11-29 RX ORDER — ACETAMINOPHEN 325 MG/1
650 TABLET ORAL EVERY 4 HOURS PRN
Status: DISCONTINUED | OUTPATIENT
Start: 2022-11-29 | End: 2022-11-29 | Stop reason: HOSPADM

## 2022-11-29 RX ORDER — HYDROCODONE BITARTRATE AND ACETAMINOPHEN 5; 325 MG/1; MG/1
1 TABLET ORAL EVERY 4 HOURS PRN
Status: DISCONTINUED | OUTPATIENT
Start: 2022-11-29 | End: 2022-11-29 | Stop reason: HOSPADM

## 2022-11-29 RX ADMIN — SODIUM CHLORIDE 1000 ML: 0.9 INJECTION, SOLUTION INTRAVENOUS at 12:11

## 2022-11-29 RX ADMIN — DIPHENHYDRAMINE HYDROCHLORIDE 25 MG: 25 CAPSULE ORAL at 08:11

## 2022-11-29 RX ADMIN — ACETAMINOPHEN 650 MG: 325 TABLET ORAL at 08:11

## 2022-11-29 RX ADMIN — SODIUM CHLORIDE: 0.9 INJECTION, SOLUTION INTRAVENOUS at 08:11

## 2022-11-29 NOTE — Clinical Note
The catheter was repositioned into the ostium   right coronary artery. An angiography was performed of the right coronary arteries. The catheter was unable to engage the area.. Catheter removed.

## 2022-11-29 NOTE — Clinical Note
42 ml of contrast were injected throughout the case. 58 mL of contrast was the total wasted during the case. 100 mL was the total amount used during the case.

## 2022-11-29 NOTE — Clinical Note
The DP pulses were 2+ bilaterally. The PT pulses were 2+ bilaterally. The radial pulses were +2 bilaterally.

## 2022-11-29 NOTE — INTERVAL H&P NOTE
The patient has been examined and the H&P has been reviewed:    I concur with the findings and no changes have occurred since H&P was written.    Anesthesia/Surgery risks, benefits and alternative options discussed and understood by patient/family.    Lab Results   Component Value Date    WBC 5.60 11/23/2022    HGB 11.6 (L) 11/23/2022    HCT 35.7 (L) 11/23/2022    MCV 83 11/23/2022     11/23/2022       Lab Results   Component Value Date    INR 0.9 11/23/2022     BMP  Lab Results   Component Value Date     11/23/2022    K 3.8 11/23/2022     11/23/2022    CO2 23 11/23/2022    BUN 12 11/23/2022    CREATININE 0.8 11/23/2022    CALCIUM 9.3 11/23/2022    ANIONGAP 11 11/23/2022    EGFRNORACEVR >60 11/23/2022           There are no hospital problems to display for this patient.

## 2022-11-29 NOTE — DISCHARGE INSTRUCTIONS
ANGIOGRAM INSTRUCTIONS                                           Drink plenty of fluids for the next 48 hours and follow your doctor's diet orders.    Rest for the next 72 hours.   Try not to keep the injected leg bent for a long period of time.  Remove the dressing in 24 hours, and you may shower. Clean the area with soap and water, and apply a band aid for the next 5 days.                                                                 No  Lifting over 5-10 lbs., that is, not more than 1 gallon of water, or straining for 72 hours.    No driving, no drinking alcohol, and no signing legal documents for the next 24 hours.    Call your doctor for elevated temperature, shortness of breath, chest pain, or cold discolored foot or leg.    If oozing occurs at the injections site, lie down.  Apply pressure with a clean wash cloth for 20 to 30 minutes and call your doctor.    If severe bleeding occurs, lie down, apply pressure.  Call 911 and request an ambulance to take you to the nearest hospital emergency room.    Continue to take your regular medications as instructed.      Follow the instructions in the handout given to you.     Fall Prevention  Millions of people fall every year and injure themselves. You may have had anesthesia or sedation which may increase your risk of falling. You may have health issues that put you at an increased risk of falling.     Here are ways to reduce your risk of falling.    Make your home safe by keeping walkways clear of objects you may trip over.  Use non-slip pads under rugs. Do not use area rugs or small throw rugs.  Use non-slip mats in bathtubs and showers.  Install handrails and lights on staircases.  Do not walk in poorly lit areas.  Do not stand on chairs or wobbly ladders.  Use caution when reaching overhead or looking upward. This position can cause a loss of balance.  Be sure your shoes fit properly, have non-slip bottoms and are in good condition.   Wear shoes both inside  and out. Avoid going barefoot or wearing slippers.  Be cautious when going up and down stairs, curbs, and when walking on uneven sidewalks.  If your balance is poor, consider using a cane or walker.  If your fall was related to alcohol use, stop or limit alcohol intake.   If your fall was related to use of sleeping medicines, talk to your doctor about this. You may need to reduce your dosage at bedtime if you awaken during the night to go to the bathroom.    To reduce the need for nighttime bathroom trips:  Avoid drinking fluids for several hours before going to bed  Empty your bladder before going to bed  Men can keep a urinal at the bedside  Stay as active as you can. Balance, flexibility, strength, and endurance all come from exercise. They all play a role in preventing falls. Ask your healthcare provider which types of activity are right for you.  Get your vision checked on a regular basis.  If you have pets, know where they are before you stand up or walk so you don't trip over them.  Use night lights.

## 2022-11-29 NOTE — Clinical Note
The catheter was inserted into the ostium   right coronary artery. An angiography was performed of the right coronary arteries. Multiple views were taken.  Catheter removed.

## 2022-11-29 NOTE — Clinical Note
The PA catheter was inserted into the right atrium. Hemodynamics were performed. O2 saturation was measured. HRA 69.2 LRA 73.1

## 2022-11-29 NOTE — DISCHARGE SUMMARY
[facility] - Cath Lab  Discharge Note    SUMMARY     Admit Date: 11/29/2022    Discharge Date and Time:  11/29/2022 345PM    Hospital Course (synopsis of major diagnoses, care, treatment, and services provided during the course of the hospital stay): uneventful R/LHC via RFA/V.  Normal cors/hemodynamics.  No evidence of shunt.  Findings c/w NICM.    Final Diagnosis: Post-Op Diagnosis Codes:     * LBBB (left bundle branch block) [I44.7]     * Nonischemic cardiomyopathy [I42.8]     * TAHIR (obstructive sleep apnea) [G47.33]     * Precordial pain [R07.2]    Disposition: Home or Self Care    Follow Up/Patient Instructions:     Medications:  Reconciled Home Medications:      Medication List        START taking these medications      ENTRESTO 49-51 mg per tablet  Generic drug: sacubitriL-valsartan  Take 1 tablet by mouth 2 (two) times daily.  Replaces: ENTRESTO 24-26 mg per tablet            CONTINUE taking these medications      cetirizine 10 MG tablet  Commonly known as: ZYRTEC  Take 1 tablet (10 mg total) by mouth once daily.     famotidine 20 MG tablet  Commonly known as: PEPCID  TAKE 1 TABLET BY MOUTH TWICE A DAY FOR stomach     metoprolol succinate 200 MG 24 hr tablet  Commonly known as: TOPROL-XL  Take 1 tablet (200 mg total) by mouth once daily.     OZEMPIC 1 mg/dose (4 mg/3 mL)  Generic drug: semaglutide  Inject 1 mg into the skin every 7 days.     sumatriptan 25 MG Tab  Commonly known as: IMITREX  Take 1 tablet (25 mg total) by mouth every 2 (two) hours as needed (migraines).     topiramate 25 MG tablet  Commonly known as: TOPAMAX  Take 1 tablet (25 mg total) by mouth every evening.     VITAMIN D2 50,000 unit Cap  Generic drug: ergocalciferol  TAKE 1 CAPSULE BY MOUTH once every 7 days            STOP taking these medications      amLODIPine 10 MG tablet  Commonly known as: NORVASC     ENTRESTO 24-26 mg per tablet  Generic drug: sacubitriL-valsartan  Replaced by: ENTRESTO 49-51 mg per tablet            Discharge  Procedure Orders   Diet Cardiac     Other restrictions (specify):   Order Comments: If femoral/groin access: no lifting >10 lbs for 1 week, no driving for 3 days.     Remove dressing in 24 hours     Call MD for:  temperature >100.4     Call MD for:  persistent nausea and vomiting     Call MD for:  severe uncontrolled pain     Call MD for:  difficulty breathing, headache or visual disturbances     Call MD for:  redness, tenderness, or signs of infection (pain, swelling, redness, odor or green/yellow discharge around incision site)     Call MD for:  hives     Call MD for:  persistent dizziness or light-headedness     Call MD for:  extreme fatigue     Activity as tolerated      Follow-up Information       Dez Hayden MD Follow up on 12/19/2022.    Specialties: Cardiology, Interventional Cardiology  Why: 2pm, for planned follow up appointment  Contact information:  [address] [address] [phone]                               Diet: cardiac    Activity: ad matthew.

## 2022-11-29 NOTE — Clinical Note
The site was marked. The site was prepped with ChloraPrep. The site was clipped. The patient was draped. The patient was positioned supine.

## 2022-11-29 NOTE — Clinical Note
The PA catheter was repositioned to the main pulmonary artery. Hemodynamics were performed. Cardiac output was obtained. O2 saturation was measured. Average CO 5.40. [place] removed. PA 71.5

## 2022-11-29 NOTE — PLAN OF CARE
Preop plan of care reviewed.  Questions encouraged and questions answered. Pt verbalized readiness to proceed. Preop benadryl and Tylenol given to pt.

## 2022-11-29 NOTE — Clinical Note
The catheter insertion attempt was made into the right coronary artery. The catheter was unable to engage the area.. Catheter removed.

## 2022-11-29 NOTE — Clinical Note
The PA catheter was repositioned to the right ventricle. Hemodynamics were performed. O2 saturation was measured. RV 72.6

## 2022-11-29 NOTE — BRIEF OP NOTE
[facility] [place] - Cath Lab  Brief Operative Note     SUMMARY     Surgery Date: 11/29/2022     Surgeon(s) and Role:     * Dez Hayden MD - Primary    Assisting Surgeon: None    Pre-op Diagnosis:  LBBB (left bundle branch block) [I44.7]  Nonischemic cardiomyopathy [I42.8]  TAHIR (obstructive sleep apnea) [G47.33]  Precordial pain [R07.2]    Post-op Diagnosis:  Post-Op Diagnosis Codes:     * LBBB (left bundle branch block) [I44.7]     * Nonischemic cardiomyopathy [I42.8]     * TAHIR (obstructive sleep apnea) [G47.33]     * Precordial pain [R07.2]    Procedure(s) (LRB):  CATHETERIZATION, HEART, BOTH LEFT AND RIGHT (Bilateral)    Anesthesia: RN IV Sedation    Description of the findings of the procedure: uneventful R/LHC via RFA/V.  Normal cors/hemodynamics.  No evidence of shunt.    Findings/Key Components:  RA 3  RV 24/5  PA 20/5/10  PAWP 6  /13  Ao 115/69/89    Sats:  HRA 69.2  LRA 73.1  RV 72.6  PA 71.5  FA 93.4    CO 5.4 L/min (thermal)    LVEF: 25-30% by echo    Dominance: Right  LM: normal  LAD: normal  LCx: normal  RCA: normal    Hemostasis:  RFA angioseal  RFV man comp    Impression:  LBBB/CMP  Normal cors  Normal hemodynamics  No evidence of intracardiac shunt  Above consistent with NICM.    Plan:  Cont med rx  Stop amlod  Inc entresto 49/51mg bid  Cont metoprolol  Eventual aldactone  Cont lifevest  Repeat echo in 3 months (late Feb 2023) for reassessment of LVEF    Estimated Blood Loss: <50cc         Specimens: None

## 2022-12-19 ENCOUNTER — OFFICE VISIT (OUTPATIENT)
Dept: CARDIOLOGY | Facility: CLINIC | Age: 42
End: 2022-12-19
Payer: MEDICAID

## 2022-12-19 VITALS
RESPIRATION RATE: 15 BRPM | OXYGEN SATURATION: 100 % | BODY MASS INDEX: 29.58 KG/M2 | SYSTOLIC BLOOD PRESSURE: 128 MMHG | HEIGHT: 66 IN | DIASTOLIC BLOOD PRESSURE: 86 MMHG | HEART RATE: 77 BPM | WEIGHT: 184.06 LBS

## 2022-12-19 DIAGNOSIS — I10 ESSENTIAL HYPERTENSION: ICD-10-CM

## 2022-12-19 DIAGNOSIS — I44.7 LBBB (LEFT BUNDLE BRANCH BLOCK): ICD-10-CM

## 2022-12-19 DIAGNOSIS — E66.9 NON MORBID OBESITY, UNSPECIFIED OBESITY TYPE: ICD-10-CM

## 2022-12-19 DIAGNOSIS — G47.33 OSA (OBSTRUCTIVE SLEEP APNEA): ICD-10-CM

## 2022-12-19 DIAGNOSIS — I42.8 NONISCHEMIC CARDIOMYOPATHY: Primary | ICD-10-CM

## 2022-12-19 PROCEDURE — 99214 OFFICE O/P EST MOD 30 MIN: CPT | Mod: PBBFAC,PO | Performed by: INTERNAL MEDICINE

## 2022-12-19 PROCEDURE — 3008F BODY MASS INDEX DOCD: CPT | Mod: CPTII,,, | Performed by: INTERNAL MEDICINE

## 2022-12-19 PROCEDURE — 3044F PR MOST RECENT HEMOGLOBIN A1C LEVEL <7.0%: ICD-10-PCS | Mod: CPTII,,, | Performed by: INTERNAL MEDICINE

## 2022-12-19 PROCEDURE — 4010F PR ACE/ARB THEARPY RXD/TAKEN: ICD-10-PCS | Mod: CPTII,,, | Performed by: INTERNAL MEDICINE

## 2022-12-19 PROCEDURE — 1160F PR REVIEW ALL MEDS BY PRESCRIBER/CLIN PHARMACIST DOCUMENTED: ICD-10-PCS | Mod: CPTII,,, | Performed by: INTERNAL MEDICINE

## 2022-12-19 PROCEDURE — 3079F DIAST BP 80-89 MM HG: CPT | Mod: CPTII,,, | Performed by: INTERNAL MEDICINE

## 2022-12-19 PROCEDURE — 3008F PR BODY MASS INDEX (BMI) DOCUMENTED: ICD-10-PCS | Mod: CPTII,,, | Performed by: INTERNAL MEDICINE

## 2022-12-19 PROCEDURE — 99214 PR OFFICE/OUTPT VISIT, EST, LEVL IV, 30-39 MIN: ICD-10-PCS | Mod: S$PBB,,, | Performed by: INTERNAL MEDICINE

## 2022-12-19 PROCEDURE — 3079F PR MOST RECENT DIASTOLIC BLOOD PRESSURE 80-89 MM HG: ICD-10-PCS | Mod: CPTII,,, | Performed by: INTERNAL MEDICINE

## 2022-12-19 PROCEDURE — 1159F PR MEDICATION LIST DOCUMENTED IN MEDICAL RECORD: ICD-10-PCS | Mod: CPTII,,, | Performed by: INTERNAL MEDICINE

## 2022-12-19 PROCEDURE — 1160F RVW MEDS BY RX/DR IN RCRD: CPT | Mod: CPTII,,, | Performed by: INTERNAL MEDICINE

## 2022-12-19 PROCEDURE — 99999 PR PBB SHADOW E&M-EST. PATIENT-LVL IV: ICD-10-PCS | Mod: PBBFAC,,, | Performed by: INTERNAL MEDICINE

## 2022-12-19 PROCEDURE — 99214 OFFICE O/P EST MOD 30 MIN: CPT | Mod: S$PBB,,, | Performed by: INTERNAL MEDICINE

## 2022-12-19 PROCEDURE — 3074F SYST BP LT 130 MM HG: CPT | Mod: CPTII,,, | Performed by: INTERNAL MEDICINE

## 2022-12-19 PROCEDURE — 99999 PR PBB SHADOW E&M-EST. PATIENT-LVL IV: CPT | Mod: PBBFAC,,, | Performed by: INTERNAL MEDICINE

## 2022-12-19 PROCEDURE — 1159F MED LIST DOCD IN RCRD: CPT | Mod: CPTII,,, | Performed by: INTERNAL MEDICINE

## 2022-12-19 PROCEDURE — 3074F PR MOST RECENT SYSTOLIC BLOOD PRESSURE < 130 MM HG: ICD-10-PCS | Mod: CPTII,,, | Performed by: INTERNAL MEDICINE

## 2022-12-19 PROCEDURE — 4010F ACE/ARB THERAPY RXD/TAKEN: CPT | Mod: CPTII,,, | Performed by: INTERNAL MEDICINE

## 2022-12-19 PROCEDURE — 3044F HG A1C LEVEL LT 7.0%: CPT | Mod: CPTII,,, | Performed by: INTERNAL MEDICINE

## 2022-12-19 RX ORDER — SPIRONOLACTONE 25 MG/1
25 TABLET ORAL DAILY
Qty: 90 TABLET | Refills: 3 | Status: SHIPPED | OUTPATIENT
Start: 2022-12-19 | End: 2023-03-07 | Stop reason: SDUPTHER

## 2022-12-19 RX ORDER — SACUBITRIL AND VALSARTAN 97; 103 MG/1; MG/1
1 TABLET, FILM COATED ORAL 2 TIMES DAILY
Qty: 180 TABLET | Refills: 3 | Status: SHIPPED | OUTPATIENT
Start: 2022-12-19 | End: 2023-12-28

## 2022-12-19 NOTE — PROGRESS NOTES
CARDIOVASCULAR PROGRESS NOTE    REASON FOR CONSULT:   Hollie Saldana is a 42 y.o. female who presents for follow up of NICM, HTN, LBBB.    PCP: Joao  HISTORY OF PRESENT ILLNESS:   The patient returns for follow-up of her catheterization.  She is had no further chest pain.  There been no complications to the right femoral access.  She denies palpitations, syncope, or life vest discharges.  She is having no dyspnea, PND, orthopnea, or lower extremity edema.  There has been no melena, hematuria, or claudicant symptoms.    CARDIOVASCULAR HISTORY:   LBBB (normal cath 5/2017)  NICM, EF 45->25% (echo 11/2022).  Cath 11/2022 neg.    PAST MEDICAL HISTORY:     Past Medical History:   Diagnosis Date    History of tubal ligation     Hypertension     LBBB (left bundle branch block)        PAST SURGICAL HISTORY:     Past Surgical History:   Procedure Laterality Date    CARDIAC CATHETERIZATION  2017    CATHETERIZATION OF BOTH LEFT AND RIGHT HEART Bilateral 11/29/2022    Procedure: CATHETERIZATION, HEART, BOTH LEFT AND RIGHT;  Surgeon: Dez Hayden MD;  Location: [facility] CATH LAB;  Service: Cardiology;  Laterality: Bilateral;  RFA/V access, 4Fr art sheath  RN PREOP 11/23/2022       ALLERGIES AND MEDICATION:   Review of patient's allergies indicates:  No Known Allergies  Previous Medications    CETIRIZINE (ZYRTEC) 10 MG TABLET    Take 1 tablet (10 mg total) by mouth once daily.    FAMOTIDINE (PEPCID) 20 MG TABLET    TAKE 1 TABLET BY MOUTH TWICE A DAY FOR stomach    METOPROLOL SUCCINATE (TOPROL-XL) 200 MG 24 HR TABLET    Take 1 tablet (200 mg total) by mouth once daily.    SACUBITRIL-VALSARTAN (ENTRESTO) 49-51 MG PER TABLET    Take 1 tablet by mouth 2 (two) times daily.    SEMAGLUTIDE (OZEMPIC) 1 MG/DOSE (4 MG/3 ML)    Inject 1 mg into the skin every 7 days.    SUMATRIPTAN (IMITREX) 25 MG TAB    Take 1 tablet (25 mg total) by mouth every 2 (two) hours as needed (migraines).    TOPIRAMATE (TOPAMAX) 25 MG TABLET    TAKE 1  "TABLET BY MOUTH EVERY EVENING    VITAMIN D2 1,250 MCG (50,000 UNIT) CAPSULE    TAKE 1 CAPSULE BY MOUTH once every 7 days       SOCIAL HISTORY:     Social History     Socioeconomic History    Marital status: Single   Tobacco Use    Smoking status: Never    Smokeless tobacco: Never   Substance and Sexual Activity    Alcohol use: Yes     Comment: Occasionally    Drug use: No       FAMILY HISTORY:     Family History   Problem Relation Age of Onset    Hypertension Mother     Thyroid disease Mother     Appendicitis Mother     Hypertension Father        REVIEW OF SYSTEMS:   Review of Systems   Constitutional:  Negative for chills, diaphoresis, fever and malaise/fatigue.   HENT:  Negative for nosebleeds.    Eyes:  Negative for blurred vision, double vision and photophobia.   Respiratory:  Negative for hemoptysis, shortness of breath and wheezing.    Cardiovascular:  Negative for chest pain, palpitations, orthopnea, claudication, leg swelling and PND.   Gastrointestinal:  Negative for abdominal pain, blood in stool, heartburn, melena, nausea and vomiting.   Genitourinary:  Negative for flank pain and hematuria.   Musculoskeletal:  Negative for falls, myalgias and neck pain.   Skin:  Negative for rash.   Neurological:  Negative for dizziness, seizures, loss of consciousness, weakness and headaches.   Endo/Heme/Allergies:  Negative for polydipsia. Does not bruise/bleed easily.   Psychiatric/Behavioral:  Negative for depression and memory loss. The patient is not nervous/anxious.      PHYSICAL EXAM:     Vitals:    12/19/22 1350   BP: 128/86   Pulse: 77   Resp: 15    Body mass index is 29.71 kg/m².  Weight: 83.5 kg (184 lb 1.4 oz)   Height: 5' 6" (167.6 cm)     Physical Exam  Vitals reviewed.   Constitutional:       General: She is not in acute distress.     Appearance: Normal appearance. She is well-developed. She is not ill-appearing, toxic-appearing or diaphoretic.   HENT:      Head: Normocephalic and atraumatic.   Eyes:      " General: No scleral icterus.     Extraocular Movements: Extraocular movements intact.      Conjunctiva/sclera: Conjunctivae normal.      Pupils: Pupils are equal, round, and reactive to light.   Neck:      Thyroid: No thyromegaly.      Vascular: Normal carotid pulses. No carotid bruit or JVD.      Trachea: Trachea normal.   Cardiovascular:      Rate and Rhythm: Normal rate and regular rhythm.      Pulses:           Carotid pulses are 2+ on the right side and 2+ on the left side.     Heart sounds: S1 normal and S2 normal. No murmur heard.    No friction rub. No gallop.      Comments: Lifevest in place  Pulmonary:      Effort: Pulmonary effort is normal. No respiratory distress.      Breath sounds: Normal breath sounds. No stridor. No wheezing, rhonchi or rales.   Chest:      Chest wall: No tenderness.   Abdominal:      General: There is no distension.      Palpations: Abdomen is soft.   Musculoskeletal:         General: No swelling or tenderness. Normal range of motion.      Cervical back: Normal range of motion and neck supple. No edema or rigidity.      Right lower leg: No edema.      Left lower leg: No edema.   Feet:      Right foot:      Skin integrity: No ulcer.      Left foot:      Skin integrity: No ulcer.   Skin:     General: Skin is warm and dry.      Coloration: Skin is not jaundiced.   Neurological:      General: No focal deficit present.      Mental Status: She is alert and oriented to person, place, and time.      Cranial Nerves: No cranial nerve deficit.   Psychiatric:         Mood and Affect: Mood normal.         Speech: Speech normal.         Behavior: Behavior normal. Behavior is cooperative.       DATA:   EKG: (personally reviewed tracing(s))  11/1/22 SR 89, LBBB, similar to 4/11/22    Laboratory:  CBC:  Recent Labs   Lab 09/28/20  0619 02/09/22  0700 11/23/22  1450   WBC 7.22 5.90 5.60   Hemoglobin 12.2 10.7 L 11.6 L   Hematocrit 38.2 34.5 L 35.7 L   Platelets 325 381 330          CHEMISTRIES:  Recent Labs   Lab 12/08/20  0946 01/21/21  0851 02/09/22  0700 11/23/22  1450   Glucose 73 92 89 77   Sodium 142 138 134 L 140   Potassium 4.0 4.1 3.8 3.8   BUN 13 11 9 12   Creatinine 1.0 0.8 0.8 0.8   eGFR if African American >60 >60 >60.0  --    eGFR if non African American >60 >60 >60.0  --    Calcium 8.5 L 8.6 L 9.4 9.3         CARDIAC BIOMARKERS:  Recent Labs   Lab 09/28/20  0619   Troponin I <0.006         COAGS:  Recent Labs   Lab 09/27/20  1303 04/12/22  0009 11/23/22  1450   INR 0.9 1.3 H 0.9         LIPIDS/LFTS:  Recent Labs   Lab 01/28/20  1413 09/28/20  0619 02/09/22  0700   Cholesterol 215 H  --  173   Triglycerides 82  --  54   HDL 80  --  67   LDL Calculated 119 H  --   --    LDL Cholesterol  --   --  95.2   Non-HDL Cholesterol  --   --  106   AST 25 20 15   ALT 23 17 10       Lab Results   Component Value Date    TSH 0.592 02/09/2022         Cardiovascular Testing:  Cath 11/25/22  RA 3  RV 24/5  PA 20/5/10  PAWP 6  /13  Ao 115/69/89  Sats:  HRA 69.2  LRA 73.1  RV 72.6  PA 71.5  FA 93.4   CO 5.4 L/min (thermal)  LVEF: 25-30% by echo  Dominance: Right  LM: normal  LAD: normal  LCx: normal  RCA: normal  Hemostasis:  RFA angioseal  RFV man comp  Impression:  LBBB/CMP  Normal cors  Normal hemodynamics  No evidence of intracardiac shunt  Above consistent with NICM.  Plan:  Cont med rx  Stop amlod  Inc entresto 49/51mg bid  Cont metoprolol  Eventual aldactone  Cont lifevest  Repeat echo in 3 months (late Feb 2023) for reassessment of LVEF    Echo 11/7/22 (EF 45% on report 2/2021)  The left ventricle is normal in size with severely decreased systolic function.  The estimated ejection fraction is 25-30%.  There is abnormal septal wall motion consistent with left bundle branch block.  Grade I left ventricular diastolic dysfunction.  Normal right ventricular size with normal right ventricular systolic function.  Mild mitral regurgitation.    Holter 11/7/22  Sinus rhythm with heart  rates varying between 75 and 138 BPM with an average of 94BPM.  There were very rare PACs totalling 7 and averaging 0.29 per hour.  The diary was not returned.    Renal art US 11/10/20  There is insignificant stenosis (0-59%) in the Right Renal Artery.  Right kidney 10.70 cm.  There is insignificant stenosis (0-59%) in the Left Renal Artery.  Left kidney 10.80 cm.      ASSESSMENT:   # CP, resolved.  Cath 11/2022 with normal cors, neg for shunt.    # HTN, controlled.  Renal art US 11/2020 neg.  # LBBB, old  # NICM, EF 45->25% on echo 11/2022.  NICM without shunt on cath 11/2022.  Wearing lifevest.  # ?TAHIR, missed sleep study in 4/2021, has appt 1/6/23    PLAN:   Cont med rx  Inc entresto 97/103mg bid  Add aldactine 25mg qd  Check BMP 1 week  Cont lifevest  RTC 1 month  Repeat echo late Feb 2023 for reassessment of LVEF      Dez Hayden MD, FACC

## 2022-12-20 ENCOUNTER — PATIENT MESSAGE (OUTPATIENT)
Dept: CARDIOLOGY | Facility: CLINIC | Age: 42
End: 2022-12-20
Payer: MEDICAID

## 2022-12-28 ENCOUNTER — LAB VISIT (OUTPATIENT)
Dept: LAB | Facility: HOSPITAL | Age: 42
End: 2022-12-28
Attending: INTERNAL MEDICINE
Payer: MEDICAID

## 2022-12-28 DIAGNOSIS — I10 ESSENTIAL HYPERTENSION: ICD-10-CM

## 2022-12-28 DIAGNOSIS — I42.8 NONISCHEMIC CARDIOMYOPATHY: ICD-10-CM

## 2022-12-28 LAB
ANION GAP SERPL CALC-SCNC: 9 MMOL/L (ref 8–16)
BUN SERPL-MCNC: 10 MG/DL (ref 6–20)
CALCIUM SERPL-MCNC: 8.5 MG/DL (ref 8.7–10.5)
CHLORIDE SERPL-SCNC: 106 MMOL/L (ref 95–110)
CO2 SERPL-SCNC: 20 MMOL/L (ref 23–29)
CREAT SERPL-MCNC: 0.8 MG/DL (ref 0.5–1.4)
EST. GFR  (NO RACE VARIABLE): >60 ML/MIN/1.73 M^2
GLUCOSE SERPL-MCNC: 74 MG/DL (ref 70–110)
POTASSIUM SERPL-SCNC: 3.8 MMOL/L (ref 3.5–5.1)
SODIUM SERPL-SCNC: 135 MMOL/L (ref 136–145)

## 2022-12-28 PROCEDURE — 36415 COLL VENOUS BLD VENIPUNCTURE: CPT | Mod: PO | Performed by: INTERNAL MEDICINE

## 2022-12-28 PROCEDURE — 80048 BASIC METABOLIC PNL TOTAL CA: CPT | Performed by: INTERNAL MEDICINE

## 2023-01-03 ENCOUNTER — OFFICE VISIT (OUTPATIENT)
Dept: URGENT CARE | Facility: CLINIC | Age: 43
End: 2023-01-03
Payer: MEDICAID

## 2023-01-03 VITALS
OXYGEN SATURATION: 98 % | DIASTOLIC BLOOD PRESSURE: 99 MMHG | HEIGHT: 66 IN | HEART RATE: 83 BPM | RESPIRATION RATE: 18 BRPM | SYSTOLIC BLOOD PRESSURE: 142 MMHG | WEIGHT: 184 LBS | TEMPERATURE: 97 F | BODY MASS INDEX: 29.57 KG/M2

## 2023-01-03 DIAGNOSIS — R09.81 NASAL CONGESTION: ICD-10-CM

## 2023-01-03 DIAGNOSIS — U07.1 COVID-19 VIRUS DETECTED: ICD-10-CM

## 2023-01-03 DIAGNOSIS — U07.1 COVID-19: Primary | ICD-10-CM

## 2023-01-03 LAB
CTP QC/QA: YES
CTP QC/QA: YES
POC MOLECULAR INFLUENZA A AGN: NEGATIVE
POC MOLECULAR INFLUENZA B AGN: NEGATIVE
SARS-COV-2 AG RESP QL IA.RAPID: POSITIVE

## 2023-01-03 PROCEDURE — 1160F PR REVIEW ALL MEDS BY PRESCRIBER/CLIN PHARMACIST DOCUMENTED: ICD-10-PCS | Mod: CPTII,S$GLB,,

## 2023-01-03 PROCEDURE — 87502 POCT INFLUENZA A/B MOLECULAR: ICD-10-PCS | Mod: QW,S$GLB,,

## 2023-01-03 PROCEDURE — 99213 PR OFFICE/OUTPT VISIT, EST, LEVL III, 20-29 MIN: ICD-10-PCS | Mod: S$GLB,,,

## 2023-01-03 PROCEDURE — 3080F PR MOST RECENT DIASTOLIC BLOOD PRESSURE >= 90 MM HG: ICD-10-PCS | Mod: CPTII,S$GLB,,

## 2023-01-03 PROCEDURE — 87811 SARS-COV-2 COVID19 W/OPTIC: CPT | Mod: QW,S$GLB,,

## 2023-01-03 PROCEDURE — 3008F BODY MASS INDEX DOCD: CPT | Mod: CPTII,S$GLB,,

## 2023-01-03 PROCEDURE — 1159F MED LIST DOCD IN RCRD: CPT | Mod: CPTII,S$GLB,,

## 2023-01-03 PROCEDURE — 3077F SYST BP >= 140 MM HG: CPT | Mod: CPTII,S$GLB,,

## 2023-01-03 PROCEDURE — 87811 SARS CORONAVIRUS 2 ANTIGEN POCT, MANUAL READ: ICD-10-PCS | Mod: QW,S$GLB,,

## 2023-01-03 PROCEDURE — 3008F PR BODY MASS INDEX (BMI) DOCUMENTED: ICD-10-PCS | Mod: CPTII,S$GLB,,

## 2023-01-03 PROCEDURE — 1160F RVW MEDS BY RX/DR IN RCRD: CPT | Mod: CPTII,S$GLB,,

## 2023-01-03 PROCEDURE — 3077F PR MOST RECENT SYSTOLIC BLOOD PRESSURE >= 140 MM HG: ICD-10-PCS | Mod: CPTII,S$GLB,,

## 2023-01-03 PROCEDURE — 99213 OFFICE O/P EST LOW 20 MIN: CPT | Mod: S$GLB,,,

## 2023-01-03 PROCEDURE — 1159F PR MEDICATION LIST DOCUMENTED IN MEDICAL RECORD: ICD-10-PCS | Mod: CPTII,S$GLB,,

## 2023-01-03 PROCEDURE — 3080F DIAST BP >= 90 MM HG: CPT | Mod: CPTII,S$GLB,,

## 2023-01-03 PROCEDURE — 87502 INFLUENZA DNA AMP PROBE: CPT | Mod: QW,S$GLB,,

## 2023-01-03 RX ORDER — FLUTICASONE PROPIONATE 50 MCG
1 SPRAY, SUSPENSION (ML) NASAL DAILY
Qty: 15.8 ML | Refills: 0 | Status: SHIPPED | OUTPATIENT
Start: 2023-01-03 | End: 2023-01-19 | Stop reason: SDUPTHER

## 2023-01-03 NOTE — PATIENT INSTRUCTIONS
You have tested positive for COVID-19 today.    ISOLATION  If you tested positive and do not have symptoms, you must isolate for 5 days starting on the day of the positive test.  If you tested positive and have symptoms, you must isolate for 5 days starting on the day of the first symptoms,  not the day of the positive test.   This is the most important part, both the CDC and the [facility] emphasize that you do not test out of isolation.   After 5 days, if your symptoms have improved and you have not had fever on day 5, you can return to the community on day 6- NO TESTING REQUIRED!    In fact, we do not retest if you were positive in the last 90 days.  After your 5 days of isolation are completed, the CDC recommends strict mask use for the first 5 days that you come out of isolation.    - Rest.    - Drink plenty of fluids.      - You can take over-the-counter claritin, zyrtec, allegra, or xyzal as directed. These are antihistamines that can help with runny nose, nasal congestion, sneezing, and helps to dry up post-nasal drip, which usually causes sore throat and cough.              - If you do NOT have high blood pressure, you may use a decongestant form (D)  of this medication (ie. Claritin- D, zyrtec-D, allegra-D) or if you do not take the D form, you can take sudafed (pseudoephedrine) over the counter, which is a decongestant. Do NOT take two decongestant (D) medications at the same time (such as mucinex-D and claritin-D or plain sudafed and claritin D)    - If you DO have Hypertension, anxiety, or palpitations, it is safe to take Coricidin HBP for relief of sinus symptoms.     - You can use Flonase (fluticasone) nasal spray as directed for sinus congestion and postnasal drip. This is a steroid nasal spray that works locally over time to decrease the inflammation in your nose/sinuses and help with allergic symptoms. This is not an quick- relief spray like afrin, but it works well if used daily.  Discontinue if you  develop nose bleed  - use nasal saline prior to Flonase.  - Use Ocean Spray Nasal Saline 1-3 puffs each nostril every 2-3 hours then blow out onto tissue. This is to irrigate the nasal passage way to clear the sinus openings. Use until sinus problem resolved.     - you can take plain Mucinex (guaifenesin) 1200 mg twice a day to help loosen mucous.      -warm salt water gargles can help with sore throat     - warm tea with honey can help with cough. Honey is a natural cough suppressant.     - Dextromethorphan (DM) is a cough suppressant over the counter (ie. mucinex DM, robitussin, delsym; dayquil/nyquil has DM as well.)        - Follow up with your PCP or specialty clinic as directed in the next 1-2 weeks if not improved or as needed.  You can call [phone] to schedule an appointment with the appropriate provider.       - Go to the ER if you develop new or worsening symptoms.      - You must understand that you have received an Urgent Care treatment only and that you may be released before all of your medical problems are known or treated.   - You, the patient, will arrange for follow up care as instructed.   - If your condition worsens or fails to improve we recommend that you receive another evaluation at the ER immediately or contact your PCP to discuss your concerns or return here.

## 2023-01-03 NOTE — PROGRESS NOTES
"Subjective:       Patient ID: Hollie Saldana is a 42 y.o. female.    Vitals:  height is 5' 6" (1.676 m) and weight is 83.5 kg (184 lb). Her tympanic temperature is 97.2 °F (36.2 °C). Her blood pressure is 142/99 (abnormal) and her pulse is 83. Her respiration is 18 and oxygen saturation is 98%.     Chief Complaint: Sinus Problem    Pt is a 43 y/o F who presents with sinus congestion, runny nose, PND, sneezing, headache x3 days. Denies known sick contacts. Denies fever, chills, myalgias, body aches, CP, SoB, N/V/D, abdominal pain.    Sinus Problem  This is a new problem. The current episode started in the past 7 days. The problem has been gradually worsening since onset. There has been no fever. Her pain is at a severity of 4/10. The pain is mild. Associated symptoms include congestion, headaches and sinus pressure. Pertinent negatives include no chills, coughing, ear pain, neck pain, shortness of breath, sneezing or sore throat. Past treatments include oral decongestants. The treatment provided mild relief.     Constitution: Negative for chills and fever.   HENT:  Positive for congestion, postnasal drip and sinus pressure. Negative for ear pain and sore throat.    Neck: Negative for neck pain and neck stiffness.   Cardiovascular:  Negative for chest pain.   Eyes:  Negative for eye discharge and eye itching.   Respiratory:  Negative for cough, shortness of breath and wheezing.    Gastrointestinal:  Negative for abdominal pain, nausea, vomiting and diarrhea.   Genitourinary:  Negative for dysuria.   Musculoskeletal:  Negative for muscle ache.   Skin:  Negative for rash.   Allergic/Immunologic: Negative for sneezing.   Neurological:  Positive for headaches. Negative for dizziness.     Objective:      Physical Exam   Constitutional: She is oriented to person, place, and time. She appears well-developed.   HENT:   Head: Normocephalic and atraumatic.   Ears:   Right Ear: External ear normal.   Left Ear: External ear " normal.   Nose: Rhinorrhea present.   Mouth/Throat: Oropharynx is clear and moist. Mucous membranes are moist. Oropharynx is clear.   Eyes: Conjunctivae, EOM and lids are normal. Pupils are equal, round, and reactive to light.   Neck: Trachea normal and phonation normal. Neck supple.   Cardiovascular: Normal rate, regular rhythm, normal heart sounds and normal pulses.   Pulmonary/Chest: Effort normal and breath sounds normal.   Musculoskeletal: Normal range of motion.         General: Normal range of motion.   Neurological: no focal deficit. She is alert and oriented to person, place, and time. No cranial nerve deficit.   Skin: Skin is warm, dry and intact. Capillary refill takes less than 2 seconds.   Psychiatric: Her speech is normal and behavior is normal. Judgment and thought content normal.   Nursing note and vitals reviewed.      Results for orders placed or performed in visit on 01/03/23   SARS Coronavirus 2 Antigen, POCT Manual Read   Result Value Ref Range    SARS Coronavirus 2 Antigen Positive (A) Negative     Acceptable Yes    POCT Influenza A/B MOLECULAR   Result Value Ref Range    POC Molecular Influenza A Ag Negative Negative, Not Reported    POC Molecular Influenza B Ag Negative Negative, Not Reported     Acceptable Yes        Assessment:       1. COVID-19    2. Nasal congestion          Plan:         COVID-19  -     SARS Coronavirus 2 Antigen, POCT Manual Read  -     POCT Influenza A/B MOLECULAR  -     nirmatrelvir-ritonavir 300 mg (150 mg x 2)-100 mg copackaged tablets (EUA); Take 3 tablets by mouth 2 (two) times daily for 5 days. Each dose contains 2 nirmatrelvir (pink tablets) and 1 ritonavir (white tablet). Take all 3 tablets together  Dispense: 30 tablet; Refill: 0    Nasal congestion  -     fluticasone propionate (FLONASE) 50 mcg/actuation nasal spray; 1 spray (50 mcg total) by Each Nostril route once daily.  Dispense: 15.8 mL; Refill: 0         COVID risk score 3  (calculated by Epic).  Will treat with Paxlovid.  Discussed potential medication interactions.  Discussed isolation and quarantine.  Discussed supportive care and over-the-counter medications.  ED precautions given.  Patient verbalizes understanding and agrees with plan.          Patient Instructions   You have tested positive for COVID-19 today.    ISOLATION  If you tested positive and do not have symptoms, you must isolate for 5 days starting on the day of the positive test.  If you tested positive and have symptoms, you must isolate for 5 days starting on the day of the first symptoms,  not the day of the positive test.   This is the most important part, both the CDC and the [facility] emphasize that you do not test out of isolation.   After 5 days, if your symptoms have improved and you have not had fever on day 5, you can return to the community on day 6- NO TESTING REQUIRED!    In fact, we do not retest if you were positive in the last 90 days.  After your 5 days of isolation are completed, the CDC recommends strict mask use for the first 5 days that you come out of isolation.    - Rest.    - Drink plenty of fluids.      - You can take over-the-counter claritin, zyrtec, allegra, or xyzal as directed. These are antihistamines that can help with runny nose, nasal congestion, sneezing, and helps to dry up post-nasal drip, which usually causes sore throat and cough.              - If you do NOT have high blood pressure, you may use a decongestant form (D)  of this medication (ie. Claritin- D, zyrtec-D, allegra-D) or if you do not take the D form, you can take sudafed (pseudoephedrine) over the counter, which is a decongestant. Do NOT take two decongestant (D) medications at the same time (such as mucinex-D and claritin-D or plain sudafed and claritin D)    - If you DO have Hypertension, anxiety, or palpitations, it is safe to take Coricidin HBP for relief of sinus symptoms.     - You can use Flonase (fluticasone) nasal  spray as directed for sinus congestion and postnasal drip. This is a steroid nasal spray that works locally over time to decrease the inflammation in your nose/sinuses and help with allergic symptoms. This is not an quick- relief spray like afrin, but it works well if used daily.  Discontinue if you develop nose bleed  - use nasal saline prior to Flonase.  - Use Ocean Spray Nasal Saline 1-3 puffs each nostril every 2-3 hours then blow out onto tissue. This is to irrigate the nasal passage way to clear the sinus openings. Use until sinus problem resolved.     - you can take plain Mucinex (guaifenesin) 1200 mg twice a day to help loosen mucous.      -warm salt water gargles can help with sore throat     - warm tea with honey can help with cough. Honey is a natural cough suppressant.     - Dextromethorphan (DM) is a cough suppressant over the counter (ie. mucinex DM, robitussin, delsym; dayquil/nyquil has DM as well.)        - Follow up with your PCP or specialty clinic as directed in the next 1-2 weeks if not improved or as needed.  You can call [phone] to schedule an appointment with the appropriate provider.       - Go to the ER if you develop new or worsening symptoms.      - You must understand that you have received an Urgent Care treatment only and that you may be released before all of your medical problems are known or treated.   - You, the patient, will arrange for follow up care as instructed.   - If your condition worsens or fails to improve we recommend that you receive another evaluation at the ER immediately or contact your PCP to discuss your concerns or return here.

## 2023-01-03 NOTE — LETTER
[address], [address]  [place] [address]  Phone: [phone]  Fax: [phone]          Return to Work/School    Patient: Hollie Saldana  YOB: 1980   Date: 01/03/2023     To Whom It May Concern:     Hollie Saldana was in contact with/seen in my office on 01/03/2023. COVID-19 is present in our communities across the state. There is limited testing for COVID at this time, so not all patients can be tested. In this situation, your employee meets the following criteria:     Hollie Saldana has met the criteria for COVID-19 testing based upon symptoms, travel, and/or potential exposure. The test has been completed and is positive. During this time the employee is not able to work and should be quarantined per the Centers for Disease Control timelines. She can return to work once their symptoms improve and they are without fever for 24 hours without the use of fever reducing medications AND at least five days from the start of symptoms (1/6/23 at the earliest).       If you have any questions or concerns, or if I can be of further assistance, please do not hesitate to contact me.     Sincerely,    Davide Aviles PA-C

## 2023-01-05 ENCOUNTER — TELEPHONE (OUTPATIENT)
Dept: PULMONOLOGY | Facility: CLINIC | Age: 43
End: 2023-01-05
Payer: MEDICAID

## 2023-01-05 NOTE — TELEPHONE ENCOUNTER
Left message to call office back.    MARILY Sherwood/[facility]  [phone]                   ----- Message from Valerie Jones sent at 1/5/2023 12:04 PM CST -----  Regarding: CAll BAck  Name of Who is Calling:ELDER LPISCOMB [[id number]]              What is the request in detail: Patient requesting a appointment Nothing available in system Please assist              Can the clinic reply by [facility]: No              [facility]: [phone]

## 2023-01-06 ENCOUNTER — TELEPHONE (OUTPATIENT)
Dept: PULMONOLOGY | Facility: CLINIC | Age: 43
End: 2023-01-06
Payer: MEDICAID

## 2023-01-06 NOTE — TELEPHONE ENCOUNTER
Changed appt.                ----- Message from Yasemin Claire sent at 1/6/2023 12:47 PM CST -----  Regarding: patient call back  Type: Patient Call Back    Who called: Self     What is the request in detail: Would like to have a call about today's apt.     Can the clinic reply by [facility]? No     Would the patient rather a call back or a response via [facility]? Call     Best call back number: .[phone]

## 2023-01-25 ENCOUNTER — OFFICE VISIT (OUTPATIENT)
Dept: CARDIOLOGY | Facility: CLINIC | Age: 43
End: 2023-01-25
Payer: MEDICAID

## 2023-01-25 VITALS
WEIGHT: 181 LBS | SYSTOLIC BLOOD PRESSURE: 128 MMHG | OXYGEN SATURATION: 99 % | BODY MASS INDEX: 29.09 KG/M2 | RESPIRATION RATE: 18 BRPM | DIASTOLIC BLOOD PRESSURE: 74 MMHG | HEIGHT: 66 IN | HEART RATE: 89 BPM

## 2023-01-25 DIAGNOSIS — I44.7 LBBB (LEFT BUNDLE BRANCH BLOCK): ICD-10-CM

## 2023-01-25 DIAGNOSIS — I10 ESSENTIAL HYPERTENSION: ICD-10-CM

## 2023-01-25 DIAGNOSIS — I42.8 NONISCHEMIC CARDIOMYOPATHY: Primary | ICD-10-CM

## 2023-01-25 PROCEDURE — 99999 PR PBB SHADOW E&M-EST. PATIENT-LVL IV: CPT | Mod: PBBFAC,,, | Performed by: INTERNAL MEDICINE

## 2023-01-25 PROCEDURE — 3078F DIAST BP <80 MM HG: CPT | Mod: CPTII,,, | Performed by: INTERNAL MEDICINE

## 2023-01-25 PROCEDURE — 3074F SYST BP LT 130 MM HG: CPT | Mod: CPTII,,, | Performed by: INTERNAL MEDICINE

## 2023-01-25 PROCEDURE — 99214 OFFICE O/P EST MOD 30 MIN: CPT | Mod: PBBFAC,PO | Performed by: INTERNAL MEDICINE

## 2023-01-25 PROCEDURE — 1160F PR REVIEW ALL MEDS BY PRESCRIBER/CLIN PHARMACIST DOCUMENTED: ICD-10-PCS | Mod: CPTII,,, | Performed by: INTERNAL MEDICINE

## 2023-01-25 PROCEDURE — 1160F RVW MEDS BY RX/DR IN RCRD: CPT | Mod: CPTII,,, | Performed by: INTERNAL MEDICINE

## 2023-01-25 PROCEDURE — 99214 OFFICE O/P EST MOD 30 MIN: CPT | Mod: S$PBB,,, | Performed by: INTERNAL MEDICINE

## 2023-01-25 PROCEDURE — 99214 PR OFFICE/OUTPT VISIT, EST, LEVL IV, 30-39 MIN: ICD-10-PCS | Mod: S$PBB,,, | Performed by: INTERNAL MEDICINE

## 2023-01-25 PROCEDURE — 3078F PR MOST RECENT DIASTOLIC BLOOD PRESSURE < 80 MM HG: ICD-10-PCS | Mod: CPTII,,, | Performed by: INTERNAL MEDICINE

## 2023-01-25 PROCEDURE — 1159F MED LIST DOCD IN RCRD: CPT | Mod: CPTII,,, | Performed by: INTERNAL MEDICINE

## 2023-01-25 PROCEDURE — 3008F BODY MASS INDEX DOCD: CPT | Mod: CPTII,,, | Performed by: INTERNAL MEDICINE

## 2023-01-25 PROCEDURE — 99999 PR PBB SHADOW E&M-EST. PATIENT-LVL IV: ICD-10-PCS | Mod: PBBFAC,,, | Performed by: INTERNAL MEDICINE

## 2023-01-25 PROCEDURE — 1159F PR MEDICATION LIST DOCUMENTED IN MEDICAL RECORD: ICD-10-PCS | Mod: CPTII,,, | Performed by: INTERNAL MEDICINE

## 2023-01-25 PROCEDURE — 3074F PR MOST RECENT SYSTOLIC BLOOD PRESSURE < 130 MM HG: ICD-10-PCS | Mod: CPTII,,, | Performed by: INTERNAL MEDICINE

## 2023-01-25 PROCEDURE — 3008F PR BODY MASS INDEX (BMI) DOCUMENTED: ICD-10-PCS | Mod: CPTII,,, | Performed by: INTERNAL MEDICINE

## 2023-01-25 NOTE — PROGRESS NOTES
CARDIOVASCULAR PROGRESS NOTE    REASON FOR CONSULT:   Hollie Saldana is a 42 y.o. female who presents for follow up of NICM, HTN, LBBB.    PCP: Joao  HISTORY OF PRESENT ILLNESS:   The patient returns for follow-up.  She describes generally asymptomatic status without angina, dyspnea, palpitations, or syncope.  There has been no PND, orthopnea, lower extremity edema.  She denies melena, hematuria, or claudicant symptoms.  She continues to wear her LifeVest without any alarms or defibrillations.    CARDIOVASCULAR HISTORY:   LBBB (normal cath 5/2017)  NICM, EF 45->25% (echo 11/2022).  Cath 11/2022 neg.    PAST MEDICAL HISTORY:     Past Medical History:   Diagnosis Date    History of tubal ligation     Hypertension     LBBB (left bundle branch block)     Nonischemic cardiomyopathy 10/23/2020       PAST SURGICAL HISTORY:     Past Surgical History:   Procedure Laterality Date    CARDIAC CATHETERIZATION  2017    CATHETERIZATION OF BOTH LEFT AND RIGHT HEART Bilateral 11/29/2022    Procedure: CATHETERIZATION, HEART, BOTH LEFT AND RIGHT;  Surgeon: Dez Hayden MD;  Location: [facility] CATH LAB;  Service: Cardiology;  Laterality: Bilateral;  RFA/V access, 4Fr art sheath  RN PREOP 11/23/2022       ALLERGIES AND MEDICATION:   Review of patient's allergies indicates:  No Known Allergies  Previous Medications    BENZONATATE (TESSALON) 200 MG CAPSULE    Take 1 capsule (200 mg total) by mouth 3 (three) times daily as needed for Cough.    CETIRIZINE (ZYRTEC) 10 MG TABLET    Take 1 tablet (10 mg total) by mouth once daily.    FAMOTIDINE (PEPCID) 20 MG TABLET    Take 1 tablet (20 mg total) by mouth nightly as needed for Heartburn.    FLUTICASONE PROPIONATE (FLONASE) 50 MCG/ACTUATION NASAL SPRAY    1 spray (50 mcg total) by Each Nostril route once daily.    LEVOCETIRIZINE (XYZAL) 5 MG TABLET    Take 1 tablet (5 mg total) by mouth every evening.    METOPROLOL SUCCINATE (TOPROL-XL) 200 MG 24 HR TABLET    Take 1 tablet (200 mg  total) by mouth once daily.    SACUBITRIL-VALSARTAN (ENTRESTO)  MG PER TABLET    Take 1 tablet by mouth 2 (two) times daily.    SEMAGLUTIDE (OZEMPIC) 1 MG/DOSE (4 MG/3 ML)    Inject 1 mg into the skin every 7 days.    SPIRONOLACTONE (ALDACTONE) 25 MG TABLET    Take 1 tablet (25 mg total) by mouth once daily.    SUMATRIPTAN (IMITREX) 25 MG TAB    Take 1 tablet (25 mg total) by mouth every 2 (two) hours as needed (migraines).    TOPIRAMATE (TOPAMAX) 25 MG TABLET    TAKE 1 TABLET BY MOUTH EVERY EVENING    VITAMIN D2 1,250 MCG (50,000 UNIT) CAPSULE    TAKE 1 CAPSULE BY MOUTH once every 7 days       SOCIAL HISTORY:     Social History     Socioeconomic History    Marital status: Single   Tobacco Use    Smoking status: Never    Smokeless tobacco: Never   Substance and Sexual Activity    Alcohol use: Yes     Comment: Occasionally    Drug use: No       FAMILY HISTORY:     Family History   Problem Relation Age of Onset    Hypertension Mother     Thyroid disease Mother     Appendicitis Mother     Hypertension Father        REVIEW OF SYSTEMS:   Review of Systems   Constitutional:  Negative for chills, diaphoresis, fever and malaise/fatigue.   HENT:  Negative for nosebleeds.    Eyes:  Negative for blurred vision, double vision and photophobia.   Respiratory:  Negative for hemoptysis, shortness of breath and wheezing.    Cardiovascular:  Negative for chest pain, palpitations, orthopnea, claudication, leg swelling and PND.   Gastrointestinal:  Negative for abdominal pain, blood in stool, heartburn, melena, nausea and vomiting.   Genitourinary:  Negative for flank pain and hematuria.   Musculoskeletal:  Negative for falls, myalgias and neck pain.   Skin:  Negative for rash.   Neurological:  Negative for dizziness, seizures, loss of consciousness, weakness and headaches.   Endo/Heme/Allergies:  Negative for polydipsia. Does not bruise/bleed easily.   Psychiatric/Behavioral:  Negative for depression and memory loss. The patient  "is not nervous/anxious.      PHYSICAL EXAM:     Vitals:    01/25/23 1401   BP: 128/74   Pulse: 89   Resp: 18    Body mass index is 29.22 kg/m².  Weight: 82.1 kg (181 lb 0.3 oz)   Height: 5' 6" (167.6 cm)     Physical Exam  Vitals reviewed.   Constitutional:       General: She is not in acute distress.     Appearance: Normal appearance. She is well-developed. She is not ill-appearing, toxic-appearing or diaphoretic.   HENT:      Head: Normocephalic and atraumatic.   Eyes:      General: No scleral icterus.     Extraocular Movements: Extraocular movements intact.      Conjunctiva/sclera: Conjunctivae normal.      Pupils: Pupils are equal, round, and reactive to light.   Neck:      Thyroid: No thyromegaly.      Vascular: Normal carotid pulses. No carotid bruit or JVD.      Trachea: Trachea normal.   Cardiovascular:      Rate and Rhythm: Normal rate and regular rhythm.      Pulses:           Carotid pulses are 2+ on the right side and 2+ on the left side.     Heart sounds: S1 normal and S2 normal. No murmur heard.    No friction rub. No gallop.      Comments: Lifevest in place  Pulmonary:      Effort: Pulmonary effort is normal. No respiratory distress.      Breath sounds: Normal breath sounds. No stridor. No wheezing, rhonchi or rales.   Chest:      Chest wall: No tenderness.   Abdominal:      General: There is no distension.      Palpations: Abdomen is soft.   Musculoskeletal:         General: No swelling or tenderness. Normal range of motion.      Cervical back: Normal range of motion and neck supple. No edema or rigidity.      Right lower leg: No edema.      Left lower leg: No edema.   Feet:      Right foot:      Skin integrity: No ulcer.      Left foot:      Skin integrity: No ulcer.   Skin:     General: Skin is warm and dry.      Coloration: Skin is not jaundiced.   Neurological:      General: No focal deficit present.      Mental Status: She is alert and oriented to person, place, and time.      Cranial Nerves: No " cranial nerve deficit.   Psychiatric:         Mood and Affect: Mood normal.         Speech: Speech normal.         Behavior: Behavior normal. Behavior is cooperative.       DATA:   EKG: (personally reviewed tracing(s))  11/1/22 SR 89, LBBB, similar to 4/11/22    Laboratory:  CBC:  Recent Labs   Lab 09/28/20  0619 02/09/22  0700 11/23/22  1450   WBC 7.22 5.90 5.60   Hemoglobin 12.2 10.7 L 11.6 L   Hematocrit 38.2 34.5 L 35.7 L   Platelets 325 381 330         CHEMISTRIES:  Recent Labs   Lab 12/08/20  0946 01/21/21  0851 02/09/22  0700 11/23/22  1450 12/28/22  1547   Glucose 73 92 89 77 74   Sodium 142 138 134 L 140 135 L   Potassium 4.0 4.1 3.8 3.8 3.8   BUN 13 11 9 12 10   Creatinine 1.0 0.8 0.8 0.8 0.8   eGFR if African American >60 >60 >60.0  --   --    eGFR if non African American >60 >60 >60.0  --   --    Calcium 8.5 L 8.6 L 9.4 9.3 8.5 L         CARDIAC BIOMARKERS:  Recent Labs   Lab 09/28/20  0619   Troponin I <0.006         COAGS:  Recent Labs   Lab 09/27/20  1303 04/12/22  0009 11/23/22  1450   INR 0.9 1.3 H 0.9         LIPIDS/LFTS:  Recent Labs   Lab 01/28/20  1413 09/28/20  0619 02/09/22  0700   Cholesterol 215 H  --  173   Triglycerides 82  --  54   HDL 80  --  67   LDL Calculated 119 H  --   --    LDL Cholesterol  --   --  95.2   Non-HDL Cholesterol  --   --  106   AST 25 20 15   ALT 23 17 10       Lab Results   Component Value Date    TSH 0.592 02/09/2022         Cardiovascular Testing:  Cath 11/25/22  RA 3  RV 24/5  PA 20/5/10  PAWP 6  /13  Ao 115/69/89  Sats:  HRA 69.2  LRA 73.1  RV 72.6  PA 71.5  FA 93.4   CO 5.4 L/min (thermal)  LVEF: 25-30% by echo  Dominance: Right  LM: normal  LAD: normal  LCx: normal  RCA: normal  Hemostasis:  RFA angioseal  RFV man comp  Impression:  LBBB/CMP  Normal cors  Normal hemodynamics  No evidence of intracardiac shunt  Above consistent with NICM.  Plan:  Cont med rx  Stop amlod  Inc entresto 49/51mg bid  Cont metoprolol  Eventual aldactone  Cont lifevest  Repeat  echo in 3 months (late Feb 2023) for reassessment of LVEF    Echo 11/7/22 (EF 45% on report 2/2021)  The left ventricle is normal in size with severely decreased systolic function.  The estimated ejection fraction is 25-30%.  There is abnormal septal wall motion consistent with left bundle branch block.  Grade I left ventricular diastolic dysfunction.  Normal right ventricular size with normal right ventricular systolic function.  Mild mitral regurgitation.    Holter 11/7/22  Sinus rhythm with heart rates varying between 75 and 138 BPM with an average of 94BPM.  There were very rare PACs totalling 7 and averaging 0.29 per hour.  The diary was not returned.    Renal art US 11/10/20  There is insignificant stenosis (0-59%) in the Right Renal Artery.  Right kidney 10.70 cm.  There is insignificant stenosis (0-59%) in the Left Renal Artery.  Left kidney 10.80 cm.      ASSESSMENT:   # NICM, EF 45->25% on echo 11/2022.  NICM without shunt on cath 11/2022.  Wearing lifevest.  # CP, resolved.  Cath 11/2022 with normal cors, neg for shunt.    # HTN, controlled.  Renal art US 11/2020 neg.  # LBBB, old  # ?TAHIR, missed sleep study in 4/2021, has appt 2/17/23    PLAN:   Cont med rx  Cont GDMT  Cont lifevest  RTC 1 month with surveillance echo (late Feb 2023) for reassessment of LVEF      Dez Hayden MD, FACC

## 2023-01-26 ENCOUNTER — HOSPITAL ENCOUNTER (EMERGENCY)
Facility: HOSPITAL | Age: 43
Discharge: HOME | End: 2023-01-26
Attending: EMERGENCY MEDICINE
Payer: MEDICAID

## 2023-01-26 VITALS
OXYGEN SATURATION: 100 % | HEART RATE: 113 BPM | SYSTOLIC BLOOD PRESSURE: 139 MMHG | TEMPERATURE: 98 F | RESPIRATION RATE: 33 BRPM | HEIGHT: 66 IN | BODY MASS INDEX: 29.09 KG/M2 | DIASTOLIC BLOOD PRESSURE: 86 MMHG | WEIGHT: 181 LBS

## 2023-01-26 DIAGNOSIS — R00.0 TACHYCARDIA: ICD-10-CM

## 2023-01-26 DIAGNOSIS — J40 BRONCHITIS: Primary | ICD-10-CM

## 2023-01-26 DIAGNOSIS — J30.9 ALLERGIC RHINITIS, UNSPECIFIED SEASONALITY, UNSPECIFIED TRIGGER: ICD-10-CM

## 2023-01-26 LAB
ALBUMIN SERPL-MCNC: 3.9 G/DL (ref 3.3–5.5)
ALLENS TEST: ABNORMAL
ALP SERPL-CCNC: 56 U/L (ref 42–141)
B-HCG UR QL: NEGATIVE
BILIRUB SERPL-MCNC: 0.7 MG/DL (ref 0.2–1.6)
BILIRUBIN, POC UA: NEGATIVE
BLOOD, POC UA: ABNORMAL
BUN SERPL-MCNC: 12 MG/DL (ref 7–22)
CALCIUM SERPL-MCNC: 9.6 MG/DL (ref 8–10.3)
CHLORIDE SERPL-SCNC: 106 MMOL/L (ref 98–108)
CLARITY, POC UA: CLEAR
COLOR, POC UA: YELLOW
CREAT SERPL-MCNC: 1 MG/DL (ref 0.6–1.2)
CTP QC/QA: YES
GLUCOSE SERPL-MCNC: 97 MG/DL (ref 73–118)
GLUCOSE, POC UA: NEGATIVE
HCO3 UR-SCNC: 22.7 MMOL/L (ref 24–28)
HCT, POC: NORMAL
HGB, POC: NORMAL (ref 14–18)
INFLUENZA A ANTIGEN, POC: NEGATIVE
INFLUENZA B ANTIGEN, POC: NEGATIVE
KETONES, POC UA: NEGATIVE
LDH SERPL L TO P-CCNC: 0.78 MMOL/L (ref 0.5–2.2)
LEUKOCYTE EST, POC UA: ABNORMAL
MCH, POC: NORMAL
MCHC, POC: NORMAL
MCV, POC: NORMAL
MPV, POC: NORMAL
NITRITE, POC UA: NEGATIVE
PCO2 BLDA: 37.9 MMHG (ref 35–45)
PH SMN: 7.38 [PH] (ref 7.35–7.45)
PH UR STRIP: 5.5 [PH]
PO2 BLDA: 43 MMHG (ref 40–60)
POC ALT (SGPT): 15 U/L (ref 10–47)
POC AST (SGOT): 20 U/L (ref 11–38)
POC B-TYPE NATRIURETIC PEPTIDE: 361 PG/ML (ref 0–100)
POC BE: -2 MMOL/L
POC D-DI: 1200 NG/ML (ref 0–450)
POC PLATELET COUNT: NORMAL
POC RAPID STREP A: NEGATIVE
POC SATURATED O2: 78 % (ref 95–100)
POC TCO2: 24 MMOL/L (ref 24–29)
POC TCO2: 26 MMOL/L (ref 18–33)
POTASSIUM BLD-SCNC: 3.8 MMOL/L (ref 3.6–5.1)
PROTEIN, POC UA: NEGATIVE
PROTEIN, POC: 7.6 G/DL (ref 6.4–8.1)
RBC, POC: NORMAL
RDW, POC: NORMAL
SAMPLE: ABNORMAL
SITE: ABNORMAL
SODIUM BLD-SCNC: 139 MMOL/L (ref 128–145)
SPECIFIC GRAVITY, POC UA: 1.02
UROBILINOGEN, POC UA: 0.2 E.U./DL
WBC, POC: NORMAL

## 2023-01-26 PROCEDURE — 83880 ASSAY OF NATRIURETIC PEPTIDE: CPT | Mod: ER

## 2023-01-26 PROCEDURE — 81025 URINE PREGNANCY TEST: CPT | Mod: ER | Performed by: NURSE PRACTITIONER

## 2023-01-26 PROCEDURE — 81003 URINALYSIS AUTO W/O SCOPE: CPT | Mod: ER

## 2023-01-26 PROCEDURE — 80053 COMPREHEN METABOLIC PANEL: CPT | Mod: ER

## 2023-01-26 PROCEDURE — 93010 ELECTROCARDIOGRAM REPORT: CPT | Mod: ,,, | Performed by: INTERNAL MEDICINE

## 2023-01-26 PROCEDURE — 25500020 PHARM REV CODE 255: Mod: ER | Performed by: EMERGENCY MEDICINE

## 2023-01-26 PROCEDURE — 93010 EKG 12-LEAD: ICD-10-PCS | Mod: ,,, | Performed by: INTERNAL MEDICINE

## 2023-01-26 PROCEDURE — 87502 INFLUENZA DNA AMP PROBE: CPT | Mod: ER

## 2023-01-26 PROCEDURE — 85025 COMPLETE CBC W/AUTO DIFF WBC: CPT | Mod: ER

## 2023-01-26 PROCEDURE — 87086 URINE CULTURE/COLONY COUNT: CPT | Performed by: NURSE PRACTITIONER

## 2023-01-26 PROCEDURE — 99285 EMERGENCY DEPT VISIT HI MDM: CPT | Mod: 25,ER

## 2023-01-26 PROCEDURE — 85610 PROTHROMBIN TIME: CPT | Mod: ER

## 2023-01-26 PROCEDURE — 85379 FIBRIN DEGRADATION QUANT: CPT | Mod: ER

## 2023-01-26 PROCEDURE — 84484 ASSAY OF TROPONIN QUANT: CPT | Mod: ER

## 2023-01-26 PROCEDURE — 93005 ELECTROCARDIOGRAM TRACING: CPT | Mod: ER

## 2023-01-26 RX ORDER — PREDNISONE 50 MG/1
50 TABLET ORAL
COMMUNITY
Start: 2022-09-05 | End: 2023-01-26

## 2023-01-26 RX ORDER — BENZONATATE 100 MG/1
100 CAPSULE ORAL 3 TIMES DAILY PRN
Qty: 20 CAPSULE | Refills: 0 | Status: SHIPPED | OUTPATIENT
Start: 2023-01-26 | End: 2023-02-05

## 2023-01-26 RX ORDER — LEVALBUTEROL TARTRATE 45 UG/1
1-2 AEROSOL, METERED ORAL EVERY 4 HOURS PRN
Qty: 15 G | Refills: 0 | Status: SHIPPED | OUTPATIENT
Start: 2023-01-26 | End: 2023-03-07

## 2023-01-26 RX ORDER — DOXYCYCLINE 100 MG/1
100 CAPSULE ORAL
COMMUNITY
Start: 2022-09-05 | End: 2023-01-26

## 2023-01-26 RX ORDER — GUAIFENESIN 100 MG/5ML
100-200 SOLUTION ORAL EVERY 4 HOURS PRN
Qty: 60 ML | Refills: 0 | Status: SHIPPED | OUTPATIENT
Start: 2023-01-26 | End: 2023-02-05

## 2023-01-26 RX ORDER — SULFAMETHOXAZOLE AND TRIMETHOPRIM 800; 160 MG/1; MG/1
1 TABLET ORAL 2 TIMES DAILY
COMMUNITY
Start: 2022-09-29 | End: 2023-01-26

## 2023-01-26 RX ORDER — ERYTHROMYCIN 5 MG/G
OINTMENT OPHTHALMIC
COMMUNITY
Start: 2022-09-29 | End: 2023-01-26

## 2023-01-26 RX ORDER — PREDNISONE 20 MG/1
40 TABLET ORAL DAILY
Qty: 10 TABLET | Refills: 0 | Status: SHIPPED | OUTPATIENT
Start: 2023-01-26 | End: 2023-01-31

## 2023-01-26 RX ORDER — FLUTICASONE PROPIONATE 50 MCG
2 SPRAY, SUSPENSION (ML) NASAL DAILY
Qty: 16 G | Refills: 0 | Status: SHIPPED | OUTPATIENT
Start: 2023-01-26 | End: 2023-02-27

## 2023-01-26 RX ORDER — MONTELUKAST SODIUM 10 MG/1
10 TABLET ORAL NIGHTLY
Qty: 30 TABLET | Refills: 0 | Status: SHIPPED | OUTPATIENT
Start: 2023-01-26 | End: 2023-02-25

## 2023-01-26 RX ORDER — FLUCONAZOLE 150 MG/1
TABLET ORAL
COMMUNITY
Start: 2022-09-05 | End: 2023-01-26

## 2023-01-26 RX ORDER — LORATADINE 10 MG/1
10 TABLET ORAL EVERY MORNING
Qty: 60 TABLET | Refills: 0 | Status: SHIPPED | OUTPATIENT
Start: 2023-01-26 | End: 2023-08-24

## 2023-01-26 RX ORDER — DOXYCYCLINE 100 MG/1
100 CAPSULE ORAL 2 TIMES DAILY
COMMUNITY
Start: 2022-09-05 | End: 2023-01-26

## 2023-01-26 RX ADMIN — IOHEXOL 100 ML: 350 INJECTION, SOLUTION INTRAVENOUS at 09:01

## 2023-01-27 LAB
POC CARDIAC TROPONIN I: 0.01 NG/ML (ref 0–0.08)
POC PTINR: 1.2 (ref 0.9–1.2)
POC PTWBT: 14.1 SEC (ref 9.7–14.3)
SAMPLE: NORMAL
SAMPLE: NORMAL

## 2023-01-27 NOTE — ED PROVIDER NOTES
Encounter Date: 1/26/2023    SCRIBE #1 NOTE: I, Zuleyka Beebe, am scribing for, and in the presence of,  SOCORRO Kennedy. I have scribed the following portions of the note - Other sections scribed: HPI,ROS,PE.     History     Chief Complaint   Patient presents with    URI     Reports cough and sore throat since being Dx with COVID earlier this month. Reports cough is worse with chest discomfort.     Hollie Saldana is a 42 y.o. female, with a PMHx of Cardiomyopathy, HTN and Left bundle branch block, who presents to the ED with COVID concerns onset 14 hours ago. Patient reports symptoms of myalgias, sore throat, cough, nasal congestion and chest pressure. Patient states that on January 2nd she was diagnosed with COVID and that since then her symptoms have worsened which is what brought her into the ED today. Patient reports her pain as 10/10 and taking over the counter medications to help with symptoms with no alleviation. Patient endorses daily compliance with maintenance medications( metoprolol and spironolactone). Patient states that she does not have any allergies. Patient reports that she is fully vaccinated for COVID and that she took a flu test today that demonstrated negative results. Patient notes that she wears a life vest daily for her cardiomyopathy. Patient denies used of cigarettes or drugs but endorses social use of alcohol. No other exacerbating or alleviating factors. Patient denies rash, fever, chest pain, SOB, numbness, weakness, tingling, abdominal pain, back pain, dysuria, hematuria, nausea, vomiting, diarrhea, or any other complaints.          The history is provided by the patient. No  was used.   Review of patient's allergies indicates:  No Known Allergies  Past Medical History:   Diagnosis Date    Cardiomyopathy     History of tubal ligation     Hypertension     LBBB (left bundle branch block)     Nonischemic cardiomyopathy 10/23/2020     Past Surgical History:    Procedure Laterality Date    CARDIAC CATHETERIZATION  2017    CATHETERIZATION OF BOTH LEFT AND RIGHT HEART Bilateral 11/29/2022    Procedure: CATHETERIZATION, HEART, BOTH LEFT AND RIGHT;  Surgeon: Dez Hayden MD;  Location: [facility] CATH LAB;  Service: Cardiology;  Laterality: Bilateral;  RFA/V access, 4Fr art sheath  RN PREOP 11/23/2022     Family History   Problem Relation Age of Onset    Hypertension Mother     Thyroid disease Mother     Appendicitis Mother     Hypertension Father      Social History     Tobacco Use    Smoking status: Never    Smokeless tobacco: Never   Substance Use Topics    Alcohol use: Yes     Comment: Occasionally    Drug use: No     Review of Systems   Constitutional:  Negative for chills, fatigue and fever.   HENT:  Positive for congestion and sore throat. Negative for ear pain, rhinorrhea and trouble swallowing.    Eyes:  Negative for pain, discharge and redness.   Respiratory:  Positive for cough. Negative for shortness of breath.    Cardiovascular:  Positive for chest pain.        (+) Chest pressure   Gastrointestinal:  Negative for abdominal pain, diarrhea, nausea and vomiting.   Genitourinary:  Negative for decreased urine volume and dysuria.   Musculoskeletal:  Positive for myalgias. Negative for back pain, neck pain and neck stiffness.   Skin:  Negative for rash.   Neurological:  Negative for dizziness, weakness, light-headedness, numbness and headaches.   Psychiatric/Behavioral:  Negative for confusion.      Physical Exam     Initial Vitals [01/26/23 1935]   BP Pulse Resp Temp SpO2   (!) 166/122 (!) 120 20 98.4 °F (36.9 °C) 99 %      MAP       --         Physical Exam    Nursing note and vitals reviewed.  Constitutional: She appears well-developed.  Non-toxic appearance. She does not appear ill.   HENT:   Head: Normocephalic and atraumatic.   Right Ear: Tympanic membrane and ear canal normal.   Left Ear: Tympanic membrane and ear canal normal.   Nose: Mucosal edema present.    Mouth/Throat: Uvula is midline, oropharynx is clear and moist and mucous membranes are normal.   Post nasal drip is present.   Eyes: Conjunctivae are normal.   Neck: No Brudzinski's sign and no Kernig's sign noted.   Normal range of motion.  Cardiovascular:  Regular rhythm.   Tachycardia present.         Life vest present   Pulmonary/Chest: Effort normal and breath sounds normal. She exhibits no tenderness.   Abdominal: Abdomen is soft. There is no abdominal tenderness.   Musculoskeletal:      Cervical back: Normal range of motion.     Lymphadenopathy:     She has no cervical adenopathy.   Neurological: She is alert and oriented to person, place, and time. Gait normal. GCS eye subscore is 4. GCS verbal subscore is 5. GCS motor subscore is 6.   Skin: Skin is warm, dry and intact. No rash noted.   Psychiatric: She has a normal mood and affect. Her speech is normal and behavior is normal. Judgment and thought content normal.       ED Course   Procedures  Labs Reviewed   POCT URINALYSIS W/O SCOPE - Abnormal; Notable for the following components:       Result Value    Blood, UA Trace-intact (*)     Leukocytes, UA 1+ (*)     All other components within normal limits   ISTAT PROCEDURE - Abnormal; Notable for the following components:    POC HCO3 22.7 (*)     POC SATURATED O2 78 (*)     All other components within normal limits   POCT B-TYPE NATRIURETIC PEPTIDE (BNP) - Abnormal; Notable for the following components:    POC B-Type Natriuretic Peptide 361 (*)     All other components within normal limits   POCT D DIMER - Abnormal; Notable for the following components:    POC D-DI 1200 (*)     All other components within normal limits   CULTURE, URINE   POCT CBC   POCT URINE PREGNANCY   POCT URINALYSIS W/O SCOPE   POCT INFLUENZA A/B MOLECULAR   POCT STREP A MOLECULAR   POCT CMP   POCT PROTIME-INR   POCT TROPONIN   POCT B-TYPE NATRIURETIC PEPTIDE (BNP)   POCT D DIMER   POCT CMP   POCT STREP A, RAPID   POCT RAPID INFLUENZA A/B            EKG Readings: (Independently Interpreted)   Initial Reading: No STEMI. Previous EKG: Compared with most recent EKG Previous EKG Date: 11/1/2022. Rhythm: Sinus Tachycardia. Heart Rate: 123. Conduction: LBBB. Axis: Normal. Clinical Impression: Sinus Tachycardia with LBBB   NO STEMI, Sinus tach with a ventricular rate of 123 and LBBB; normal axis; QTc 486; compared to most recent on 11/1/2022, increase in HR by 34 BPMs; abnormal EKG     Imaging Results              CTA Chest Non-Coronary (PE Studies) (Final result)  Result time 01/26/23 21:49:17      Final result by Aman Stratton MD (01/26/23 21:49:17)                   Impression:      1. No evidence of pulmonary embolism.  2. No acute abnormality.      Electronically signed by: Aman Stratton  Date:    01/26/2023  Time:    21:49               Narrative:    EXAMINATION:  CTA CHEST NON CORONARY (PE STUDIES)    CLINICAL HISTORY:  Pulmonary embolism (PE) suspected, unknown D-dimer;    TECHNIQUE:  Low dose axial images, sagittal and coronal reformations were obtained from the thoracic inlet to the lung bases following the IV administration of 100 mL of Omnipaque 350.  Contrast timing was optimized to evaluate the pulmonary arteries.  MIP images were performed.    COMPARISON:  04/12/2022    FINDINGS:  Pulmonary arteries:Pulmonary arteries are adequately enhanced.No filling defects to indicate pulmonary embolism.    Thoracic soft tissues: Unremarkable.    Aorta: Left-sided aortic arch.  No aneurysm or dissection.    Heart: Normal size. No effusion.    Pham/Mediastinum: No pathologic judith enlargement.    Airways: Patent.    Lungs/Pleura: Clear lungs. No pleural effusion or thickening.    Esophagus: Unremarkable.    Upper Abdomen: No abnormality of the partially imaged upper abdomen.    Bones: No acute fracture. No suspicious lytic or sclerotic lesions.                                       Medications   iohexoL (OMNIPAQUE 350) injection 100 mL (100 mLs  Intravenous Given 1/26/23 2120)     Medical Decision Making:   History:   Old Medical Records: I decided to obtain old medical records.  Independently Interpreted Test(s):   I have ordered and independently interpreted EKG Reading(s) - see prior notes  Clinical Tests:   Lab Tests: Ordered and Reviewed  Radiological Study: Ordered and Reviewed  Medical Tests: Ordered and Reviewed  Additional MDM:   PERC Rule:   Age is greater than or equal to 50 = 0.0  Heart Rate is greater than or equal to 100 = 1.0  SaO2 on room air < 95% = 0.0  Unilateral leg swelling = 0.0  Hemoptysis = 0.0  Recent surgery or trauma = 0.0  Prior PE or DVT =  0.0  Hormone use = 0.00  PERC Score = 1  Heart Score:    History:          Slightly suspicious.  ECG:             Nonspecific repolarisation disturbance  Age:               Less than 45 years  Risk factors: 1-2 risk factors  Troponin:       Less than or equal to normal limit  Final Score: 2    APC / Resident Notes:   This is an urgent evaluation of a 42 y.o. female that presents to the Emergency Department for URI Symptoms for 1 day.  The patient is a non-toxic, afebrile, and well appearing female. On physical exam: Ears: without infection.  Pharynx without infection. Appears well hydrated with moist mucus membranes. Neck soft and supple with no meningeal signs or cervical lymphadenopathy.  Breath sounds are clear and equal bilaterally with no adventitious breath sounds, tachypnea or respiratory distress.  Oxygen saturation is 97% on Room Air, no evidence of hypoxia.  Tachycardia on auscultation.  Life vest present    Vital Signs: 140/87, 98.4, 115, 16, 97%  Lab\Radiology\Other Procedure RESULTS: EKG No STEMI-Heart Score 2, sinus tach with LBBB; UPT negative, D-Dimer 1200, , remainder of labs stable; UA showed leukocytes, no symptoms; will send culture  CT chest: negative for PE  Flu: Negative  COVID-19: Positive on 1/2/2023  Strep: Negative    Given the above findings, my overall  impression is Bronchitis, tachycardia, allergic rhinitis. Given the above findings, I do not think the patient has COVID, Flu, STEMI, NSTEMI, PE, OM, OE, strep pharyngitis, meningitis, pneumonia, bacterial sinusitis, or significant dehydration requiring IV fluids or admission    The patient will be discharged home with Flonase, Singulair, claritin, tessalon perles, Robitussin, prednison, xopenex. Additional home care recommendations include Tylenol, Hydration. The diagnosis, treatment plan, instructions for follow-up, strict return precautions, and reevaluation with her PCP as well as ED return precautions have been discussed with the patient and she has verbalized an understanding of the information.  All questions or concerns from the patient have been addressed.     This case was discussed with and the patient has been examined by my attending Dr. Andujar who is in agreement with my assessment and plan.     Scribe Attestation:   Scribe #1: I performed the above scribed service and the documentation accurately describes the services I performed. I attest to the accuracy of the note.            I, DOMINIQUE Sellers, personally performed the services described in this documentation.  All medical record entries made by the scribe were at my direction and in my presence.  I have reviewed the chart and agree that the record reflects my personal performance and is accurate and complete.         Clinical Impression:   Final diagnoses:  [R00.0] Tachycardia  [J30.9] Allergic rhinitis, unspecified seasonality, unspecified trigger  [J40] Bronchitis (Primary)        ED Disposition Condition    Discharge Stable          ED Prescriptions       Medication Sig Dispense Start Date End Date Auth. Provider    fluticasone propionate (FLONASE) 50 mcg/actuation nasal spray 2 sprays (100 mcg total) by Each Nostril route once daily. 16 g 1/26/2023 -- Elsie Andujar MD    montelukast (SINGULAIR) 10 mg tablet Take 1 tablet (10 mg total) by  mouth every evening. 30 tablet 1/26/2023 2/25/2023 Elsie Andujar MD    loratadine (CLARITIN) 10 mg tablet Take 1 tablet (10 mg total) by mouth every morning. 60 tablet 1/26/2023 1/26/2024 Elsie Andujar MD    benzonatate (TESSALON) 100 MG capsule Take 1 capsule (100 mg total) by mouth 3 (three) times daily as needed for Cough. 20 capsule 1/26/2023 2/5/2023 Elsie Andujar MD    guaiFENesin 100 mg/5 ml (ROBITUSSIN) 100 mg/5 mL syrup Take 5-10 mLs (100-200 mg total) by mouth every 4 (four) hours as needed for Cough. 60 mL 1/26/2023 2/5/2023 Elsie Andujar MD    predniSONE (DELTASONE) 20 MG tablet Take 2 tablets (40 mg total) by mouth once daily. for 5 days 10 tablet 1/26/2023 1/31/2023 Elsie Andujar MD    levalbuterol (XOPENEX HFA) 45 mcg/actuation inhaler Inhale 1-2 puffs into the lungs every 4 (four) hours as needed for Wheezing or Shortness of Breath. Rescue 15 g 1/26/2023 1/26/2024 Elsie Andujar MD          Follow-up Information       Follow up With Specialties Details Why Contact Info    The nearest emergency department.  Go to  As needed, If symptoms worsen     Hebert Shepherd MD Family Medicine Call  As needed, for ongoing care [address] [facility] [address] [phone]               SOCORRO Kennedy  01/26/23 [id number]

## 2023-01-27 NOTE — ED TRIAGE NOTES
PT C/O BILATERAL CHEST PAIN BELOW BREASTS THAT WAXES AND WANES. STATES SHE HAS HAD SIMILAR BEFORE BUT IT HAS NEVER LASTED THIS LONG. CURRENTLY WEARING LIFEVEST. DENIES SOB OR DIZZINESS.

## 2023-01-27 NOTE — ED NOTES
PT GIVEN DISCHARGE INSTRUCTIONS INCLUDING RX AND FOLLOW UP. VERBALIZES UNDERSTANDING. AMBULATORY TO DISCHARGE WINDOW W/O ISSUE

## 2023-01-27 NOTE — DISCHARGE INSTRUCTIONS
Drink plenty of electrolyte-rich fluids (at least one liter daily).  Limit/avoid caffeine (coffee, tea, coke, Dr KarissaPepper, Mountain Dew, energy drinks, pre-workout supplements, etc.) and alcohol intake while symptoms persist.

## 2023-01-29 LAB — BACTERIA UR CULT: NORMAL

## 2023-02-07 ENCOUNTER — TELEPHONE (OUTPATIENT)
Dept: FAMILY MEDICINE | Facility: CLINIC | Age: 43
End: 2023-02-07

## 2023-02-07 ENCOUNTER — HOSPITAL ENCOUNTER (OUTPATIENT)
Dept: RADIOLOGY | Facility: HOSPITAL | Age: 43
Discharge: HOME | End: 2023-02-07
Attending: FAMILY MEDICINE
Payer: MEDICAID

## 2023-02-07 DIAGNOSIS — M25.562 ACUTE PAIN OF LEFT KNEE: Primary | ICD-10-CM

## 2023-02-07 DIAGNOSIS — M25.562 ACUTE PAIN OF LEFT KNEE: ICD-10-CM

## 2023-02-07 DIAGNOSIS — Z00.00 ANNUAL PHYSICAL EXAM: Primary | ICD-10-CM

## 2023-02-07 PROCEDURE — 73562 X-RAY EXAM OF KNEE 3: CPT | Mod: TC,FY,LT

## 2023-02-07 PROCEDURE — 73562 XR KNEE 3 VIEW LEFT: ICD-10-PCS | Mod: 26,LT,, | Performed by: RADIOLOGY

## 2023-02-07 PROCEDURE — 73562 X-RAY EXAM OF KNEE 3: CPT | Mod: 26,LT,, | Performed by: RADIOLOGY

## 2023-02-13 ENCOUNTER — TELEPHONE (OUTPATIENT)
Dept: PULMONOLOGY | Facility: CLINIC | Age: 43
End: 2023-02-13
Payer: MEDICAID

## 2023-02-13 NOTE — TELEPHONE ENCOUNTER
Added patient to the wait list.                      ----- Message from Presley Bautista MA sent at 2/10/2023  1:10 PM CST -----  Inez Dean V Staff  Caller: Unspecified (Today, 12:00 PM)  .Type:  Sooner Appointment Request     Patient is requesting a sooner appointment.  Patient declined first available appointment listed as well as another facility and provider .  Patient will not accept being placed on the waitlist and is requesting a message be sent to doctor.     Name of Caller:pt     When is the first available appointment? No date was shown     Symptoms: new patient for dr santamaria     Would the patient rather a call back or a response via [facility]? Call back     Best Call Back Number: [phone]       Additional Information:

## 2023-02-14 ENCOUNTER — LAB VISIT (OUTPATIENT)
Dept: LAB | Facility: HOSPITAL | Age: 43
End: 2023-02-14
Payer: MEDICAID

## 2023-02-14 DIAGNOSIS — Z00.00 ANNUAL PHYSICAL EXAM: ICD-10-CM

## 2023-02-14 LAB
ALBUMIN SERPL BCP-MCNC: 3.7 G/DL (ref 3.5–5.2)
ALP SERPL-CCNC: 58 U/L (ref 55–135)
ALT SERPL W/O P-5'-P-CCNC: 9 U/L (ref 10–44)
ANION GAP SERPL CALC-SCNC: 11 MMOL/L (ref 8–16)
AST SERPL-CCNC: 13 U/L (ref 10–40)
BASOPHILS # BLD AUTO: 0.06 K/UL (ref 0–0.2)
BASOPHILS NFR BLD: 1.2 % (ref 0–1.9)
BILIRUB SERPL-MCNC: 0.6 MG/DL (ref 0.1–1)
BUN SERPL-MCNC: 13 MG/DL (ref 6–20)
CALCIUM SERPL-MCNC: 9.2 MG/DL (ref 8.7–10.5)
CHLORIDE SERPL-SCNC: 106 MMOL/L (ref 95–110)
CHOLEST SERPL-MCNC: 187 MG/DL (ref 120–199)
CHOLEST/HDLC SERPL: 3.4 {RATIO} (ref 2–5)
CO2 SERPL-SCNC: 20 MMOL/L (ref 23–29)
CREAT SERPL-MCNC: 0.8 MG/DL (ref 0.5–1.4)
DIFFERENTIAL METHOD: ABNORMAL
EOSINOPHIL # BLD AUTO: 0.2 K/UL (ref 0–0.5)
EOSINOPHIL NFR BLD: 4 % (ref 0–8)
ERYTHROCYTE [DISTWIDTH] IN BLOOD BY AUTOMATED COUNT: 15.8 % (ref 11.5–14.5)
EST. GFR  (NO RACE VARIABLE): >60 ML/MIN/1.73 M^2
ESTIMATED AVG GLUCOSE: 100 MG/DL (ref 68–131)
GLUCOSE SERPL-MCNC: 72 MG/DL (ref 70–110)
HBA1C MFR BLD: 5.1 % (ref 4–5.6)
HCT VFR BLD AUTO: 37.9 % (ref 37–48.5)
HCV AB SERPL QL IA: NORMAL
HDLC SERPL-MCNC: 55 MG/DL (ref 40–75)
HDLC SERPL: 29.4 % (ref 20–50)
HGB BLD-MCNC: 11.7 G/DL (ref 12–16)
HIV 1+2 AB+HIV1 P24 AG SERPL QL IA: NORMAL
IMM GRANULOCYTES # BLD AUTO: 0.01 K/UL (ref 0–0.04)
IMM GRANULOCYTES NFR BLD AUTO: 0.2 % (ref 0–0.5)
LDLC SERPL CALC-MCNC: 116.8 MG/DL (ref 63–159)
LYMPHOCYTES # BLD AUTO: 2.1 K/UL (ref 1–4.8)
LYMPHOCYTES NFR BLD: 40.1 % (ref 18–48)
MCH RBC QN AUTO: 27.3 PG (ref 27–31)
MCHC RBC AUTO-ENTMCNC: 30.9 G/DL (ref 32–36)
MCV RBC AUTO: 89 FL (ref 82–98)
MONOCYTES # BLD AUTO: 0.6 K/UL (ref 0.3–1)
MONOCYTES NFR BLD: 11.3 % (ref 4–15)
NEUTROPHILS # BLD AUTO: 2.3 K/UL (ref 1.8–7.7)
NEUTROPHILS NFR BLD: 43.2 % (ref 38–73)
NONHDLC SERPL-MCNC: 132 MG/DL
NRBC BLD-RTO: 0 /100 WBC
PLATELET # BLD AUTO: 318 K/UL (ref 150–450)
PMV BLD AUTO: 10.8 FL (ref 9.2–12.9)
POTASSIUM SERPL-SCNC: 3.9 MMOL/L (ref 3.5–5.1)
PROT SERPL-MCNC: 7.1 G/DL (ref 6–8.4)
RBC # BLD AUTO: 4.28 M/UL (ref 4–5.4)
SODIUM SERPL-SCNC: 137 MMOL/L (ref 136–145)
T4 FREE SERPL-MCNC: 0.84 NG/DL (ref 0.71–1.51)
TRIGL SERPL-MCNC: 76 MG/DL (ref 30–150)
TSH SERPL DL<=0.005 MIU/L-ACNC: 0.3 UIU/ML (ref 0.4–4)
WBC # BLD AUTO: 5.21 K/UL (ref 3.9–12.7)

## 2023-02-14 PROCEDURE — 80053 COMPREHEN METABOLIC PANEL: CPT | Performed by: FAMILY MEDICINE

## 2023-02-14 PROCEDURE — 85025 COMPLETE CBC W/AUTO DIFF WBC: CPT | Performed by: FAMILY MEDICINE

## 2023-02-14 PROCEDURE — 36415 COLL VENOUS BLD VENIPUNCTURE: CPT | Mod: PO | Performed by: FAMILY MEDICINE

## 2023-02-14 PROCEDURE — 83036 HEMOGLOBIN GLYCOSYLATED A1C: CPT | Performed by: FAMILY MEDICINE

## 2023-02-14 PROCEDURE — 80061 LIPID PANEL: CPT | Performed by: FAMILY MEDICINE

## 2023-02-14 PROCEDURE — 84443 ASSAY THYROID STIM HORMONE: CPT | Performed by: FAMILY MEDICINE

## 2023-02-14 PROCEDURE — 86803 HEPATITIS C AB TEST: CPT | Performed by: FAMILY MEDICINE

## 2023-02-14 PROCEDURE — 84439 ASSAY OF FREE THYROXINE: CPT | Performed by: FAMILY MEDICINE

## 2023-02-14 PROCEDURE — 87389 HIV-1 AG W/HIV-1&-2 AB AG IA: CPT | Performed by: FAMILY MEDICINE

## 2023-02-17 ENCOUNTER — PATIENT MESSAGE (OUTPATIENT)
Dept: CARDIOLOGY | Facility: CLINIC | Age: 43
End: 2023-02-17
Payer: MEDICAID

## 2023-02-17 ENCOUNTER — HOSPITAL ENCOUNTER (OUTPATIENT)
Dept: CARDIOLOGY | Facility: HOSPITAL | Age: 43
Discharge: HOME | End: 2023-02-17
Attending: INTERNAL MEDICINE
Payer: MEDICAID

## 2023-02-17 DIAGNOSIS — I42.8 NONISCHEMIC CARDIOMYOPATHY: ICD-10-CM

## 2023-02-17 LAB
AV PEAK GRADIENT: 8 MMHG
AV VELOCITY RATIO: 0.66
CV ECHO LV RWT: 0.34 CM
DOP CALC AO PEAK VEL: 1.42 M/S
DOP CALC LVOT AREA: 2.7 CM2
DOP CALC LVOT DIAMETER: 1.84 CM
DOP CALC LVOT PEAK VEL: 0.94 M/S
DOP CALC LVOT STROKE VOLUME: 36.41 CM3
DOP CALCLVOT PEAK VEL VTI: 13.7 CM
E WAVE DECELERATION TIME: 126.46 MSEC
E/A RATIO: 1.17
E/E' RATIO: 8.12 M/S
ECHO LV POSTERIOR WALL: 0.94 CM (ref 0.6–1.1)
EJECTION FRACTION: 25 %
FRACTIONAL SHORTENING: 17 % (ref 28–44)
INTERVENTRICULAR SEPTUM: 0.89 CM (ref 0.6–1.1)
IVC DIAMETER: 19 CM
LA MAJOR: 5.42 CM
LA MINOR: 5.68 CM
LA WIDTH: 4.6 CM
LEFT ATRIUM SIZE: 5.11 CM
LEFT ATRIUM VOLUME: 110.83 CM3
LEFT INTERNAL DIMENSION IN SYSTOLE: 4.63 CM (ref 2.1–4)
LEFT VENTRICLE DIASTOLIC VOLUME: 150.54 ML
LEFT VENTRICLE SYSTOLIC VOLUME: 99.06 ML
LEFT VENTRICULAR INTERNAL DIMENSION IN DIASTOLE: 5.55 CM (ref 3.5–6)
LEFT VENTRICULAR MASS: 192.79 G
LV LATERAL E/E' RATIO: 5.31 M/S
LV SEPTAL E/E' RATIO: 17.25 M/S
LVOT MG: 2.46 MMHG
LVOT MV: 0.78 CM/S
MV PEAK A VEL: 0.59 M/S
MV PEAK E VEL: 0.69 M/S
MV STENOSIS PRESSURE HALF TIME: 36.67 MS
MV VALVE AREA P 1/2 METHOD: 6 CM2
PISA TR MAX VEL: 2.19 M/S
PV PEAK VELOCITY: 1.08 CM/S
RA MAJOR: 5.33 CM
RA PRESSURE: 3 MMHG
RA WIDTH: 4.5 CM
RIGHT VENTRICULAR END-DIASTOLIC DIMENSION: 3.5 CM
SINUS: 3 CM
STJ: 2.4 CM
TDI LATERAL: 0.13 M/S
TDI SEPTAL: 0.04 M/S
TDI: 0.09 M/S
TR MAX PG: 19 MMHG
TRICUSPID ANNULAR PLANE SYSTOLIC EXCURSION: 2.1 CM
TV REST PULMONARY ARTERY PRESSURE: 22 MMHG

## 2023-02-17 PROCEDURE — 93306 TTE W/DOPPLER COMPLETE: CPT

## 2023-02-17 PROCEDURE — 93306 TTE W/DOPPLER COMPLETE: CPT | Mod: 26,,, | Performed by: INTERNAL MEDICINE

## 2023-02-17 PROCEDURE — 93306 ECHO (CUPID ONLY): ICD-10-PCS | Mod: 26,,, | Performed by: INTERNAL MEDICINE

## 2023-03-07 ENCOUNTER — OFFICE VISIT (OUTPATIENT)
Dept: CARDIOLOGY | Facility: CLINIC | Age: 43
End: 2023-03-07
Payer: MEDICAID

## 2023-03-07 VITALS
BODY MASS INDEX: 28.45 KG/M2 | OXYGEN SATURATION: 97 % | HEIGHT: 66 IN | SYSTOLIC BLOOD PRESSURE: 132 MMHG | HEART RATE: 65 BPM | WEIGHT: 177 LBS | DIASTOLIC BLOOD PRESSURE: 88 MMHG | RESPIRATION RATE: 18 BRPM

## 2023-03-07 DIAGNOSIS — I10 ESSENTIAL HYPERTENSION: ICD-10-CM

## 2023-03-07 DIAGNOSIS — I42.8 NONISCHEMIC CARDIOMYOPATHY: Primary | ICD-10-CM

## 2023-03-07 DIAGNOSIS — I44.7 LBBB (LEFT BUNDLE BRANCH BLOCK): ICD-10-CM

## 2023-03-07 DIAGNOSIS — G47.33 OSA (OBSTRUCTIVE SLEEP APNEA): ICD-10-CM

## 2023-03-07 PROCEDURE — 3008F PR BODY MASS INDEX (BMI) DOCUMENTED: ICD-10-PCS | Mod: CPTII,,, | Performed by: INTERNAL MEDICINE

## 2023-03-07 PROCEDURE — 99214 OFFICE O/P EST MOD 30 MIN: CPT | Mod: PBBFAC,PO | Performed by: INTERNAL MEDICINE

## 2023-03-07 PROCEDURE — 99999 PR PBB SHADOW E&M-EST. PATIENT-LVL IV: ICD-10-PCS | Mod: PBBFAC,,, | Performed by: INTERNAL MEDICINE

## 2023-03-07 PROCEDURE — 3079F PR MOST RECENT DIASTOLIC BLOOD PRESSURE 80-89 MM HG: ICD-10-PCS | Mod: CPTII,,, | Performed by: INTERNAL MEDICINE

## 2023-03-07 PROCEDURE — 3044F PR MOST RECENT HEMOGLOBIN A1C LEVEL <7.0%: ICD-10-PCS | Mod: CPTII,,, | Performed by: INTERNAL MEDICINE

## 2023-03-07 PROCEDURE — 3075F SYST BP GE 130 - 139MM HG: CPT | Mod: CPTII,,, | Performed by: INTERNAL MEDICINE

## 2023-03-07 PROCEDURE — 1159F MED LIST DOCD IN RCRD: CPT | Mod: CPTII,,, | Performed by: INTERNAL MEDICINE

## 2023-03-07 PROCEDURE — 1160F RVW MEDS BY RX/DR IN RCRD: CPT | Mod: CPTII,,, | Performed by: INTERNAL MEDICINE

## 2023-03-07 PROCEDURE — 4010F PR ACE/ARB THEARPY RXD/TAKEN: ICD-10-PCS | Mod: CPTII,,, | Performed by: INTERNAL MEDICINE

## 2023-03-07 PROCEDURE — 99214 PR OFFICE/OUTPT VISIT, EST, LEVL IV, 30-39 MIN: ICD-10-PCS | Mod: S$PBB,,, | Performed by: INTERNAL MEDICINE

## 2023-03-07 PROCEDURE — 99214 OFFICE O/P EST MOD 30 MIN: CPT | Mod: S$PBB,,, | Performed by: INTERNAL MEDICINE

## 2023-03-07 PROCEDURE — 99999 PR PBB SHADOW E&M-EST. PATIENT-LVL IV: CPT | Mod: PBBFAC,,, | Performed by: INTERNAL MEDICINE

## 2023-03-07 PROCEDURE — 3075F PR MOST RECENT SYSTOLIC BLOOD PRESS GE 130-139MM HG: ICD-10-PCS | Mod: CPTII,,, | Performed by: INTERNAL MEDICINE

## 2023-03-07 PROCEDURE — 3008F BODY MASS INDEX DOCD: CPT | Mod: CPTII,,, | Performed by: INTERNAL MEDICINE

## 2023-03-07 PROCEDURE — 3079F DIAST BP 80-89 MM HG: CPT | Mod: CPTII,,, | Performed by: INTERNAL MEDICINE

## 2023-03-07 PROCEDURE — 1160F PR REVIEW ALL MEDS BY PRESCRIBER/CLIN PHARMACIST DOCUMENTED: ICD-10-PCS | Mod: CPTII,,, | Performed by: INTERNAL MEDICINE

## 2023-03-07 PROCEDURE — 4010F ACE/ARB THERAPY RXD/TAKEN: CPT | Mod: CPTII,,, | Performed by: INTERNAL MEDICINE

## 2023-03-07 PROCEDURE — 1159F PR MEDICATION LIST DOCUMENTED IN MEDICAL RECORD: ICD-10-PCS | Mod: CPTII,,, | Performed by: INTERNAL MEDICINE

## 2023-03-07 PROCEDURE — 3044F HG A1C LEVEL LT 7.0%: CPT | Mod: CPTII,,, | Performed by: INTERNAL MEDICINE

## 2023-03-07 RX ORDER — SPIRONOLACTONE 50 MG/1
50 TABLET, FILM COATED ORAL DAILY
Qty: 90 TABLET | Refills: 3 | Status: SHIPPED | OUTPATIENT
Start: 2023-03-07 | End: 2024-03-22

## 2023-03-07 NOTE — PROGRESS NOTES
CARDIOVASCULAR PROGRESS NOTE    REASON FOR CONSULT:   Hollie Saldana is a 42 y.o. female who presents for follow up of NICM, HTN, LBBB.    PCP: Joao  HISTORY OF PRESENT ILLNESS:   The patient returns for follow-up.  She describes generally asymptomatic status without angina, dyspnea, palpitations, or syncope.  There has been no PND, orthopnea, lower extremity edema.  She denies melena, hematuria, or claudicant symptoms.  She continues to wear her LifeVest without any alarms or defibrillations.  Her echo, unfortunately, notes persistently severely depressed LV systolic dysfunction.    CARDIOVASCULAR HISTORY:   LBBB (normal cath 5/2017)  NICM, EF 45->25% (echo 11/2022).  Cath 11/2022 neg.    PAST MEDICAL HISTORY:     Past Medical History:   Diagnosis Date    Cardiomyopathy     History of tubal ligation     Hypertension     LBBB (left bundle branch block)     Nonischemic cardiomyopathy 10/23/2020       PAST SURGICAL HISTORY:     Past Surgical History:   Procedure Laterality Date    CARDIAC CATHETERIZATION  2017    CATHETERIZATION OF BOTH LEFT AND RIGHT HEART Bilateral 11/29/2022    Procedure: CATHETERIZATION, HEART, BOTH LEFT AND RIGHT;  Surgeon: Dez Hayden MD;  Location: [facility] CATH LAB;  Service: Cardiology;  Laterality: Bilateral;  RFA/V access, 4Fr art sheath  RN PREOP 11/23/2022       ALLERGIES AND MEDICATION:   Review of patient's allergies indicates:  No Known Allergies  Previous Medications    DIAZEPAM (VALIUM) 5 MG TABLET    Take 1 tablet (5 mg total) by mouth every evening.    FAMOTIDINE (PEPCID) 20 MG TABLET    Take 1 tablet (20 mg total) by mouth nightly as needed for Heartburn.    FLUTICASONE PROPIONATE (FLONASE) 50 MCG/ACTUATION NASAL SPRAY    spray once IN EACH NOSTRIL ONCE A DAY    LORATADINE (CLARITIN) 10 MG TABLET    Take 1 tablet (10 mg total) by mouth every morning.    METOPROLOL SUCCINATE (TOPROL-XL) 200 MG 24 HR TABLET    Take 1 tablet (200 mg total) by mouth once daily.     SACUBITRIL-VALSARTAN (ENTRESTO)  MG PER TABLET    Take 1 tablet by mouth 2 (two) times daily.    SEMAGLUTIDE (OZEMPIC) 1 MG/DOSE (4 MG/3 ML)    Inject 1 mg into the skin every 7 days.    SPIRONOLACTONE (ALDACTONE) 25 MG TABLET    Take 1 tablet (25 mg total) by mouth once daily.    SUMATRIPTAN (IMITREX) 25 MG TAB    Take 1 tablet (25 mg total) by mouth every 2 (two) hours as needed (migraines).    VITAMIN D2 1,250 MCG (50,000 UNIT) CAPSULE    TAKE 1 CAPSULE BY MOUTH once every 7 days       SOCIAL HISTORY:     Social History     Socioeconomic History    Marital status: Single   Tobacco Use    Smoking status: Never    Smokeless tobacco: Never   Substance and Sexual Activity    Alcohol use: Yes     Comment: Occasionally    Drug use: No       FAMILY HISTORY:     Family History   Problem Relation Age of Onset    Hypertension Mother     Thyroid disease Mother     Appendicitis Mother     Hypertension Father        REVIEW OF SYSTEMS:   Review of Systems   Constitutional:  Negative for chills, diaphoresis, fever and malaise/fatigue.   HENT:  Negative for nosebleeds.    Eyes:  Negative for blurred vision, double vision and photophobia.   Respiratory:  Negative for hemoptysis, shortness of breath and wheezing.    Cardiovascular:  Negative for chest pain, palpitations, orthopnea, claudication, leg swelling and PND.   Gastrointestinal:  Negative for abdominal pain, blood in stool, heartburn, melena, nausea and vomiting.   Genitourinary:  Negative for flank pain and hematuria.   Musculoskeletal:  Negative for falls, myalgias and neck pain.   Skin:  Negative for rash.   Neurological:  Negative for dizziness, seizures, loss of consciousness, weakness and headaches.   Endo/Heme/Allergies:  Negative for polydipsia. Does not bruise/bleed easily.   Psychiatric/Behavioral:  Negative for depression and memory loss. The patient is not nervous/anxious.      PHYSICAL EXAM:     Vitals:    03/07/23 1521   BP: 132/88   Pulse: 65   Resp:  "18    Body mass index is 28.57 kg/m².  Weight: 80.3 kg (177 lb 0.5 oz)   Height: 5' 6" (167.6 cm)     Physical Exam  Vitals reviewed.   Constitutional:       General: She is not in acute distress.     Appearance: Normal appearance. She is well-developed. She is not ill-appearing, toxic-appearing or diaphoretic.   HENT:      Head: Normocephalic and atraumatic.   Eyes:      General: No scleral icterus.     Extraocular Movements: Extraocular movements intact.      Conjunctiva/sclera: Conjunctivae normal.      Pupils: Pupils are equal, round, and reactive to light.   Neck:      Thyroid: No thyromegaly.      Vascular: Normal carotid pulses. No carotid bruit or JVD.      Trachea: Trachea normal.   Cardiovascular:      Rate and Rhythm: Normal rate and regular rhythm.      Pulses:           Carotid pulses are 2+ on the right side and 2+ on the left side.     Heart sounds: S1 normal and S2 normal. No murmur heard.    No friction rub. No gallop.      Comments: Lifevest in place  Pulmonary:      Effort: Pulmonary effort is normal. No respiratory distress.      Breath sounds: Normal breath sounds. No stridor. No wheezing, rhonchi or rales.   Chest:      Chest wall: No tenderness.   Abdominal:      General: There is no distension.      Palpations: Abdomen is soft.   Musculoskeletal:         General: No swelling or tenderness. Normal range of motion.      Cervical back: Normal range of motion and neck supple. No edema or rigidity.      Right lower leg: No edema.      Left lower leg: No edema.   Feet:      Right foot:      Skin integrity: No ulcer.      Left foot:      Skin integrity: No ulcer.   Skin:     General: Skin is warm and dry.      Coloration: Skin is not jaundiced.   Neurological:      General: No focal deficit present.      Mental Status: She is alert and oriented to person, place, and time.      Cranial Nerves: No cranial nerve deficit.   Psychiatric:         Mood and Affect: Mood normal.         Speech: Speech " normal.         Behavior: Behavior normal. Behavior is cooperative.       DATA:   EKG: (personally reviewed tracing(s))  11/1/22 SR 89, LBBB, similar to 4/11/22    Laboratory:  CBC:  Recent Labs   Lab 02/09/22  0700 11/23/22  1450 02/14/23  0939   WBC 5.90 5.60 5.21   Hemoglobin 10.7 L 11.6 L 11.7 L   Hematocrit 34.5 L 35.7 L 37.9   Platelets 381 330 318         CHEMISTRIES:  Recent Labs   Lab 12/08/20  0946 01/21/21  0851 02/09/22  0700 11/23/22  1450 12/28/22  1547 02/14/23  0939   Glucose 73 92 89 77 74 72   Sodium 142 138 134 L 140 135 L 137   Potassium 4.0 4.1 3.8 3.8 3.8 3.9   BUN 13 11 9 12 10 13   Creatinine 1.0 0.8 0.8 0.8 0.8 0.8   eGFR if African American >60 >60 >60.0  --   --   --    eGFR if non African American >60 >60 >60.0  --   --   --    Calcium 8.5 L 8.6 L 9.4 9.3 8.5 L 9.2         CARDIAC BIOMARKERS:  Recent Labs   Lab 09/28/20  0619   Troponin I <0.006         COAGS:  Recent Labs   Lab 04/12/22  0009 11/23/22  1450 01/26/23 2036   INR 1.3 H 0.9 1.2         LIPIDS/LFTS:  Recent Labs   Lab 09/28/20  0619 02/09/22  0700 02/14/23  0939   Cholesterol  --  173 187   Triglycerides  --  54 76   HDL  --  67 55   LDL Cholesterol  --  95.2 116.8   Non-HDL Cholesterol  --  106 132   AST 20 15 13   ALT 17 10 9 L       Lab Results   Component Value Date    TSH 0.300 (L) 02/14/2023         Cardiovascular Testing:  Echo 2/17/23  The left ventricle is mildly enlarged with eccentric hypertrophy and severely decreased systolic function.  The estimated ejection fraction is 25%.  Grade I left ventricular diastolic dysfunction.  Normal right ventricular size with normal right ventricular systolic function.  Mild left atrial enlargement.  Mild mitral regurgitation.  Normal central venous pressure (3 mmHg).  The estimated PA systolic pressure is 22 mmHg.    Cath 11/25/22  RA 3  RV 24/5  PA 20/5/10  PAWP 6  /13  Ao 115/69/89  Sats:  HRA 69.2  LRA 73.1  RV 72.6  PA 71.5  FA 93.4   CO 5.4 L/min (thermal)  LVEF:  25-30% by echo  Dominance: Right  LM: normal  LAD: normal  LCx: normal  RCA: normal  Hemostasis:  RFA angioseal  RFV man comp  Impression:  LBBB/CMP  Normal cors  Normal hemodynamics  No evidence of intracardiac shunt  Above consistent with NICM.  Plan:  Cont med rx  Stop amlod  Inc entresto 49/51mg bid  Cont metoprolol  Eventual aldactone  Cont lifevest  Repeat echo in 3 months (late Feb 2023) for reassessment of LVEF    Holter 11/7/22  Sinus rhythm with heart rates varying between 75 and 138 BPM with an average of 94BPM.  There were very rare PACs totalling 7 and averaging 0.29 per hour.  The diary was not returned.    Renal art US 11/10/20  There is insignificant stenosis (0-59%) in the Right Renal Artery.  Right kidney 10.70 cm.  There is insignificant stenosis (0-59%) in the Left Renal Artery.  Left kidney 10.80 cm.      ASSESSMENT:   # NICM, EF 45->25% on echo 11/2022.  NICM without shunt on cath 11/2022.  Wearing lifevest.  EF persistently depressed on echo 2/2023.  # CP, resolved.  Cath 11/2022 with normal cors, neg for shunt.    # HTN, controlled.  Renal art US 11/2020 neg.  # LBBB, old  # ?TAHIR, missed sleep study in 4/2021, has appt 2/17/23    PLAN:   Cont med rx  Cont GDMT  Inc aldact 50mg qd  Check BMP 1 week  Cont lifevest  Refer to Dr. Rider to discuss need for ICD +/- BiV (vs sub-Q)  RTC 3 months (June 2023)      Dez Hayden MD, [facility]

## 2023-03-15 ENCOUNTER — LAB VISIT (OUTPATIENT)
Dept: LAB | Facility: HOSPITAL | Age: 43
End: 2023-03-15
Attending: INTERNAL MEDICINE
Payer: MEDICAID

## 2023-03-15 DIAGNOSIS — I42.8 NONISCHEMIC CARDIOMYOPATHY: ICD-10-CM

## 2023-03-15 DIAGNOSIS — I10 ESSENTIAL HYPERTENSION: ICD-10-CM

## 2023-03-15 PROCEDURE — 36415 COLL VENOUS BLD VENIPUNCTURE: CPT | Mod: PO | Performed by: INTERNAL MEDICINE

## 2023-03-15 PROCEDURE — 80048 BASIC METABOLIC PNL TOTAL CA: CPT | Performed by: INTERNAL MEDICINE

## 2023-03-16 DIAGNOSIS — I42.8 NONISCHEMIC CARDIOMYOPATHY: Primary | ICD-10-CM

## 2023-03-16 LAB
ANION GAP SERPL CALC-SCNC: 8 MMOL/L (ref 8–16)
BUN SERPL-MCNC: 11 MG/DL (ref 6–20)
CALCIUM SERPL-MCNC: 9.1 MG/DL (ref 8.7–10.5)
CHLORIDE SERPL-SCNC: 107 MMOL/L (ref 95–110)
CO2 SERPL-SCNC: 22 MMOL/L (ref 23–29)
CREAT SERPL-MCNC: 0.8 MG/DL (ref 0.5–1.4)
EST. GFR  (NO RACE VARIABLE): >60 ML/MIN/1.73 M^2
GLUCOSE SERPL-MCNC: 70 MG/DL (ref 70–110)
POTASSIUM SERPL-SCNC: 3.9 MMOL/L (ref 3.5–5.1)
SODIUM SERPL-SCNC: 137 MMOL/L (ref 136–145)

## 2023-03-20 DIAGNOSIS — I42.8 NONISCHEMIC CARDIOMYOPATHY: Primary | ICD-10-CM

## 2023-04-05 ENCOUNTER — HOSPITAL ENCOUNTER (OUTPATIENT)
Dept: CARDIOLOGY | Facility: CLINIC | Age: 43
Discharge: HOME | End: 2023-04-05
Payer: MEDICAID

## 2023-04-05 ENCOUNTER — OFFICE VISIT (OUTPATIENT)
Dept: ELECTROPHYSIOLOGY | Facility: CLINIC | Age: 43
End: 2023-04-05
Payer: MEDICAID

## 2023-04-05 VITALS
HEIGHT: 66 IN | HEART RATE: 82 BPM | WEIGHT: 177.69 LBS | SYSTOLIC BLOOD PRESSURE: 120 MMHG | DIASTOLIC BLOOD PRESSURE: 82 MMHG | BODY MASS INDEX: 28.56 KG/M2

## 2023-04-05 DIAGNOSIS — I11.9 BENIGN HYPERTENSIVE HEART DISEASE WITHOUT HEART FAILURE: ICD-10-CM

## 2023-04-05 DIAGNOSIS — I10 ESSENTIAL HYPERTENSION: ICD-10-CM

## 2023-04-05 DIAGNOSIS — I42.8 NONISCHEMIC CARDIOMYOPATHY: ICD-10-CM

## 2023-04-05 DIAGNOSIS — I44.7 LBBB (LEFT BUNDLE BRANCH BLOCK): Primary | ICD-10-CM

## 2023-04-05 DIAGNOSIS — I16.0 HYPERTENSIVE URGENCY: ICD-10-CM

## 2023-04-05 PROCEDURE — 3044F HG A1C LEVEL LT 7.0%: CPT | Mod: CPTII,,, | Performed by: INTERNAL MEDICINE

## 2023-04-05 PROCEDURE — 93010 ELECTROCARDIOGRAM REPORT: CPT | Mod: S$PBB,,, | Performed by: INTERNAL MEDICINE

## 2023-04-05 PROCEDURE — 1160F PR REVIEW ALL MEDS BY PRESCRIBER/CLIN PHARMACIST DOCUMENTED: ICD-10-PCS | Mod: CPTII,,, | Performed by: INTERNAL MEDICINE

## 2023-04-05 PROCEDURE — 93005 ELECTROCARDIOGRAM TRACING: CPT | Mod: PBBFAC | Performed by: INTERNAL MEDICINE

## 2023-04-05 PROCEDURE — 1160F RVW MEDS BY RX/DR IN RCRD: CPT | Mod: CPTII,,, | Performed by: INTERNAL MEDICINE

## 2023-04-05 PROCEDURE — 3008F PR BODY MASS INDEX (BMI) DOCUMENTED: ICD-10-PCS | Mod: CPTII,,, | Performed by: INTERNAL MEDICINE

## 2023-04-05 PROCEDURE — 3044F PR MOST RECENT HEMOGLOBIN A1C LEVEL <7.0%: ICD-10-PCS | Mod: CPTII,,, | Performed by: INTERNAL MEDICINE

## 2023-04-05 PROCEDURE — 4010F ACE/ARB THERAPY RXD/TAKEN: CPT | Mod: CPTII,,, | Performed by: INTERNAL MEDICINE

## 2023-04-05 PROCEDURE — 4010F PR ACE/ARB THEARPY RXD/TAKEN: ICD-10-PCS | Mod: CPTII,,, | Performed by: INTERNAL MEDICINE

## 2023-04-05 PROCEDURE — 3074F PR MOST RECENT SYSTOLIC BLOOD PRESSURE < 130 MM HG: ICD-10-PCS | Mod: CPTII,,, | Performed by: INTERNAL MEDICINE

## 2023-04-05 PROCEDURE — 3079F PR MOST RECENT DIASTOLIC BLOOD PRESSURE 80-89 MM HG: ICD-10-PCS | Mod: CPTII,,, | Performed by: INTERNAL MEDICINE

## 2023-04-05 PROCEDURE — 99205 PR OFFICE/OUTPT VISIT, NEW, LEVL V, 60-74 MIN: ICD-10-PCS | Mod: S$PBB,,, | Performed by: INTERNAL MEDICINE

## 2023-04-05 PROCEDURE — 3074F SYST BP LT 130 MM HG: CPT | Mod: CPTII,,, | Performed by: INTERNAL MEDICINE

## 2023-04-05 PROCEDURE — 99205 OFFICE O/P NEW HI 60 MIN: CPT | Mod: S$PBB,,, | Performed by: INTERNAL MEDICINE

## 2023-04-05 PROCEDURE — 93010 RHYTHM STRIP: ICD-10-PCS | Mod: S$PBB,,, | Performed by: INTERNAL MEDICINE

## 2023-04-05 PROCEDURE — 1159F MED LIST DOCD IN RCRD: CPT | Mod: CPTII,,, | Performed by: INTERNAL MEDICINE

## 2023-04-05 PROCEDURE — 3008F BODY MASS INDEX DOCD: CPT | Mod: CPTII,,, | Performed by: INTERNAL MEDICINE

## 2023-04-05 PROCEDURE — 3079F DIAST BP 80-89 MM HG: CPT | Mod: CPTII,,, | Performed by: INTERNAL MEDICINE

## 2023-04-05 PROCEDURE — 99999 PR PBB SHADOW E&M-EST. PATIENT-LVL IV: CPT | Mod: PBBFAC,,, | Performed by: INTERNAL MEDICINE

## 2023-04-05 PROCEDURE — 99999 PR PBB SHADOW E&M-EST. PATIENT-LVL IV: ICD-10-PCS | Mod: PBBFAC,,, | Performed by: INTERNAL MEDICINE

## 2023-04-05 PROCEDURE — 1159F PR MEDICATION LIST DOCUMENTED IN MEDICAL RECORD: ICD-10-PCS | Mod: CPTII,,, | Performed by: INTERNAL MEDICINE

## 2023-04-05 PROCEDURE — 99214 OFFICE O/P EST MOD 30 MIN: CPT | Mod: PBBFAC | Performed by: INTERNAL MEDICINE

## 2023-04-05 NOTE — PROGRESS NOTES
Subjective:   Patient ID:  Hollie Saldana is a 42 y.o. 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  No hx of syncope.  Aunt  of SCD     25-30%.   25%    My interpretation of today's ECG is NSR. Variable width QRS on ECGs, with overall LBBB morphology.    Review of Systems   Constitutional: Negative. Negative for malaise/fatigue.   HENT: Negative.  Negative for ear pain and tinnitus.    Eyes:  Negative for blurred vision.   Cardiovascular: Negative.  Negative for chest pain, dyspnea on exertion, near-syncope, palpitations and syncope.   Respiratory: Negative.  Negative for shortness of breath.    Endocrine: Negative.  Negative for polyuria.   Hematologic/Lymphatic: Does not bruise/bleed easily.   Skin: Negative.  Negative for rash.   Musculoskeletal: Negative.  Negative for joint pain and muscle weakness.   Gastrointestinal: Negative.  Negative for abdominal pain and change in bowel habit.   Genitourinary:  Negative for frequency.   Neurological: Negative.  Negative for dizziness and weakness.   Psychiatric/Behavioral: Negative.  Negative for depression. The patient is not nervous/anxious.    Allergic/Immunologic: Negative for environmental allergies.     Objective:   Physical Exam  Vitals and nursing note reviewed.   Constitutional:       Appearance: Normal appearance. She is well-developed.   HENT:      Head: Normocephalic and atraumatic.   Eyes:      Conjunctiva/sclera: Conjunctivae normal.   Neck:      Thyroid: No thyroid mass or thyromegaly.      Trachea: No tracheal deviation.   Cardiovascular:      Rate and Rhythm: Normal rate and regular rhythm.   Pulmonary:      Effort: Pulmonary effort is normal. No respiratory distress.      Breath sounds: No wheezing.   Abdominal:      General: There is no  distension.   Musculoskeletal:         General: Normal range of motion.      Cervical back: Normal range of motion. No edema.   Skin:     General: Skin is warm and dry.      Findings: No rash.   Neurological:      Mental Status: She is alert and oriented to person, place, and time.      Coordination: Coordination normal.   Psychiatric:         Speech: Speech normal.         Behavior: Behavior normal. Behavior is cooperative.         Thought Content: Thought content normal.       Assessment:      1. LBBB (left bundle branch block)    2. Nonischemic cardiomyopathy    3. Hypertensive urgency    4. Essential hypertension    5. Benign hypertensive heart disease without heart failure        Plan:     NICM despite med Rx.  Mild sx. Doesn't exert self much, really. If truly NYHA I, then ICD/CRT not indicated. If NYHA >=II, then ICD/CRT are indicated.  Will assess exertion tolerance with CPX. If less than expected, then CRT-D. If expected exertion, f/u next year.

## 2023-04-20 ENCOUNTER — HOSPITAL ENCOUNTER (OUTPATIENT)
Dept: CARDIOLOGY | Facility: HOSPITAL | Age: 43
Discharge: HOME | End: 2023-04-20
Attending: INTERNAL MEDICINE
Payer: MEDICAID

## 2023-04-20 VITALS
SYSTOLIC BLOOD PRESSURE: 129 MMHG | HEIGHT: 66 IN | DIASTOLIC BLOOD PRESSURE: 88 MMHG | WEIGHT: 172 LBS | HEART RATE: 89 BPM | BODY MASS INDEX: 27.64 KG/M2

## 2023-04-20 DIAGNOSIS — I44.7 LBBB (LEFT BUNDLE BRANCH BLOCK): ICD-10-CM

## 2023-04-20 DIAGNOSIS — I42.8 NONISCHEMIC CARDIOMYOPATHY: ICD-10-CM

## 2023-04-20 LAB
CV STRESS BASE HR: 89 BPM
DIASTOLIC BLOOD PRESSURE: 88 MMHG
OHS CV CPX 1 MINUTE RECOVERY HEART RATE: 130 BPM
OHS CV CPX 85 PERCENT MAX PREDICTED HEART RATE MALE: 144
OHS CV CPX ANAEROBIC THRESHOLD DIASTOLIC BLOOD PRESSURE: 88 MMHG
OHS CV CPX ANAEROBIC THRESHOLD HEART RATE: 133
OHS CV CPX ANAEROBIC THRESHOLD RATE PRESSURE PRODUCT: NORMAL
OHS CV CPX ANAEROBIC THRESHOLD SYSTOLIC BLOOD PRESSURE: 138
OHS CV CPX DATA GRADE - AT: 11.9
OHS CV CPX DATA GRADE - PEAK: 14.5
OHS CV CPX DATA O2 SAT - PEAK: 97
OHS CV CPX DATA O2 SAT - REST: 98
OHS CV CPX DATA SPEED - AT: 3.3
OHS CV CPX DATA SPEED - PEAK: 3.7
OHS CV CPX DATA TIME - AT: 5.75
OHS CV CPX DATA TIME - PEAK: 6.75
OHS CV CPX DATA VE/VCO2 - AT: 45
OHS CV CPX DATA VE/VCO2 - PEAK: 30
OHS CV CPX DATA VE/VO2 - AT: 37
OHS CV CPX DATA VE/VO2 - PEAK: 29
OHS CV CPX DATA VO2 - AT: 12.4
OHS CV CPX DATA VO2 - PEAK: 14.7
OHS CV CPX DATA VO2 - REST: 3.9
OHS CV CPX FEV1/FVC: 0.8
OHS CV CPX FORCED EXPIRATORY VOLUME: 2.75
OHS CV CPX FORCED VITAL CAPACITY (FVC): 3.42
OHS CV CPX HIGHEST VO: 33.3
OHS CV CPX MAX PREDICTED HEART RATE: 170
OHS CV CPX MAXIMAL VOLUNTARY VENTILATION (MVV) PREDICTED: 110
OHS CV CPX MAXIMAL VOLUNTARY VENTILATION (MVV): 63
OHS CV CPX MAXIUMUM EXERCISE VENTILATION (VE MAX): 49.4
OHS CV CPX PATIENT AGE: 41
OHS CV CPX PATIENT HEIGHT IN: 66
OHS CV CPX PATIENT IS FEMALE AGE 11-19: 0
OHS CV CPX PATIENT IS FEMALE AGE GREATER THAN 19: 1
OHS CV CPX PATIENT IS FEMALE AGE LESS THAN 11: 0
OHS CV CPX PATIENT IS FEMALE: 1
OHS CV CPX PATIENT IS MALE AGE 11-25: 0
OHS CV CPX PATIENT IS MALE AGE GREATER THAN 25: 0
OHS CV CPX PATIENT IS MALE AGE LESS THAN 11: 0
OHS CV CPX PATIENT IS MALE GREATER THAN 18: 0
OHS CV CPX PATIENT IS MALE LESS THAN OR EQUAL TO 18: 0
OHS CV CPX PATIENT IS MALE: 0
OHS CV CPX PATIENT WEIGHT RETURNED IN OZ: 2752
OHS CV CPX PEAK DIASTOLIC BLOOD PRESSURE: 90 MMHG
OHS CV CPX PEAK HEAR RATE: 137 BPM
OHS CV CPX PEAK RATE PRESSURE PRODUCT: NORMAL
OHS CV CPX PEAK SYSTOLIC BLOOD PRESSURE: 146 MMHG
OHS CV CPX PERCENT BODY FAT: 16.9
OHS CV CPX PERCENT MAX PREDICTED HEART RATE ACHIEVED: 81
OHS CV CPX PREDICTED VO2: 33.3 ML/KG/MIN
OHS CV CPX RATE PRESSURE PRODUCT PRESENTING: NORMAL
OHS CV CPX REST PET CO2: 24
OHS CV CPX VE/VCO2 SLOPE: 33.8
STRESS ECHO POST EXERCISE DUR MIN: 6 MINUTES
STRESS ECHO POST EXERCISE DUR SEC: 45 SECONDS
SYSTOLIC BLOOD PRESSURE: 129 MMHG

## 2023-04-20 PROCEDURE — 94621 CARDIOPULM EXERCISE TESTING: CPT | Mod: 26,,, | Performed by: INTERNAL MEDICINE

## 2023-04-20 PROCEDURE — 94621 CARDIOPULMONARY EXERCISE TESTING (CUPID ONLY): ICD-10-PCS | Mod: 26,,, | Performed by: INTERNAL MEDICINE

## 2023-04-20 PROCEDURE — 94621 CARDIOPULM EXERCISE TESTING: CPT

## 2023-04-24 ENCOUNTER — TELEPHONE (OUTPATIENT)
Dept: CARDIOLOGY | Facility: HOSPITAL | Age: 43
End: 2023-04-24
Payer: MEDICAID

## 2023-04-24 ENCOUNTER — TELEPHONE (OUTPATIENT)
Dept: ELECTROPHYSIOLOGY | Facility: CLINIC | Age: 43
End: 2023-04-24
Payer: MEDICAID

## 2023-04-24 NOTE — TELEPHONE ENCOUNTER
Pt returned call, informed her of Dr Rider's message of her test results and recommendation that she have CRT implant procedure, informed pt we will call her back this week to get it scheduled

## 2023-04-30 DIAGNOSIS — I42.8 NON-ISCHEMIC CARDIOMYOPATHY: Primary | ICD-10-CM

## 2023-05-01 ENCOUNTER — TELEPHONE (OUTPATIENT)
Dept: ELECTROPHYSIOLOGY | Facility: CLINIC | Age: 43
End: 2023-05-01
Payer: MEDICAID

## 2023-05-04 ENCOUNTER — TELEPHONE (OUTPATIENT)
Dept: ELECTROPHYSIOLOGY | Facility: CLINIC | Age: 43
End: 2023-05-04
Payer: MEDICAID

## 2023-05-09 ENCOUNTER — PATIENT MESSAGE (OUTPATIENT)
Dept: ELECTROPHYSIOLOGY | Facility: CLINIC | Age: 43
End: 2023-05-09
Payer: MEDICAID

## 2023-05-25 ENCOUNTER — TELEPHONE (OUTPATIENT)
Dept: ELECTROPHYSIOLOGY | Facility: CLINIC | Age: 43
End: 2023-05-25
Payer: MEDICAID

## 2023-05-25 NOTE — TELEPHONE ENCOUNTER
----- Message from Zoey Cruz sent at 5/25/2023 11:43 AM CDT -----  Regarding: Procedure  Pt [phone] have questions about her upcoming procedure    Thanks

## 2023-05-25 NOTE — TELEPHONE ENCOUNTER
Spoke with pt about activity restrictions post device implant, especially arm movement restrictions

## 2023-06-02 ENCOUNTER — PATIENT MESSAGE (OUTPATIENT)
Dept: CARDIOLOGY | Facility: CLINIC | Age: 43
End: 2023-06-02
Payer: MEDICAID

## 2023-06-05 ENCOUNTER — LAB VISIT (OUTPATIENT)
Dept: LAB | Facility: HOSPITAL | Age: 43
End: 2023-06-05
Attending: INTERNAL MEDICINE
Payer: MEDICAID

## 2023-06-05 DIAGNOSIS — I42.8 NON-ISCHEMIC CARDIOMYOPATHY: ICD-10-CM

## 2023-06-05 LAB
ANION GAP SERPL CALC-SCNC: 5 MMOL/L (ref 8–16)
BUN SERPL-MCNC: 13 MG/DL (ref 6–20)
CALCIUM SERPL-MCNC: 9.2 MG/DL (ref 8.7–10.5)
CHLORIDE SERPL-SCNC: 109 MMOL/L (ref 95–110)
CO2 SERPL-SCNC: 25 MMOL/L (ref 23–29)
CREAT SERPL-MCNC: 0.9 MG/DL (ref 0.5–1.4)
ERYTHROCYTE [DISTWIDTH] IN BLOOD BY AUTOMATED COUNT: 15.4 % (ref 11.5–14.5)
EST. GFR  (NO RACE VARIABLE): >60 ML/MIN/1.73 M^2
GLUCOSE SERPL-MCNC: 78 MG/DL (ref 70–110)
HCT VFR BLD AUTO: 36.5 % (ref 37–48.5)
HGB BLD-MCNC: 11.7 G/DL (ref 12–16)
MCH RBC QN AUTO: 27.5 PG (ref 27–31)
MCHC RBC AUTO-ENTMCNC: 32.1 G/DL (ref 32–36)
MCV RBC AUTO: 86 FL (ref 82–98)
PLATELET # BLD AUTO: 284 K/UL (ref 150–450)
PMV BLD AUTO: 10.9 FL (ref 9.2–12.9)
POTASSIUM SERPL-SCNC: 3.9 MMOL/L (ref 3.5–5.1)
RBC # BLD AUTO: 4.26 M/UL (ref 4–5.4)
SODIUM SERPL-SCNC: 139 MMOL/L (ref 136–145)
WBC # BLD AUTO: 4.87 K/UL (ref 3.9–12.7)

## 2023-06-05 PROCEDURE — 85730 THROMBOPLASTIN TIME PARTIAL: CPT | Performed by: INTERNAL MEDICINE

## 2023-06-05 PROCEDURE — 36415 COLL VENOUS BLD VENIPUNCTURE: CPT | Mod: PO | Performed by: INTERNAL MEDICINE

## 2023-06-05 PROCEDURE — 85027 COMPLETE CBC AUTOMATED: CPT | Performed by: INTERNAL MEDICINE

## 2023-06-05 PROCEDURE — 85610 PROTHROMBIN TIME: CPT | Performed by: INTERNAL MEDICINE

## 2023-06-05 PROCEDURE — 80048 BASIC METABOLIC PNL TOTAL CA: CPT | Performed by: INTERNAL MEDICINE

## 2023-06-06 LAB
APTT PPP: 28.1 SEC (ref 21–32)
INR PPP: 1 (ref 0.8–1.2)
PROTHROMBIN TIME: 10.6 SEC (ref 9–12.5)

## 2023-06-09 ENCOUNTER — TELEPHONE (OUTPATIENT)
Dept: ELECTROPHYSIOLOGY | Facility: CLINIC | Age: 43
End: 2023-06-09
Payer: MEDICAID

## 2023-06-09 NOTE — TELEPHONE ENCOUNTER
Pt returned call    Spoke to patient    CONFIRMED procedure arrival time of 9am    Reiterated instructions including:  -Directions to check in desk  -NPO after midnight night prior to procedure  -High importance of HOLDING n/a  Pre-procedure LABS reviewed no alerts noted   -Confirmed absence or presence of implanted device/stimulator n/a  -Confirmed no fever, cough, or shortness of breath in the past 30 days  -Bathe night prior and morning prior to procedure with Hibiclens solution or an antibacterial soap  -Reviewed current visitor policy    Patient verbalized understanding of above and appreciated the call.

## 2023-06-12 ENCOUNTER — ANESTHESIA EVENT (OUTPATIENT)
Dept: MEDSURG UNIT | Facility: HOSPITAL | Age: 43
End: 2023-06-12
Payer: MEDICAID

## 2023-06-12 ENCOUNTER — ANESTHESIA (OUTPATIENT)
Dept: MEDSURG UNIT | Facility: HOSPITAL | Age: 43
End: 2023-06-12
Payer: MEDICAID

## 2023-06-12 ENCOUNTER — HOSPITAL ENCOUNTER (OUTPATIENT)
Facility: HOSPITAL | Age: 43
Discharge: HOME | End: 2023-06-12
Attending: INTERNAL MEDICINE | Admitting: INTERNAL MEDICINE
Payer: MEDICAID

## 2023-06-12 VITALS
DIASTOLIC BLOOD PRESSURE: 83 MMHG | TEMPERATURE: 98 F | SYSTOLIC BLOOD PRESSURE: 141 MMHG | OXYGEN SATURATION: 96 % | RESPIRATION RATE: 18 BRPM | WEIGHT: 162 LBS | BODY MASS INDEX: 26.03 KG/M2 | HEART RATE: 85 BPM | HEIGHT: 66 IN

## 2023-06-12 DIAGNOSIS — I42.8 NONISCHEMIC CARDIOMYOPATHY: Primary | ICD-10-CM

## 2023-06-12 DIAGNOSIS — I49.9 ARRHYTHMIA: ICD-10-CM

## 2023-06-12 DIAGNOSIS — I42.8 NON-ISCHEMIC CARDIOMYOPATHY: ICD-10-CM

## 2023-06-12 DIAGNOSIS — I50.22 CHF (CONGESTIVE HEART FAILURE), NYHA CLASS III, CHRONIC, SYSTOLIC: ICD-10-CM

## 2023-06-12 DIAGNOSIS — I44.7 LBBB (LEFT BUNDLE BRANCH BLOCK): ICD-10-CM

## 2023-06-12 DIAGNOSIS — Z95.9 CARDIAC DEVICE IN SITU: ICD-10-CM

## 2023-06-12 LAB
B-HCG UR QL: NEGATIVE
CTP QC/QA: YES
POCT GLUCOSE: 86 MG/DL (ref 70–110)

## 2023-06-12 PROCEDURE — 63600175 PHARM REV CODE 636 W HCPCS: Performed by: ANESTHESIOLOGY

## 2023-06-12 PROCEDURE — 25000003 PHARM REV CODE 250: Performed by: NURSE PRACTITIONER

## 2023-06-12 PROCEDURE — 25000003 PHARM REV CODE 250: Performed by: NURSE ANESTHETIST, CERTIFIED REGISTERED

## 2023-06-12 PROCEDURE — 99234 PR OBSERV/HOSP SAME DATE,LEVL III: ICD-10-PCS | Mod: ,,, | Performed by: INTERNAL MEDICINE

## 2023-06-12 PROCEDURE — 93005 ELECTROCARDIOGRAM TRACING: CPT

## 2023-06-12 PROCEDURE — C1898 LEAD, PMKR, OTHER THAN TRANS: HCPCS | Performed by: INTERNAL MEDICINE

## 2023-06-12 PROCEDURE — 33249 INSJ/RPLCMT DEFIB W/LEAD(S): CPT | Mod: ,,, | Performed by: INTERNAL MEDICINE

## 2023-06-12 PROCEDURE — 33249 INSJ/RPLCMT DEFIB W/LEAD(S): CPT | Performed by: INTERNAL MEDICINE

## 2023-06-12 PROCEDURE — 33249 PR ICD INSERT SNGL/DUAL W/LEADS: ICD-10-PCS | Mod: ,,, | Performed by: INTERNAL MEDICINE

## 2023-06-12 PROCEDURE — 00534 ANES INSERTION/RPLCMT CVDFB: CPT | Performed by: INTERNAL MEDICINE

## 2023-06-12 PROCEDURE — 25500020 PHARM REV CODE 255: Performed by: INTERNAL MEDICINE

## 2023-06-12 PROCEDURE — 63600175 PHARM REV CODE 636 W HCPCS: Performed by: NURSE PRACTITIONER

## 2023-06-12 PROCEDURE — C1893 INTRO/SHEATH, FIXED,NON-PEEL: HCPCS | Performed by: INTERNAL MEDICINE

## 2023-06-12 PROCEDURE — 37000009 HC ANESTHESIA EA ADD 15 MINS: Performed by: INTERNAL MEDICINE

## 2023-06-12 PROCEDURE — 37000008 HC ANESTHESIA 1ST 15 MINUTES: Performed by: INTERNAL MEDICINE

## 2023-06-12 PROCEDURE — 63600175 PHARM REV CODE 636 W HCPCS: Performed by: NURSE ANESTHETIST, CERTIFIED REGISTERED

## 2023-06-12 PROCEDURE — 25000003 PHARM REV CODE 250: Performed by: INTERNAL MEDICINE

## 2023-06-12 PROCEDURE — D9220A PRA ANESTHESIA: ICD-10-PCS | Mod: CRNA,,, | Performed by: NURSE ANESTHETIST, CERTIFIED REGISTERED

## 2023-06-12 PROCEDURE — C1725 CATH, TRANSLUMIN NON-LASER: HCPCS | Performed by: INTERNAL MEDICINE

## 2023-06-12 PROCEDURE — C1769 GUIDE WIRE: HCPCS | Performed by: INTERNAL MEDICINE

## 2023-06-12 PROCEDURE — 81025 URINE PREGNANCY TEST: CPT | Performed by: NURSE PRACTITIONER

## 2023-06-12 PROCEDURE — C1730 CATH, EP, 19 OR FEW ELECT: HCPCS | Performed by: INTERNAL MEDICINE

## 2023-06-12 PROCEDURE — C1882 AICD, OTHER THAN SING/DUAL: HCPCS | Performed by: INTERNAL MEDICINE

## 2023-06-12 PROCEDURE — 27201423 OPTIME MED/SURG SUP & DEVICES STERILE SUPPLY: Performed by: INTERNAL MEDICINE

## 2023-06-12 PROCEDURE — C1777 LEAD, AICD, ENDO SINGLE COIL: HCPCS | Performed by: INTERNAL MEDICINE

## 2023-06-12 PROCEDURE — 63600175 PHARM REV CODE 636 W HCPCS: Performed by: INTERNAL MEDICINE

## 2023-06-12 PROCEDURE — D9220A PRA ANESTHESIA: Mod: ANES,,, | Performed by: ANESTHESIOLOGY

## 2023-06-12 PROCEDURE — D9220A PRA ANESTHESIA: ICD-10-PCS | Mod: ANES,,, | Performed by: ANESTHESIOLOGY

## 2023-06-12 PROCEDURE — 93010 ELECTROCARDIOGRAM REPORT: CPT | Mod: ,,, | Performed by: INTERNAL MEDICINE

## 2023-06-12 PROCEDURE — D9220A PRA ANESTHESIA: Mod: CRNA,,, | Performed by: NURSE ANESTHETIST, CERTIFIED REGISTERED

## 2023-06-12 PROCEDURE — 99234 HOSP IP/OBS SM DT SF/LOW 45: CPT | Mod: ,,, | Performed by: INTERNAL MEDICINE

## 2023-06-12 PROCEDURE — 93010 EKG 12-LEAD: ICD-10-PCS | Mod: ,,, | Performed by: INTERNAL MEDICINE

## 2023-06-12 PROCEDURE — C1894 INTRO/SHEATH, NON-LASER: HCPCS | Performed by: INTERNAL MEDICINE

## 2023-06-12 DEVICE — CARDIAC RESYNCHRONIZATION DEVICE, TIERED-THERAPY CARDIOVERTER/DEFIBRILLATOR VVED DDDRV
Type: IMPLANTABLE DEVICE | Site: HEART | Status: FUNCTIONAL
Brand: UNIFY ASSURA™

## 2023-06-12 DEVICE — PACING LEAD
Type: IMPLANTABLE DEVICE | Site: HEART | Status: FUNCTIONAL
Brand: TENDRIL™

## 2023-06-12 DEVICE — DEFIBRILLATION LEAD
Type: IMPLANTABLE DEVICE | Site: HEART | Status: FUNCTIONAL
Brand: DURATA™

## 2023-06-12 RX ORDER — IODIXANOL 320 MG/ML
INJECTION, SOLUTION INTRAVASCULAR
Status: DISCONTINUED | OUTPATIENT
Start: 2023-06-12 | End: 2023-06-12 | Stop reason: HOSPADM

## 2023-06-12 RX ORDER — ONDANSETRON 2 MG/ML
4 INJECTION INTRAMUSCULAR; INTRAVENOUS ONCE AS NEEDED
Status: DISCONTINUED | OUTPATIENT
Start: 2023-06-12 | End: 2023-06-12 | Stop reason: HOSPADM

## 2023-06-12 RX ORDER — FENTANYL CITRATE 50 UG/ML
25 INJECTION, SOLUTION INTRAMUSCULAR; INTRAVENOUS EVERY 5 MIN PRN
Status: DISCONTINUED | OUTPATIENT
Start: 2023-06-12 | End: 2023-06-12 | Stop reason: HOSPADM

## 2023-06-12 RX ORDER — LIDOCAINE HYDROCHLORIDE 20 MG/ML
INJECTION, SOLUTION INFILTRATION; PERINEURAL
Status: DISCONTINUED | OUTPATIENT
Start: 2023-06-12 | End: 2023-06-12 | Stop reason: HOSPADM

## 2023-06-12 RX ORDER — DIPHENHYDRAMINE HYDROCHLORIDE 50 MG/ML
25 INJECTION INTRAMUSCULAR; INTRAVENOUS EVERY 6 HOURS PRN
Status: DISCONTINUED | OUTPATIENT
Start: 2023-06-12 | End: 2023-06-12 | Stop reason: HOSPADM

## 2023-06-12 RX ORDER — MIDAZOLAM HYDROCHLORIDE 1 MG/ML
INJECTION, SOLUTION INTRAMUSCULAR; INTRAVENOUS
Status: DISCONTINUED | OUTPATIENT
Start: 2023-06-12 | End: 2023-06-12

## 2023-06-12 RX ORDER — HYDROMORPHONE HYDROCHLORIDE 1 MG/ML
0.2 INJECTION, SOLUTION INTRAMUSCULAR; INTRAVENOUS; SUBCUTANEOUS EVERY 5 MIN PRN
Status: DISCONTINUED | OUTPATIENT
Start: 2023-06-12 | End: 2023-06-12 | Stop reason: HOSPADM

## 2023-06-12 RX ORDER — BUPIVACAINE HYDROCHLORIDE 2.5 MG/ML
INJECTION, SOLUTION EPIDURAL; INFILTRATION; INTRACAUDAL
Status: DISCONTINUED | OUTPATIENT
Start: 2023-06-12 | End: 2023-06-12 | Stop reason: HOSPADM

## 2023-06-12 RX ORDER — VANCOMYCIN HYDROCHLORIDE 1 G/20ML
INJECTION, POWDER, LYOPHILIZED, FOR SOLUTION INTRAVENOUS
Status: DISCONTINUED | OUTPATIENT
Start: 2023-06-12 | End: 2023-06-12 | Stop reason: HOSPADM

## 2023-06-12 RX ORDER — ACETAMINOPHEN 325 MG/1
650 TABLET ORAL EVERY 4 HOURS PRN
Status: DISCONTINUED | OUTPATIENT
Start: 2023-06-12 | End: 2023-06-12 | Stop reason: HOSPADM

## 2023-06-12 RX ORDER — EPHEDRINE SULFATE 50 MG/ML
INJECTION, SOLUTION INTRAVENOUS
Status: DISCONTINUED | OUTPATIENT
Start: 2023-06-12 | End: 2023-06-12

## 2023-06-12 RX ORDER — ONDANSETRON 2 MG/ML
INJECTION INTRAMUSCULAR; INTRAVENOUS
Status: DISCONTINUED | OUTPATIENT
Start: 2023-06-12 | End: 2023-06-12

## 2023-06-12 RX ORDER — DEXMEDETOMIDINE HYDROCHLORIDE 100 UG/ML
INJECTION, SOLUTION INTRAVENOUS
Status: DISCONTINUED | OUTPATIENT
Start: 2023-06-12 | End: 2023-06-12

## 2023-06-12 RX ORDER — SODIUM CHLORIDE 0.9 G/100ML
IRRIGANT IRRIGATION
Status: DISCONTINUED | OUTPATIENT
Start: 2023-06-12 | End: 2023-06-12 | Stop reason: HOSPADM

## 2023-06-12 RX ORDER — PROPOFOL 10 MG/ML
VIAL (ML) INTRAVENOUS CONTINUOUS PRN
Status: DISCONTINUED | OUTPATIENT
Start: 2023-06-12 | End: 2023-06-12

## 2023-06-12 RX ORDER — DOXYCYCLINE 100 MG/1
100 CAPSULE ORAL 2 TIMES DAILY
Qty: 10 CAPSULE | Refills: 0 | Status: SHIPPED | OUTPATIENT
Start: 2023-06-12 | End: 2023-06-17

## 2023-06-12 RX ORDER — FENTANYL CITRATE 50 UG/ML
INJECTION, SOLUTION INTRAMUSCULAR; INTRAVENOUS
Status: DISCONTINUED | OUTPATIENT
Start: 2023-06-12 | End: 2023-06-12

## 2023-06-12 RX ORDER — PROPOFOL 10 MG/ML
VIAL (ML) INTRAVENOUS
Status: DISCONTINUED | OUTPATIENT
Start: 2023-06-12 | End: 2023-06-12

## 2023-06-12 RX ORDER — PHENYLEPHRINE HYDROCHLORIDE 10 MG/ML
INJECTION INTRAVENOUS
Status: DISCONTINUED | OUTPATIENT
Start: 2023-06-12 | End: 2023-06-12

## 2023-06-12 RX ADMIN — Medication 10 MG: at 11:06

## 2023-06-12 RX ADMIN — PHENYLEPHRINE HYDROCHLORIDE 100 MCG: 10 INJECTION INTRAVENOUS at 11:06

## 2023-06-12 RX ADMIN — PHENYLEPHRINE HYDROCHLORIDE 100 MCG: 10 INJECTION INTRAVENOUS at 12:06

## 2023-06-12 RX ADMIN — EPHEDRINE SULFATE 25 MG: 50 INJECTION INTRAMUSCULAR; INTRAVENOUS; SUBCUTANEOUS at 12:06

## 2023-06-12 RX ADMIN — MIDAZOLAM HYDROCHLORIDE 2 MG: 1 INJECTION, SOLUTION INTRAMUSCULAR; INTRAVENOUS at 10:06

## 2023-06-12 RX ADMIN — ONDANSETRON 4 MG: 2 INJECTION INTRAMUSCULAR; INTRAVENOUS at 02:06

## 2023-06-12 RX ADMIN — SODIUM CHLORIDE: 9 INJECTION, SOLUTION INTRAVENOUS at 10:06

## 2023-06-12 RX ADMIN — FENTANYL CITRATE 25 MCG: 50 INJECTION, SOLUTION INTRAMUSCULAR; INTRAVENOUS at 11:06

## 2023-06-12 RX ADMIN — Medication 20 MG: at 10:06

## 2023-06-12 RX ADMIN — FENTANYL CITRATE 25 MCG: 50 INJECTION, SOLUTION INTRAMUSCULAR; INTRAVENOUS at 10:06

## 2023-06-12 RX ADMIN — Medication 20 MG: at 11:06

## 2023-06-12 RX ADMIN — PROPOFOL 75 MCG/KG/MIN: 10 INJECTION, EMULSION INTRAVENOUS at 10:06

## 2023-06-12 RX ADMIN — ACETAMINOPHEN 650 MG: 325 TABLET ORAL at 03:06

## 2023-06-12 RX ADMIN — FENTANYL CITRATE 25 MCG: 50 INJECTION, SOLUTION INTRAMUSCULAR; INTRAVENOUS at 03:06

## 2023-06-12 RX ADMIN — Medication 30 MG: at 11:06

## 2023-06-12 RX ADMIN — DEXMEDETOMIDINE HYDROCHLORIDE 12 MCG: 100 INJECTION, SOLUTION INTRAVENOUS at 02:06

## 2023-06-12 RX ADMIN — CEFAZOLIN 2 G: 2 INJECTION, POWDER, FOR SOLUTION INTRAMUSCULAR; INTRAVENOUS at 10:06

## 2023-06-12 NOTE — Clinical Note
A venogram was performed in the coronary sinus. A balloon was inserted. The vessel was injected via hand injection  with 3 mL of contrast. The balloon was removed.

## 2023-06-12 NOTE — PLAN OF CARE
Pt ambulated on unit this morning with sig other at her side.  Denies pain or SOB.  Verbalized an understanding of procedure.  Oriented to unit and call bell provided.  Will continue to monitor.

## 2023-06-12 NOTE — ANESTHESIA POSTPROCEDURE EVALUATION
Anesthesia Post Evaluation    Patient: Hollie Saldana    Procedure(s) Performed: Procedure(s) (LRB):  INSERTION, ICD, BIVENTRICULAR (Left)    Final Anesthesia Type: general      Level of consciousness: awake and alert  Post-procedure vital signs: reviewed and stable  Pain control: Pain has been treated.  Airway patency: patent    PONV status: Absent or treated.  Anesthetic complications: no      Cardiovascular status: hemodynamically stable  Respiratory status: unassisted  Hydration status: euvolemic            Vitals Value Taken Time   /81 06/12/23 1425   Temp 36.6 °C (97.9 °F) 06/12/23 1425   Pulse 102 06/12/23 1452   Resp 18 06/12/23 1452   SpO2 98 % 06/12/23 1452   Vitals shown include unvalidated device data.      No case tracking events are documented in the log.      Pain/[place] Score: [place] Score: 7 (6/12/2023  2:25 PM)

## 2023-06-12 NOTE — ANESTHESIA PREPROCEDURE EVALUATION
06/12/2023  Hollie Saldana is a 42 y.o., female.  Patient Active Problem List   Diagnosis    Benign hypertensive heart disease without heart failure    Hypertensive urgency    LBBB (left bundle branch block)    Essential hypertension    Non morbid obesity, unspecified obesity type    Nonischemic cardiomyopathy    S/P tubal ligation    Precordial pain           Pre-op Assessment          Review of Systems      Physical Exam    Airway:  No airway management difficulties anticipated  Dental:No active dental issues noted  Chest/Lungs:  Clear to auscultation    Heart:  Rate: Normal  Rhythm: Regular Rhythm  Sounds: Normal        Anesthesia Plan  Type of Anesthesia, risks & benefits discussed:    Anesthesia Type: MAC, Gen Supraglottic Airway, Gen Natural Airway  Informed Consent: Informed consent signed with the Patient and all parties understand the risks and agree with anesthesia plan.  All questions answered.   ASA Score: 3  Anesthesia Plan Notes: Chart reviewed. Patient seen and examined. Anesthesia plan discussed and questions answered. E-consent signed. Eliseo Saenz MD    Ready For Surgery From Anesthesia Perspective.     .      
DISPLAY PLAN FREE TEXT

## 2023-06-12 NOTE — H&P
[facility] Stay Cardiac [facility]  Cardiac Electrophysiology  History and Physical     Admission Date: 2023  Code Status: No Order   Attending Provider: Magdiel Rider MD   Principal Problem:Nonischemic cardiomyopathy    Subjective:     Chief Complaint:  NICM     HPI: Ms. Saldana is a 42 year old female with a PMHx of NICM 25% (cath proved, and persistent despite GDMT), LBBB, and HTN.    History obtained through clinic notes as well as patient report:     She has NYHA I to II sx -- doesn't work out, but also doesn't have problems with stairs at work.  No hx of syncope.  Aunt  of SCD     Echo 2022 25-30%.    Echo 2023 25%     23 During last clinic visit, ECG showed NSR. Variable width QRS on ECGs, with overall LBBB morphology. NICM despite med Rx. Mild sx. Doesn't exert self much, really. If truly NYHA I, then ICD/CRT not indicated. If NYHA >=II, then ICD/CRT are indicated.  Will assess exertion tolerance with CPX. If less than expected, then CRT-D. If expected exertion, f/u next year.    23 Dr. Rider reviewed CPX results: severe impairment. Plan CRT-D.    Ms. Saldana presents today to [facility] for scheduled CRT-D implant with Dr. Rider. She denies any chest pain, palpitations, SOB, ESTRELLA, dizziness, light headedness, weakness, syncope, or near syncopal episodes. She denies any bleeding, infections, fevers, rashes, or surgeries in the past 30 days. She is currently taking Toprol- mg daily, entresto  BID, spironolactone 50 mg daily and ozempic.    ECG today shows sinus rhythm with LBBB at 79 bpm  ms  ms QT/Qtc 410/470 ms.    Past Medical History:   Diagnosis Date    Cardiomyopathy     History of tubal ligation     Hypertension     LBBB (left bundle branch block)     Nonischemic cardiomyopathy 10/23/2020       Past Surgical History:   Procedure Laterality Date    CARDIAC CATHETERIZATION  2017    CATHETERIZATION OF BOTH LEFT AND RIGHT HEART Bilateral 2022     Procedure: CATHETERIZATION, HEART, BOTH LEFT AND RIGHT;  Surgeon: Dez Hayden MD;  Location: [facility] CATH LAB;  Service: Cardiology;  Laterality: Bilateral;  RFA/V access, 4Fr art sheath  RN PREOP 11/23/2022       Review of patient's allergies indicates:  No Known Allergies    No current facility-administered medications on file prior to encounter.     Current Outpatient Medications on File Prior to Encounter   Medication Sig    famotidine (PEPCID) 20 MG tablet Take 1 tablet (20 mg total) by mouth nightly as needed for Heartburn.    fluticasone propionate (FLONASE) 50 mcg/actuation nasal spray spray once IN EACH NOSTRIL ONCE A DAY    loratadine (CLARITIN) 10 mg tablet Take 1 tablet (10 mg total) by mouth every morning.    metoprolol succinate (TOPROL-XL) 200 MG 24 hr tablet Take 1 tablet (200 mg total) by mouth once daily.    sacubitriL-valsartan (ENTRESTO)  mg per tablet Take 1 tablet by mouth 2 (two) times daily.    spironolactone (ALDACTONE) 50 MG tablet Take 1 tablet (50 mg total) by mouth once daily.    sumatriptan (IMITREX) 25 MG Tab Take 1 tablet (25 mg total) by mouth every 2 (two) hours as needed (migraines).    VITAMIN D2 1,250 mcg (50,000 unit) capsule TAKE 1 CAPSULE BY MOUTH once every 7 days    diazePAM (VALIUM) 5 MG tablet Take 1 tablet (5 mg total) by mouth every evening. (Patient not taking: Reported on 4/5/2023)    semaglutide (OZEMPIC) 1 mg/dose (4 mg/3 mL) Inject 1 mg into the skin every 7 days.     Family History       Problem Relation (Age of Onset)    Appendicitis Mother    Hypertension Mother, Father    Thyroid disease Mother          Tobacco Use    Smoking status: Never    Smokeless tobacco: Never   Substance and Sexual Activity    Alcohol use: Yes     Comment: Occasionally    Drug use: No    Sexual activity: Not on file     Review of Systems   Constitutional: Negative for chills, fever and malaise/fatigue.   HENT:  Negative for congestion and nosebleeds.    Eyes:   Negative for blurred vision.   Cardiovascular:  Negative for chest pain, dyspnea on exertion, irregular heartbeat, leg swelling, near-syncope, orthopnea, palpitations, paroxysmal nocturnal dyspnea and syncope.   Respiratory:  Negative for cough, hemoptysis, shortness of breath, sleep disturbances due to breathing, sputum production and wheezing.    Endocrine: Negative for polyphagia.   Hematologic/Lymphatic: Negative for bleeding problem. Does not bruise/bleed easily.   Skin:  Negative for itching and rash.   Musculoskeletal:  Negative for back pain, joint swelling, muscle cramps and muscle weakness.   Gastrointestinal:  Negative for bloating, abdominal pain, hematemesis, hematochezia, nausea and vomiting.   Genitourinary:  Negative for dysuria and hematuria.   Neurological:  Negative for dizziness, focal weakness, headaches, light-headedness, loss of balance, numbness and weakness.   Psychiatric/Behavioral:  Negative for altered mental status.    Objective:     Vital Signs (Most Recent):  Temp: 97.8 °F (36.6 °C) (06/12/23 0900)  Pulse: 89 (06/12/23 0900)  Resp: 17 (06/12/23 0900)  BP: (!) 140/87 (06/12/23 0946)  SpO2: 99 % (06/12/23 0900) Vital Signs (24h Range):  Temp:  [97.8 °F (36.6 °C)] 97.8 °F (36.6 °C)  Pulse:  [89] 89  Resp:  [17] 17  SpO2:  [99 %] 99 %  BP: (140-143)/(87-90) 140/87     Weight: 73.5 kg (162 lb)  Body mass index is 26.15 kg/m².    SpO2: 99 %     Physical Exam  Vitals and nursing note reviewed.   Constitutional:       General: She is not in acute distress.     Appearance: Normal appearance. She is well-developed. She is not diaphoretic.   HENT:      Head: Normocephalic and atraumatic.      Mouth/Throat:      Mouth: Mucous membranes are moist.      Pharynx: No oropharyngeal exudate.   Eyes:      Conjunctiva/sclera: Conjunctivae normal.      Pupils: Pupils are equal, round, and reactive to light.   Cardiovascular:      Rate and Rhythm: Normal rate and regular rhythm. No extrasystoles are  present.     Pulses: Normal pulses and intact distal pulses.           Radial pulses are 2+ on the right side and 2+ on the left side.        Dorsalis pedis pulses are 2+ on the right side and 2+ on the left side.      Heart sounds: Normal heart sounds, S1 normal and S2 normal. No murmur heard.  Pulmonary:      Effort: Pulmonary effort is normal. No accessory muscle usage or respiratory distress.      Breath sounds: Normal breath sounds. No decreased breath sounds, wheezing, rhonchi or rales.   Chest:      Chest wall: No tenderness.   Abdominal:      General: Bowel sounds are normal. There is no distension.      Palpations: Abdomen is soft.      Tenderness: There is no abdominal tenderness. There is no guarding.   Musculoskeletal:         General: Normal range of motion.      Cervical back: Normal range of motion and neck supple.   Skin:     General: Skin is warm and dry.      Findings: No erythema or rash.   Neurological:      Mental Status: She is alert and oriented to person, place, and time. She is not disoriented.      Sensory: No sensory deficit.      Motor: No abnormal muscle tone.      Coordination: Coordination normal.      Gait: Gait normal.   Psychiatric:         Mood and Affect: Mood normal.         Behavior: Behavior normal.         Thought Content: Thought content normal.         Judgment: Judgment normal.     Significant Labs: Pre procedure labs from 6/5/23 through today reviewed.    Significant Imaging:  ECG today shows sinus rhythm with LBBB at 79 bpm  ms  ms QT/Qtc 410/470 ms.    Assessment and Plan:   Plan:  -CRT-D implant  -Anesthesia for sedation    Dr. Rider at bedside. Prior to procedure, we discussed the alternatives, benefits and risks of the procedure including pain, infection, bleeding, injury to lung causing pneumothorax requiring tube placement, injury to heart valves, puncture of the heart leading to pericardial effusion or tamponade requiring tube drainage, heart attack,  stroke and death.The patient voices understanding and all questions have been answered. No further questions/concerns voiced at this time.      Irena Payne NP  Cardiac Electrophysiology  [facility] -[facility]    Attending: Magdiel Rider MD

## 2023-06-12 NOTE — Clinical Note
A balloon was inserted. The vessel was injected via hand injection  with 10 mL of contrast. The balloon was removed. Per MD

## 2023-06-12 NOTE — NURSING TRANSFER
Nursing Transfer Note      6/12/2023     Reason patient is being transferred: d/c critieria met     Transfer To: [facility]     Transfer via stretcher    Transfer with cardiac monitoring    Transported by aparna rn     Telemetry: yes and registered box     Medicines sent: none     Any special needs or follow-up needed: site monitoring left chest     Chart send with patient: Yes    Notified: see epic     Patient reassessed at: see epic  (date, time)  1  Upon arrival to floor: reported to ирина bermeo rn no complaints no distress noted.

## 2023-06-12 NOTE — NURSING
Patient ambulating in [facility] with staff. L CW dressing c/d/I, soft. Patient voiding without difficulty.

## 2023-06-12 NOTE — HPI
Ms. Saldana is a 42 year old female with a PMHx of NICM 25% (cath proved, and persistent despite GDMT), LBBB, and HTN.    History obtained through clinic notes as well as patient report:     She has NYHA I to II sx -- doesn't work out, but also doesn't have problems with stairs at work.  No hx of syncope.  Aunt  of [facility]     Echo 2022 25-30%.    Echo 2023 25%     23 During last clinic visit, ECG showed NSR. Variable width QRS on ECGs, with overall LBBB morphology. NICM despite med Rx. Mild sx. Doesn't exert self much, really. If truly NYHA I, then ICD/CRT not indicated. If NYHA >=II, then ICD/CRT are indicated.  Will assess exertion tolerance with CPX. If less than expected, then CRT-D. If expected exertion, f/u next year.    23 Dr. Rider reviewed CPX results: severe impairment. Plan CRT-D.    Ms. Saldana presents today to [facility] for scheduled CRT-D implant with Dr. Rider. She denies any chest pain, palpitations, SOB, ESTRELLA, dizziness, light headedness, weakness, syncope, or near syncopal episodes. She denies any bleeding, infections, fevers, rashes, or surgeries in the past 30 days. She is currently taking Toprol- mg daily, entresto  BID, spironolactone 50 mg daily and ozempic.    ECG today shows sinus rhythm with LBBB at 79 bpm  ms  ms QT/Qtc 410/470 ms.

## 2023-06-12 NOTE — TRANSFER OF CARE
"Anesthesia Transfer of Care Note    Patient: Hollie Saldana    Procedure(s) Performed: Procedure(s) (LRB):  INSERTION, ICD, BIVENTRICULAR (Left)    Patient location: Cath Lab    Anesthesia Type: general    Transport from OR: Transported from OR on room air with adequate spontaneous ventilation    Post pain: adequate analgesia    Post assessment: no apparent anesthetic complications    Post vital signs: stable    Level of consciousness: awake    Nausea/Vomiting: no nausea/vomiting    Complications: none    Transfer of care protocol was followed      Last vitals:   Visit Vitals  BP (!) 140/87 (BP Location: Left arm, Patient Position: Lying)   Pulse 89   Temp 36.6 °C (97.8 °F)   Resp 17   Ht 5' 6" (1.676 m)   Wt 73.5 kg (162 lb)   SpO2 99%   Breastfeeding No   BMI 26.15 kg/m²     "

## 2023-06-12 NOTE — DISCHARGE INSTRUCTIONS
Patient Discharge Instructions   Devices (Pacemakers & Defibrillators)    Medications:  -Continue all of your home medications.  -Start your antibiotic Doxycycline 100 mg two times per day for 5 days.    Restrictions   No pushing, pulling, or lifting greater than 5 pounds with device-side arm for 6 weeks.  No lifting device-side arm higher than shoulder level for 6 weeks.  You will be discharged wearing a sling and swathe. You will wear this for 48 hours. Then, the sling/swath should be removed and will remain off during the day. Continue to wear the sling/swath at night for 6 weeks.  No driving until your clinic wound check appointment (this will be 7-10 days post-procedure).   No submerging device incision site in a tub, pool, or body of water for 6 weeks.    Activity   No heavy activity with device-side arm for 6 weeks (no tennis, golf, bowling, aerobics, mowing the lawn, etc.).   Avoid rough contact at the device site for 6 weeks.   You may participate in sexual activity unless otherwise instructed.   You may return to work within 3-5 days, unless told otherwise, provided you adhere to the above activity restrictions.   If you only had a battery/generator change performed, then there are no postoperative activity restrictions.     Wound Care  If you are discharged with a white tape pressure dressing, remove this dressing after 24 hours.   If you are discharged with a water resistant Aquacel dressing, keep this dressing on until your follow-up appointment in 1 week. We will remove it at that time. IF you find that this dressing is no longer occlusive or sticking to your skin, it is okay to remove it prior to your 1 week follow-up appointment. The incision may then remain open to air.   There is dermabond skin glue over your incision. DO NOT scrub it off. Dermabond acts as another protective barrier and will eventually dissolve/flake off.   You will be discharged with 5 days of oral antibiotics. Please take the  full prescription until it is gone.   If you are taking a blood thinner (Eliquis, Pradaxa, Xarelto), continue to hold this medication for 5 days. RESUME your blood thinner following completion of your antibiotics. If you are taking warfarin, continue to take this medication without interruption.   Do not get your incision wet for 48 hours following the procedure. You may sponge bath during this period. After 48 hours, you may shower, but avoid direct water contact to the incision and try to keep this area dry as possible. Allow the shower water to hit the back, not directly on the chest. Gently pat the incision/dressing dry if it does get wet.   Avoid using deodorants, mclain, creams, lotions on your incision for 6 weeks.   If you notice increased swelling, redness, drainage, device site pain, chest pain, shortness of breath, or have a fever greater than 100 degrees, call our device clinic immediately: [phone] or [phone] during daytime business hours, OR call [phone] during after-hours or on weekends and ask for the electrophysiology fellow on call.     Long-Term Instructions  Keep your pacemaker or defibrillator identification card with you at all times.   If you have a defibrillator and you get shocked by your device: If you receive 1 shock and you feel okay, call [phone] or [phone] during normal office hours. For after hours, call [phone] and ask to speak with the on-call electrophysiology fellow. If you receive 1 shock and do NOT feel well, call EMS or go to the ER.   If you have a defibrillator and you get more than 1 shock from the device, or multiple shocks in a short period of time: Call EMS and or go to the nearest ER.   Appliances: You may operate any electrical device in your home, including microwaves.   Security Systems: Electromagnetic security systems can be located in the workplace, courthouse, airports, or other high security areas. Exposure to this type of  security system has been shown to cause interference in some cases. Interference may be related to the duration of exposure and or the distance between the device and the security device. You should be aware of the location of security systems, moving through them at a normal pace, and avoid leaning or standing too close.   Metal Detectors at Airports: Metal detectors at airports can potentially interfere with pacemakers or defibrillators, although it is unlikely. Metal detectors will likely be triggered by your device and therefore at places such as airports/courthouses it will be important for you to carry your identification card for the device. Airport personnel will likely prefer to do a manual search.   Cellular Phones: It is unlikely that using a cell phone will interfere with your device. It should be used with the hand opposite to the side where your device was implanted. The phone should not be carried in the shirt pocket on the same side as the device.   Specific Work Conditions: If you work near high voltage lines, transmitting towers, large motors, welding equipment, or powerful magnets, you should discuss your specific work conditions with your physician. In general, remain at least 2 feet from external electrical equipment, verify that the equipment is properly grounded, and wear insulated gloves when using electrical devices. Leave the immediate location if lightheadedness or other symptoms develop.   MRI: Some pacemakers and defibrillators are safe in MRI scanners, while others are not. Please consult your physician to see if you have an MRI-compatible device.   Surgery: Should you require surgery, timothy electrosurgical devices can interfere with your device function. You should discuss this with your surgeon prior to any operation.   Radiation Therapy: If you require radiation therapy, care must be taken to avoid irradiating the device.     Long-Term Follow-Up  Your device has the ability to  transmit device information from home to the doctors office using a home monitoring system. This remote system takes the place of a doctors visit. Your device will be checked from home every 3-6 months. Every 6-12 months, you will be asked to come in the office for an in-office check.   Your device should last in the range of 6-12 years. This depends on many factors including how often it paces the heart.   When the battery is low, a generator change will be performed. This is a same-day procedure with no postoperative activity restrictions.     Any need to reschedule or cancel procedures or appointments, or any questions regarding your procedures should be addressed with the Arrhythmia Department Nurses at: [phone].

## 2023-06-12 NOTE — Clinical Note
A venogram was performed in the coronary sinus. A balloon was inserted. The vessel was injected via hand injection  with 8 mL of contrast. The balloon was removed.

## 2023-06-12 NOTE — SUBJECTIVE & OBJECTIVE
Past Medical History:   Diagnosis Date    Cardiomyopathy     History of tubal ligation     Hypertension     LBBB (left bundle branch block)     Nonischemic cardiomyopathy 10/23/2020       Past Surgical History:   Procedure Laterality Date    CARDIAC CATHETERIZATION  2017    CATHETERIZATION OF BOTH LEFT AND RIGHT HEART Bilateral 11/29/2022    Procedure: CATHETERIZATION, HEART, BOTH LEFT AND RIGHT;  Surgeon: Dez Hayden MD;  Location: [facility] CATH LAB;  Service: Cardiology;  Laterality: Bilateral;  RFA/V access, 4Fr art sheath  RN PREOP 11/23/2022       Review of patient's allergies indicates:  No Known Allergies    No current facility-administered medications on file prior to encounter.     Current Outpatient Medications on File Prior to Encounter   Medication Sig    famotidine (PEPCID) 20 MG tablet Take 1 tablet (20 mg total) by mouth nightly as needed for Heartburn.    fluticasone propionate (FLONASE) 50 mcg/actuation nasal spray spray once IN EACH NOSTRIL ONCE A DAY    loratadine (CLARITIN) 10 mg tablet Take 1 tablet (10 mg total) by mouth every morning.    metoprolol succinate (TOPROL-XL) 200 MG 24 hr tablet Take 1 tablet (200 mg total) by mouth once daily.    sacubitriL-valsartan (ENTRESTO)  mg per tablet Take 1 tablet by mouth 2 (two) times daily.    spironolactone (ALDACTONE) 50 MG tablet Take 1 tablet (50 mg total) by mouth once daily.    sumatriptan (IMITREX) 25 MG Tab Take 1 tablet (25 mg total) by mouth every 2 (two) hours as needed (migraines).    VITAMIN D2 1,250 mcg (50,000 unit) capsule TAKE 1 CAPSULE BY MOUTH once every 7 days    diazePAM (VALIUM) 5 MG tablet Take 1 tablet (5 mg total) by mouth every evening. (Patient not taking: Reported on 4/5/2023)    semaglutide (OZEMPIC) 1 mg/dose (4 mg/3 mL) Inject 1 mg into the skin every 7 days.     Family History       Problem Relation (Age of Onset)    Appendicitis Mother    Hypertension Mother, Father    Thyroid disease Mother          Tobacco  Use    Smoking status: Never    Smokeless tobacco: Never   Substance and Sexual Activity    Alcohol use: Yes     Comment: Occasionally    Drug use: No    Sexual activity: Not on file     Review of Systems   Constitutional: Negative for chills, fever and malaise/fatigue.   HENT:  Negative for congestion and nosebleeds.    Eyes:  Negative for blurred vision.   Cardiovascular:  Negative for chest pain, dyspnea on exertion, irregular heartbeat, leg swelling, near-syncope, orthopnea, palpitations, paroxysmal nocturnal dyspnea and syncope.   Respiratory:  Negative for cough, hemoptysis, shortness of breath, sleep disturbances due to breathing, sputum production and wheezing.    Endocrine: Negative for polyphagia.   Hematologic/Lymphatic: Negative for bleeding problem. Does not bruise/bleed easily.   Skin:  Negative for itching and rash.   Musculoskeletal:  Negative for back pain, joint swelling, muscle cramps and muscle weakness.   Gastrointestinal:  Negative for bloating, abdominal pain, hematemesis, hematochezia, nausea and vomiting.   Genitourinary:  Negative for dysuria and hematuria.   Neurological:  Negative for dizziness, focal weakness, headaches, light-headedness, loss of balance, numbness and weakness.   Psychiatric/Behavioral:  Negative for altered mental status.    Objective:     Vital Signs (Most Recent):  Temp: 97.8 °F (36.6 °C) (06/12/23 0900)  Pulse: 89 (06/12/23 0900)  Resp: 17 (06/12/23 0900)  BP: (!) 140/87 (06/12/23 0946)  SpO2: 99 % (06/12/23 0900) Vital Signs (24h Range):  Temp:  [97.8 °F (36.6 °C)] 97.8 °F (36.6 °C)  Pulse:  [89] 89  Resp:  [17] 17  SpO2:  [99 %] 99 %  BP: (140-143)/(87-90) 140/87       Weight: 73.5 kg (162 lb)  Body mass index is 26.15 kg/m².    SpO2: 99 %        Physical Exam  Vitals and nursing note reviewed.   Constitutional:       General: She is not in acute distress.     Appearance: Normal appearance. She is well-developed. She is not diaphoretic.   HENT:      Head:  Normocephalic and atraumatic.      Mouth/Throat:      Mouth: Mucous membranes are moist.      Pharynx: No oropharyngeal exudate.   Eyes:      Conjunctiva/sclera: Conjunctivae normal.      Pupils: Pupils are equal, round, and reactive to light.   Cardiovascular:      Rate and Rhythm: Normal rate and regular rhythm. No extrasystoles are present.     Pulses: Normal pulses and intact distal pulses.           Radial pulses are 2+ on the right side and 2+ on the left side.        Dorsalis pedis pulses are 2+ on the right side and 2+ on the left side.      Heart sounds: Normal heart sounds, S1 normal and S2 normal. No murmur heard.  Pulmonary:      Effort: Pulmonary effort is normal. No accessory muscle usage or respiratory distress.      Breath sounds: Normal breath sounds. No decreased breath sounds, wheezing, rhonchi or rales.   Chest:      Chest wall: No tenderness.   Abdominal:      General: Bowel sounds are normal. There is no distension.      Palpations: Abdomen is soft.      Tenderness: There is no abdominal tenderness. There is no guarding.   Musculoskeletal:         General: Normal range of motion.      Cervical back: Normal range of motion and neck supple.   Skin:     General: Skin is warm and dry.      Findings: No erythema or rash.   Neurological:      Mental Status: She is alert and oriented to person, place, and time. She is not disoriented.      Sensory: No sensory deficit.      Motor: No abnormal muscle tone.      Coordination: Coordination normal.      Gait: Gait normal.   Psychiatric:         Mood and Affect: Mood normal.         Behavior: Behavior normal.         Thought Content: Thought content normal.         Judgment: Judgment normal.          Significant Labs: Pre procedure labs from 6/5/23 through today reviewed.    Significant Imaging:  ECG

## 2023-06-12 NOTE — NURSING
Report received from BRENDON French. Patient s/p ICD placement. Aquacel and pressure dressing to L CW cdi. Sling intact. Vss. Call light in reach.

## 2023-06-12 NOTE — PROGRESS NOTES
Patient admitted to recovery see [facility] for complete assessment pacu bcg's maintained safety measures verified patient instructed on pain scale and patient verbalized understanding. Called for ekg and chest xray. Also called patient's mom and updated on patient location with the permission of patient. Ekg done and in chart.

## 2023-06-13 NOTE — DISCHARGE SUMMARY
[facility]iac [facility]  Cardiac Electrophysiology  Discharge Summary      Patient Name: Hollie Saldana  MRN: [id number]  Admission Date: 2023  Hospital Length of Stay: 0 days  Discharge Date and Time: 2023  5:59 PM  Attending Physician:Magdiel Rider MD  Discharging Provider: Irena Payne NP  Primary Care Physician: Hebert Shepherd MD    HPI: Ms. Saldana is a 42 year old female with a PMHx of NICM 25% (cath proved, and persistent despite GDMT), LBBB, and HTN.     History obtained through clinic notes as well as patient report:     She has NYHA I to II sx -- doesn't work out, but also doesn't have problems with stairs at work.  No hx of syncope.  Aunt  of SCD     Echo 2022 25-30%.    Echo 2023 25%     23 During last clinic visit, ECG showed NSR. Variable width QRS on ECGs, with overall LBBB morphology. NICM despite med Rx. Mild sx. Doesn't exert self much, really. If truly NYHA I, then ICD/CRT not indicated. If NYHA >=II, then ICD/CRT are indicated.  Will assess exertion tolerance with CPX. If less than expected, then CRT-D. If expected exertion, f/u next year.     23 Dr. Rider reviewed CPX results: severe impairment. Plan CRT-D.     Ms. Saldana presents today to [facility] for scheduled CRT-D implant with Dr. Rider. She denies any chest pain, palpitations, SOB, ESTRELLA, dizziness, light headedness, weakness, syncope, or near syncopal episodes. She denies any bleeding, infections, fevers, rashes, or surgeries in the past 30 days. She is currently taking Toprol- mg daily, entresto  BID, spironolactone 50 mg daily and ozempic.     ECG today shows sinus rhythm with LBBB at 79 bpm  ms  ms QT/Qtc 410/470 ms.    Procedure(s) (LRB):  INSERTION, ICD, BIVENTRICULAR (Left)     Indwelling Lines/Drains at time of discharge:  Lines/Drains/Airways     None      Hospital Course: Patient underwent BIV ICD implantation (see procedure note), tolerated procedure well with  no acute complications. Left pectoral site with aquacel dressing C/D/I, no redness, drainage/discharge, bleeding, or hematoma noted. Sling and swath in place. Post EKG normal sinus tachycardia atrial sensed-ventricular paced rhythm at 104 bpm  ms  ms QT/QTc 370/486 ms.Post procedure ECG reviewed with CONOR Owens. Post procedure CXR showed satisfactory appearance of left-sided AICD. No immediate complication. There are no pleural effusions. There is no evidence of a pneumothorax. There is no evidence of pneumomediastinum. No airspace opacity is present. Monitored on telemetry for 3 hours post procedure with no events. Patient ambulating, tolerating PO intake, and voiding with no issues. She denies any chest pain or SOB. Patient to continue all home medications. Start Doxycycline 100 mg BID x 5 days. Follow up with device clinic in 1 week for wound check and in 3 months in clinic with Dr. Rider. Discharge plans/instructions discussed with patient and family who verbalized understanding and agreement of plans of care. No further questions or concerns voiced at this time. VSS.     Goals of Care Treatment Preferences:  Code Status: Full Code    Consults: None    Significant Diagnostic Studies: N/A    Final Active Diagnoses:    Diagnosis Date Noted POA    PRINCIPAL PROBLEM:  Nonischemic cardiomyopathy [I42.8] 10/23/2020 Yes    CHF (congestive heart failure), NYHA class III, chronic, systolic [I50.22] 06/12/2023 Yes      Problems Resolved During this Admission:     Discharged Condition: stable    Disposition: Home or Self Care    Follow Up:   Follow-up Information     [facility] Elect[facility] Follow up in 1 week(s).    Specialty: Electrophysiology  Why: For wound re-check, leave your dressing on until this visit  Contact information:  [address]  [facility] [address] [phone]  Additional information:  Cardiology Services Clinics - Main Building - Clinic [place], [facility]  Floor   [facility] and use Clinic elevator           Magdiel Rider MD Follow up in 3 month(s).    Specialties: Electrophysiology, Cardiology  Why: s/p ICD implant  Contact information:  [address]  [facility] [id number] [phone]                       Patient Instructions:      No driving until:   Order Comments: No driving or operating heavy machinery for 24-48 hours after your procedure because you received sedation.     Other restrictions (specify):   Order Comments: Patient Discharge Instructions   Devices (Pacemakers & Defibrillators)    Medications:  -Continue all of your home medications.  -Start your antibiotic Doxycycline 100 mg two times per day for 5 days.    Restrictions   No pushing, pulling, or lifting greater than 5 pounds with device-side arm for 6 weeks.  No lifting device-side arm higher than shoulder level for 6 weeks.  You will be discharged wearing a sling and swathe. You will wear this for 48 hours. Then, the sling/swath should be removed and will remain off during the day. Continue to wear the sling/swath at night for 6 weeks.  No driving until your clinic wound check appointment (this will be 7-10 days post-procedure).   No submerging device incision site in a tub, pool, or body of water for 6 weeks.    Activity   No heavy activity with device-side arm for 6 weeks (no tennis, golf, bowling, aerobics, mowing the lawn, etc.).   Avoid rough contact at the device site for 6 weeks.   You may participate in sexual activity unless otherwise instructed.   You may return to work within 3-5 days, unless told otherwise, provided you adhere to the above activity restrictions.   If you only had a battery/generator change performed, then there are no postoperative activity restrictions.     Wound Care  If you are discharged with a white tape pressure dressing, remove this dressing after 24 hours.   If you are discharged with a water resistant Aquacel dressing, keep this  dressing on until your follow-up appointment in 1 week. We will remove it at that time. IF you find that this dressing is no longer occlusive or sticking to your skin, it is okay to remove it prior to your 1 week follow-up appointment. The incision may then remain open to air.   There is dermabond skin glue over your incision. DO NOT scrub it off. Dermabond acts as another protective barrier and will eventually dissolve/flake off.   You will be discharged with 5 days of oral antibiotics. Please take the full prescription until it is gone.   If you are taking a blood thinner (Eliquis, Pradaxa, Xarelto), continue to hold this medication for 5 days. RESUME your blood thinner following completion of your antibiotics. If you are taking warfarin, continue to take this medication without interruption.   Do not get your incision wet for 48 hours following the procedure. You may sponge bath during this period. After 48 hours, you may shower, but avoid direct water contact to the incision and try to keep this area dry as possible. Allow the shower water to hit the back, not directly on the chest. Gently pat the incision/dressing dry if it does get wet.   Avoid using deodorants, mclain, creams, lotions on your incision for 6 weeks.   If you notice increased swelling, redness, drainage, device site pain, chest pain, shortness of breath, or have a fever greater than 100 degrees, call our device clinic immediately: [phone] or [phone] during daytime business hours, OR call [phone] during after-hours or on weekends and ask for the electrophysiology fellow on call.     Long-Term Instructions  Keep your pacemaker or defibrillator identification card with you at all times.   If you have a defibrillator and you get shocked by your device: If you receive 1 shock and you feel okay, call [phone] or [phone] during normal office hours. For after hours, call [phone] and ask to speak with the on-call  electrophysiology fellow. If you receive 1 shock and do NOT feel well, call EMS or go to the ER.   If you have a defibrillator and you get more than 1 shock from the device, or multiple shocks in a short period of time: Call EMS and or go to the nearest ER.   Appliances: You may operate any electrical device in your home, including microwaves.   Security Systems: Electromagnetic security systems can be located in the workplace, courthouse, airports, or other high security areas. Exposure to this type of security system has been shown to cause interference in some cases. Interference may be related to the duration of exposure and or the distance between the device and the security device. You should be aware of the location of security systems, moving through them at a normal pace, and avoid leaning or standing too close.   Metal Detectors at Airports: Metal detectors at airports can potentially interfere with pacemakers or defibrillators, although it is unlikely. Metal detectors will likely be triggered by your device and therefore at places such as airports/courthouses it will be important for you to carry your identification card for the device. Airport personnel will likely prefer to do a manual search.   Cellular Phones: It is unlikely that using a cell phone will interfere with your device. It should be used with the hand opposite to the side where your device was implanted. The phone should not be carried in the shirt pocket on the same side as the device.   Specific Work Conditions: If you work near high voltage lines, transmitting towers, large motors, welding equipment, or powerful magnets, you should discuss your specific work conditions with your physician. In general, remain at least 2 feet from external electrical equipment, verify that the equipment is properly grounded, and wear insulated gloves when using electrical devices. Leave the immediate location if lightheadedness or other symptoms develop.    MRI: Some pacemakers and defibrillators are safe in MRI scanners, while others are not. Please consult your physician to see if you have an MRI-compatible device.   Surgery: Should you require surgery, timothy electrosurgical devices can interfere with your device function. You should discuss this with your surgeon prior to any operation.   Radiation Therapy: If you require radiation therapy, care must be taken to avoid irradiating the device.     Long-Term Follow-Up  Your device has the ability to transmit device information from home to the doctor's office using a home monitoring system. This remote system takes the place of a doctor's visit. Your device will be checked from home every 3-6 months. Every 6-12 months, you will be asked to come in the office for an in-office check.   Your device should last in the range of 6-12 years. This depends on many factors including how often it paces the heart.   When the battery is low, a generator change will be performed. This is a same-day procedure with no postoperative activity restrictions.     Any need to reschedule or cancel procedures or appointments, or any questions regarding your procedures should be addressed with the Arrhythmia Department Nurses at: [phone].     Notify your health care provider if you experience any of the following:  increased confusion or weakness     Notify your health care provider if you experience any of the following:  persistent dizziness, light-headedness, or visual disturbances     Notify your health care provider if you experience any of the following:  worsening rash     Notify your health care provider if you experience any of the following:  severe persistent headache     Notify your health care provider if you experience any of the following:  difficulty breathing or increased cough     Notify your health care provider if you experience any of the following:  redness, tenderness, or signs of infection (pain, swelling,  redness, odor or green/yellow discharge around incision site)     Notify your health care provider if you experience any of the following:  severe uncontrolled pain     Notify your health care provider if you experience any of the following:  persistent nausea and vomiting or diarrhea     Notify your health care provider if you experience any of the following:  temperature >100.4     Leave dressing on - Keep it clean, dry, and intact until clinic visit     Medications:  Reconciled Home Medications:      Medication List      START taking these medications    doxycycline 100 MG capsule  Commonly known as: MONODOX  Take 1 capsule (100 mg total) by mouth 2 (two) times daily. for 5 days        CONTINUE taking these medications    diazePAM 5 MG tablet  Commonly known as: VALIUM  Take 1 tablet (5 mg total) by mouth every evening.     ENTRESTO  mg per tablet  Generic drug: sacubitriL-valsartan  Take 1 tablet by mouth 2 (two) times daily.     famotidine 20 MG tablet  Commonly known as: PEPCID  Take 1 tablet (20 mg total) by mouth nightly as needed for Heartburn.     fluticasone propionate 50 mcg/actuation nasal spray  Commonly known as: FLONASE  spray once IN EACH NOSTRIL ONCE A DAY     loratadine 10 mg tablet  Commonly known as: CLARITIN  Take 1 tablet (10 mg total) by mouth every morning.     metoprolol succinate 200 MG 24 hr tablet  Commonly known as: TOPROL-XL  Take 1 tablet (200 mg total) by mouth once daily.     OZEMPIC 1 mg/dose (4 mg/3 mL)  Generic drug: semaglutide  Inject 1 mg into the skin every 7 days.     spironolactone 50 MG tablet  Commonly known as: ALDACTONE  Take 1 tablet (50 mg total) by mouth once daily.     sumatriptan 25 MG Tab  Commonly known as: IMITREX  Take 1 tablet (25 mg total) by mouth every 2 (two) hours as needed (migraines).     VITAMIN D2 50,000 unit Cap  Generic drug: ergocalciferol  TAKE 1 CAPSULE BY MOUTH once every 7 days          Plan:  -Continue all home medications  -Start  Doxycycline 100 mg BID x 5 days  -Follow up in device clinic in 1 week for wound check  -Follow up in clinic with Dr. Rider in 3 months    Time spent on the discharge of patient: 20 minutes    Irena Payne NP  Cardiac Electrophysiology  [facility] -Arrhythmia    Attending: Magdiel Rider MD

## 2023-06-15 ENCOUNTER — NURSE TRIAGE (OUTPATIENT)
Dept: ADMINISTRATIVE | Facility: CLINIC | Age: 43
End: 2023-06-15
Payer: MEDICAID

## 2023-06-15 ENCOUNTER — TELEPHONE (OUTPATIENT)
Dept: CARDIOLOGY | Facility: CLINIC | Age: 43
End: 2023-06-15
Payer: MEDICAID

## 2023-06-15 NOTE — TELEPHONE ENCOUNTER
Pt reports pressure 6/10 in her middle chest and into throat that began yesterday. Recent ICD placement on the 12th. I have called provider in this regard. Dr. Shelley report the throat pain is unusual and advises she may try an Ibuprofen. Patient verbalizes understanding. I have triaged her using the chest pain protocol and advised per protocol. She verbalizes understanding.    Reason for Disposition   [1] Caller has URGENT question AND [2] triager unable to answer question   [1] Chest pain lasts > 5 minutes AND [2] described as crushing, pressure-like, or heavy    Additional Information   Negative: Sounds like a life-threatening emergency to the triager   Negative: [1] Widespread rash AND [2] bright red, sunburn-like   Negative: [1] SEVERE headache AND [2] after spinal (epidural) anesthesia   Negative: [1] Vomiting AND [2] persists > 4 hours   Negative: [1] Vomiting AND [2] abdomen looks much more swollen than usual   Negative: [1] Drinking very little AND [2] dehydration suspected (e.g., no urine > 12 hours, very dry mouth, very lightheaded)   Negative: Patient sounds very sick or weak to the triager   Negative: Sounds like a serious complication to the triager   Negative: Fever > 100.4 F (38.0 C)    Protocols used: Post-Op Symptoms and Shinvyjyl-H-QO, Chest Pain-A-AH

## 2023-06-15 NOTE — TELEPHONE ENCOUNTER
----- Message from Michele Chauhan sent at 6/15/2023  1:38 PM CDT -----  Regarding: Mason with [facility]  Name of Caller: Mason     Pharmacy Name:  [facility]     Prescription Name: sacubitriL-valsartan (ENTRESTO)  mg per tablet    What do they need to clarify? Stated that they needed the ICD10 diagnosis code for sacubitriL-valsartan (ENTRESTO)  mg per tablet. Please call as soon as possible    Can you be contacted via [facility]? No     Best Call Back Number: [phone]    Additional Information      Spoke to pharmacy gave Diagnosis per Chart

## 2023-06-19 ENCOUNTER — CLINICAL SUPPORT (OUTPATIENT)
Dept: CARDIOLOGY | Facility: HOSPITAL | Age: 43
End: 2023-06-19
Attending: INTERNAL MEDICINE
Payer: MEDICAID

## 2023-06-19 DIAGNOSIS — I42.8 NON-ISCHEMIC CARDIOMYOPATHY: Primary | ICD-10-CM

## 2023-06-19 DIAGNOSIS — I42.8 NON-ISCHEMIC CARDIOMYOPATHY: ICD-10-CM

## 2023-06-19 DIAGNOSIS — I42.8 NONISCHEMIC CARDIOMYOPATHY: ICD-10-CM

## 2023-06-19 PROCEDURE — 93284 PRGRMG EVAL IMPLANTABLE DFB: CPT

## 2023-06-21 ENCOUNTER — TELEPHONE (OUTPATIENT)
Dept: ELECTROPHYSIOLOGY | Facility: CLINIC | Age: 43
End: 2023-06-21
Payer: MEDICAID

## 2023-06-21 ENCOUNTER — PATIENT MESSAGE (OUTPATIENT)
Dept: ELECTROPHYSIOLOGY | Facility: CLINIC | Age: 43
End: 2023-06-21
Payer: MEDICAID

## 2023-06-21 DIAGNOSIS — I44.7 LBBB (LEFT BUNDLE BRANCH BLOCK): ICD-10-CM

## 2023-06-21 DIAGNOSIS — Z95.810 BIVENTRICULAR ICD (IMPLANTABLE CARDIOVERTER-DEFIBRILLATOR) IN PLACE: Primary | ICD-10-CM

## 2023-06-21 DIAGNOSIS — R07.0 PAIN IN THROAT: Primary | ICD-10-CM

## 2023-06-21 DIAGNOSIS — I42.8 NONISCHEMIC CARDIOMYOPATHY: ICD-10-CM

## 2023-06-21 NOTE — TELEPHONE ENCOUNTER
"Spoke with pt, reports she has a pain in the back of her throat since the procedure, reports it is not a "sore throat" reports hurts all the time, has been taking ibuprofen as instructed by triage call since last Wednesday, denies any difficulty swallowing or difficulty breathing, recommended she see her PCP or ENT to evaluate, instructed to go to ER for any swelling to throat, trouble breathing or difficulty swallowing, will put in referral to ENT and notify Dr Rider  "

## 2023-06-21 NOTE — TELEPHONE ENCOUNTER
Spoke with patient to schedule in clinic device appointment to re-check LV/LBBAP lead in clinic. Patient agreed to coming in on 7/18/23 @ 1:20 pm.

## 2023-06-22 ENCOUNTER — TELEPHONE (OUTPATIENT)
Dept: ELECTROPHYSIOLOGY | Facility: CLINIC | Age: 43
End: 2023-06-22
Payer: MEDICAID

## 2023-06-22 NOTE — TELEPHONE ENCOUNTER
----- Message from Wyatt Ulloa MA sent at 6/21/2023  4:42 PM CDT -----  Regarding: FW: please call    ----- Message -----  From: Fatimah Blankenship  Sent: 6/21/2023   3:11 PM CDT  To: Adry ROTHMAN Staff  Subject: please call                                      The pt is calling to speak with Nataly in regards to her pain. Please call her back @ [phone]. Thanks, Thanks, Fatimah

## 2023-06-22 NOTE — TELEPHONE ENCOUNTER
Called pt back to schedule ENT referral. Told them the next available I could see was in October. They stated they would call the department to find something sooner.

## 2023-06-23 ENCOUNTER — HOSPITAL ENCOUNTER (EMERGENCY)
Facility: HOSPITAL | Age: 43
Discharge: HOME | End: 2023-06-23
Attending: EMERGENCY MEDICINE
Payer: MEDICAID

## 2023-06-23 VITALS
HEIGHT: 66 IN | RESPIRATION RATE: 20 BRPM | DIASTOLIC BLOOD PRESSURE: 110 MMHG | BODY MASS INDEX: 26.36 KG/M2 | HEART RATE: 98 BPM | SYSTOLIC BLOOD PRESSURE: 149 MMHG | WEIGHT: 164 LBS | TEMPERATURE: 99 F | OXYGEN SATURATION: 98 %

## 2023-06-23 DIAGNOSIS — R13.10 DYSPHAGIA, UNSPECIFIED TYPE: Primary | ICD-10-CM

## 2023-06-23 LAB
B-HCG UR QL: NEGATIVE
CTP QC/QA: YES

## 2023-06-23 PROCEDURE — 99283 EMERGENCY DEPT VISIT LOW MDM: CPT | Mod: 25,ER

## 2023-06-23 PROCEDURE — 81025 URINE PREGNANCY TEST: CPT | Mod: ER | Performed by: EMERGENCY MEDICINE

## 2023-06-23 PROCEDURE — 81025 URINE PREGNANCY TEST: CPT | Mod: ER

## 2023-06-23 RX ORDER — FAMOTIDINE 20 MG/1
20 TABLET, FILM COATED ORAL NIGHTLY PRN
Qty: 30 TABLET | Refills: 0 | Status: SHIPPED | OUTPATIENT
Start: 2023-06-23 | End: 2024-03-22

## 2023-06-23 RX ORDER — FAMOTIDINE 20 MG/1
20 TABLET, FILM COATED ORAL 2 TIMES DAILY
Qty: 20 TABLET | Refills: 0 | Status: SHIPPED | OUTPATIENT
Start: 2023-06-23 | End: 2023-06-23 | Stop reason: ALTCHOICE

## 2023-06-23 RX ORDER — SUMATRIPTAN SUCCINATE 25 MG/1
25 TABLET ORAL
COMMUNITY
Start: 2022-07-26

## 2023-06-24 NOTE — DISCHARGE INSTRUCTIONS
A supraglottic airway was used during her surgery.  Possibility of slight irritation secondary to procedure.  Anticipate this will improve over the next 1 week.  If you began having any difficulty breathing or difficulty swallowing recommend re-evaluation.  Recommend Pepto-Bismol or Maalox as needed for throat irritation when swallowing.  Recommend starting Pepcid.

## 2023-06-24 NOTE — ED PROVIDER NOTES
Encounter Date: 6/23/2023    SCRIBE #1 NOTE: I, BURTON Garibay am scribing for, and in the presence of,  Maurilio Herbert MD. I have scribed the following portions of the note - Other sections scribed: HPI, ROS, PE.     History     Chief Complaint   Patient presents with    TRACHEAL PAIN     PT REPORTS HAD ICD IMPLANTED AT [facility] ON 6/12 AND PT REPORTS HAS HAD PAIN DIRECTLY BELOW TRACHEA SINCE 6/14, HAS CALLED SURGEON MULTIPLE TIMES AND WAS TOLD TO TAKE IBUPROFEN, PT REPORTS PAIN NOT BETTER AND IS WORSE WITH MOVEMENT AND DEEP INSPIRATION     42-year-old female presenting with throat pain.  History of nonischemic cardiomyopathy with the EF 25%.  Patient had ICD placement on 06/12/2023. Pt states she contacted nurse on 06/14/2023 regarding throat pain, and she was instructed to take ibuprofen for the inflammation. Pt returns today because throat pain persists despite attempted treatment. Throat pain is exacerbated with laughter, movement, and cough. No other exacerbating or alleviating factors. She denies nausea, GERD, SOB, or other associated symptoms.    The history is provided by the patient. No  was used.   Review of patient's allergies indicates:  No Known Allergies  Past Medical History:   Diagnosis Date    Cardiomyopathy     History of tubal ligation     Hypertension     LBBB (left bundle branch block)     Nonischemic cardiomyopathy 10/23/2020     Past Surgical History:   Procedure Laterality Date    CARDIAC CATHETERIZATION  2017    CATHETERIZATION OF BOTH LEFT AND RIGHT HEART Bilateral 11/29/2022    Procedure: CATHETERIZATION, HEART, BOTH LEFT AND RIGHT;  Surgeon: Dez Hayden MD;  Location: [facility] CATH LAB;  Service: Cardiology;  Laterality: Bilateral;  RFA/V access, 4Fr art sheath  RN PREOP 11/23/2022    INSERTION OF BIVENTRICULAR IMPLANTABLE CARDIOVERTER-DEFIBRILLATOR (ICD) Left 6/12/2023    Procedure: INSERTION, ICD, BIVENTRICULAR;  Surgeon: Magdiel Rider MD;  Location: [facility]  EP LAB;  Service: Cardiology;  Laterality: Left;  NICM,CRT-D, SJM, MAC, DM, 3 Prep     Family History   Problem Relation Age of Onset    Hypertension Mother     Thyroid disease Mother     Appendicitis Mother     Hypertension Father      Social History     Tobacco Use    Smoking status: Never    Smokeless tobacco: Never   Substance Use Topics    Alcohol use: Yes     Comment: Occasionally    Drug use: No     Review of Systems   Constitutional:  Negative for chills and fever.   HENT:  Positive for sore throat.    Respiratory:  Negative for cough and shortness of breath.    Cardiovascular:  Negative for chest pain.   Gastrointestinal:  Negative for nausea.   Genitourinary:  Negative for dysuria.   Musculoskeletal:  Negative for back pain.   Skin:  Negative for rash.   Neurological:  Negative for weakness.   Psychiatric/Behavioral:  Negative for confusion.      Physical Exam     Initial Vitals [06/23/23 2128]   BP Pulse Resp Temp SpO2   (!) 149/110 98 20 98.6 °F (37 °C) 98 %      MAP       --         Physical Exam    Nursing note and vitals reviewed.  Constitutional: She appears well-developed and well-nourished. She does not appear ill. No distress.   HENT:   Head: Normocephalic and atraumatic.   Mouth/Throat: Oropharynx is clear and moist.   Eyes: Conjunctivae and EOM are normal.   Neck:   Normal range of motion.  Cardiovascular:  Regular rhythm and normal heart sounds.   Tachycardia present.         No murmur heard.  Pulmonary/Chest: Breath sounds normal. No respiratory distress. She has no decreased breath sounds. She has no wheezes.   Left ICD implantation site; no induration or erythema.   Abdominal: Abdomen is soft. There is no abdominal tenderness.   Musculoskeletal:         General: No edema.      Cervical back: Normal range of motion.     Neurological: She is alert. GCS score is 15.   Skin: Skin is warm.   Psychiatric: She has a normal mood and affect.       ED Course   Procedures  Labs Reviewed   POCT URINE  PREGNANCY          Imaging Results              X-Ray Chest PA And Lateral (Final result)  Result time 06/23/23 22:27:45      Final result by Rose Pinto MD (06/23/23 22:27:45)                   Impression:      No acute intrathoracic abnormality.      Electronically signed by: Rose Pinto  Date:    06/23/2023  Time:    22:27               Narrative:    EXAMINATION:  CHEST PA AND LATERAL    CLINICAL HISTORY:  Other chest pain    TECHNIQUE:  PA and lateral chest radiograph    COMPARISON:  06/12/2023    FINDINGS:  A left subclavian pacer is present.  The cardiac silhouette is within normal limits.   There is no focal consolidation, pneumothorax, or pleural effusion.                                       Medications - No data to display  Medical Decision Making:   History:   Old Medical Records: I decided to obtain old medical records.  Initial Assessment:     42-year-old female presenting with throat pain with swallowing and lower neck irritation with deep inspiration.  Patient had ICD placement roughly 12 days prior in 6/12.  Patient has supraglottic airway placed per EMR.  This could be the cause of the patient's discomfort.  There appears to be no other signs of edema tracheal deviation.  No stridor noted no oropharyngeal edema or irritation noted on direct visualization of the throat.  Recommended either Maalox or Pepto-Bismol as needed for throat pain.  Recommended avoiding NSAIDs given her cardiomyopathy history.  Recommend using Tylenol as needed for pain control.  Patient does not appear to have any airway impingement at this time.  Differential Diagnosis:   Esophagitis, pharyngeal scratch  Clinical Tests:   Radiological Study: Ordered and Reviewed        Scribe Attestation:   Scribe #1: I performed the above scribed service and the documentation accurately describes the services I performed. I attest to the accuracy of the note.      ED Course as of 06/24/23 0408   Fri Jun 23, 2023 2218 Per  anesthesia note patient had supraglottic airway placed.  Patient did not require endotracheal tube. [JM]      ED Course User Index  [JM] Maurilio Herbert MD               Pt counseled on their diagnosis and the importance of following up with PCP.  Pt also cautioned on when to return to ED.  Pt verbalizes understanding of discharge plan and will return to ED immediately if symptoms worsen  Clinical Impression:   Final diagnoses:  [R13.10] Dysphagia, unspecified type (Primary)        ED Disposition Condition    Discharge Stable          ED Prescriptions       Medication Sig Dispense Start Date End Date Auth. Provider    famotidine (PEPCID) 20 MG tablet  (Status: Discontinued) Take 1 tablet (20 mg total) by mouth 2 (two) times daily. 20 tablet 6/23/2023 6/23/2023 Maurilio Herbert MD    famotidine (PEPCID) 20 MG tablet Take 1 tablet (20 mg total) by mouth nightly as needed for Heartburn. 30 tablet 6/23/2023 7/23/2023 Maurilio Herbert MD          Follow-up Information       Follow up With Specialties Details Why Contact Info    Hebert Shepherd MD Family Medicine Schedule an appointment as soon as possible for a visit in 3 days  [address] [facility] [id number] [phone]      [facility] ED Emergency Medicine  If symptoms worsen [address] [facility] [id number] [phone]          I, Maurilio Herbert, personally performed the services described in this documentation.  All medical record entries made by the scribe were at my direction and in my presence.  I have reviewed the chart and agree that the record reflects my personal performance and is accurate and complete.     Maurilio Herbert MD  06/24/23 5700

## 2023-06-29 ENCOUNTER — OFFICE VISIT (OUTPATIENT)
Dept: CARDIOLOGY | Facility: CLINIC | Age: 43
End: 2023-06-29
Payer: MEDICAID

## 2023-06-29 VITALS
HEART RATE: 92 BPM | WEIGHT: 163.56 LBS | OXYGEN SATURATION: 100 % | HEIGHT: 66 IN | BODY MASS INDEX: 26.29 KG/M2 | RESPIRATION RATE: 18 BRPM | SYSTOLIC BLOOD PRESSURE: 136 MMHG | DIASTOLIC BLOOD PRESSURE: 88 MMHG

## 2023-06-29 DIAGNOSIS — I44.7 LBBB (LEFT BUNDLE BRANCH BLOCK): ICD-10-CM

## 2023-06-29 DIAGNOSIS — I10 ESSENTIAL HYPERTENSION: ICD-10-CM

## 2023-06-29 DIAGNOSIS — G47.33 OSA (OBSTRUCTIVE SLEEP APNEA): ICD-10-CM

## 2023-06-29 DIAGNOSIS — Z95.810 BIVENTRICULAR ICD (IMPLANTABLE CARDIOVERTER-DEFIBRILLATOR) IN PLACE: ICD-10-CM

## 2023-06-29 DIAGNOSIS — I42.8 NON-ISCHEMIC CARDIOMYOPATHY: Primary | ICD-10-CM

## 2023-06-29 PROCEDURE — 3008F PR BODY MASS INDEX (BMI) DOCUMENTED: ICD-10-PCS | Mod: CPTII,,, | Performed by: INTERNAL MEDICINE

## 2023-06-29 PROCEDURE — 1160F PR REVIEW ALL MEDS BY PRESCRIBER/CLIN PHARMACIST DOCUMENTED: ICD-10-PCS | Mod: CPTII,,, | Performed by: INTERNAL MEDICINE

## 2023-06-29 PROCEDURE — 3008F BODY MASS INDEX DOCD: CPT | Mod: CPTII,,, | Performed by: INTERNAL MEDICINE

## 2023-06-29 PROCEDURE — 3075F PR MOST RECENT SYSTOLIC BLOOD PRESS GE 130-139MM HG: ICD-10-PCS | Mod: CPTII,,, | Performed by: INTERNAL MEDICINE

## 2023-06-29 PROCEDURE — 4010F ACE/ARB THERAPY RXD/TAKEN: CPT | Mod: CPTII,,, | Performed by: INTERNAL MEDICINE

## 2023-06-29 PROCEDURE — 99214 PR OFFICE/OUTPT VISIT, EST, LEVL IV, 30-39 MIN: ICD-10-PCS | Mod: S$PBB,,, | Performed by: INTERNAL MEDICINE

## 2023-06-29 PROCEDURE — 3075F SYST BP GE 130 - 139MM HG: CPT | Mod: CPTII,,, | Performed by: INTERNAL MEDICINE

## 2023-06-29 PROCEDURE — 3044F PR MOST RECENT HEMOGLOBIN A1C LEVEL <7.0%: ICD-10-PCS | Mod: CPTII,,, | Performed by: INTERNAL MEDICINE

## 2023-06-29 PROCEDURE — 99999 PR PBB SHADOW E&M-EST. PATIENT-LVL IV: CPT | Mod: PBBFAC,,, | Performed by: INTERNAL MEDICINE

## 2023-06-29 PROCEDURE — 1160F RVW MEDS BY RX/DR IN RCRD: CPT | Mod: CPTII,,, | Performed by: INTERNAL MEDICINE

## 2023-06-29 PROCEDURE — 99999 PR PBB SHADOW E&M-EST. PATIENT-LVL IV: ICD-10-PCS | Mod: PBBFAC,,, | Performed by: INTERNAL MEDICINE

## 2023-06-29 PROCEDURE — 1159F MED LIST DOCD IN RCRD: CPT | Mod: CPTII,,, | Performed by: INTERNAL MEDICINE

## 2023-06-29 PROCEDURE — 3044F HG A1C LEVEL LT 7.0%: CPT | Mod: CPTII,,, | Performed by: INTERNAL MEDICINE

## 2023-06-29 PROCEDURE — 3079F PR MOST RECENT DIASTOLIC BLOOD PRESSURE 80-89 MM HG: ICD-10-PCS | Mod: CPTII,,, | Performed by: INTERNAL MEDICINE

## 2023-06-29 PROCEDURE — 99214 OFFICE O/P EST MOD 30 MIN: CPT | Mod: S$PBB,,, | Performed by: INTERNAL MEDICINE

## 2023-06-29 PROCEDURE — 1159F PR MEDICATION LIST DOCUMENTED IN MEDICAL RECORD: ICD-10-PCS | Mod: CPTII,,, | Performed by: INTERNAL MEDICINE

## 2023-06-29 PROCEDURE — 99214 OFFICE O/P EST MOD 30 MIN: CPT | Mod: PBBFAC,PO | Performed by: INTERNAL MEDICINE

## 2023-06-29 PROCEDURE — 3079F DIAST BP 80-89 MM HG: CPT | Mod: CPTII,,, | Performed by: INTERNAL MEDICINE

## 2023-06-29 PROCEDURE — 4010F PR ACE/ARB THEARPY RXD/TAKEN: ICD-10-PCS | Mod: CPTII,,, | Performed by: INTERNAL MEDICINE

## 2023-06-29 RX ORDER — METOPROLOL SUCCINATE 200 MG/1
400 TABLET, EXTENDED RELEASE ORAL DAILY
Qty: 180 TABLET | Refills: 3 | Status: SHIPPED | OUTPATIENT
Start: 2023-06-29

## 2023-06-29 NOTE — PROGRESS NOTES
CARDIOVASCULAR PROGRESS NOTE    REASON FOR CONSULT:   Hollie Saldana is a 42 y.o. female who presents for follow up of NICM, HTN, LBBB.    PCP: Joao BERUMEN: Adry  HISTORY OF PRESENT ILLNESS:   The patient returns for follow-up.  In the interim since her last visit, she underwent biventricular ICD placement by Dr. Rider.  Postoperatively, she had some throat pain which appears to be related to the anesthesia and is resolving.  She is also describing continued difficulty sleeping and has a sleep medicine appointment in August.  She otherwise denies angina, dyspnea, palpitations, or syncope.  There has been no PND, orthopnea, melena, hematuria, or claudicant symptoms.  Her left infraclavicular ICD incision appears to be healing nicely.    CARDIOVASCULAR HISTORY:   LBBB (normal cath 5/2017)  NICM, EF 45->25% (echo 11/2022).  Cath 11/2022 neg.  [facility] BiVICD (6/12/23, Dr. Rider)    PAST MEDICAL HISTORY:     Past Medical History:   Diagnosis Date    Cardiomyopathy     History of tubal ligation     Hypertension     LBBB (left bundle branch block)     Nonischemic cardiomyopathy 10/23/2020       PAST SURGICAL HISTORY:     Past Surgical History:   Procedure Laterality Date    CARDIAC CATHETERIZATION  2017    CATHETERIZATION OF BOTH LEFT AND RIGHT HEART Bilateral 11/29/2022    Procedure: CATHETERIZATION, HEART, BOTH LEFT AND RIGHT;  Surgeon: Dez Hayden MD;  Location: [facility] CATH LAB;  Service: Cardiology;  Laterality: Bilateral;  RFA/V access, 4Fr art sheath  RN PREOP 11/23/2022    INSERTION OF BIVENTRICULAR IMPLANTABLE CARDIOVERTER-DEFIBRILLATOR (ICD) Left 6/12/2023    Procedure: INSERTION, ICD, BIVENTRICULAR;  Surgeon: Magdiel Rider MD;  Location: [facility] EP LAB;  Service: Cardiology;  Laterality: Left;  NICM,CRT-D, SJM, MAC, DM, 3 Prep       ALLERGIES AND MEDICATION:   Review of patient's allergies indicates:  No Known Allergies  Previous Medications    FAMOTIDINE (PEPCID) 20 MG TABLET    Take 1 tablet (20 mg  total) by mouth nightly as needed for Heartburn.    FLUTICASONE PROPIONATE (FLONASE) 50 MCG/ACTUATION NASAL SPRAY    spray once IN EACH NOSTRIL ONCE A DAY    HYDROCODONE-ACETAMINOPHEN (NORCO) 5-325 MG PER TABLET    Take 1 tablet by mouth every 24 hours as needed for Pain.    LORATADINE (CLARITIN) 10 MG TABLET    Take 1 tablet (10 mg total) by mouth every morning.    METOPROLOL SUCCINATE (TOPROL-XL) 200 MG 24 HR TABLET    Take 1 tablet (200 mg total) by mouth once daily.    SACUBITRIL-VALSARTAN (ENTRESTO)  MG PER TABLET    Take 1 tablet by mouth 2 (two) times daily.    SEMAGLUTIDE (OZEMPIC) 1 MG/DOSE (4 MG/3 ML)    Inject 1 mg into the skin every 7 days.    SEMAGLUTIDE (OZEMPIC) 2 MG/DOSE (8 MG/3 ML) PNIJ    inject 2 mg UNDER THE SKIN once every 7 days    SPIRONOLACTONE (ALDACTONE) 50 MG TABLET    Take 1 tablet (50 mg total) by mouth once daily.    SUMATRIPTAN (IMITREX) 25 MG TAB    Take 25 mg by mouth.    VITAMIN D2 1,250 MCG (50,000 UNIT) CAPSULE    TAKE 1 CAPSULE BY MOUTH once every 7 days       SOCIAL HISTORY:     Social History     Socioeconomic History    Marital status: Single   Tobacco Use    Smoking status: Never    Smokeless tobacco: Never   Substance and Sexual Activity    Alcohol use: Yes     Comment: Occasionally    Drug use: No       FAMILY HISTORY:     Family History   Problem Relation Age of Onset    Hypertension Mother     Thyroid disease Mother     Appendicitis Mother     Hypertension Father        REVIEW OF SYSTEMS:   Review of Systems   Constitutional:  Negative for chills, diaphoresis, fever and malaise/fatigue.   HENT:  Negative for nosebleeds.    Eyes:  Negative for blurred vision, double vision and photophobia.   Respiratory:  Negative for hemoptysis, shortness of breath and wheezing.    Cardiovascular:  Negative for chest pain, palpitations, orthopnea, claudication, leg swelling and PND.   Gastrointestinal:  Negative for abdominal pain, blood in stool, heartburn, melena, nausea and  "vomiting.   Genitourinary:  Negative for flank pain and hematuria.   Musculoskeletal:  Negative for falls, myalgias and neck pain.   Skin:  Negative for rash.   Neurological:  Negative for dizziness, seizures, loss of consciousness, weakness and headaches.   Endo/Heme/Allergies:  Negative for polydipsia. Does not bruise/bleed easily.   Psychiatric/Behavioral:  Negative for depression and memory loss. The patient is not nervous/anxious.      PHYSICAL EXAM:     Vitals:    06/29/23 1338   BP: 136/88   Pulse: 92   Resp: 18    Body mass index is 26.4 kg/m².  Weight: 74.2 kg (163 lb 9.3 oz)   Height: 5' 6" (167.6 cm)     Physical Exam  Vitals reviewed.   Constitutional:       General: She is not in acute distress.     Appearance: Normal appearance. She is well-developed. She is not ill-appearing, toxic-appearing or diaphoretic.   HENT:      Head: Normocephalic and atraumatic.   Eyes:      General: No scleral icterus.     Extraocular Movements: Extraocular movements intact.      Conjunctiva/sclera: Conjunctivae normal.      Pupils: Pupils are equal, round, and reactive to light.   Neck:      Thyroid: No thyromegaly.      Vascular: Normal carotid pulses. No carotid bruit or JVD.      Trachea: Trachea normal.   Cardiovascular:      Rate and Rhythm: Normal rate and regular rhythm.      Pulses:           Carotid pulses are 2+ on the right side and 2+ on the left side.     Heart sounds: S1 normal and S2 normal. No murmur heard.    No friction rub. No gallop.      Comments: L infraclavicular ICD incision healing well  Pulmonary:      Effort: Pulmonary effort is normal. No respiratory distress.      Breath sounds: Normal breath sounds. No stridor. No wheezing, rhonchi or rales.   Chest:      Chest wall: No tenderness.   Abdominal:      General: There is no distension.      Palpations: Abdomen is soft.   Musculoskeletal:         General: No swelling or tenderness. Normal range of motion.      Cervical back: Normal range of motion " and neck supple. No edema or rigidity.      Right lower leg: No edema.      Left lower leg: No edema.   Feet:      Right foot:      Skin integrity: No ulcer.      Left foot:      Skin integrity: No ulcer.   Skin:     General: Skin is warm and dry.      Coloration: Skin is not jaundiced.   Neurological:      General: No focal deficit present.      Mental Status: She is alert and oriented to person, place, and time.      Cranial Nerves: No cranial nerve deficit.   Psychiatric:         Mood and Affect: Mood normal.         Speech: Speech normal.         Behavior: Behavior normal. Behavior is cooperative.       DATA:   EKG: (personally reviewed tracing(s))  11/1/22 SR 89, LBBB, similar to 4/11/22    Laboratory:  CBC:  Recent Labs   Lab 11/23/22  1450 02/14/23  0939 06/05/23  1544   WBC 5.60 5.21 4.87   Hemoglobin 11.6 L 11.7 L 11.7 L   Hematocrit 35.7 L 37.9 36.5 L   Platelets 330 318 284         CHEMISTRIES:  Recent Labs   Lab 12/08/20  0946 01/21/21  0851 02/09/22  0700 11/23/22  1450 02/14/23  0939 03/15/23  1556 06/05/23  1544   Glucose 73 92 89   < > 72 70 78   Sodium 142 138 134 L   < > 137 137 139   Potassium 4.0 4.1 3.8   < > 3.9 3.9 3.9   BUN 13 11 9   < > 13 11 13   Creatinine 1.0 0.8 0.8   < > 0.8 0.8 0.9   eGFR if African American >60 >60 >60.0  --   --   --   --    eGFR if non African American >60 >60 >60.0  --   --   --   --    Calcium 8.5 L 8.6 L 9.4   < > 9.2 9.1 9.2    < > = values in this interval not displayed.         CARDIAC BIOMARKERS:  Recent Labs   Lab 09/28/20  0619   Troponin I <0.006         COAGS:  Recent Labs   Lab 11/23/22  1450 01/26/23  2036 06/05/23  1544   INR 0.9 1.2 1.0         LIPIDS/LFTS:  Recent Labs   Lab 09/28/20  0619 02/09/22  0700 02/14/23  0939   Cholesterol  --  173 187   Triglycerides  --  54 76   HDL  --  67 55   LDL Cholesterol  --  95.2 116.8   Non-HDL Cholesterol  --  106 132   AST 20 15 13   ALT 17 10 9 L       Lab Results   Component Value Date    TSH 0.300 (L)  02/14/2023         Cardiovascular Testing:  Echo 2/17/23  The left ventricle is mildly enlarged with eccentric hypertrophy and severely decreased systolic function.  The estimated ejection fraction is 25%.  Grade I left ventricular diastolic dysfunction.  Normal right ventricular size with normal right ventricular systolic function.  Mild left atrial enlargement.  Mild mitral regurgitation.  Normal central venous pressure (3 mmHg).  The estimated PA systolic pressure is 22 mmHg.    Cath 11/25/22  RA 3  RV 24/5  PA 20/5/10  PAWP 6  /13  Ao 115/69/89  Sats:  HRA 69.2  LRA 73.1  RV 72.6  PA 71.5  FA 93.4   CO 5.4 L/min (thermal)  LVEF: 25-30% by echo  Dominance: Right  LM: normal  LAD: normal  LCx: normal  RCA: normal  Hemostasis:  RFA angioseal  RFV man comp  Impression:  LBBB/CMP  Normal cors  Normal hemodynamics  No evidence of intracardiac shunt  Above consistent with NICM.  Plan:  Cont med rx  Stop amlod  Inc entresto 49/51mg bid  Cont metoprolol  Eventual aldactone  Cont lifevest  Repeat echo in 3 months (late Feb 2023) for reassessment of LVEF    Holter 11/7/22  Sinus rhythm with heart rates varying between 75 and 138 BPM with an average of 94BPM.  There were very rare PACs totalling 7 and averaging 0.29 per hour.  The diary was not returned.    Renal art US 11/10/20  There is insignificant stenosis (0-59%) in the Right Renal Artery.  Right kidney 10.70 cm.  There is insignificant stenosis (0-59%) in the Left Renal Artery.  Left kidney 10.80 cm.      ASSESSMENT:   # NICM, EF 45->25% on echo 11/2022.  NICM without shunt on cath 11/2022.    # [facility] BiVICD 6/2023 (Dr. Rider)  # HTN, controlled.  Renal art US 11/2020 neg.  # LBBB, old  # ?TAHIR, missed sleep study in 4/2021, has appt 8/2023    PLAN:   Cont med rx  Cont GDMT  Inc toprol 400mg qd  Follow up with Dr. Rider as planned for device check  RTC 6 months (Dec 2023)      Dez Hayden MD, FACC

## 2023-07-06 ENCOUNTER — PATIENT MESSAGE (OUTPATIENT)
Dept: ELECTROPHYSIOLOGY | Facility: CLINIC | Age: 43
End: 2023-07-06
Payer: MEDICAID

## 2023-07-11 ENCOUNTER — PATIENT MESSAGE (OUTPATIENT)
Dept: ELECTROPHYSIOLOGY | Facility: CLINIC | Age: 43
End: 2023-07-11
Payer: MEDICAID

## 2023-07-18 ENCOUNTER — CLINICAL SUPPORT (OUTPATIENT)
Dept: CARDIOLOGY | Facility: HOSPITAL | Age: 43
End: 2023-07-18
Attending: INTERNAL MEDICINE
Payer: MEDICAID

## 2023-07-18 DIAGNOSIS — Z95.810 BIVENTRICULAR ICD (IMPLANTABLE CARDIOVERTER-DEFIBRILLATOR) IN PLACE: ICD-10-CM

## 2023-07-18 DIAGNOSIS — I44.7 LBBB (LEFT BUNDLE BRANCH BLOCK): ICD-10-CM

## 2023-07-18 DIAGNOSIS — I42.8 NONISCHEMIC CARDIOMYOPATHY: ICD-10-CM

## 2023-07-18 PROCEDURE — 93284 PRGRMG EVAL IMPLANTABLE DFB: CPT

## 2023-07-25 ENCOUNTER — PATIENT MESSAGE (OUTPATIENT)
Dept: ELECTROPHYSIOLOGY | Facility: CLINIC | Age: 43
End: 2023-07-25
Payer: MEDICAID

## 2023-07-26 ENCOUNTER — LAB VISIT (OUTPATIENT)
Dept: LAB | Facility: HOSPITAL | Age: 43
End: 2023-07-26
Payer: MEDICAID

## 2023-07-26 DIAGNOSIS — R53.83 FATIGUE, UNSPECIFIED TYPE: ICD-10-CM

## 2023-07-26 DIAGNOSIS — R53.83 FATIGUE, UNSPECIFIED TYPE: Primary | ICD-10-CM

## 2023-07-26 LAB — VIT B12 SERPL-MCNC: 391 PG/ML (ref 210–950)

## 2023-07-26 PROCEDURE — 82607 VITAMIN B-12: CPT

## 2023-07-26 PROCEDURE — 36415 COLL VENOUS BLD VENIPUNCTURE: CPT | Mod: PO

## 2023-08-24 ENCOUNTER — OFFICE VISIT (OUTPATIENT)
Dept: PULMONOLOGY | Facility: CLINIC | Age: 43
End: 2023-08-24
Payer: MEDICAID

## 2023-08-24 VITALS
SYSTOLIC BLOOD PRESSURE: 143 MMHG | DIASTOLIC BLOOD PRESSURE: 88 MMHG | OXYGEN SATURATION: 99 % | HEIGHT: 66 IN | HEART RATE: 85 BPM | RESPIRATION RATE: 15 BRPM | BODY MASS INDEX: 27.63 KG/M2 | WEIGHT: 171.94 LBS

## 2023-08-24 DIAGNOSIS — G47.33 OSA (OBSTRUCTIVE SLEEP APNEA): ICD-10-CM

## 2023-08-24 DIAGNOSIS — I44.7 LBBB (LEFT BUNDLE BRANCH BLOCK): ICD-10-CM

## 2023-08-24 DIAGNOSIS — I42.8 NONISCHEMIC CARDIOMYOPATHY: ICD-10-CM

## 2023-08-24 DIAGNOSIS — G47.19 EXCESSIVE DAYTIME SLEEPINESS: Primary | ICD-10-CM

## 2023-08-24 PROCEDURE — 1159F PR MEDICATION LIST DOCUMENTED IN MEDICAL RECORD: ICD-10-PCS | Mod: CPTII,,, | Performed by: INTERNAL MEDICINE

## 2023-08-24 PROCEDURE — 99214 OFFICE O/P EST MOD 30 MIN: CPT | Mod: PBBFAC | Performed by: INTERNAL MEDICINE

## 2023-08-24 PROCEDURE — 4010F PR ACE/ARB THEARPY RXD/TAKEN: ICD-10-PCS | Mod: CPTII,,, | Performed by: INTERNAL MEDICINE

## 2023-08-24 PROCEDURE — 99999 PR PBB SHADOW E&M-EST. PATIENT-LVL IV: ICD-10-PCS | Mod: PBBFAC,,, | Performed by: INTERNAL MEDICINE

## 2023-08-24 PROCEDURE — 4010F ACE/ARB THERAPY RXD/TAKEN: CPT | Mod: CPTII,,, | Performed by: INTERNAL MEDICINE

## 2023-08-24 PROCEDURE — 3008F BODY MASS INDEX DOCD: CPT | Mod: CPTII,,, | Performed by: INTERNAL MEDICINE

## 2023-08-24 PROCEDURE — 3079F DIAST BP 80-89 MM HG: CPT | Mod: CPTII,,, | Performed by: INTERNAL MEDICINE

## 2023-08-24 PROCEDURE — 3077F PR MOST RECENT SYSTOLIC BLOOD PRESSURE >= 140 MM HG: ICD-10-PCS | Mod: CPTII,,, | Performed by: INTERNAL MEDICINE

## 2023-08-24 PROCEDURE — 3044F PR MOST RECENT HEMOGLOBIN A1C LEVEL <7.0%: ICD-10-PCS | Mod: CPTII,,, | Performed by: INTERNAL MEDICINE

## 2023-08-24 PROCEDURE — 1159F MED LIST DOCD IN RCRD: CPT | Mod: CPTII,,, | Performed by: INTERNAL MEDICINE

## 2023-08-24 PROCEDURE — 1160F PR REVIEW ALL MEDS BY PRESCRIBER/CLIN PHARMACIST DOCUMENTED: ICD-10-PCS | Mod: CPTII,,, | Performed by: INTERNAL MEDICINE

## 2023-08-24 PROCEDURE — 3077F SYST BP >= 140 MM HG: CPT | Mod: CPTII,,, | Performed by: INTERNAL MEDICINE

## 2023-08-24 PROCEDURE — 99204 OFFICE O/P NEW MOD 45 MIN: CPT | Mod: S$PBB,,, | Performed by: INTERNAL MEDICINE

## 2023-08-24 PROCEDURE — 3079F PR MOST RECENT DIASTOLIC BLOOD PRESSURE 80-89 MM HG: ICD-10-PCS | Mod: CPTII,,, | Performed by: INTERNAL MEDICINE

## 2023-08-24 PROCEDURE — 3008F PR BODY MASS INDEX (BMI) DOCUMENTED: ICD-10-PCS | Mod: CPTII,,, | Performed by: INTERNAL MEDICINE

## 2023-08-24 PROCEDURE — 99999 PR PBB SHADOW E&M-EST. PATIENT-LVL IV: CPT | Mod: PBBFAC,,, | Performed by: INTERNAL MEDICINE

## 2023-08-24 PROCEDURE — 1160F RVW MEDS BY RX/DR IN RCRD: CPT | Mod: CPTII,,, | Performed by: INTERNAL MEDICINE

## 2023-08-24 PROCEDURE — 99204 PR OFFICE/OUTPT VISIT, NEW, LEVL IV, 45-59 MIN: ICD-10-PCS | Mod: S$PBB,,, | Performed by: INTERNAL MEDICINE

## 2023-08-24 PROCEDURE — 3044F HG A1C LEVEL LT 7.0%: CPT | Mod: CPTII,,, | Performed by: INTERNAL MEDICINE

## 2023-08-24 NOTE — PROGRESS NOTES
Subjective:   Patient ID: Hollie Saldana is a 42 y.o. female    Chief Complaint:   Chief Complaint   Patient presents with    Sleep Apnea       Referred by Dr. Dez Hayden    HPI    Hollie Saldana is a 42 y.o. female who was referred by Dr. Dez Hayden   for a sleep evaluation for possible sleep disordered. Relevant medical history includes hypertension and HFrEF (EF 25%) post ICD.    The patient reports witnessed snoring noticed by his family. There is  witnessed apneas. She has  awoken from a snore and has  gasping for air. When she wakes up from sleep, she does  feel un refreshed.    Prior sleep evaluation: none    Sleep medication taken: none.      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     Sleep summary during the workdays per patient report:  Bedtime: 9-10 PM  Sleep Latency: 10 min  # Awakenings: 2  WASO (wakefulness after sleep onset) a few min  Risetime: 5:45 AM    Sleep summary during days off per patient report:  Bedtime: 11:00 PM  Sleep Latency: 10 min  # Awakenings: 2   WASO (wakefulness after sleep onset) a few min  Risetime: 8:00 AM    Bed partner is  present.    Snoring is usually of  intensity.    Naps are  taken frequently.    CONTRIBUTING and/or CONFOUNDING FACTORS:    Nocturnal pain:   n    Nocturia >2/night:   n  Heartburn/GI pain:   n  Environment:    n  Bed partner noise/movements : N.      Patient provided ESS:    [place] Sleepiness Scale TOTAL   (validated sleepiness questionnaire with a higher score indicating greater sleepiness; range 0-24)      8/24/2023     2:19 PM   EPWORTH SLEEPINESS SCALE   Sitting and reading 1   Watching TV 3   Sitting, inactive in a public place (e.g. a theatre or a meeting) 1   As a passenger in a car for an hour without a break 0   Lying down to rest in the afternoon when circumstances permit 3   Sitting and talking to someone 0   Sitting quietly after a lunch without alcohol 0   In a car, while stopped for a few minutes in  traffic 0   Total score 8       STOP BANG questionnaire:    Snoring present: y  Tiredness present:y  Obstruction (apneas/choking episodes): y  Pressure (HTN): y    BMI greater than 35 kg/m2: n  Age greater than 50 years old: n  Neck circumference > than 17 inches if male or > than 16 inches if female : n  Gender being male: b    Total STOP BANG score = 4/8  Low risk TAHIR: 0-2, Intermediate risk TAHIR: 3-4, High risk TAHIR: 5+      Most Recent Vital Signs:    The patient's body mass index is unknown because there is no height or weight on file.    Wt Readings from Last 5 Encounters:   08/01/23 76 kg (167 lb 9.6 oz)   06/29/23 74.2 kg (163 lb 9.3 oz)   06/23/23 74.4 kg (164 lb)   06/13/23 76.2 kg (168 lb)   06/12/23 73.5 kg (162 lb)     BMI Readings from Last 5 Encounters:   08/01/23 27.05 kg/m²   06/29/23 26.40 kg/m²   06/23/23 26.47 kg/m²   06/13/23 27.12 kg/m²   06/12/23 26.15 kg/m²     Pulse Readings from Last 3 Encounters:   08/01/23 78   06/29/23 92   06/23/23 98         Current Outpatient Medications:     famotidine (PEPCID) 20 MG tablet, Take 1 tablet (20 mg total) by mouth nightly as needed for Heartburn., Disp: 30 tablet, Rfl: 0    fluticasone propionate (FLONASE) 50 mcg/actuation nasal spray, spray once IN EACH NOSTRIL ONCE A DAY, Disp: 16 g, Rfl: 1    HYDROcodone-acetaminophen (NORCO) 5-325 mg per tablet, Take 1 tablet by mouth every 24 hours as needed for Pain., Disp: 20 tablet, Rfl: 0    hydrOXYzine HCL (ATARAX) 25 MG tablet, Take 1 tablet (25 mg total) by mouth nightly as needed (insomnia)., Disp: 30 tablet, Rfl: 1    loratadine (CLARITIN) 10 mg tablet, Take 1 tablet (10 mg total) by mouth every morning., Disp: 60 tablet, Rfl: 0    metoprolol succinate (TOPROL-XL) 200 MG 24 hr tablet, Take 2 tablets (400 mg total) by mouth once daily., Disp: 180 tablet, Rfl: 3    sacubitriL-valsartan (ENTRESTO)  mg per tablet, Take 1 tablet by mouth 2 (two) times daily., Disp: 180 tablet, Rfl: 3    semaglutide  "(OZEMPIC) 1 mg/dose (4 mg/3 mL), Inject 1 mg into the skin every 7 days., Disp: 4 each, Rfl: 1    semaglutide (OZEMPIC) 2 mg/dose (8 mg/3 mL) PnIj, inject 2 mg UNDER THE SKIN once every 7 days, Disp: 3 mL, Rfl: 0    spironolactone (ALDACTONE) 50 MG tablet, Take 1 tablet (50 mg total) by mouth once daily., Disp: 90 tablet, Rfl: 3    sumatriptan (IMITREX) 25 MG Tab, Take 25 mg by mouth., Disp: , Rfl:     VITAMIN D2 1,250 mcg (50,000 unit) capsule, TAKE 1 CAPSULE BY MOUTH once every 7 days, Disp: 12 capsule, Rfl: 3    Objective:   Vitals reviewed   Physical Exam  Constitutional:       Appearance: Normal appearance.   HENT:      Head: Normocephalic.      Mouth/Throat:      Comments: Mallampati III  Neck:      Comments: 13.5"  Cardiovascular:      Rate and Rhythm: Normal rate and regular rhythm.   Pulmonary:      Effort: Pulmonary effort is normal. No respiratory distress.      Breath sounds: No wheezing or rales.   Musculoskeletal:      Cervical back: Normal range of motion and neck supple.   Neurological:      General: No focal deficit present.      Mental Status: She is alert and oriented to person, place, and time.   Psychiatric:         Mood and Affect: Mood normal.         Behavior: Behavior normal.         Assessment:     Problem List Items Addressed This Visit          Cardiac/Vascular    LBBB (left bundle branch block)    Relevant Orders    Home Sleep Study    Nonischemic cardiomyopathy    Relevant Orders    Home Sleep Study     Other Visit Diagnoses       Excessive daytime sleepiness    -  Primary    Relevant Orders    Home Sleep Study    TAHIR (obstructive sleep apnea)        Relevant Orders    Home Sleep Study              Plan:      Comorbid cardiomyopathy  and hypertension    Most likely secondary to sleep disordered breathing of obstructive origin.  Reviewed STOP BANG and ESS.    Patient has been informed about the pathophysiology and prognosis associated with TAHIR and CSA and has been advised to undergo a " baseline Polysomnography (PSG) study for further evaluation of his sleep disorder breathing.    Patient understood and agreed to undergo for a PSG study. Will order Home Sleep Study and proceed with further studies or prescription for CPAP as appropriate.    Today's office encounter included review of salient clinical notes from others involved in the care of the patient, discrete laboratory elements and, as available, prior and present intervention for the disturbance of sleep.  The information gleaned was used in establishing the diagnosis and in stratification of disease risk.    The patient has a disturbance of sleep with clinically relevant potential for adverse behavioral, cardiovascular, and metabolic outcomes.  Untreated, the disturbance of sleep can have significant effect on mental health, clinical health and survival.       Additionally close attention to patient's past medical history as described below were discussed today during the office evaluation.   she  has a past medical history of Cardiomyopathy, Hypertension, LBBB (left bundle branch block), and Nonischemic cardiomyopathy (10/23/2020).       All patient's questions and concerns regarding Sleep Disorder Breathing were addressed during this visit.    Will follow-up patient with results of PSG

## 2023-09-11 ENCOUNTER — CLINICAL SUPPORT (OUTPATIENT)
Dept: CARDIOLOGY | Facility: HOSPITAL | Age: 43
End: 2023-09-11
Payer: MEDICAID

## 2023-09-11 DIAGNOSIS — Z95.810 PRESENCE OF AUTOMATIC (IMPLANTABLE) CARDIAC DEFIBRILLATOR: ICD-10-CM

## 2023-09-11 DIAGNOSIS — I42.9 CARDIOMYOPATHY, UNSPECIFIED: ICD-10-CM

## 2023-09-11 PROCEDURE — 93295 CARDIAC DEVICE CHECK - REMOTE: ICD-10-PCS | Mod: ,,, | Performed by: INTERNAL MEDICINE

## 2023-09-11 PROCEDURE — 93295 DEV INTERROG REMOTE 1/2/MLT: CPT | Mod: ,,, | Performed by: INTERNAL MEDICINE

## 2023-09-11 PROCEDURE — 93296 REM INTERROG EVL PM/IDS: CPT | Performed by: INTERNAL MEDICINE

## 2023-09-13 NOTE — PROGRESS NOTES
Ms. Saldana is a patient of Dr. Rider and was last seen in clinic 2023.      Subjective:   Patient ID:  Hollie Saldana is a 42 y.o. female who presents for follow up of CRT-D  .     HPI:    Ms. Saldana is a 42 y.o. female with NICM, LBBB here for follow up after CRT-D implantation.    Background:    NICM 25% (cath proved, and persistent despite GDMT)  LBBB  She has NYHA I to II sx -- doesn't work out, but also doesn't have problems with stairs at work.  No hx of syncope  Aunt  of SCD     25-30%.   25%  NICM despite med Rx.  Mild sx. Doesn't exert self much, really. If truly NYHA I, then ICD/CRT not indicated. If NYHA >=II, then ICD/CRT are indicated.  Will assess exertion tolerance with CPX. If less than expected, then CRT-D. If expected exertion, f/u next year.ECG is NSR. Variable width QRS on ECGs, with overall LBBB morphology.    Update (09/15/2023):    2023: Successful implantation of ICD BIV (leads in RA, RVA, left bundle branch area) placed for primary intervention.    2023:  Unipolar threshold and Ohms- 0.5V @ 0.5ms. Impedance 260 ohms  Bipolar threshold and Ohms-0.5V @ 0.5ms. Impedance 430 ohms  EKGs performed in Unipolar/Bipolar polarities EKGs acceptable    Today she says overall she is feeling well. No CHF symptoms. Had some tracheal pain after procedure but this has improved. No worsening ESTRELLA, CP, palps, LH, syncope reported. Some soreness around incision due to keloid.    Device Interrogation (9/15/2023) reveals an intrinsic SR with stable lead and device function.   EKGs were recorded in bipolar and unipolar pacing configurations at high and low outputs.  Reviewed with Dr. Giraldo.  LBBA pacing lead impedance  bipolar 410 Ohms  LBB capture threshold unipolar 0.5@0.5, bipolar 0.5@0.5,  No arrhythmias or treated episodes were noted.  She paces 0% in the RA and 100% in the BiV (LBBAP in LV port). Estimated battery longevity 7.3 years.     I have personally reviewed the  patient's EKG today, which shows ASVP at 65bpmbpm. QRS is 110. QTc is 163.    Relevant Cardiac Test Results:    CPX (4/20/23):  Severe functional impairment associated with a normal breathing reserve, normal oxygen stauration, poor effort, and a reduced AT. These findings are indicative of functional impairment secondary to circulatory insufficiency, poor effort.  There were no arrhythmias during stress.  There was no ST segment deviation noted during stress.  The test was stopped because the patient experienced fatigue.  The ECG portion of this study is negative for myocardial ischemia.    Current Outpatient Medications   Medication Sig    hydrOXYzine HCL (ATARAX) 25 MG tablet Take 1 tablet (25 mg total) by mouth nightly as needed (insomnia).    metoprolol succinate (TOPROL-XL) 200 MG 24 hr tablet Take 2 tablets (400 mg total) by mouth once daily.    naltrexone-bupropion (CONTRAVE) 8-90 mg TbSR Take 1 tablet by mouth 2 (two) times daily.    sacubitriL-valsartan (ENTRESTO)  mg per tablet Take 1 tablet by mouth 2 (two) times daily.    semaglutide (OZEMPIC) 1 mg/dose (4 mg/3 mL) Inject 1 mg into the skin every 7 days.    semaglutide (OZEMPIC) 2 mg/dose (8 mg/3 mL) PnIj inject 2 mg UNDER THE SKIN once every 7 days    spironolactone (ALDACTONE) 50 MG tablet Take 1 tablet (50 mg total) by mouth once daily.    sumatriptan (IMITREX) 25 MG Tab Take 25 mg by mouth.    VITAMIN D2 1,250 mcg (50,000 unit) capsule TAKE 1 CAPSULE BY MOUTH once every 7 days    famotidine (PEPCID) 20 MG tablet Take 1 tablet (20 mg total) by mouth nightly as needed for Heartburn.     No current facility-administered medications for this visit.       Review of Systems   Constitutional: Negative for malaise/fatigue.   Cardiovascular:  Negative for chest pain, dyspnea on exertion, irregular heartbeat, leg swelling and palpitations.   Respiratory:  Negative for shortness of breath.    Hematologic/Lymphatic: Negative for bleeding problem.   Skin:   "Negative for rash.   Musculoskeletal:  Negative for myalgias.   Gastrointestinal:  Negative for hematemesis, hematochezia and nausea.   Genitourinary:  Negative for hematuria.   Neurological:  Negative for light-headedness.   Psychiatric/Behavioral:  Negative for altered mental status.    Allergic/Immunologic: Negative for persistent infections.       Objective:          BP (!) 149/80   Pulse 65   Ht 5' 6" (1.676 m)   Wt 78.1 kg (172 lb 2.9 oz)   BMI 27.79 kg/m²     Physical Exam  Vitals and nursing note reviewed.   Constitutional:       Appearance: Normal appearance. She is well-developed.   HENT:      Head: Normocephalic.      Nose: Nose normal.   Eyes:      Pupils: Pupils are equal, round, and reactive to light.   Cardiovascular:      Rate and Rhythm: Normal rate and regular rhythm.   Pulmonary:      Effort: No respiratory distress.      Breath sounds: Normal breath sounds.   Chest:      Comments: Device to LUCW. Incision and pocket in good repair.  Keloid noted  Musculoskeletal:         General: Normal range of motion.   Skin:     General: Skin is warm and dry.      Findings: No erythema.   Neurological:      Mental Status: She is alert and oriented to person, place, and time.   Psychiatric:         Speech: Speech normal.         Behavior: Behavior normal.           Lab Results   Component Value Date     06/05/2023    K 3.9 06/05/2023    BUN 13 06/05/2023    CREATININE 0.9 06/05/2023    ALT 9 (L) 02/14/2023    AST 13 02/14/2023    HGB 11.7 (L) 06/05/2023    HCT 36.5 (L) 06/05/2023    TSH 0.300 (L) 02/14/2023    LDLCALC 116.8 02/14/2023    LDLCALC 119 (H) 01/28/2020       Recent Labs   Lab 04/12/22  0009 11/23/22  1450 01/26/23  2036 06/05/23  1544   INR 1.3 H 0.9 1.2 1.0       Assessment:     1. ICD (implantable cardioverter-defibrillator) in place - LBBAP    2. Nonischemic cardiomyopathy    3. LBBB (left bundle branch block)    4. Essential hypertension      Plan:     In summary, Ms. Saldana is a 42 " y.o. female with NICM, LBBB here for follow up after CRT-D implantation.  She is 3 mo s/p CRT-D implantation (RA, RVA, LV in LBBA).   Ms. Saldana is doing well from a device perspective with stable lead and device function. 100% LBBA pacing. No CHF symptoms.  Stable thresholds and impedence in unipolar and bipolar. ECGs with unipolar and bipolar at high and low outputs were reviewed with Dr. Giraldo.  No arrhythmia noted. Sleep study scheduled for later this month. On GDMT and follows with Dr. Hayden in general cardiology.    Continue current medication regimen and device settings.   Follow up in device clinic as scheduled.   Follow up in EP clinic in 6 mo, sooner as needed.     *A copy of this note has been sent to Dr. Rider*    Follow up in about 6 months (around 3/15/2024).    ------------------------------------------------------------------    MAIDA Moreno, NP-C  Cardiac Electrophysiology

## 2023-09-15 ENCOUNTER — CLINICAL SUPPORT (OUTPATIENT)
Dept: CARDIOLOGY | Facility: HOSPITAL | Age: 43
End: 2023-09-15
Attending: INTERNAL MEDICINE
Payer: MEDICAID

## 2023-09-15 ENCOUNTER — OFFICE VISIT (OUTPATIENT)
Dept: ELECTROPHYSIOLOGY | Facility: CLINIC | Age: 43
End: 2023-09-15
Payer: MEDICAID

## 2023-09-15 VITALS
SYSTOLIC BLOOD PRESSURE: 149 MMHG | WEIGHT: 172.19 LBS | BODY MASS INDEX: 27.67 KG/M2 | HEIGHT: 66 IN | DIASTOLIC BLOOD PRESSURE: 80 MMHG | HEART RATE: 65 BPM

## 2023-09-15 DIAGNOSIS — I10 ESSENTIAL HYPERTENSION: ICD-10-CM

## 2023-09-15 DIAGNOSIS — I42.8 NONISCHEMIC CARDIOMYOPATHY: ICD-10-CM

## 2023-09-15 DIAGNOSIS — I44.7 LBBB (LEFT BUNDLE BRANCH BLOCK): ICD-10-CM

## 2023-09-15 DIAGNOSIS — I42.8 NON-ISCHEMIC CARDIOMYOPATHY: ICD-10-CM

## 2023-09-15 DIAGNOSIS — Z95.810 ICD (IMPLANTABLE CARDIOVERTER-DEFIBRILLATOR) IN PLACE: Primary | ICD-10-CM

## 2023-09-15 PROCEDURE — 1159F PR MEDICATION LIST DOCUMENTED IN MEDICAL RECORD: ICD-10-PCS | Mod: CPTII,,, | Performed by: NURSE PRACTITIONER

## 2023-09-15 PROCEDURE — 4010F ACE/ARB THERAPY RXD/TAKEN: CPT | Mod: CPTII,,, | Performed by: NURSE PRACTITIONER

## 2023-09-15 PROCEDURE — 1159F MED LIST DOCD IN RCRD: CPT | Mod: CPTII,,, | Performed by: NURSE PRACTITIONER

## 2023-09-15 PROCEDURE — 93284 PRGRMG EVAL IMPLANTABLE DFB: CPT

## 2023-09-15 PROCEDURE — 3008F BODY MASS INDEX DOCD: CPT | Mod: CPTII,,, | Performed by: NURSE PRACTITIONER

## 2023-09-15 PROCEDURE — 99214 PR OFFICE/OUTPT VISIT, EST, LEVL IV, 30-39 MIN: ICD-10-PCS | Mod: S$PBB,,, | Performed by: NURSE PRACTITIONER

## 2023-09-15 PROCEDURE — 99213 OFFICE O/P EST LOW 20 MIN: CPT | Mod: PBBFAC | Performed by: NURSE PRACTITIONER

## 2023-09-15 PROCEDURE — 93284 CARDIAC DEVICE CHECK - IN CLINIC & HOSPITAL: ICD-10-PCS | Mod: 26,,, | Performed by: INTERNAL MEDICINE

## 2023-09-15 PROCEDURE — 3008F PR BODY MASS INDEX (BMI) DOCUMENTED: ICD-10-PCS | Mod: CPTII,,, | Performed by: NURSE PRACTITIONER

## 2023-09-15 PROCEDURE — 3044F HG A1C LEVEL LT 7.0%: CPT | Mod: CPTII,,, | Performed by: NURSE PRACTITIONER

## 2023-09-15 PROCEDURE — 3077F PR MOST RECENT SYSTOLIC BLOOD PRESSURE >= 140 MM HG: ICD-10-PCS | Mod: CPTII,,, | Performed by: NURSE PRACTITIONER

## 2023-09-15 PROCEDURE — 99999 PR PBB SHADOW E&M-EST. PATIENT-LVL III: ICD-10-PCS | Mod: PBBFAC,,, | Performed by: NURSE PRACTITIONER

## 2023-09-15 PROCEDURE — 93284 PRGRMG EVAL IMPLANTABLE DFB: CPT | Mod: 26,,, | Performed by: INTERNAL MEDICINE

## 2023-09-15 PROCEDURE — 3079F DIAST BP 80-89 MM HG: CPT | Mod: CPTII,,, | Performed by: NURSE PRACTITIONER

## 2023-09-15 PROCEDURE — 1160F PR REVIEW ALL MEDS BY PRESCRIBER/CLIN PHARMACIST DOCUMENTED: ICD-10-PCS | Mod: CPTII,,, | Performed by: NURSE PRACTITIONER

## 2023-09-15 PROCEDURE — 99214 OFFICE O/P EST MOD 30 MIN: CPT | Mod: S$PBB,,, | Performed by: NURSE PRACTITIONER

## 2023-09-15 PROCEDURE — 3077F SYST BP >= 140 MM HG: CPT | Mod: CPTII,,, | Performed by: NURSE PRACTITIONER

## 2023-09-15 PROCEDURE — 93010 RHYTHM STRIP: ICD-10-PCS | Mod: S$PBB,,, | Performed by: INTERNAL MEDICINE

## 2023-09-15 PROCEDURE — 93010 ELECTROCARDIOGRAM REPORT: CPT | Mod: S$PBB,,, | Performed by: INTERNAL MEDICINE

## 2023-09-15 PROCEDURE — 3079F PR MOST RECENT DIASTOLIC BLOOD PRESSURE 80-89 MM HG: ICD-10-PCS | Mod: CPTII,,, | Performed by: NURSE PRACTITIONER

## 2023-09-15 PROCEDURE — 3044F PR MOST RECENT HEMOGLOBIN A1C LEVEL <7.0%: ICD-10-PCS | Mod: CPTII,,, | Performed by: NURSE PRACTITIONER

## 2023-09-15 PROCEDURE — 93005 ELECTROCARDIOGRAM TRACING: CPT | Mod: PBBFAC | Performed by: INTERNAL MEDICINE

## 2023-09-15 PROCEDURE — 99999 PR PBB SHADOW E&M-EST. PATIENT-LVL III: CPT | Mod: PBBFAC,,, | Performed by: NURSE PRACTITIONER

## 2023-09-15 PROCEDURE — 4010F PR ACE/ARB THEARPY RXD/TAKEN: ICD-10-PCS | Mod: CPTII,,, | Performed by: NURSE PRACTITIONER

## 2023-09-15 PROCEDURE — 1160F RVW MEDS BY RX/DR IN RCRD: CPT | Mod: CPTII,,, | Performed by: NURSE PRACTITIONER

## 2023-09-28 ENCOUNTER — PATIENT MESSAGE (OUTPATIENT)
Dept: ELECTROPHYSIOLOGY | Facility: CLINIC | Age: 43
End: 2023-09-28
Payer: MEDICAID

## 2023-10-02 ENCOUNTER — PATIENT MESSAGE (OUTPATIENT)
Dept: CARDIOLOGY | Facility: CLINIC | Age: 43
End: 2023-10-02
Payer: MEDICAID

## 2023-10-31 ENCOUNTER — TELEPHONE (OUTPATIENT)
Dept: FAMILY MEDICINE | Facility: CLINIC | Age: 43
End: 2023-10-31
Payer: MEDICAID

## 2023-11-02 ENCOUNTER — TELEPHONE (OUTPATIENT)
Dept: OTOLARYNGOLOGY | Facility: CLINIC | Age: 43
End: 2023-11-02

## 2023-11-02 NOTE — TELEPHONE ENCOUNTER
I call back about appmt, patient confirmed appmt but thought it was for a different time. Patient is feeling better and just canceled appmt and will reschedule prn.

## 2023-11-08 ENCOUNTER — OFFICE VISIT (OUTPATIENT)
Dept: FAMILY MEDICINE | Facility: CLINIC | Age: 43
End: 2023-11-08
Payer: MEDICAID

## 2023-11-08 VITALS
DIASTOLIC BLOOD PRESSURE: 83 MMHG | TEMPERATURE: 98 F | OXYGEN SATURATION: 99 % | HEART RATE: 60 BPM | HEIGHT: 66 IN | SYSTOLIC BLOOD PRESSURE: 138 MMHG | BODY MASS INDEX: 29.9 KG/M2 | WEIGHT: 186.06 LBS

## 2023-11-08 DIAGNOSIS — R63.4 WEIGHT LOSS: Primary | ICD-10-CM

## 2023-11-08 DIAGNOSIS — E66.09 CLASS 1 OBESITY DUE TO EXCESS CALORIES WITH SERIOUS COMORBIDITY AND BODY MASS INDEX (BMI) OF 30.0 TO 30.9 IN ADULT: ICD-10-CM

## 2023-11-08 PROCEDURE — 99499 UNLISTED E&M SERVICE: CPT | Mod: S$PBB,,, | Performed by: FAMILY MEDICINE

## 2023-11-08 PROCEDURE — 99213 OFFICE O/P EST LOW 20 MIN: CPT | Mod: PBBFAC,PO | Performed by: FAMILY MEDICINE

## 2023-11-08 PROCEDURE — 99999 PR PBB SHADOW E&M-EST. PATIENT-LVL III: CPT | Mod: PBBFAC,,, | Performed by: FAMILY MEDICINE

## 2023-11-08 PROCEDURE — 99999 PR PBB SHADOW E&M-EST. PATIENT-LVL III: ICD-10-PCS | Mod: PBBFAC,,, | Performed by: FAMILY MEDICINE

## 2023-11-08 PROCEDURE — 99499 NO LOS: ICD-10-PCS | Mod: S$PBB,,, | Performed by: FAMILY MEDICINE

## 2023-11-08 RX ORDER — SEMAGLUTIDE 0.25 MG/.5ML
0.25 INJECTION, SOLUTION SUBCUTANEOUS
Qty: 0.5 ML | Refills: 0 | Status: SHIPPED | OUTPATIENT
Start: 2023-11-08 | End: 2024-02-26 | Stop reason: SDUPTHER

## 2023-11-08 NOTE — PROGRESS NOTES
Assessment & Plan  Problem List Items Addressed This Visit    None  Visit Diagnoses       Weight loss    -  Primary    Class 1 obesity due to excess calories with serious comorbidity and body mass index (BMI) of 30.0 to 30.9 in adult        Relevant Medications    semaglutide, weight loss, (WEGOVY) 0.25 mg/0.5 mL PnIj              Health Maintenance reviewed,.    Follow-up: No follow-ups on file.    ______________________________________________________________________    Chief Complaint  No chief complaint on file.      HPI  Hollie Saldana is a 42 y.o. female with multiple medical diagnoses as listed in the medical history and problem list that presents for weight loss.  Pt is new to me  Weight loss:  she will eat late at night.  She may eat 10AM.   5-7 minutes to finish her meals.  It depends on her food.   Dinner:  mexican pizza- steak, cheese.  She ate 2 slices. She at this after 6pm.   Lunch:  [employer]  chicken with biscuit   Breakfast: skipped.       PAST MEDICAL HISTORY:  Past Medical History:   Diagnosis Date    Cardiomyopathy     History of tubal ligation     Hypertension     LBBB (left bundle branch block)     Nonischemic cardiomyopathy 10/23/2020       PAST SURGICAL HISTORY:  Past Surgical History:   Procedure Laterality Date    CARDIAC CATHETERIZATION  2017    CATHETERIZATION OF BOTH LEFT AND RIGHT HEART Bilateral 11/29/2022    Procedure: CATHETERIZATION, HEART, BOTH LEFT AND RIGHT;  Surgeon: Dez Hayden MD;  Location: [facility] CATH LAB;  Service: Cardiology;  Laterality: Bilateral;  RFA/V access, 4Fr art sheath  RN PREOP 11/23/2022    INSERTION OF BIVENTRICULAR IMPLANTABLE CARDIOVERTER-DEFIBRILLATOR (ICD) Left 6/12/2023    Procedure: INSERTION, ICD, BIVENTRICULAR;  Surgeon: Magdiel Rider MD;  Location: [facility] EP LAB;  Service: Cardiology;  Laterality: Left;  NICM,CRT-D, SJM, MAC, DM, 3 Prep       SOCIAL HISTORY:  Social History     Socioeconomic History    Marital status: Single   Tobacco Use     Smoking status: Never    Smokeless tobacco: Never   Substance and Sexual Activity    Alcohol use: Yes     Comment: Occasionally    Drug use: No       FAMILY HISTORY:  Family History   Problem Relation Age of Onset    Hypertension Mother     Thyroid disease Mother     Appendicitis Mother     Hypertension Father        ALLERGIES AND MEDICATIONS: updated and reviewed.  Review of patient's allergies indicates:  No Known Allergies  Current Outpatient Medications   Medication Sig Dispense Refill    hydrOXYzine HCL (ATARAX) 25 MG tablet Take 1 tablet (25 mg total) by mouth nightly as needed (insomnia). 30 tablet 1    metoprolol succinate (TOPROL-XL) 200 MG 24 hr tablet Take 2 tablets (400 mg total) by mouth once daily. 180 tablet 3    naltrexone-bupropion (CONTRAVE) 8-90 mg TbSR Take 1 tablet by mouth 2 (two) times daily. 60 tablet 1    sacubitriL-valsartan (ENTRESTO)  mg per tablet Take 1 tablet by mouth 2 (two) times daily. 180 tablet 3    spironolactone (ALDACTONE) 50 MG tablet Take 1 tablet (50 mg total) by mouth once daily. 90 tablet 3    topiramate (TOPAMAX) 25 MG tablet Take 1 tablet (25 mg total) by mouth every evening. 30 tablet 1    VITAMIN D2 1,250 mcg (50,000 unit) capsule TAKE 1 CAPSULE BY MOUTH once every 7 days 12 capsule 3    acetaminophen (TYLENOL) 650 MG TbSR Take 1 tablet (650 mg total) by mouth every 24 hours as needed (joint pain). (Patient not taking: Reported on 11/8/2023) 30 tablet 3    diclofenac sodium (VOLTAREN) 1 % Gel Apply 2 g topically 4 (four) times daily. for 10 days (Patient not taking: Reported on 11/8/2023) 100 g 0    famotidine (PEPCID) 20 MG tablet Take 1 tablet (20 mg total) by mouth nightly as needed for Heartburn. 30 tablet 0    semaglutide, weight loss, (WEGOVY) 0.25 mg/0.5 mL PnIj Inject 0.25 mg into the skin every 7 days. 0.5 mL 0    spironolactone (ALDACTONE) 25 MG tablet Take 1 tablet (25 mg total) by mouth once daily. (Patient not taking: Reported on 11/8/2023) 90  "tablet 0    sumatriptan (IMITREX) 25 MG Tab Take 25 mg by mouth.       No current facility-administered medications for this visit.         ROS  Review of Systems   Constitutional:  Negative for activity change, appetite change, fatigue, fever and unexpected weight change.   HENT: Negative.  Negative for ear discharge, ear pain, rhinorrhea and sore throat.    Eyes: Negative.    Respiratory:  Negative for apnea, cough, chest tightness, shortness of breath and wheezing.    Cardiovascular:  Negative for chest pain, palpitations and leg swelling.   Gastrointestinal:  Negative for abdominal distention, abdominal pain, constipation, diarrhea and vomiting.   Endocrine: Negative for cold intolerance, heat intolerance, polydipsia and polyuria.   Genitourinary:  Negative for decreased urine volume and urgency.   Musculoskeletal: Negative.    Skin:  Negative for rash.   Neurological:  Negative for dizziness and headaches.   Hematological:  Does not bruise/bleed easily.   Psychiatric/Behavioral:  Negative for agitation, sleep disturbance and suicidal ideas.            Physical Exam  Vitals:    11/08/23 0941   BP: 138/83   Pulse: 60   Temp: 97.7 °F (36.5 °C)   TempSrc: Oral   SpO2: 99%   Weight: 84.4 kg (186 lb 1.1 oz)   Height: 5' 6" (1.676 m)    Body mass index is 30.03 kg/m².  Weight: 84.4 kg (186 lb 1.1 oz)   Height: 5' 6" (167.6 cm)   Physical Exam  Constitutional:       Appearance: Normal appearance. She is well-developed.   HENT:      Head: Normocephalic and atraumatic.      Right Ear: External ear normal.      Left Ear: External ear normal.      Nose: Nose normal.   Eyes:      Extraocular Movements: Extraocular movements intact.      Conjunctiva/sclera: Conjunctivae normal.   Pulmonary:      Effort: Pulmonary effort is normal.   Neurological:      Mental Status: She is alert and oriented to person, place, and time.   Psychiatric:         Mood and Affect: Mood normal.         Behavior: Behavior normal.           Health " Maintenance         Date Due Completion Date    Cervical Cancer Screening Never done ---    COVID-19 Vaccine (4 - 2023-24 season) 09/01/2023 1/17/2022    Hemoglobin A1c (Prediabetes) 02/14/2024 2/14/2023    Mammogram 03/02/2024 3/2/2023    TETANUS VACCINE 05/19/2032 5/19/2022                Patient note was created using [employer].  Any errors in syntax or even information may not have been identified and edited on initial review prior to signing this note.

## 2023-11-10 NOTE — TELEPHONE ENCOUNTER
Called and spoke with [place] with [employer] states they can not prescribe a medication that is not an pharmacy benefit

## 2023-11-15 RX ORDER — SEMAGLUTIDE 1.34 MG/ML
1 INJECTION, SOLUTION SUBCUTANEOUS
Qty: 4 EACH | Refills: 1 | Status: SHIPPED | OUTPATIENT
Start: 2023-11-15 | End: 2023-11-16 | Stop reason: SDUPTHER

## 2023-11-16 ENCOUNTER — OFFICE VISIT (OUTPATIENT)
Dept: FAMILY MEDICINE | Facility: CLINIC | Age: 43
End: 2023-11-16
Payer: MEDICAID

## 2023-11-16 VITALS
BODY MASS INDEX: 30.41 KG/M2 | HEART RATE: 60 BPM | TEMPERATURE: 99 F | WEIGHT: 189.25 LBS | HEIGHT: 66 IN | OXYGEN SATURATION: 99 % | SYSTOLIC BLOOD PRESSURE: 138 MMHG | DIASTOLIC BLOOD PRESSURE: 86 MMHG

## 2023-11-16 DIAGNOSIS — M25.512 ACUTE PAIN OF BOTH SHOULDERS: Primary | ICD-10-CM

## 2023-11-16 DIAGNOSIS — M25.511 ACUTE PAIN OF BOTH SHOULDERS: Primary | ICD-10-CM
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
## 2023-11-16 DIAGNOSIS — S09.90XA HEAD TRAUMA, INITIAL ENCOUNTER: ICD-10-CM

## 2023-11-16 PROCEDURE — 3008F PR BODY MASS INDEX (BMI) DOCUMENTED: ICD-10-PCS | Mod: CPTII,,,

## 2023-11-16 PROCEDURE — 3075F PR MOST RECENT SYSTOLIC BLOOD PRESS GE 130-139MM HG: ICD-10-PCS | Mod: CPTII,,,

## 2023-11-16 PROCEDURE — 3079F PR MOST RECENT DIASTOLIC BLOOD PRESSURE 80-89 MM HG: ICD-10-PCS | Mod: CPTII,,,

## 2023-11-16 PROCEDURE — 99999 PR PBB SHADOW E&M-EST. PATIENT-LVL IV: CPT | Mod: PBBFAC,,,

## 2023-11-16 PROCEDURE — 3044F HG A1C LEVEL LT 7.0%: CPT | Mod: CPTII,,,

## 2023-11-16 PROCEDURE — 4010F PR ACE/ARB THEARPY RXD/TAKEN: ICD-10-PCS | Mod: CPTII,,,

## 2023-11-16 PROCEDURE — 99214 OFFICE O/P EST MOD 30 MIN: CPT | Mod: PBBFAC,PO

## 2023-11-16 PROCEDURE — 3008F BODY MASS INDEX DOCD: CPT | Mod: CPTII,,,

## 2023-11-16 PROCEDURE — 4010F ACE/ARB THERAPY RXD/TAKEN: CPT | Mod: CPTII,,,

## 2023-11-16 PROCEDURE — 3079F DIAST BP 80-89 MM HG: CPT | Mod: CPTII,,,

## 2023-11-16 PROCEDURE — 99999 PR PBB SHADOW E&M-EST. PATIENT-LVL IV: ICD-10-PCS | Mod: PBBFAC,,,

## 2023-11-16 PROCEDURE — 1159F PR MEDICATION LIST DOCUMENTED IN MEDICAL RECORD: ICD-10-PCS | Mod: CPTII,,,

## 2023-11-16 PROCEDURE — 3075F SYST BP GE 130 - 139MM HG: CPT | Mod: CPTII,,,

## 2023-11-16 PROCEDURE — 3044F PR MOST RECENT HEMOGLOBIN A1C LEVEL <7.0%: ICD-10-PCS | Mod: CPTII,,,

## 2023-11-16 PROCEDURE — 99213 OFFICE O/P EST LOW 20 MIN: CPT | Mod: S$PBB,,,

## 2023-11-16 PROCEDURE — 1159F MED LIST DOCD IN RCRD: CPT | Mod: CPTII,,,

## 2023-11-16 PROCEDURE — 99213 PR OFFICE/OUTPT VISIT, EST, LEVL III, 20-29 MIN: ICD-10-PCS | Mod: S$PBB,,,

## 2023-11-16 RX ORDER — SEMAGLUTIDE 1.34 MG/ML
1 INJECTION, SOLUTION SUBCUTANEOUS
Qty: 4 EACH | Refills: 1 | Status: SHIPPED | OUTPATIENT
Start: 2023-11-16 | End: 2024-01-22

## 2023-11-16 RX ORDER — IBUPROFEN 800 MG/1
800 TABLET ORAL 3 TIMES DAILY
Qty: 30 TABLET | Refills: 0 | OUTPATIENT
Start: 2023-11-16 | End: 2024-01-01

## 2023-11-16 RX ORDER — TIZANIDINE 2 MG/1
4 TABLET ORAL EVERY 6 HOURS PRN
Qty: 30 TABLET | Refills: 0 | Status: SHIPPED | OUTPATIENT
Start: 2023-11-16 | End: 2023-11-26
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
## 2023-11-17 ENCOUNTER — HOSPITAL ENCOUNTER (OUTPATIENT)
Dept: RADIOLOGY | Facility: HOSPITAL | Age: 43
Discharge: HOME | End: 2023-11-17
Payer: MEDICAID

## 2023-11-17 DIAGNOSIS — S09.90XA HEAD TRAUMA, INITIAL ENCOUNTER: ICD-10-CM

## 2023-11-17 PROCEDURE — 70450 CT HEAD/BRAIN W/O DYE: CPT | Mod: TC

## 2023-11-17 PROCEDURE — 70450 CT HEAD WITHOUT CONTRAST: ICD-10-PCS | Mod: 26,,, | Performed by: RADIOLOGY

## 2023-11-17 PROCEDURE — 70450 CT HEAD/BRAIN W/O DYE: CPT | Mod: 26,,, | Performed by: RADIOLOGY

## 2023-11-20 ENCOUNTER — PATIENT MESSAGE (OUTPATIENT)
Dept: ADMINISTRATIVE | Facility: OTHER | Age: 43
End: 2023-11-20
Payer: MEDICAID

## 2023-12-15 ENCOUNTER — CLINICAL SUPPORT (OUTPATIENT)
Dept: CARDIOLOGY | Facility: HOSPITAL | Age: 43
End: 2023-12-15
Payer: MEDICAID

## 2023-12-15 ENCOUNTER — CLINICAL SUPPORT (OUTPATIENT)
Dept: CARDIOLOGY | Facility: HOSPITAL | Age: 43
End: 2023-12-15
Attending: INTERNAL MEDICINE
Payer: MEDICAID

## 2023-12-15 DIAGNOSIS — I42.8 OTHER CARDIOMYOPATHIES: ICD-10-CM

## 2023-12-15 DIAGNOSIS — I42.9 CARDIOMYOPATHY, UNSPECIFIED: ICD-10-CM

## 2023-12-15 PROCEDURE — 93295 DEV INTERROG REMOTE 1/2/MLT: CPT | Mod: ,,, | Performed by: INTERNAL MEDICINE

## 2023-12-15 PROCEDURE — 93296 REM INTERROG EVL PM/IDS: CPT | Performed by: INTERNAL MEDICINE

## 2023-12-17 ENCOUNTER — HOSPITAL ENCOUNTER (EMERGENCY)
Facility: HOSPITAL | Age: 43
Discharge: HOME | End: 2023-12-17
Attending: EMERGENCY MEDICINE
Payer: MEDICAID

## 2023-12-17 VITALS
HEART RATE: 90 BPM | WEIGHT: 180 LBS | DIASTOLIC BLOOD PRESSURE: 99 MMHG | BODY MASS INDEX: 28.93 KG/M2 | RESPIRATION RATE: 18 BRPM | SYSTOLIC BLOOD PRESSURE: 157 MMHG | OXYGEN SATURATION: 99 % | HEIGHT: 66 IN | TEMPERATURE: 98 F

## 2023-12-17 DIAGNOSIS — N30.90 CYSTITIS: ICD-10-CM

## 2023-12-17 DIAGNOSIS — G89.29 CHRONIC PAIN OF RIGHT KNEE: ICD-10-CM

## 2023-12-17 DIAGNOSIS — R10.9 FLANK PAIN: Primary | ICD-10-CM

## 2023-12-17 DIAGNOSIS — M25.561 CHRONIC PAIN OF RIGHT KNEE: ICD-10-CM

## 2023-12-17 LAB
B-HCG UR QL: NEGATIVE
BILIRUBIN, POC UA: NEGATIVE
BLOOD, POC UA: ABNORMAL
CLARITY, POC UA: CLEAR
COLOR, POC UA: YELLOW
CTP QC/QA: YES
GLUCOSE, POC UA: NEGATIVE
KETONES, POC UA: NEGATIVE
LEUKOCYTE EST, POC UA: ABNORMAL
NITRITE, POC UA: NEGATIVE
PH UR STRIP: 6 [PH]
PROTEIN, POC UA: ABNORMAL
SPECIFIC GRAVITY, POC UA: >=1.03
UROBILINOGEN, POC UA: 0.2 E.U./DL

## 2023-12-17 PROCEDURE — 81025 URINE PREGNANCY TEST: CPT | Mod: ER

## 2023-12-17 PROCEDURE — 99284 EMERGENCY DEPT VISIT MOD MDM: CPT | Mod: 25,ER

## 2023-12-17 PROCEDURE — 81025 URINE PREGNANCY TEST: CPT | Mod: ER | Performed by: EMERGENCY MEDICINE

## 2023-12-17 RX ORDER — LIDOCAINE 50 MG/G
1 PATCH TOPICAL ONCE
Qty: 15 PATCH | Refills: 0 | Status: SHIPPED | OUTPATIENT
Start: 2023-12-17 | End: 2023-12-17

## 2023-12-17 RX ORDER — SULFAMETHOXAZOLE AND TRIMETHOPRIM 800; 160 MG/1; MG/1
1 TABLET ORAL 2 TIMES DAILY
Qty: 28 TABLET | Refills: 0 | Status: SHIPPED | OUTPATIENT
Start: 2023-12-17 | End: 2023-12-31

## 2023-12-17 RX ORDER — NAPROXEN 500 MG/1
500 TABLET ORAL 2 TIMES DAILY
Qty: 20 TABLET | Refills: 0 | Status: SHIPPED | OUTPATIENT
Start: 2023-12-17 | End: 2024-01-22

## 2023-12-17 NOTE — ED PROVIDER NOTES
Encounter Date: 12/17/2023       History     Chief Complaint   Patient presents with    Knee Pain     Right knee pain, denies injury, onset yesterday.  Also reports left flank and/or lower back pain, denies urinary symptoms, denies recent injury.      43-year-old female with past medical history of hypertension, cardiomyopathy presents to ED for multiple complaints.  She is complaining of a one-week history of left flank pain.  She is also complaining of acute onset right knee pain that started yesterday.  She denies any associated trauma, fall, head trauma, loss of consciousness.  She reports chronic knee pain bilaterally.  She is never seen an orthopedist for her pain.  She states that she had a recent flare to her right knee yesterday.  She denies any trauma.  She denies any attempted treatment.  She denies any fever, chills, chest pain, shortness of breath, abdominal pain, nausea, vomiting, diarrhea, dysuria, hematuria, bladder/bowel incontinence, saddle anesthesia.  No other symptoms reported.    The history is provided by the patient. No  was used.     Review of patient's allergies indicates:  No Known Allergies  Past Medical History:   Diagnosis Date    Cardiomyopathy     History of tubal ligation     Hypertension     LBBB (left bundle branch block)     Nonischemic cardiomyopathy 10/23/2020     Past Surgical History:   Procedure Laterality Date    CARDIAC CATHETERIZATION  2017    CATHETERIZATION OF BOTH LEFT AND RIGHT HEART Bilateral 11/29/2022    Procedure: CATHETERIZATION, HEART, BOTH LEFT AND RIGHT;  Surgeon: Dez Hayden MD;  Location: [facility] CATH LAB;  Service: Cardiology;  Laterality: Bilateral;  RFA/V access, 4Fr art sheath  RN PREOP 11/23/2022    INSERTION OF BIVENTRICULAR IMPLANTABLE CARDIOVERTER-DEFIBRILLATOR (ICD) Left 6/12/2023    Procedure: INSERTION, ICD, BIVENTRICULAR;  Surgeon: Magdiel Rider MD;  Location: [facility] EP LAB;  Service: Cardiology;  Laterality: Left;   NICM,CRT-D, SJM, MAC, DM, 3 Prep     Family History   Problem Relation Age of Onset    Hypertension Mother     Thyroid disease Mother     Appendicitis Mother     Hypertension Father      Social History     Tobacco Use    Smoking status: Never    Smokeless tobacco: Never   Substance Use Topics    Alcohol use: Yes     Comment: Occasionally    Drug use: No     Review of Systems   Constitutional:  Negative for chills and fever.   HENT:  Negative for congestion, ear pain, rhinorrhea and sore throat.    Eyes:  Negative for redness.   Respiratory:  Negative for cough and shortness of breath.    Cardiovascular:  Negative for chest pain.   Gastrointestinal:  Negative for abdominal pain, diarrhea, nausea and vomiting.   Genitourinary:  Positive for flank pain. Negative for decreased urine volume, difficulty urinating, dysuria, frequency, hematuria, urgency, vaginal bleeding, vaginal discharge and vaginal pain.        (-) bladder/bowel incontinence  (-) saddle anesthesia   Musculoskeletal:  Positive for arthralgias. Negative for back pain and neck pain.   Skin:  Negative for rash.   Neurological:  Negative for headaches.        (-) head trauma  (-) LOC   Psychiatric/Behavioral:  Negative for confusion.        Physical Exam     Initial Vitals [12/17/23 1407]   BP Pulse Resp Temp SpO2   (!) 150/98 88 18 98.2 °F (36.8 °C) 99 %      MAP       --         Physical Exam    Nursing note and vitals reviewed.  Constitutional: She appears well-developed and well-nourished.  Non-toxic appearance. She does not appear ill.   HENT:   Head: Normocephalic and atraumatic.   Right Ear: Hearing, tympanic membrane, external ear and ear canal normal. Tympanic membrane is not perforated, not erythematous and not bulging.   Left Ear: Hearing, tympanic membrane, external ear and ear canal normal. Tympanic membrane is not perforated, not erythematous and not bulging.   Nose: Nose normal.   Mouth/Throat: Uvula is midline, oropharynx is clear and moist  and mucous membranes are normal.   Eyes: Conjunctivae and EOM are normal.   Neck: Neck supple.   Normal range of motion.   Full passive range of motion without pain.     Cardiovascular:  Normal rate and regular rhythm.           Pulses:       Radial pulses are 2+ on the right side and 2+ on the left side.        Dorsalis pedis pulses are 2+ on the right side and 2+ on the left side.   Pulmonary/Chest: Effort normal and breath sounds normal. No accessory muscle usage. No respiratory distress. She has no decreased breath sounds.   Abdominal: Abdomen is soft. Bowel sounds are normal. She exhibits no distension. There is no abdominal tenderness.   No right CVA tenderness.  No left CVA tenderness. There is no rebound and no guarding.   Musculoskeletal:         General: Normal range of motion.      Cervical back: Full passive range of motion without pain, normal range of motion and neck supple. No rigidity.      Comments: Patella intact bilaterally.  No ACL, PCL, MCL, LCL laxity towards bilateral knees. Full ROM of neck. No neck rigidity. No midline tenderness to cervical, thoracic, or lumbar spine.  No bony step-offs.  Full range of motion of bilateral upper and lower extremities.  Strength and sensation intact bilateral upper and lower extremities.  Patient able to ambulate into the room.  No erythema, edema, bruising, rash, or cellulitis patient's back, bilateral knees, or bilateral lower extremities.      Neurological: She is alert. No cranial nerve deficit.   Neuro intact.  Strength and sensation intact bilateral upper and lower extremities.   Skin: Skin is warm and dry.   Psychiatric: She has a normal mood and affect.         ED Course   Procedures  Labs Reviewed   POCT URINALYSIS W/O SCOPE - Abnormal; Notable for the following components:       Result Value    Spec Grav UA >=1.030 (*)     Blood, UA Trace-intact (*)     Protein, UA Trace (*)     Leukocytes, UA 1+ (*)     All other components within normal limits    POCT URINE PREGNANCY   POCT URINALYSIS(INSTRUMENT)          Imaging Results    None          Medications - No data to display  Medical Decision Making  This is a 43-year-old female with past medical history of hypertension, cardiomyopathy presents to ED for multiple complaints.  She is complaining of a one-week history of left flank pain.  She is also complaining of acute onset right knee pain that started yesterday.   On physical exam, patient is well-appearing and in no acute distress.  Nontoxic appearing.  Lungs are clear to auscultation bilaterally.  Abdomen is soft and nontender.  No guarding, rigidity, rebound.  2+ radial pulses bilaterally.  Posterior oropharynx is not erythematous.  No edema or exudate.  Uvula midline.  Bilateral tympanic membrane is normal.  No erythema, bulging, or perforations.  Neuro intact.  Strength and sensation intact bilateral upper and lower extremities.  No CVA tenderness bilaterally.  Patella intact bilaterally.  No ACL, PCL, MCL, LCL laxity towards bilateral knees. Full ROM of neck. No neck rigidity. No midline tenderness to cervical, thoracic, or lumbar spine.  No bony step-offs.  Full range of motion of bilateral upper and lower extremities.  Strength and sensation intact bilateral upper and lower extremities.  Patient able to ambulate into the room.  No erythema, edema, bruising, rash, or cellulitis patient's back, bilateral knees, or bilateral lower extremities.  2+ DP pulses bilaterally.  UPT negative.  UA revealed 1+ leukocytes.  Given positive leukocytes in the urinalysis and flank pain.  I will treat patient for pyelonephritis.  I do not believe patient needs any imaging of her knee at this time.  I doubt nephrolithiasis at this time.  Will discharge patient on Bactrim, naproxen, and Lidoderm patches.  Advised patient to drink lot of fluids upon discharge.  Urged prompt follow-up with PCP for further evaluation.    Strict return precautions given. I discussed with the  patient/family the diagnosis, treatment plan, indications for return to the emergency department, and for expected follow-up. The patient/family verbalized an understanding. The patient/family is asked if there are any questions or concerns. We discuss the case, until all issues are addressed to the patient/family's satisfaction. Patient/family understands and is agreeable to the plan. Patient is stable and ready for discharge.      Amount and/or Complexity of Data Reviewed  Labs: ordered.    Risk  Prescription drug management.                                      Clinical Impression:  Final diagnoses:  [M25.561, G89.29] Chronic pain of right knee  [R10.9] Flank pain (Primary)  [N30.90] Cystitis          ED Disposition Condition    Discharge Stable          ED Prescriptions       Medication Sig Dispense Start Date End Date Auth. Provider    naproxen (NAPROSYN) 500 MG tablet Take 1 tablet (500 mg total) by mouth 2 (two) times daily. 20 tablet 12/17/2023 -- Orlando Olivares PA-C    LIDOcaine (LIDODERM) 5 % (Expires today) Place 1 patch onto the skin once. Remove & Discard patch within 12 hours or as directed by MD for 1 dose 15 patch 12/17/2023 12/17/2023 Orlando Olivares PA-C    sulfamethoxazole-trimethoprim 800-160mg (BACTRIM DS) 800-160 mg Tab Take 1 tablet by mouth 2 (two) times daily. for 14 days 28 tablet 12/17/2023 12/31/2023 Orlando Olivares PA-C          Follow-up Information       Follow up With Specialties Details Why Contact Info    Hebert Shepherd MD Family Medicine In 2 days for further evaluation [address] [facility] [address] [phone]      [facility] ED Emergency Medicine In 2 days If symptoms worsen [address] [facility] [id number] [phone]             Orlando Olivares PA-C  12/17/23 8256

## 2023-12-17 NOTE — DISCHARGE INSTRUCTIONS

## 2023-12-17 NOTE — Clinical Note
"Hollie"En Saldana was seen and treated in our emergency department on 12/17/2023.  She may return to work on 12/18/2023.       If you have any questions or concerns, please don't hesitate to call.      Orlando Olivares PA-C"

## 2023-12-27 DIAGNOSIS — I42.8 NONISCHEMIC CARDIOMYOPATHY: ICD-10-CM

## 2023-12-28 RX ORDER — SACUBITRIL AND VALSARTAN 97; 103 MG/1; MG/1
1 TABLET, FILM COATED ORAL 2 TIMES DAILY
Qty: 180 TABLET | Refills: 1 | Status: SHIPPED | OUTPATIENT
Start: 2023-12-28 | End: 2024-01-22 | Stop reason: SDUPTHER

## 2024-01-01 ENCOUNTER — HOSPITAL ENCOUNTER (EMERGENCY)
Facility: HOSPITAL | Age: 44
Discharge: HOME | End: 2024-01-01
Attending: EMERGENCY MEDICINE
Payer: MEDICAID

## 2024-01-01 VITALS
HEART RATE: 87 BPM | BODY MASS INDEX: 28.93 KG/M2 | OXYGEN SATURATION: 98 % | DIASTOLIC BLOOD PRESSURE: 97 MMHG | WEIGHT: 180 LBS | TEMPERATURE: 98 F | SYSTOLIC BLOOD PRESSURE: 143 MMHG | HEIGHT: 66 IN | RESPIRATION RATE: 20 BRPM

## 2024-01-01 DIAGNOSIS — J06.9 UPPER RESPIRATORY TRACT INFECTION, UNSPECIFIED TYPE: Primary | ICD-10-CM

## 2024-01-01 LAB
B-HCG UR QL: NEGATIVE
CTP QC/QA: YES
CTP QC/QA: YES
INFLUENZA A ANTIGEN, POC: NEGATIVE
INFLUENZA B ANTIGEN, POC: NEGATIVE
OHS CV AF BURDEN PERCENT: < 1
OHS CV BIV PACING PERCENT: 99 %
OHS CV DC REMOTE DEVICE TYPE: NORMAL
POC RAPID STREP A: NEGATIVE
SARS-COV-2 RDRP RESP QL NAA+PROBE: NEGATIVE

## 2024-01-01 PROCEDURE — 81025 URINE PREGNANCY TEST: CPT | Mod: ER

## 2024-01-01 PROCEDURE — 99283 EMERGENCY DEPT VISIT LOW MDM: CPT | Mod: 25,ER

## 2024-01-01 PROCEDURE — 81025 URINE PREGNANCY TEST: CPT | Mod: ER | Performed by: NURSE PRACTITIONER

## 2024-01-01 PROCEDURE — 87804 INFLUENZA ASSAY W/OPTIC: CPT | Mod: 59,ER

## 2024-01-01 PROCEDURE — 87635 SARS-COV-2 COVID-19 AMP PRB: CPT | Mod: ER | Performed by: NURSE PRACTITIONER

## 2024-01-01 RX ORDER — ACETAMINOPHEN 500 MG
1000 TABLET ORAL EVERY 6 HOURS PRN
Qty: 20 TABLET | Refills: 0 | Status: SHIPPED | OUTPATIENT
Start: 2024-01-01 | End: 2024-01-22

## 2024-01-01 RX ORDER — FLUTICASONE PROPIONATE 50 MCG
1 SPRAY, SUSPENSION (ML) NASAL 2 TIMES DAILY PRN
Qty: 15 G | Refills: 0 | Status: SHIPPED | OUTPATIENT
Start: 2024-01-01 | End: 2024-01-22

## 2024-01-01 RX ORDER — IBUPROFEN 600 MG/1
600 TABLET ORAL EVERY 6 HOURS PRN
Qty: 20 TABLET | Refills: 0 | Status: SHIPPED | OUTPATIENT
Start: 2024-01-01 | End: 2024-01-22

## 2024-01-01 RX ORDER — CETIRIZINE HYDROCHLORIDE 10 MG/1
10 TABLET ORAL DAILY
Qty: 30 TABLET | Refills: 0 | Status: SHIPPED | OUTPATIENT
Start: 2024-01-01 | End: 2024-01-22

## 2024-01-01 RX ORDER — GUAIFENESIN 600 MG/1
1200 TABLET, EXTENDED RELEASE ORAL 2 TIMES DAILY PRN
Qty: 40 TABLET | Refills: 0 | Status: SHIPPED | OUTPATIENT
Start: 2024-01-01 | End: 2024-01-11

## 2024-01-01 NOTE — DISCHARGE INSTRUCTIONS
COVID, flu, and strep tests are negative.    Thank you for coming to our Emergency Department today. It is important to remember that some problems or medical conditions are difficult to diagnose and may not be found during your Emergency Department visit.     Be sure to follow up with your primary care doctor and review all labs/imaging/tests that were performed during your ER visit with them. Some labs/tests may be outside of the normal range and require non-emergent follow-up and further investigation to help diagnose/exclude/prevent complications or other potentially serious medical conditions that were not addressed during your ER visit.    If you do not have a primary care doctor, you may contact the one listed on your discharge paperwork or you may also call the [facility] Appointment Desk at [phone] to schedule an appointment and establish care with one. It is important to your health that you have a primary care doctor.    Please take all medications as directed. All medications may potentially have side-effects and it is impossible to predict which medications may give you side-effects or what side-effects (if any) they will give you.. If you feel that you are having a negative effect or side-effect of any medication you should immediately stop taking them and seek medical attention. If you feel that you are having a life-threatening reaction call 911.    Return to the ER with any questions/concerns, new/concerning symptoms, worsening or failure to improve.     Do not drive, swim, climb to height, take a bath, operate heavy machinery, drink alcohol or take potentially sedating medications, sign any legal documents or make any important decisions for 24 hours if you have received any pain medications, sedatives or mood altering drugs during your ER visit or within 24 hours of taking them if they have been prescribed to you.     You can find additional resources for Dentists, hearing aids, durable  medical equipment, low cost pharmacies and other resources at [email or web address]    BELOW THIS LINE ONLY APPLIES IF YOU HAVE A COVID TEST PENDING OR IF YOU HAVE BEEN DIAGNOSED WITH COVID:  Please access [facility] to review the results of your test. Until the results of your COVID test return, you should isolate yourself so as not to potentially spread illness to others.   If your COVID test returns positive, you should isolate yourself so as not to spread illness to others. After five full days, if you are feeling better and you have not had fever for 24 hours, you can return to your typical daily activities, but you must wear a mask around others for an additional 5 days.   If your COVID test returns negative and you are either unvaccinated or more than six months out from your two-dose vaccine and are not yet boosted, you should still quarantine for 5 full days followed by strict mask use for an additional 5 full days.   If your COVID test returns negative and you have received your 2-dose initial vaccine as well as a booster, you should continue strict mask use for 10 full days after the exposure.  For all those exposed, best practice includes a test at day 5 after the exposure. This can be a home test or a test through one of the many testing centers throughout our community.   Masking is always advised to limit the spread of COVID. Cdc.gov is an excellent site to obtain the latest up to date recommendations regarding COVID and COVID testing.     CDC Testing and Quarantine Guidelines for patients with exposure to a known-positive COVID-19 person:  A close exposure is defined as anyone who has had an exposure (masked or unmasked) to a known COVID -19 positive person within 6 feet of someone for a cumulative total of 15 minutes or more over a 24-hour period.   Vaccinated and/or if you recently had a positive covid test within 90 days do NOT need to quarantine after contact with someone who had COVID-19  unless you develop symptoms.   Fully vaccinated people who have not had a positive test within 90 days, should get tested 3-5 days after their exposure, even if they don't have symptoms and wear a mask indoors in public for 14 days following exposure or until their test result is negative.      Unvaccinated and/or NOT had a positive test within 90 days and meet close exposure  You are required by CDC guidelines to quarantine for at least 5 days from time of exposure followed by 5 days of strict masking. It is recommended, but not required to test after 5 days, unless you develop symptoms, in which case you should test at that time.  If you get tested after 5 days and your test is positive, your 5 day period of isolation starts the day of the positive test.    If your exposure does not meet the above definition, you can return to your normal daily activities to include social distancing, wearing a mask and frequent handwashing.      Here is a link to guidance from the CDC:  https://www.cdc.gov/media/releases/2021/s1227-isolation-quarantine-guidance.html      [place] Dept Of Health Testing Sites:  https://ldh.[place].gov/page/3934      [facility] website with testing locations and guidance:  [email or web address]

## 2024-01-01 NOTE — ED PROVIDER NOTES
Encounter Date: 1/1/2024    SCRIBE #1 NOTE: I, Rose Yun, am scribing for, and in the presence of,  May Sullivan NP. I have scribed the following portions of the note - Other sections scribed: HPI, ROS.       History     Chief Complaint   Patient presents with    Sore Throat    Cough     Onset Thursday, now loss of voice     CC: Sore Throat    HPI: This is a 44 y/o female with PMHx of HTN, LBBB, and cardiomyopathy who presents to the ED complaining of a sore throat since yesterday. Pt also reports associated headache, cough, and loss of voice. No alleviating or exacerbating factors. Denies taking any OTC medication for her symptoms. Denies any fevers or associated symptoms. Denies any recent use of antibiotics. Denies cigarette/tobacco use. Denies any recent sick contact. Has no other complaints.       The history is provided by the patient. No  was used.     Review of patient's allergies indicates:  No Known Allergies  Past Medical History:   Diagnosis Date    Cardiomyopathy     History of tubal ligation     Hypertension     LBBB (left bundle branch block)     Nonischemic cardiomyopathy 10/23/2020     Past Surgical History:   Procedure Laterality Date    CARDIAC CATHETERIZATION  2017    CATHETERIZATION OF BOTH LEFT AND RIGHT HEART Bilateral 11/29/2022    Procedure: CATHETERIZATION, HEART, BOTH LEFT AND RIGHT;  Surgeon: Dez Hayden MD;  Location: [facility] CATH LAB;  Service: Cardiology;  Laterality: Bilateral;  RFA/V access, 4Fr art sheath  RN PREOP 11/23/2022    INSERTION OF BIVENTRICULAR IMPLANTABLE CARDIOVERTER-DEFIBRILLATOR (ICD) Left 6/12/2023    Procedure: INSERTION, ICD, BIVENTRICULAR;  Surgeon: Magdiel Rider MD;  Location: [facility] EP LAB;  Service: Cardiology;  Laterality: Left;  NICM,CRT-D, SJM, MAC, DM, 3 Prep     Family History   Problem Relation Age of Onset    Hypertension Mother     Thyroid disease Mother     Appendicitis Mother     Hypertension Father      Social  History     Tobacco Use    Smoking status: Never    Smokeless tobacco: Never   Substance Use Topics    Alcohol use: Yes     Comment: Occasionally    Drug use: No     Review of Systems   Constitutional:  Negative for fever.   HENT:  Positive for sore throat and voice change (loss of voice).    Eyes:  Negative for visual disturbance.   Respiratory:  Positive for cough. Negative for shortness of breath.    Cardiovascular:  Negative for chest pain.   Gastrointestinal:  Negative for abdominal pain.   Genitourinary:  Negative for difficulty urinating.   Musculoskeletal:  Negative for back pain.   Skin:  Negative for rash.   Neurological:  Positive for headaches.       Physical Exam     Initial Vitals [01/01/24 1015]   BP Pulse Resp Temp SpO2   (!) 147/101 91 20 98.1 °F (36.7 °C) 98 %      MAP       --         Physical Exam    Constitutional: She appears well-developed and well-nourished. She is not diaphoretic. No distress.   HENT:   Head: Normocephalic and atraumatic.   Right Ear: Hearing, tympanic membrane, external ear and ear canal normal.   Left Ear: Hearing, tympanic membrane, external ear and ear canal normal.   Nose: Mucosal edema and rhinorrhea present.   Mouth/Throat: Posterior oropharyngeal erythema present. No oropharyngeal exudate.   Eyes: Conjunctivae and EOM are normal. Pupils are equal, round, and reactive to light. Right eye exhibits no discharge. Left eye exhibits no discharge.   Neck: Neck supple.   Normal range of motion.  Cardiovascular:  Normal rate, regular rhythm, normal heart sounds and intact distal pulses.           Pulmonary/Chest: Breath sounds normal. No respiratory distress.   Abdominal: Abdomen is soft. Bowel sounds are normal. There is no abdominal tenderness. No hernia. There is no rigidity, no rebound, no guarding, no tenderness at McBurney's point and negative German's sign.   Musculoskeletal:         General: Normal range of motion.      Cervical back: Normal range of motion and neck  supple.     Neurological: She is alert and oriented to person, place, and time.   Skin: Skin is warm and dry.   Psychiatric: She has a normal mood and affect. Her behavior is normal.         ED Course   Procedures  Labs Reviewed   POCT URINE PREGNANCY   SARS-COV-2 RDRP GENE    Narrative:     This test utilizes isothermal nucleic acid amplification technology to detect the SARS-CoV-2 RdRp nucleic acid segment. The analytical sensitivity (limit of detection) is 500 copies/swab.     A POSITIVE result is indicative of the presence of SARS-CoV-2 RNA; clinical correlation with patient history and other diagnostic information is necessary to determine patient infection status.    A NEGATIVE result means that SARS-CoV-2 nucleic acids are not present above the limit of detection. A NEGATIVE result should be treated as presumptive. It does not rule out the possibility of COVID-19 and should not be the sole basis for treatment decisions. If COVID-19 is strongly suspected based on clinical and exposure history, re-testing using an alternate molecular assay should be considered.     This test is only for use under the Food and Drug Administration s Emergency Use Authorization (EUA).     Commercial kits are provided by [employer]. Performance characteristics of the EUA have been independently verified by [facility] Department of Pathology and Laboratory Medicine.   _________________________________________________________________   The authorized Fact Sheet for Healthcare Providers and the authorized Fact Sheet for Patients of the ID NOW COVID-19 are available on the FDA website:    https://www.fda.gov/media/851732/download      https://www.fda.gov/media/035119/download      POCT STREP A MOLECULAR   POCT INFLUENZA A/B MOLECULAR   POCT RAPID INFLUENZA A/B   POCT STREP A, RAPID          Imaging Results    None          Medications - No data to display  Medical Decision Making  This is an evaluation of a 43 y.o.  female that presents to the Emergency Department for sore throat and URI symptoms. The patient is a non-toxic, afebrile, and well appearing female. On physical exam ears and pharynx are without evidence of infection. Appears well hydrated with moist mucus membranes. Neck soft and supple with no meningeal signs or cervical lymphadenopathy. Breath sounds are clear and equal bilaterally with no adventitious breath sounds, tachypnea or respiratory distress with room air pulse ox of 98% and no evidence of hypoxia.     Vital Signs Are Reassuring.  RESULTS:   UPT negative.  COVID, flu, strep negative.    My overall impression is Viral URI. I considered, but at this time, do not suspect OM, OE, strep pharyngitis, meningitis, pneumonia, or acute bacterial sinusitis.    ED return precautions were discussed and understanding was verbalized. All questions or concerns have been addressed.     Amount and/or Complexity of Data Reviewed  Labs: ordered. Decision-making details documented in ED Course.    Risk  OTC drugs.  Prescription drug management.            Scribe Attestation:   Scribe #1: I performed the above scribed service and the documentation accurately describes the services I performed. I attest to the accuracy of the note.        ED Course as of 01/01/24 1427   Mon Jan 01, 2024   1212 SARS-CoV-2 RNA, Amplification, Qual: Negative [MT]   1212 Influenza B Ag: negative [MT]   1212 Inflenza A Ag: negative [MT]   1212 POC Rapid Strep A: negative [MT]      ED Course User Index  [MT] May Sullivan NP I, M Truxillo, personally performed the services described in this documentation. All medical record entries made by the scribe were at my direction and in my presence. I have reviewed the chart and agree that the record reflects my personal performance and is accurate and complete.    Clinical Impression:  Final diagnoses:  [J06.9] Upper respiratory tract infection, unspecified type (Primary)           ED Disposition Condition    Discharge Stable          ED Prescriptions       Medication Sig Dispense Start Date End Date Auth. Provider    ibuprofen (ADVIL,MOTRIN) 600 MG tablet Take 1 tablet (600 mg total) by mouth every 6 (six) hours as needed for Pain. 20 tablet 1/1/2024 -- May Sullivan NP    acetaminophen (TYLENOL) 500 MG tablet Take 2 tablets (1,000 mg total) by mouth every 6 (six) hours as needed for Pain. 20 tablet 1/1/2024 -- May Sullivan NP    cetirizine (ZYRTEC) 10 MG tablet Take 1 tablet (10 mg total) by mouth once daily. 30 tablet 1/1/2024 12/31/2024 May Sullivan NP    fluticasone propionate (FLONASE) 50 mcg/actuation nasal spray 1 spray (50 mcg total) by Each Nostril route 2 (two) times daily as needed for Rhinitis or Allergies. 15 g 1/1/2024 -- May Sullivan NP    guaiFENesin (MUCINEX) 600 mg 12 hr tablet Take 2 tablets (1,200 mg total) by mouth 2 (two) times daily as needed for Congestion. 40 tablet 1/1/2024 1/11/2024 May Sullivan NP    benzocaine-menthoL 6-10 mg lozenge Take 1 lozenge by mouth every 2 (two) hours as needed for Pain. 18 tablet 1/1/2024 -- May Sullivan NP          Follow-up Information       Follow up With Specialties Details Why Contact Info    Hebert Shepherd MD Family Medicine Schedule an appointment as soon as possible for a visit  For follow-up [address] [facility] [address] [phone]      [facility] ED Emergency Medicine Go to  If symptoms worsen [address] [facility] [address] [phone]             May Sullivan NP  01/01/24 7742

## 2024-01-22 ENCOUNTER — TELEPHONE (OUTPATIENT)
Dept: PULMONOLOGY | Facility: CLINIC | Age: 44
End: 2024-01-22
Payer: MEDICAID

## 2024-01-22 ENCOUNTER — OFFICE VISIT (OUTPATIENT)
Dept: CARDIOLOGY | Facility: CLINIC | Age: 44
End: 2024-01-22
Payer: MEDICAID

## 2024-01-22 VITALS
BODY MASS INDEX: 30.47 KG/M2 | RESPIRATION RATE: 20 BRPM | OXYGEN SATURATION: 20 % | SYSTOLIC BLOOD PRESSURE: 166 MMHG | HEIGHT: 66 IN | HEART RATE: 61 BPM | DIASTOLIC BLOOD PRESSURE: 94 MMHG | WEIGHT: 189.63 LBS

## 2024-01-22 DIAGNOSIS — Z95.810 BIVENTRICULAR ICD (IMPLANTABLE CARDIOVERTER-DEFIBRILLATOR) IN PLACE: Primary | ICD-10-CM

## 2024-01-22 DIAGNOSIS — I10 ESSENTIAL HYPERTENSION: ICD-10-CM

## 2024-01-22 DIAGNOSIS — G47.33 OSA (OBSTRUCTIVE SLEEP APNEA): ICD-10-CM

## 2024-01-22 DIAGNOSIS — I42.8 NONISCHEMIC CARDIOMYOPATHY: ICD-10-CM

## 2024-01-22 DIAGNOSIS — E66.9 NON MORBID OBESITY, UNSPECIFIED OBESITY TYPE: ICD-10-CM

## 2024-01-22 PROCEDURE — 1159F MED LIST DOCD IN RCRD: CPT | Mod: CPTII,,, | Performed by: INTERNAL MEDICINE

## 2024-01-22 PROCEDURE — 1160F RVW MEDS BY RX/DR IN RCRD: CPT | Mod: CPTII,,, | Performed by: INTERNAL MEDICINE

## 2024-01-22 PROCEDURE — 99214 OFFICE O/P EST MOD 30 MIN: CPT | Mod: S$PBB,,, | Performed by: INTERNAL MEDICINE

## 2024-01-22 PROCEDURE — 99999 PR PBB SHADOW E&M-EST. PATIENT-LVL IV: CPT | Mod: PBBFAC,,, | Performed by: INTERNAL MEDICINE

## 2024-01-22 PROCEDURE — 3008F BODY MASS INDEX DOCD: CPT | Mod: CPTII,,, | Performed by: INTERNAL MEDICINE

## 2024-01-22 PROCEDURE — 99214 OFFICE O/P EST MOD 30 MIN: CPT | Mod: PBBFAC,PO | Performed by: INTERNAL MEDICINE

## 2024-01-22 PROCEDURE — 3077F SYST BP >= 140 MM HG: CPT | Mod: CPTII,,, | Performed by: INTERNAL MEDICINE

## 2024-01-22 PROCEDURE — 3080F DIAST BP >= 90 MM HG: CPT | Mod: CPTII,,, | Performed by: INTERNAL MEDICINE

## 2024-01-22 RX ORDER — SACUBITRIL AND VALSARTAN 97; 103 MG/1; MG/1
1 TABLET, FILM COATED ORAL 2 TIMES DAILY
Qty: 180 TABLET | Refills: 3 | Status: SHIPPED | OUTPATIENT
Start: 2024-01-22 | End: 2024-06-19

## 2024-01-22 RX ORDER — HYDRALAZINE HYDROCHLORIDE 50 MG/1
50 TABLET, FILM COATED ORAL 2 TIMES DAILY
Qty: 180 TABLET | Refills: 3 | Status: SHIPPED | OUTPATIENT
Start: 2024-01-22 | End: 2025-01-21

## 2024-01-22 NOTE — TELEPHONE ENCOUNTER
----- Message from Dez Hayden MD sent at 1/22/2024 12:05 PM CST -----  Regarding: TAHIR fantasma  I had the chance to see your mutual patient today.  You saw her back in August and ordered a sleep study (home sleep test).  This is not yet been completed.  Can you please get in touch with the patient to assist her?    [facility]

## 2024-01-22 NOTE — PROGRESS NOTES
CARDIOVASCULAR PROGRESS NOTE    REASON FOR CONSULT:   Hollie Saldana is a 43 y.o. female who presents for follow up of NICM, HTN, LBBB.    PCP: Joao  EP: Ad[facility]  Sleep: Pedro Luis  HISTORY OF PRESENT ILLNESS:   The patient returns for follow up.  She denies intercurrent angina, dyspnea, palpitations, syncope, or defibrillator discharges.  There has been no PND, orthopnea, melena, hematuria, or claudication symptoms.  In the interim since her last visit, she was seen by sleep medicine with plans for home sleep testing, but this is not yet been completed.  Her blood pressure is uncontrolled and she reports compliance/adherence with her blood pressure/cardiomyopathic medications.    CARDIOVASCULAR HISTORY:   LBBB (normal cath 5/2017)  NICM, EF 45->25% (echo 11/2022).  Cath 11/2022 neg.  [facility] BiVICD (6/12/23, Dr. Rider)    PAST MEDICAL HISTORY:     Past Medical History:   Diagnosis Date    Cardiomyopathy     History of tubal ligation     Hypertension     LBBB (left bundle branch block)     Nonischemic cardiomyopathy 10/23/2020       PAST SURGICAL HISTORY:     Past Surgical History:   Procedure Laterality Date    CARDIAC CATHETERIZATION  2017    CATHETERIZATION OF BOTH LEFT AND RIGHT HEART Bilateral 11/29/2022    Procedure: CATHETERIZATION, HEART, BOTH LEFT AND RIGHT;  Surgeon: Dez Hayden MD;  Location: [facility] CATH LAB;  Service: Cardiology;  Laterality: Bilateral;  RFA/V access, 4Fr art sheath  RN PREOP 11/23/2022    INSERTION OF BIVENTRICULAR IMPLANTABLE CARDIOVERTER-DEFIBRILLATOR (ICD) Left 6/12/2023    Procedure: INSERTION, ICD, BIVENTRICULAR;  Surgeon: Magdiel Rider MD;  Location: [facility] EP LAB;  Service: Cardiology;  Laterality: Left;  NICM,CRT-D, SJM, MAC, DM, 3 Prep       ALLERGIES AND MEDICATION:   Review of patient's allergies indicates:  No Known Allergies  Previous Medications    BENZOCAINE-MENTHOL 6-10 MG LOZENGE    Take 1 lozenge by mouth every 2 (two) hours as needed for Pain.    DICLOFENAC  SODIUM (VOLTAREN) 1 % GEL    Apply 2 g topically 4 (four) times daily. for 10 days    FAMOTIDINE (PEPCID) 20 MG TABLET    Take 1 tablet (20 mg total) by mouth nightly as needed for Heartburn.    METOPROLOL SUCCINATE (TOPROL-XL) 200 MG 24 HR TABLET    Take 2 tablets (400 mg total) by mouth once daily.    SACUBITRIL-VALSARTAN (ENTRESTO)  MG PER TABLET    TAKE 1 TABLET BY MOUTH TWICE A DAY    SEMAGLUTIDE, WEIGHT LOSS, (WEGOVY) 0.25 MG/0.5 ML PNIJ    Inject 0.25 mg into the skin every 7 days.    SPIRONOLACTONE (ALDACTONE) 25 MG TABLET    Take 1 tablet (25 mg total) by mouth once daily.    SPIRONOLACTONE (ALDACTONE) 50 MG TABLET    Take 1 tablet (50 mg total) by mouth once daily.    SUMATRIPTAN (IMITREX) 25 MG TAB    Take 25 mg by mouth.    TOPIRAMATE (TOPAMAX) 25 MG TABLET    Take 1 tablet (25 mg total) by mouth every evening.    VITAMIN D2 1,250 MCG (50,000 UNIT) CAPSULE    TAKE 1 CAPSULE BY MOUTH once every 7 days       SOCIAL HISTORY:     Social History     Socioeconomic History    Marital status: Single   Tobacco Use    Smoking status: Never    Smokeless tobacco: Never   Substance and Sexual Activity    Alcohol use: Yes     Comment: Occasionally    Drug use: No       FAMILY HISTORY:     Family History   Problem Relation Age of Onset    Hypertension Mother     Thyroid disease Mother     Appendicitis Mother     Hypertension Father        REVIEW OF SYSTEMS:   Review of Systems   Constitutional:  Negative for chills, diaphoresis, fever and malaise/fatigue.   HENT:  Negative for nosebleeds.    Eyes:  Negative for blurred vision, double vision and photophobia.   Respiratory:  Negative for hemoptysis, shortness of breath and wheezing.    Cardiovascular:  Negative for chest pain, palpitations, orthopnea, claudication, leg swelling and PND.   Gastrointestinal:  Negative for abdominal pain, blood in stool, heartburn, melena, nausea and vomiting.   Genitourinary:  Negative for flank pain and hematuria.   Musculoskeletal:  " Negative for falls, myalgias and neck pain.   Skin:  Negative for rash.   Neurological:  Negative for dizziness, seizures, loss of consciousness, weakness and headaches.   Endo/Heme/Allergies:  Negative for polydipsia. Does not bruise/bleed easily.   Psychiatric/Behavioral:  Negative for depression and memory loss. The patient is not nervous/anxious.        PHYSICAL EXAM:     Vitals:    01/22/24 1148   BP: (!) 166/94   Pulse: 61   Resp: 20    Body mass index is 30.6 kg/m².  Weight: 86 kg (189 lb 9.5 oz)   Height: 5' 6" (167.6 cm)     Physical Exam  Vitals reviewed.   Constitutional:       General: She is not in acute distress.     Appearance: She is well-developed. She is obese. She is not ill-appearing, toxic-appearing or diaphoretic.   HENT:      Head: Normocephalic and atraumatic.   Eyes:      General: No scleral icterus.     Extraocular Movements: Extraocular movements intact.      Conjunctiva/sclera: Conjunctivae normal.      Pupils: Pupils are equal, round, and reactive to light.   Neck:      Thyroid: No thyromegaly.      Vascular: Normal carotid pulses. No carotid bruit or JVD.      Trachea: Trachea normal.   Cardiovascular:      Rate and Rhythm: Normal rate and regular rhythm.      Pulses:           Carotid pulses are 2+ on the right side and 2+ on the left side.     Heart sounds: S1 normal and S2 normal. No murmur heard.     No friction rub. No gallop.      Comments: L infraclavicular ICD incision well healed  Pulmonary:      Effort: Pulmonary effort is normal. No respiratory distress.      Breath sounds: Normal breath sounds. No stridor. No wheezing, rhonchi or rales.   Chest:      Chest wall: No tenderness.   Abdominal:      General: There is no distension.      Palpations: Abdomen is soft.   Musculoskeletal:         General: No swelling or tenderness. Normal range of motion.      Cervical back: Normal range of motion and neck supple. No edema or rigidity.      Right lower leg: No edema.      Left lower " leg: No edema.   Feet:      Right foot:      Skin integrity: No ulcer.      Left foot:      Skin integrity: No ulcer.   Skin:     General: Skin is warm and dry.      Coloration: Skin is not jaundiced.   Neurological:      General: No focal deficit present.      Mental Status: She is alert and oriented to person, place, and time.      Cranial Nerves: No cranial nerve deficit.   Psychiatric:         Mood and Affect: Mood normal.         Speech: Speech normal.         Behavior: Behavior normal. Behavior is cooperative.         DATA:   EKG: (personally reviewed tracing(s))  1/22/24 ?Ap-BiV paced 61    Laboratory:  CBC:  Recent Labs   Lab 11/23/22  1450 02/14/23  0939 06/05/23  1544   WBC 5.60 5.21 4.87   Hemoglobin 11.6 L 11.7 L 11.7 L   Hematocrit 35.7 L 37.9 36.5 L   Platelets 330 318 284         CHEMISTRIES:  Recent Labs   Lab 02/09/22  0700 11/23/22  1450 02/14/23  0939 03/15/23  1556 06/05/23  1544   Glucose 89   < > 72 70 78   Sodium 134 L   < > 137 137 139   Potassium 3.8   < > 3.9 3.9 3.9   BUN 9   < > 13 11 13   Creatinine 0.8   < > 0.8 0.8 0.9   eGFR if African American >60.0  --   --   --   --    eGFR if non  >60.0  --   --   --   --    Calcium 9.4   < > 9.2 9.1 9.2    < > = values in this interval not displayed.         CARDIAC BIOMARKERS:          COAGS:  Recent Labs   Lab 11/23/22  1450 01/26/23  2036 06/05/23  1544   INR 0.9 1.2 1.0         LIPIDS/LFTS:  Recent Labs   Lab 02/09/22  0700 02/14/23  0939   Cholesterol 173 187   Triglycerides 54 76   HDL 67 55   LDL Cholesterol 95.2 116.8   Non-HDL Cholesterol 106 132   AST 15 13   ALT 10 9 L       Lab Results   Component Value Date    TSH 0.300 (L) 02/14/2023         Cardiovascular Testing:  Echo 2/17/23  The left ventricle is mildly enlarged with eccentric hypertrophy and severely decreased systolic function.  The estimated ejection fraction is 25%.  Grade I left ventricular diastolic dysfunction.  Normal right ventricular size with normal  right ventricular systolic function.  Mild left atrial enlargement.  Mild mitral regurgitation.  Normal central venous pressure (3 mmHg).  The estimated PA systolic pressure is 22 mmHg.    Cath 11/25/22  RA 3  RV 24/5  PA 20/5/10  PAWP 6  /13  Ao 115/69/89  Sats:  HRA 69.2  LRA 73.1  RV 72.6  PA 71.5  FA 93.4   CO 5.4 L/min (thermal)  LVEF: 25-30% by echo  Dominance: Right  LM: normal  LAD: normal  LCx: normal  RCA: normal  Hemostasis:  RFA angioseal  RFV man comp  Impression:  LBBB/CMP  Normal cors  Normal hemodynamics  No evidence of intracardiac shunt  Above consistent with NICM.  Plan:  Cont med rx  Stop amlod  Inc entresto 49/51mg bid  Cont metoprolol  Eventual aldactone  Cont lifevest  Repeat echo in 3 months (late Feb 2023) for reassessment of LVEF    Holter 11/7/22  Sinus rhythm with heart rates varying between 75 and 138 BPM with an average of 94BPM.  There were very rare PACs totalling 7 and averaging 0.29 per hour.  The diary was not returned.    Renal art US 11/10/20  There is insignificant stenosis (0-59%) in the Right Renal Artery.  Right kidney 10.70 cm.  There is insignificant stenosis (0-59%) in the Left Renal Artery.  Left kidney 10.80 cm.      ASSESSMENT:   # NICM, EF 45->25% on echo 11/2022.  NICM without shunt on cath 11/2022.    # [facility] BiVICD 6/2023 (Dr. Rider)  # HTN, uncontrolled.  Renal art US 11/2020 neg.  # hx LBBB  # ?TAHIR, HST ordered (see by Dr. Cloud 8/2023)    PLAN:   Cont med rx  Cont GDMT  Add hydrala 50mg bid, titrate prn, next drug: Imdur +/- HCTZ  RN BP check 2 weeks (Dr. Shepherd)  Home sleep study, message sent to Dr. Cloud  Follow up with Dr. Rider/[facility] EPS as planned for device check  RTC 6 months (July 2024)  Consider as a candidate for renal denervation in the future.      Dez Hayden MD, [facility]

## 2024-01-22 NOTE — TELEPHONE ENCOUNTER
Received a message from Dr Hayden about no sleep study yet for Mrs Saldana who was seen in.August 2023.  [place] [facility] [place] [facility] [place] Saldana on 1-29-24. Appointment slip mailed. Negar Aquino LPN

## 2024-01-25 ENCOUNTER — PATIENT MESSAGE (OUTPATIENT)
Dept: CARDIOLOGY | Facility: CLINIC | Age: 44
End: 2024-01-25
Payer: MEDICAID

## 2024-01-29 ENCOUNTER — HOSPITAL ENCOUNTER (OUTPATIENT)
Dept: SLEEP MEDICINE | Facility: HOSPITAL | Age: 44
Discharge: HOME | End: 2024-01-29
Attending: INTERNAL MEDICINE
Payer: MEDICAID

## 2024-01-29 DIAGNOSIS — G47.19 EXCESSIVE DAYTIME SLEEPINESS: ICD-10-CM

## 2024-01-29 DIAGNOSIS — I42.8 NONISCHEMIC CARDIOMYOPATHY: ICD-10-CM

## 2024-01-29 DIAGNOSIS — G47.33 OSA (OBSTRUCTIVE SLEEP APNEA): ICD-10-CM

## 2024-01-29 DIAGNOSIS — I44.7 LBBB (LEFT BUNDLE BRANCH BLOCK): ICD-10-CM

## 2024-01-29 PROCEDURE — 95800 SLP STDY UNATTENDED: CPT

## 2024-01-29 NOTE — PROGRESS NOTES
The patient ID was verified.  She was instructed on how to turn the Home Sleep Testing device on and off, how to apply the sensors. She was encouraged to sleep on supine position and must have 6 hours of sleep. The patient was instructed not to get the device wet and return it back to us. All questions were answered prior to patient leaving.  She was provided the after visit summary.

## 2024-01-30 ENCOUNTER — TELEPHONE (OUTPATIENT)
Dept: SLEEP MEDICINE | Facility: HOSPITAL | Age: 44
End: 2024-01-30
Payer: MEDICAID

## 2024-01-30 NOTE — PROGRESS NOTES
The HSAT device was received and uploaded this evening. The recorded sleep data noted to be less than 4 hours and the patient did not report issue with the device during the study.

## 2024-02-07 ENCOUNTER — PATIENT MESSAGE (OUTPATIENT)
Dept: PULMONOLOGY | Facility: CLINIC | Age: 44
End: 2024-02-07
Payer: MEDICAID

## 2024-02-07 DIAGNOSIS — G47.33 OSA (OBSTRUCTIVE SLEEP APNEA): Primary | ICD-10-CM

## 2024-02-07 PROCEDURE — 95806 SLEEP STUDY UNATT&RESP EFFT: CPT | Mod: 26,52,, | Performed by: INTERNAL MEDICINE

## 2024-02-23 DIAGNOSIS — R05.9 COUGH, UNSPECIFIED TYPE: Primary | ICD-10-CM

## 2024-02-23 LAB — SARS-COV-2 RNA RESP QL NAA+PROBE: NOT DETECTED

## 2024-02-26 ENCOUNTER — OFFICE VISIT (OUTPATIENT)
Dept: FAMILY MEDICINE | Facility: CLINIC | Age: 44
End: 2024-02-26
Payer: MEDICAID

## 2024-02-26 ENCOUNTER — TELEPHONE (OUTPATIENT)
Dept: ELECTROPHYSIOLOGY | Facility: CLINIC | Age: 44
End: 2024-02-26
Payer: MEDICAID

## 2024-02-26 VITALS
HEART RATE: 71 BPM | HEIGHT: 66 IN | DIASTOLIC BLOOD PRESSURE: 70 MMHG | BODY MASS INDEX: 30.47 KG/M2 | WEIGHT: 189.63 LBS | TEMPERATURE: 99 F | SYSTOLIC BLOOD PRESSURE: 132 MMHG | OXYGEN SATURATION: 98 %

## 2024-02-26 DIAGNOSIS — E66.09 CLASS 1 OBESITY DUE TO EXCESS CALORIES WITH SERIOUS COMORBIDITY AND BODY MASS INDEX (BMI) OF 30.0 TO 30.9 IN ADULT: ICD-10-CM

## 2024-02-26 DIAGNOSIS — R05.9 COUGH, UNSPECIFIED TYPE: ICD-10-CM

## 2024-02-26 PROCEDURE — 99499 UNLISTED E&M SERVICE: CPT | Mod: S$PBB,,, | Performed by: FAMILY MEDICINE

## 2024-02-26 PROCEDURE — 99999 PR PBB SHADOW E&M-EST. PATIENT-LVL IV: CPT | Mod: PBBFAC,,, | Performed by: FAMILY MEDICINE

## 2024-02-26 PROCEDURE — 87635 SARS-COV-2 COVID-19 AMP PRB: CPT | Performed by: FAMILY MEDICINE

## 2024-02-26 PROCEDURE — 99214 OFFICE O/P EST MOD 30 MIN: CPT | Mod: PBBFAC,PO | Performed by: FAMILY MEDICINE

## 2024-02-26 RX ORDER — SEMAGLUTIDE 0.5 MG/.5ML
0.5 INJECTION, SOLUTION SUBCUTANEOUS
Qty: 4 EACH | Refills: 0 | Status: SHIPPED | OUTPATIENT
Start: 2024-03-27 | End: 2024-03-05

## 2024-02-26 RX ORDER — LEVOCETIRIZINE DIHYDROCHLORIDE 5 MG/1
5 TABLET, FILM COATED ORAL
COMMUNITY
Start: 2024-01-24

## 2024-02-26 RX ORDER — SEMAGLUTIDE 1 MG/.5ML
1 INJECTION, SOLUTION SUBCUTANEOUS
Qty: 4 EACH | Refills: 0 | Status: SHIPPED | OUTPATIENT
Start: 2024-04-26 | End: 2024-05-26

## 2024-02-26 RX ORDER — SEMAGLUTIDE 0.25 MG/.5ML
0.25 INJECTION, SOLUTION SUBCUTANEOUS
Qty: 0.5 ML | Refills: 0 | Status: SHIPPED | OUTPATIENT
Start: 2024-02-26 | End: 2024-02-27 | Stop reason: SDUPTHER

## 2024-02-26 RX ORDER — LIDOCAINE 50 MG/G
1 PATCH TOPICAL
COMMUNITY
Start: 2023-12-19 | End: 2024-05-22

## 2024-02-26 NOTE — PROGRESS NOTES
Assessment & Plan  Problem List Items Addressed This Visit    None  Visit Diagnoses       Cough, unspecified type        Class 1 obesity due to excess calories with serious comorbidity and body mass index (BMI) of 30.0 to 30.9 in adult        Relevant Medications    semaglutide, weight loss, (WEGOVY) 0.25 mg/0.5 mL PnIj    semaglutide, weight loss, (WEGOVY) 0.5 mg/0.5 mL PnIj (Start on 3/27/2024)    semaglutide, weight loss, (WEGOVY) 1 mg/0.5 mL PnIj (Start on 4/26/2024)          Unable to tolerate stimulants.  This impacts blood pressure     Health Maintenance reviewed, .    Follow-up: No follow-ups on file.    ______________________________________________________________________    Chief Complaint  Chief Complaint   Patient presents with    Weight Check       HPI  Hollie Saldana is a 43 y.o. female with multiple medical diagnoses as listed in the medical history and problem list that presents for weight check.  Pt is known to me with last appointment 11/8/2023.    Patient denies any new symptoms including chest pain, SOB, blurry vision, N/V, diarrhea.  Want to get on medications in order to assist weight loss. Limited due to chronic conditions that prevent stimulant therapy      PAST MEDICAL HISTORY:  Past Medical History:   Diagnosis Date    Cardiomyopathy     History of tubal ligation     Hypertension     LBBB (left bundle branch block)     Nonischemic cardiomyopathy 10/23/2020       PAST SURGICAL HISTORY:  Past Surgical History:   Procedure Laterality Date    CARDIAC CATHETERIZATION  2017    CATHETERIZATION OF BOTH LEFT AND RIGHT HEART Bilateral 11/29/2022    Procedure: CATHETERIZATION, HEART, BOTH LEFT AND RIGHT;  Surgeon: Dez Hayden MD;  Location: [facility] CATH LAB;  Service: Cardiology;  Laterality: Bilateral;  RFA/V access, 4Fr art sheath  RN PREOP 11/23/2022    INSERTION OF BIVENTRICULAR IMPLANTABLE CARDIOVERTER-DEFIBRILLATOR (ICD) Left 6/12/2023    Procedure: INSERTION, ICD, BIVENTRICULAR;   Surgeon: Magdiel Rider MD;  Location: [facility] EP LAB;  Service: Cardiology;  Laterality: Left;  NICM,CRT-D, SJM, MAC, DM, 3 Prep       SOCIAL HISTORY:  Social History     Socioeconomic History    Marital status: Single   Tobacco Use    Smoking status: Never    Smokeless tobacco: Never   Substance and Sexual Activity    Alcohol use: Yes     Comment: Occasionally    Drug use: No       FAMILY HISTORY:  Family History   Problem Relation Age of Onset    Hypertension Mother     Thyroid disease Mother     Appendicitis Mother     Hypertension Father        ALLERGIES AND MEDICATIONS: updated and reviewed.  Review of patient's allergies indicates:  No Known Allergies  Current Outpatient Medications   Medication Sig Dispense Refill    hydrALAZINE (APRESOLINE) 50 MG tablet Take 1 tablet (50 mg total) by mouth 2 (two) times a day. 180 tablet 3    levocetirizine (XYZAL) 5 MG tablet Take 5 mg by mouth.      LIDOcaine (LIDODERM) 5 % 1 patch.      metoprolol succinate (TOPROL-XL) 200 MG 24 hr tablet Take 2 tablets (400 mg total) by mouth once daily. 180 tablet 3    sacubitriL-valsartan (ENTRESTO)  mg per tablet Take 1 tablet by mouth 2 (two) times daily. 180 tablet 3    spironolactone (ALDACTONE) 50 MG tablet Take 1 tablet (50 mg total) by mouth once daily. 90 tablet 3    topiramate (TOPAMAX) 25 MG tablet TAKE 1 TABLET BY MOUTH EVERY EVENING 30 tablet 1    benzocaine-menthoL 6-10 mg lozenge Take 1 lozenge by mouth every 2 (two) hours as needed for Pain. (Patient not taking: Reported on 1/22/2024) 18 tablet 0    diclofenac sodium (VOLTAREN) 1 % Gel Apply 2 g topically 4 (four) times daily. for 10 days 100 g 0    famotidine (PEPCID) 20 MG tablet Take 1 tablet (20 mg total) by mouth nightly as needed for Heartburn. 30 tablet 0    semaglutide, weight loss, (WEGOVY) 0.25 mg/0.5 mL PnIj Inject 0.25 mg into the skin every 7 days. 0.5 mL 0    [START ON 3/27/2024] semaglutide, weight loss, (WEGOVY) 0.5 mg/0.5 mL PnIj Inject 0.5 mg into  "the skin every 7 days. 4 each 0    [START ON 4/26/2024] semaglutide, weight loss, (WEGOVY) 1 mg/0.5 mL PnIj Inject 1 mg into the skin every 7 days. 4 each 0    sumatriptan (IMITREX) 25 MG Tab Take 25 mg by mouth.      VITAMIN D2 1,250 mcg (50,000 unit) capsule TAKE 1 CAPSULE BY MOUTH once every 7 days (Patient not taking: Reported on 2/26/2024) 12 capsule 3     No current facility-administered medications for this visit.         ROS  Review of Systems   Constitutional:  Negative for activity change, appetite change, fatigue, fever and unexpected weight change.   HENT: Negative.  Negative for ear discharge, ear pain, rhinorrhea and sore throat.    Eyes: Negative.    Respiratory:  Negative for apnea, cough, chest tightness, shortness of breath and wheezing.    Cardiovascular:  Negative for chest pain, palpitations and leg swelling.   Gastrointestinal:  Negative for abdominal distention, abdominal pain, constipation, diarrhea and vomiting.   Endocrine: Negative for cold intolerance, heat intolerance, polydipsia and polyuria.   Genitourinary:  Negative for decreased urine volume and urgency.   Musculoskeletal: Negative.    Skin:  Negative for rash.   Neurological:  Negative for dizziness and headaches.   Hematological:  Does not bruise/bleed easily.   Psychiatric/Behavioral:  Negative for agitation, sleep disturbance and suicidal ideas.            Physical Exam  Vitals:    02/26/24 0705   BP: 132/70   Pulse: 71   Temp: 98.5 °F (36.9 °C)   SpO2: 98%   Weight: 86 kg (189 lb 9.5 oz)   Height: 5' 6" (1.676 m)    Body mass index is 30.6 kg/m².  Weight: 86 kg (189 lb 9.5 oz)   Height: 5' 6" (167.6 cm)   Physical Exam  Vitals reviewed.   Constitutional:       Appearance: Normal appearance. She is well-developed.   HENT:      Head: Normocephalic and atraumatic.      Right Ear: External ear normal.      Left Ear: External ear normal.      Nose: Nose normal.      Mouth/Throat:      Mouth: Mucous membranes are moist.      Pharynx: " Oropharynx is clear.   Eyes:      Extraocular Movements: Extraocular movements intact.      Conjunctiva/sclera: Conjunctivae normal.      Pupils: Pupils are equal, round, and reactive to light.   Cardiovascular:      Rate and Rhythm: Normal rate and regular rhythm.      Heart sounds: Normal heart sounds.   Pulmonary:      Effort: Pulmonary effort is normal.      Breath sounds: Normal breath sounds.   Skin:     General: Skin is warm and dry.   Neurological:      Mental Status: She is alert and oriented to person, place, and time.           Health Maintenance         Date Due Completion Date    Cervical Cancer Screening Never done ---    COVID-19 Vaccine (4 - 2023-24 season) 09/01/2023 1/17/2022    Hemoglobin A1c (Prediabetes) 02/14/2024 2/14/2023    Mammogram 03/02/2024 3/2/2023    TETANUS VACCINE 05/19/2032 5/19/2022                Patient note was created using Graph[employer].  Any errors in syntax or even information may not have been identified and edited on initial review prior to signing this note.

## 2024-02-27 RX ORDER — SEMAGLUTIDE 0.25 MG/.5ML
0.25 INJECTION, SOLUTION SUBCUTANEOUS
Qty: 0.5 ML | Refills: 0 | Status: SHIPPED | OUTPATIENT
Start: 2024-02-27 | End: 2024-03-05

## 2024-02-27 RX ORDER — TIRZEPATIDE 2.5 MG/.5ML
2.5 INJECTION, SOLUTION SUBCUTANEOUS
Qty: 4 PEN | Refills: 0 | Status: SHIPPED | OUTPATIENT
Start: 2024-02-27 | End: 2024-03-05

## 2024-03-05 ENCOUNTER — TELEPHONE (OUTPATIENT)
Dept: FAMILY MEDICINE | Facility: CLINIC | Age: 44
End: 2024-03-05
Payer: MEDICAID

## 2024-03-05 NOTE — TELEPHONE ENCOUNTER
Patient states can we try another option looks like medicaid don't want to pay for weight loss medication. Medication patient referring to is (ZEPBOUND)  Please advise

## 2024-03-15 ENCOUNTER — CLINICAL SUPPORT (OUTPATIENT)
Dept: CARDIOLOGY | Facility: HOSPITAL | Age: 44
End: 2024-03-15
Attending: INTERNAL MEDICINE
Payer: MEDICAID

## 2024-03-15 ENCOUNTER — CLINICAL SUPPORT (OUTPATIENT)
Dept: CARDIOLOGY | Facility: HOSPITAL | Age: 44
End: 2024-03-15
Payer: MEDICAID

## 2024-03-15 DIAGNOSIS — I42.8 OTHER CARDIOMYOPATHIES: ICD-10-CM

## 2024-03-15 DIAGNOSIS — I42.9 CARDIOMYOPATHY, UNSPECIFIED: ICD-10-CM

## 2024-03-15 PROCEDURE — 93295 DEV INTERROG REMOTE 1/2/MLT: CPT | Mod: ,,, | Performed by: INTERNAL MEDICINE

## 2024-03-15 PROCEDURE — 93296 REM INTERROG EVL PM/IDS: CPT | Performed by: INTERNAL MEDICINE

## 2024-03-20 LAB
OHS CV AF BURDEN PERCENT: < 1
OHS CV BIV PACING PERCENT: 99 %
OHS CV DC REMOTE DEVICE TYPE: NORMAL

## 2024-03-22 ENCOUNTER — HOSPITAL ENCOUNTER (OUTPATIENT)
Dept: CARDIOLOGY | Facility: CLINIC | Age: 44
Discharge: HOME | End: 2024-03-22
Payer: MEDICAID

## 2024-03-22 ENCOUNTER — HOSPITAL ENCOUNTER (OUTPATIENT)
Dept: CARDIOLOGY | Facility: HOSPITAL | Age: 44
Discharge: HOME | End: 2024-03-22
Attending: INTERNAL MEDICINE
Payer: MEDICAID

## 2024-03-22 ENCOUNTER — OFFICE VISIT (OUTPATIENT)
Dept: ELECTROPHYSIOLOGY | Facility: CLINIC | Age: 44
End: 2024-03-22
Payer: MEDICAID

## 2024-03-22 ENCOUNTER — CLINICAL SUPPORT (OUTPATIENT)
Dept: CARDIOLOGY | Facility: HOSPITAL | Age: 44
End: 2024-03-22
Attending: INTERNAL MEDICINE
Payer: MEDICAID

## 2024-03-22 VITALS
BODY MASS INDEX: 32.34 KG/M2 | WEIGHT: 201.25 LBS | HEIGHT: 66 IN | HEART RATE: 53 BPM | DIASTOLIC BLOOD PRESSURE: 88 MMHG | SYSTOLIC BLOOD PRESSURE: 132 MMHG

## 2024-03-22 DIAGNOSIS — I50.22 CHRONIC SYSTOLIC CONGESTIVE HEART FAILURE, NYHA CLASS 1: ICD-10-CM

## 2024-03-22 DIAGNOSIS — I44.7 LBBB (LEFT BUNDLE BRANCH BLOCK): ICD-10-CM

## 2024-03-22 DIAGNOSIS — I50.22 CHF (CONGESTIVE HEART FAILURE), NYHA CLASS III, CHRONIC, SYSTOLIC: Primary | ICD-10-CM

## 2024-03-22 DIAGNOSIS — I10 ESSENTIAL HYPERTENSION: ICD-10-CM

## 2024-03-22 DIAGNOSIS — I42.8 NONISCHEMIC CARDIOMYOPATHY: ICD-10-CM

## 2024-03-22 DIAGNOSIS — Z95.810 BIVENTRICULAR ICD (IMPLANTABLE CARDIOVERTER-DEFIBRILLATOR) IN PLACE: ICD-10-CM

## 2024-03-22 DIAGNOSIS — I50.22 CHF (CONGESTIVE HEART FAILURE), NYHA CLASS III, CHRONIC, SYSTOLIC: ICD-10-CM

## 2024-03-22 DIAGNOSIS — G47.33 OSA (OBSTRUCTIVE SLEEP APNEA): ICD-10-CM

## 2024-03-22 LAB
ASCENDING AORTA: 2.56 CM
AV INDEX (PROSTH): 0.72
AV MEAN GRADIENT: 7 MMHG
AV PEAK GRADIENT: 12 MMHG
AV VALVE AREA BY VELOCITY RATIO: 2.05 CM²
AV VALVE AREA: 2.17 CM²
AV VELOCITY RATIO: 0.68
BSA FOR ECHO PROCEDURE: 2.06 M2
CV ECHO LV RWT: 0.48 CM
DOP CALC AO PEAK VEL: 1.71 M/S
DOP CALC AO VTI: 32.9 CM
DOP CALC LVOT AREA: 3 CM2
DOP CALC LVOT DIAMETER: 1.96 CM
DOP CALC LVOT PEAK VEL: 1.16 M/S
DOP CALC LVOT STROKE VOLUME: 71.47 CM3
DOP CALC MV VTI: 26.8 CM
DOP CALCLVOT PEAK VEL VTI: 23.7 CM
E WAVE DECELERATION TIME: 193.54 MSEC
E/A RATIO: 1.55
E/E' RATIO: 6.58 M/S
ECHO LV POSTERIOR WALL: 1.16 CM (ref 0.6–1.1)
FRACTIONAL SHORTENING: 34 % (ref 28–44)
INTERVENTRICULAR SEPTUM: 1.15 CM (ref 0.6–1.1)
IVC DIAMETER: 1.46 CM
IVRT: 97.05 MSEC
LA MAJOR: 6.07 CM
LA MINOR: 5.19 CM
LA WIDTH: 3.7 CM
LEFT ATRIUM SIZE: 3.93 CM
LEFT ATRIUM VOLUME INDEX: 34.4 ML/M2
LEFT ATRIUM VOLUME: 69.16 CM3
LEFT INTERNAL DIMENSION IN SYSTOLE: 3.18 CM (ref 2.1–4)
LEFT VENTRICLE DIASTOLIC VOLUME INDEX: 53.13 ML/M2
LEFT VENTRICLE DIASTOLIC VOLUME: 106.79 ML
LEFT VENTRICLE MASS INDEX: 103 G/M2
LEFT VENTRICLE SYSTOLIC VOLUME INDEX: 20.1 ML/M2
LEFT VENTRICLE SYSTOLIC VOLUME: 40.49 ML
LEFT VENTRICULAR INTERNAL DIMENSION IN DIASTOLE: 4.79 CM (ref 3.5–6)
LEFT VENTRICULAR MASS: 206.94 G
LV LATERAL E/E' RATIO: 5.27 M/S
LV SEPTAL E/E' RATIO: 8.78 M/S
LVOT MG: 2.64 MMHG
LVOT MV: 0.74 CM/S
MV MEAN GRADIENT: 1 MMHG
MV PEAK A VEL: 0.51 M/S
MV PEAK E VEL: 0.79 M/S
MV PEAK GRADIENT: 3 MMHG
MV STENOSIS PRESSURE HALF TIME: 56.13 MS
MV VALVE AREA BY CONTINUITY EQUATION: 2.67 CM2
MV VALVE AREA P 1/2 METHOD: 3.92 CM2
OHS QRS DURATION: 102 MS
OHS QRS DURATION: 96 MS
OHS QTC CALCULATION: 438 MS
OHS QTC CALCULATION: 438 MS
PISA TR MAX VEL: 2.31 M/S
PULM VEIN S/D RATIO: 2.04
PV PEAK D VEL: 0.26 M/S
PV PEAK GRADIENT: 5 MMHG
PV PEAK S VEL: 0.53 M/S
PV PEAK VELOCITY: 1.16 M/S
RA MAJOR: 5.42 CM
RA PRESSURE ESTIMATED: 3 MMHG
RA WIDTH: 3.7 CM
RIGHT VENTRICULAR END-DIASTOLIC DIMENSION: 3.61 CM
RV TB RVSP: 5 MMHG
RV TISSUE DOPPLER FREE WALL SYSTOLIC VELOCITY 1 (APICAL 4 CHAMBER VIEW): 15.91 CM/S
SINUS: 3.01 CM
STJ: 2.37 CM
TDI LATERAL: 0.15 M/S
TDI SEPTAL: 0.09 M/S
TDI: 0.12 M/S
TR MAX PG: 21 MMHG
TRICUSPID ANNULAR PLANE SYSTOLIC EXCURSION: 2.45 CM
TV REST PULMONARY ARTERY PRESSURE: 24 MMHG
Z-SCORE OF LEFT VENTRICULAR DIMENSION IN END DIASTOLE: -2.07
Z-SCORE OF LEFT VENTRICULAR DIMENSION IN END SYSTOLE: -1.01

## 2024-03-22 PROCEDURE — 99213 OFFICE O/P EST LOW 20 MIN: CPT | Mod: PBBFAC,25 | Performed by: INTERNAL MEDICINE

## 2024-03-22 PROCEDURE — 93005 ELECTROCARDIOGRAM TRACING: CPT | Mod: PBBFAC,59 | Performed by: INTERNAL MEDICINE

## 2024-03-22 PROCEDURE — 93284 PRGRMG EVAL IMPLANTABLE DFB: CPT | Mod: 26,,, | Performed by: INTERNAL MEDICINE

## 2024-03-22 PROCEDURE — 1160F RVW MEDS BY RX/DR IN RCRD: CPT | Mod: CPTII,,, | Performed by: INTERNAL MEDICINE

## 2024-03-22 PROCEDURE — 99999 PR PBB SHADOW E&M-EST. PATIENT-LVL III: CPT | Mod: PBBFAC,,, | Performed by: INTERNAL MEDICINE

## 2024-03-22 PROCEDURE — 4010F ACE/ARB THERAPY RXD/TAKEN: CPT | Mod: CPTII,,, | Performed by: INTERNAL MEDICINE

## 2024-03-22 PROCEDURE — 93010 ELECTROCARDIOGRAM REPORT: CPT | Mod: S$PBB,,, | Performed by: INTERNAL MEDICINE

## 2024-03-22 PROCEDURE — 3008F BODY MASS INDEX DOCD: CPT | Mod: CPTII,,, | Performed by: INTERNAL MEDICINE

## 2024-03-22 PROCEDURE — 93010 ELECTROCARDIOGRAM REPORT: CPT | Mod: S$PBB,59,, | Performed by: INTERNAL MEDICINE

## 2024-03-22 PROCEDURE — 3079F DIAST BP 80-89 MM HG: CPT | Mod: CPTII,,, | Performed by: INTERNAL MEDICINE

## 2024-03-22 PROCEDURE — 99215 OFFICE O/P EST HI 40 MIN: CPT | Mod: S$PBB,,, | Performed by: INTERNAL MEDICINE

## 2024-03-22 PROCEDURE — 93306 TTE W/DOPPLER COMPLETE: CPT | Mod: 26,,, | Performed by: INTERNAL MEDICINE

## 2024-03-22 PROCEDURE — 1159F MED LIST DOCD IN RCRD: CPT | Mod: CPTII,,, | Performed by: INTERNAL MEDICINE

## 2024-03-22 PROCEDURE — 93306 TTE W/DOPPLER COMPLETE: CPT

## 2024-03-22 PROCEDURE — 3075F SYST BP GE 130 - 139MM HG: CPT | Mod: CPTII,,, | Performed by: INTERNAL MEDICINE

## 2024-03-22 PROCEDURE — 93284 PRGRMG EVAL IMPLANTABLE DFB: CPT

## 2024-03-22 NOTE — PROGRESS NOTES
Subjective:   Patient ID:  Hollie Saldana is a 43 y.o. female who presents for follow up of NICM.     HPI:    Ms. Saldana is a 43 y.o. female with NICM, LBBB here for follow up after CRT-D implantation.    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  No hx of syncope  Aunt  of SCD     25-30%.   25%  NICM despite med Rx.    2023: Successful implantation of ICD BIV (leads in RA, RVA, left bundle branch area) placed for primary intervention.    Today she says overall she is feeling well. No CHF symptoms. No ESTRELLA, CP, palps, LH, syncope reported.     ICD: 99% biVP.   My interpretation of today's ECG is SR with biVP and remarkably narrow QRSd    Relevant Cardiac Test Results:  CPX (23):  Severe functional impairment associated with a normal breathing reserve, normal oxygen stauration, poor effort, and a reduced AT. These findings are indicative of functional impairment secondary to circulatory insufficiency, poor effort.  There were no arrhythmias during stress.  There was no ST segment deviation noted during stress.  The test was stopped because the patient experienced fatigue.  The ECG portion of this study is negative for myocardial ischemia.    Current Outpatient Medications   Medication Sig    hydrOXYzine HCL (ATARAX) 25 MG tablet Take 1 tablet (25 mg total) by mouth nightly as needed (insomnia).    metoprolol succinate (TOPROL-XL) 200 MG 24 hr tablet Take 2 tablets (400 mg total) by mouth once daily.    naltrexone-bupropion (CONTRAVE) 8-90 mg TbSR Take 1 tablet by mouth 2 (two) times daily.    sacubitriL-valsartan (ENTRESTO)  mg per tablet Take 1 tablet by mouth 2 (two) times daily.    semaglutide (OZEMPIC) 1 mg/dose (4 mg/3 mL) Inject 1 mg into the skin every 7 days.    semaglutide (OZEMPIC) 2 mg/dose (8 mg/3 mL) PnIj inject 2  "mg UNDER THE SKIN once every 7 days    spironolactone (ALDACTONE) 50 MG tablet Take 1 tablet (50 mg total) by mouth once daily.    sumatriptan (IMITREX) 25 MG Tab Take 25 mg by mouth.    VITAMIN D2 1,250 mcg (50,000 unit) capsule TAKE 1 CAPSULE BY MOUTH once every 7 days    famotidine (PEPCID) 20 MG tablet Take 1 tablet (20 mg total) by mouth nightly as needed for Heartburn.     No current facility-administered medications for this visit.       Review of Systems   Constitutional: Negative. Negative for malaise/fatigue.   HENT: Negative.  Negative for ear pain and tinnitus.    Eyes:  Negative for blurred vision.   Cardiovascular: Negative.  Negative for chest pain, dyspnea on exertion, irregular heartbeat, leg swelling, near-syncope, palpitations and syncope.   Respiratory: Negative.  Negative for shortness of breath.    Endocrine: Negative.  Negative for polyuria.   Hematologic/Lymphatic: Negative for bleeding problem. Does not bruise/bleed easily.   Skin: Negative.  Negative for rash.   Musculoskeletal: Negative.  Negative for joint pain, muscle weakness and myalgias.   Gastrointestinal: Negative.  Negative for abdominal pain, change in bowel habit, hematemesis, hematochezia and nausea.   Genitourinary:  Negative for frequency and hematuria.   Neurological: Negative.  Negative for dizziness, light-headedness and weakness.   Psychiatric/Behavioral: Negative.  Negative for altered mental status and depression. The patient is not nervous/anxious.    Allergic/Immunologic: Negative for environmental allergies and persistent infections.       Objective:          /88   Pulse (!) 53   Ht 5' 6" (1.676 m)   Wt 91.3 kg (201 lb 4.5 oz)   BMI 32.49 kg/m²     Physical Exam  Vitals and nursing note reviewed.   Constitutional:       Appearance: Normal appearance. She is well-developed.   HENT:      Head: Normocephalic and atraumatic.      Nose: Nose normal.   Eyes:      Conjunctiva/sclera: Conjunctivae normal.      " Pupils: Pupils are equal, round, and reactive to light.   Neck:      Thyroid: No thyroid mass or thyromegaly.      Trachea: No tracheal deviation.   Cardiovascular:      Rate and Rhythm: Normal rate and regular rhythm.   Pulmonary:      Effort: Pulmonary effort is normal. No respiratory distress.      Breath sounds: No wheezing.   Chest:      Comments: Device to LUCW. Incision and pocket in good repair.  Keloid noted  Abdominal:      General: There is no distension.   Musculoskeletal:         General: Normal range of motion.      Cervical back: Normal range of motion. No edema.   Skin:     General: Skin is warm and dry.      Findings: No erythema or rash.   Neurological:      Mental Status: She is alert and oriented to person, place, and time.      Coordination: Coordination normal.   Psychiatric:         Speech: Speech normal.         Behavior: Behavior normal. Behavior is cooperative.         Thought Content: Thought content normal.           Lab Results   Component Value Date     06/05/2023    K 3.9 06/05/2023    BUN 13 06/05/2023    CREATININE 0.9 06/05/2023    ALT 9 (L) 02/14/2023    AST 13 02/14/2023    HGB 11.7 (L) 06/05/2023    HCT 36.5 (L) 06/05/2023    TSH 0.300 (L) 02/14/2023    LDLCALC 116.8 02/14/2023    LDLCALC 119 (H) 01/28/2020       Recent Labs   Lab 04/12/22  0009 11/23/22  1450 01/26/23  2036 06/05/23  1544   INR 1.3 H 0.9 1.2 1.0         Assessment:     1. CHF (congestive heart failure), NYHA class III, chronic, systolic    2. Chronic systolic congestive heart failure, NYHA class 1    3. LBBB (left bundle branch block)    4. Essential hypertension    5. TAHIR (obstructive sleep apnea)      Plan:     In summary, Ms. Saldana is a 43 y.o. female with NICM, LBBB here for follow up after CRT-D implantation.    Doing well s/p ICD.  She would like to know if LVEF improved with device; will update echo today.  Return in 1 year with echo, or earlier prn.      HPI

## 2024-03-26 LAB
OHS CV AF BURDEN PERCENT: < 1
OHS CV BIV PACING PERCENT: 99 %
OHS CV DC REMOTE DEVICE TYPE: NORMAL

## 2024-04-15 ENCOUNTER — TELEPHONE (OUTPATIENT)
Dept: FAMILY MEDICINE | Facility: CLINIC | Age: 44
End: 2024-04-15
Payer: MEDICAID

## 2024-04-15 DIAGNOSIS — J18.9 PRIMARY ATYPICAL PNEUMONIA: Primary | ICD-10-CM

## 2024-04-15 RX ORDER — PROMETHAZINE HYDROCHLORIDE AND DEXTROMETHORPHAN HYDROBROMIDE 6.25; 15 MG/5ML; MG/5ML
5 SYRUP ORAL 3 TIMES DAILY PRN
Qty: 473 ML | Refills: 0 | Status: SHIPPED | OUTPATIENT
Start: 2024-04-15 | End: 2024-05-17

## 2024-04-15 RX ORDER — AZITHROMYCIN 250 MG/1
TABLET, FILM COATED ORAL
Qty: 6 TABLET | Refills: 0 | Status: SHIPPED | OUTPATIENT
Start: 2024-04-15 | End: 2024-04-20

## 2024-04-18 ENCOUNTER — PATIENT MESSAGE (OUTPATIENT)
Dept: FAMILY MEDICINE | Facility: CLINIC | Age: 44
End: 2024-04-18
Payer: MEDICAID

## 2024-04-18 ENCOUNTER — HOSPITAL ENCOUNTER (OUTPATIENT)
Dept: RADIOLOGY | Facility: HOSPITAL | Age: 44
Discharge: HOME | End: 2024-04-18
Attending: FAMILY MEDICINE
Payer: MEDICAID

## 2024-04-18 DIAGNOSIS — J40 BRONCHITIS: Primary | ICD-10-CM

## 2024-04-18 DIAGNOSIS — R05.9 COUGH, UNSPECIFIED TYPE: ICD-10-CM

## 2024-04-18 DIAGNOSIS — R05.9 COUGH, UNSPECIFIED TYPE: Primary | ICD-10-CM

## 2024-04-18 PROCEDURE — 71046 X-RAY EXAM CHEST 2 VIEWS: CPT | Mod: TC,FY,PO

## 2024-04-18 PROCEDURE — 71046 X-RAY EXAM CHEST 2 VIEWS: CPT | Mod: 26,,, | Performed by: RADIOLOGY

## 2024-04-18 RX ORDER — METHYLPREDNISOLONE 4 MG/1
TABLET ORAL
Qty: 21 EACH | Refills: 0 | Status: SHIPPED | OUTPATIENT
Start: 2024-04-18 | End: 2024-05-09

## 2024-04-25 ENCOUNTER — PATIENT MESSAGE (OUTPATIENT)
Dept: ELECTROPHYSIOLOGY | Facility: CLINIC | Age: 44
End: 2024-04-25
Payer: MEDICAID

## 2024-05-16 ENCOUNTER — TELEPHONE (OUTPATIENT)
Dept: FAMILY MEDICINE | Facility: CLINIC | Age: 44
End: 2024-05-16
Payer: MEDICAID

## 2024-05-16 DIAGNOSIS — I11.9 BENIGN HYPERTENSIVE HEART DISEASE WITHOUT HEART FAILURE: ICD-10-CM

## 2024-05-16 DIAGNOSIS — Z00.00 ANNUAL PHYSICAL EXAM: Primary | ICD-10-CM

## 2024-05-16 DIAGNOSIS — I10 ESSENTIAL HYPERTENSION: ICD-10-CM

## 2024-05-17 ENCOUNTER — OFFICE VISIT (OUTPATIENT)
Dept: FAMILY MEDICINE | Facility: CLINIC | Age: 44
End: 2024-05-17
Payer: MEDICAID

## 2024-05-17 ENCOUNTER — LAB VISIT (OUTPATIENT)
Dept: LAB | Facility: HOSPITAL | Age: 44
End: 2024-05-17
Payer: MEDICAID

## 2024-05-17 VITALS
TEMPERATURE: 98 F | DIASTOLIC BLOOD PRESSURE: 86 MMHG | HEIGHT: 66 IN | BODY MASS INDEX: 33.84 KG/M2 | HEART RATE: 72 BPM | OXYGEN SATURATION: 98 % | WEIGHT: 210.56 LBS | SYSTOLIC BLOOD PRESSURE: 136 MMHG

## 2024-05-17 DIAGNOSIS — Z00.00 ANNUAL PHYSICAL EXAM: Primary | ICD-10-CM

## 2024-05-17 DIAGNOSIS — I10 ESSENTIAL HYPERTENSION: ICD-10-CM

## 2024-05-17 DIAGNOSIS — Z23 NEED FOR 23-POLYVALENT PNEUMOCOCCAL POLYSACCHARIDE VACCINE: ICD-10-CM

## 2024-05-17 DIAGNOSIS — Z00.00 ANNUAL PHYSICAL EXAM: ICD-10-CM

## 2024-05-17 DIAGNOSIS — T75.3XXA MOTION SICKNESS, INITIAL ENCOUNTER: ICD-10-CM

## 2024-05-17 DIAGNOSIS — I11.9 BENIGN HYPERTENSIVE HEART DISEASE WITHOUT HEART FAILURE: ICD-10-CM

## 2024-05-17 LAB
ALBUMIN SERPL BCP-MCNC: 3.6 G/DL (ref 3.5–5.2)
ALP SERPL-CCNC: 61 U/L (ref 55–135)
ALT SERPL W/O P-5'-P-CCNC: 23 U/L (ref 10–44)
ANION GAP SERPL CALC-SCNC: 9 MMOL/L (ref 8–16)
AST SERPL-CCNC: 21 U/L (ref 10–40)
BASOPHILS # BLD AUTO: 0.03 K/UL (ref 0–0.2)
BASOPHILS NFR BLD: 0.5 % (ref 0–1.9)
BILIRUB SERPL-MCNC: 0.4 MG/DL (ref 0.1–1)
BUN SERPL-MCNC: 14 MG/DL (ref 6–20)
CALCIUM SERPL-MCNC: 9.3 MG/DL (ref 8.7–10.5)
CHLORIDE SERPL-SCNC: 108 MMOL/L (ref 95–110)
CHOLEST SERPL-MCNC: 190 MG/DL (ref 120–199)
CHOLEST/HDLC SERPL: 2.6 {RATIO} (ref 2–5)
CO2 SERPL-SCNC: 21 MMOL/L (ref 23–29)
CREAT SERPL-MCNC: 1 MG/DL (ref 0.5–1.4)
DIFFERENTIAL METHOD BLD: ABNORMAL
EOSINOPHIL # BLD AUTO: 0.3 K/UL (ref 0–0.5)
EOSINOPHIL NFR BLD: 4.3 % (ref 0–8)
ERYTHROCYTE [DISTWIDTH] IN BLOOD BY AUTOMATED COUNT: 14.5 % (ref 11.5–14.5)
EST. GFR  (NO RACE VARIABLE): >60 ML/MIN/1.73 M^2
ESTIMATED AVG GLUCOSE: 111 MG/DL (ref 68–131)
GLUCOSE SERPL-MCNC: 86 MG/DL (ref 70–110)
HBA1C MFR BLD: 5.5 % (ref 4–5.6)
HCT VFR BLD AUTO: 38.5 % (ref 37–48.5)
HDLC SERPL-MCNC: 72 MG/DL (ref 40–75)
HDLC SERPL: 37.9 % (ref 20–50)
HGB BLD-MCNC: 12 G/DL (ref 12–16)
IMM GRANULOCYTES # BLD AUTO: 0.01 K/UL (ref 0–0.04)
IMM GRANULOCYTES NFR BLD AUTO: 0.2 % (ref 0–0.5)
LDLC SERPL CALC-MCNC: 105.2 MG/DL (ref 63–159)
LYMPHOCYTES # BLD AUTO: 2.2 K/UL (ref 1–4.8)
LYMPHOCYTES NFR BLD: 36.8 % (ref 18–48)
MCH RBC QN AUTO: 27.3 PG (ref 27–31)
MCHC RBC AUTO-ENTMCNC: 31.2 G/DL (ref 32–36)
MCV RBC AUTO: 88 FL (ref 82–98)
MONOCYTES # BLD AUTO: 0.6 K/UL (ref 0.3–1)
MONOCYTES NFR BLD: 10.5 % (ref 4–15)
NEUTROPHILS # BLD AUTO: 2.9 K/UL (ref 1.8–7.7)
NEUTROPHILS NFR BLD: 47.7 % (ref 38–73)
NONHDLC SERPL-MCNC: 118 MG/DL
NRBC BLD-RTO: 0 /100 WBC
PLATELET # BLD AUTO: 302 K/UL (ref 150–450)
PMV BLD AUTO: 10.6 FL (ref 9.2–12.9)
POTASSIUM SERPL-SCNC: 4.5 MMOL/L (ref 3.5–5.1)
PROT SERPL-MCNC: 7.4 G/DL (ref 6–8.4)
RBC # BLD AUTO: 4.4 M/UL (ref 4–5.4)
SODIUM SERPL-SCNC: 138 MMOL/L (ref 136–145)
TRIGL SERPL-MCNC: 64 MG/DL (ref 30–150)
TSH SERPL DL<=0.005 MIU/L-ACNC: 0.78 UIU/ML (ref 0.4–4)
WBC # BLD AUTO: 6.09 K/UL (ref 3.9–12.7)

## 2024-05-17 PROCEDURE — 3075F SYST BP GE 130 - 139MM HG: CPT | Mod: CPTII,,, | Performed by: FAMILY MEDICINE

## 2024-05-17 PROCEDURE — 1159F MED LIST DOCD IN RCRD: CPT | Mod: CPTII,,, | Performed by: FAMILY MEDICINE

## 2024-05-17 PROCEDURE — 99999 PR PBB SHADOW E&M-EST. PATIENT-LVL III: CPT | Mod: PBBFAC,,, | Performed by: FAMILY MEDICINE

## 2024-05-17 PROCEDURE — 83036 HEMOGLOBIN GLYCOSYLATED A1C: CPT | Performed by: FAMILY MEDICINE

## 2024-05-17 PROCEDURE — 84443 ASSAY THYROID STIM HORMONE: CPT | Performed by: FAMILY MEDICINE

## 2024-05-17 PROCEDURE — 99213 OFFICE O/P EST LOW 20 MIN: CPT | Mod: PBBFAC,PO | Performed by: FAMILY MEDICINE

## 2024-05-17 PROCEDURE — 80061 LIPID PANEL: CPT | Performed by: FAMILY MEDICINE

## 2024-05-17 PROCEDURE — 1160F RVW MEDS BY RX/DR IN RCRD: CPT | Mod: CPTII,,, | Performed by: FAMILY MEDICINE

## 2024-05-17 PROCEDURE — 99396 PREV VISIT EST AGE 40-64: CPT | Mod: S$PBB,,, | Performed by: FAMILY MEDICINE

## 2024-05-17 PROCEDURE — 3044F HG A1C LEVEL LT 7.0%: CPT | Mod: CPTII,,, | Performed by: FAMILY MEDICINE

## 2024-05-17 PROCEDURE — 4010F ACE/ARB THERAPY RXD/TAKEN: CPT | Mod: CPTII,,, | Performed by: FAMILY MEDICINE

## 2024-05-17 PROCEDURE — 36415 COLL VENOUS BLD VENIPUNCTURE: CPT | Mod: PO | Performed by: FAMILY MEDICINE

## 2024-05-17 PROCEDURE — 80053 COMPREHEN METABOLIC PANEL: CPT | Performed by: FAMILY MEDICINE

## 2024-05-17 PROCEDURE — 3008F BODY MASS INDEX DOCD: CPT | Mod: CPTII,,, | Performed by: FAMILY MEDICINE

## 2024-05-17 PROCEDURE — 3079F DIAST BP 80-89 MM HG: CPT | Mod: CPTII,,, | Performed by: FAMILY MEDICINE

## 2024-05-17 RX ORDER — SCOLOPAMINE TRANSDERMAL SYSTEM 1 MG/1
1 PATCH, EXTENDED RELEASE TRANSDERMAL
Qty: 4 PATCH | Refills: 0 | Status: SHIPPED | OUTPATIENT
Start: 2024-05-17

## 2024-05-17 NOTE — PROGRESS NOTES
Assessment & Plan  Problem List Items Addressed This Visit    None  Visit Diagnoses       Annual physical exam    -  Primary    Need for 23-polyvalent pneumococcal polysaccharide vaccine        Relevant Orders    (In Office Administered) Pneumococcal Polysaccharide Vaccine (23 Valent) (SQ/IM)    Motion sickness, initial encounter        Relevant Medications    scopolamine (TRANSDERM-SCOP) 1.3-1.5 mg (1 mg over 3 days)         I addressed all major concerns as it related to health maintenance.  All were ordered and scheduled based on the patients wishes.  Any additional health maintenance will be readdressed at the next physical if declined or deferred by the patient.    Assessment & Plan      Results        Health Maintenance reviewed.      ______________________________________________________________________    Chief Complaint  Chief Complaint   Patient presents with    Annual Exam       HPI  Hollie Saldana is a 43 y.o. female with multiple medical diagnoses as listed in the medical history and problem list that presents for annual exam.  Pt is known to me with last appointment 2/26/2024.    History of Present Illness     Patient denies any new symptoms including chest pain, SOB, blurry vision, N/V, diarrhea.        PAST MEDICAL HISTORY:  Past Medical History:   Diagnosis Date    Cardiomyopathy     History of tubal ligation     Hypertension     LBBB (left bundle branch block)     Nonischemic cardiomyopathy 10/23/2020       PAST SURGICAL HISTORY:  Past Surgical History:   Procedure Laterality Date    CARDIAC CATHETERIZATION  2017    CATHETERIZATION OF BOTH LEFT AND RIGHT HEART Bilateral 11/29/2022    Procedure: CATHETERIZATION, HEART, BOTH LEFT AND RIGHT;  Surgeon: Dez Hayden MD;  Location: [facility] CATH LAB;  Service: Cardiology;  Laterality: Bilateral;  RFA/V access, 4Fr art sheath  RN PREOP 11/23/2022    INSERTION OF BIVENTRICULAR IMPLANTABLE CARDIOVERTER-DEFIBRILLATOR (ICD) Left 6/12/2023     Procedure: INSERTION, ICD, BIVENTRICULAR;  Surgeon: Magdiel Rider MD;  Location: [facility] LAB;  Service: Cardiology;  Laterality: Left;  NICM,CRT-D, SJM, MAC, DM, 3 Prep       SOCIAL HISTORY:  Social History     Socioeconomic History    Marital status: Single   Tobacco Use    Smoking status: Never    Smokeless tobacco: Never   Substance and Sexual Activity    Alcohol use: Yes     Comment: Occasionally    Drug use: No       FAMILY HISTORY:  Family History   Problem Relation Name Age of Onset    Hypertension Mother      Thyroid disease Mother      Appendicitis Mother      Hypertension Father         ALLERGIES AND MEDICATIONS: updated and reviewed.  Review of patient's allergies indicates:   Allergen Reactions    Oxycodone Itching     Current Outpatient Medications   Medication Sig Dispense Refill    benzocaine-menthoL 6-10 mg lozenge Take 1 lozenge by mouth every 2 (two) hours as needed for Pain. 18 tablet 0    hydrALAZINE (APRESOLINE) 50 MG tablet Take 1 tablet (50 mg total) by mouth 2 (two) times a day. 180 tablet 3    levocetirizine (XYZAL) 5 MG tablet Take 5 mg by mouth.      metoprolol succinate (TOPROL-XL) 200 MG 24 hr tablet Take 2 tablets (400 mg total) by mouth once daily. 180 tablet 3    sacubitriL-valsartan (ENTRESTO)  mg per tablet Take 1 tablet by mouth 2 (two) times daily. 180 tablet 3    diclofenac sodium (VOLTAREN) 1 % Gel Apply 2 g topically 4 (four) times daily. for 10 days 100 g 0    famotidine (PEPCID) 20 MG tablet Take 1 tablet (20 mg total) by mouth nightly as needed for Heartburn. 30 tablet 0    indomethacin (INDOCIN) 50 MG capsule Take 1 capsule (50 mg total) by mouth 2 (two) times daily with meals. for 5 days 10 capsule 0    scopolamine (TRANSDERM-SCOP) 1.3-1.5 mg (1 mg over 3 days) Place 1 patch onto the skin every 72 hours. 4 patch 0    semaglutide, weight loss, (WEGOVY) 1 mg/0.5 mL PnIj Inject 1 mg into the skin every 7 days. (Patient not taking: Reported on 5/17/2024) 4 each 0     "spironolactone (ALDACTONE) 50 MG tablet Take 1 tablet (50 mg total) by mouth once daily. 90 tablet 3    sumatriptan (IMITREX) 25 MG Tab Take 25 mg by mouth. (Patient not taking: Reported on 5/17/2024)       No current facility-administered medications for this visit.         ROS  Review of Systems   Constitutional:  Negative for activity change, appetite change, fatigue, fever and unexpected weight change.   HENT: Negative.  Negative for ear discharge, ear pain, rhinorrhea and sore throat.    Eyes: Negative.    Respiratory:  Negative for apnea, cough, chest tightness, shortness of breath and wheezing.    Cardiovascular:  Negative for chest pain, palpitations and leg swelling.   Gastrointestinal:  Negative for abdominal distention, abdominal pain, constipation, diarrhea and vomiting.   Endocrine: Negative for cold intolerance, heat intolerance, polydipsia and polyuria.   Genitourinary:  Negative for decreased urine volume and urgency.   Musculoskeletal: Negative.    Skin:  Negative for rash.   Neurological:  Negative for dizziness and headaches.   Hematological:  Does not bruise/bleed easily.   Psychiatric/Behavioral:  Negative for agitation, sleep disturbance and suicidal ideas.          Physical Exam  Vitals:    05/17/24 1321   BP: 136/86   BP Location: Left arm   Patient Position: Sitting   BP Method: Medium (Manual)   Pulse: 72   Temp: 98 °F (36.7 °C)   TempSrc: Oral   SpO2: 98%   Weight: 95.5 kg (210 lb 8.6 oz)   Height: 5' 6" (1.676 m)    Body mass index is 33.98 kg/m².  Weight: 95.5 kg (210 lb 8.6 oz)   Height: 5' 6" (167.6 cm)   Physical Exam  Vitals reviewed.   Constitutional:       Appearance: Normal appearance. She is well-developed.   HENT:      Head: Normocephalic and atraumatic.      Right Ear: External ear normal.      Left Ear: External ear normal.      Nose: Nose normal.      Mouth/Throat:      Mouth: Mucous membranes are moist.      Pharynx: Oropharynx is clear.   Eyes:      Extraocular Movements: " Extraocular movements intact.      Conjunctiva/sclera: Conjunctivae normal.      Pupils: Pupils are equal, round, and reactive to light.   Cardiovascular:      Rate and Rhythm: Normal rate and regular rhythm.      Heart sounds: Normal heart sounds.   Pulmonary:      Effort: Pulmonary effort is normal.      Breath sounds: Normal breath sounds.   Skin:     General: Skin is warm and dry.   Neurological:      Mental Status: She is alert and oriented to person, place, and time.        Physical Exam           Health Maintenance         Date Due Completion Date    Cervical Cancer Screening Never done ---    Pneumococcal Vaccines (Age 0-64) (1 of 2 - PCV) Never done ---    COVID-19 Vaccine (4 - 2023-24 season) 09/01/2023 1/17/2022    Mammogram 03/02/2024 3/2/2023    Hemoglobin A1c (Prediabetes) 05/17/2025 5/17/2024    TETANUS VACCINE 05/19/2032 5/19/2022                Patient note was created using sendwithus.  Any errors in syntax or even information may not have been identified and edited on initial review prior to signing this note.

## 2024-05-22 ENCOUNTER — HOSPITAL ENCOUNTER (EMERGENCY)
Facility: HOSPITAL | Age: 44
Discharge: HOME | End: 2024-05-22
Attending: EMERGENCY MEDICINE
Payer: MEDICAID

## 2024-05-22 VITALS
WEIGHT: 190 LBS | TEMPERATURE: 98 F | DIASTOLIC BLOOD PRESSURE: 79 MMHG | OXYGEN SATURATION: 99 % | BODY MASS INDEX: 30.53 KG/M2 | RESPIRATION RATE: 14 BRPM | HEIGHT: 66 IN | HEART RATE: 63 BPM | SYSTOLIC BLOOD PRESSURE: 145 MMHG

## 2024-05-22 DIAGNOSIS — M25.562 LEFT KNEE PAIN: Primary | ICD-10-CM

## 2024-05-22 LAB
B-HCG UR QL: NEGATIVE
CTP QC/QA: YES

## 2024-05-22 PROCEDURE — 99283 EMERGENCY DEPT VISIT LOW MDM: CPT | Mod: 25,ER

## 2024-05-22 PROCEDURE — 81025 URINE PREGNANCY TEST: CPT | Mod: ER | Performed by: EMERGENCY MEDICINE

## 2024-05-22 PROCEDURE — 81025 URINE PREGNANCY TEST: CPT | Mod: ER

## 2024-05-22 RX ORDER — INDOMETHACIN 50 MG/1
50 CAPSULE ORAL 2 TIMES DAILY WITH MEALS
Qty: 10 CAPSULE | Refills: 0 | Status: SHIPPED | OUTPATIENT
Start: 2024-05-22 | End: 2024-05-27

## 2024-05-22 NOTE — ED NOTES
Pt ambulatory to ED for L knee pain starting Monday.  Pt denies injury or trauma, states pain to posterior knee, radiating around to front of knee.  No noted swelling, skin warm/dry.  LLE neuro and peripheral pulses intact.

## 2024-05-22 NOTE — DISCHARGE INSTRUCTIONS
Take medication as directed for pain. Wear ACE wrap and ice knee at end of day. You may still use Voltaren gel. See an Orthopedic specialist if you are not better after this treatment.

## 2024-05-22 NOTE — ED PROVIDER NOTES
Encounter Date: 5/22/2024    SCRIBE #1 NOTE: I, Carmella Thornton, am scribing for, and in the presence of,  Sandra Milan MD.       History     Chief Complaint   Patient presents with    Knee Pain     Left knee pain since Monday. Denies any trauma/injury. Ambulatory at triage.      43 y.o. female, w/ a PMHx of cardiomyopathy, HTN, presents to the ED w/ left knee pain x3 days. Patient reports pain begins above her knee and radiates to the back of her knee and medial knee. She reports pain is worst on the medial side. She denies recent abnormal activities, exercise, new shoes, falls, injuries or trauma. She reports attempted Tx with voltaren gel, heating pad and tylenol w/o relief. No other exacerbating or alleviating factors. Denies fever, numbness, weakness or other associated symptoms. NKDA.     The history is provided by the patient. No  was used.     Review of patient's allergies indicates:   Allergen Reactions    Oxycodone Itching     Past Medical History:   Diagnosis Date    Cardiomyopathy     History of tubal ligation     Hypertension     LBBB (left bundle branch block)     Nonischemic cardiomyopathy 10/23/2020     Past Surgical History:   Procedure Laterality Date    CARDIAC CATHETERIZATION  2017    CATHETERIZATION OF BOTH LEFT AND RIGHT HEART Bilateral 11/29/2022    Procedure: CATHETERIZATION, HEART, BOTH LEFT AND RIGHT;  Surgeon: Dez Hayden MD;  Location: [facility] CATH LAB;  Service: Cardiology;  Laterality: Bilateral;  RFA/V access, 4Fr art sheath  RN PREOP 11/23/2022    INSERTION OF BIVENTRICULAR IMPLANTABLE CARDIOVERTER-DEFIBRILLATOR (ICD) Left 6/12/2023    Procedure: INSERTION, ICD, BIVENTRICULAR;  Surgeon: Magdiel Rider MD;  Location: [facility] EP LAB;  Service: Cardiology;  Laterality: Left;  NICM,CRT-D, SJM, MAC, DM, 3 Prep     Family History   Problem Relation Name Age of Onset    Hypertension Mother      Thyroid disease Mother      Appendicitis Mother      Hypertension Father        Social History     Tobacco Use    Smoking status: Never    Smokeless tobacco: Never   Substance Use Topics    Alcohol use: Yes     Comment: Occasionally    Drug use: No     Review of Systems   Constitutional:  Negative for activity change, appetite change, chills and fever.   HENT:  Negative for congestion, rhinorrhea, sneezing and sore throat.    Respiratory:  Negative for cough, choking, shortness of breath and wheezing.    Cardiovascular:  Negative for chest pain and palpitations.   Gastrointestinal:  Negative for abdominal pain, diarrhea, nausea and vomiting.   Musculoskeletal:  Positive for arthralgias (left knee). Negative for gait problem and joint swelling.   Skin:  Negative for color change, rash and wound.   Neurological:  Negative for dizziness, syncope, weakness, light-headedness, numbness and headaches.   All other systems reviewed and are negative.      Physical Exam     Initial Vitals [05/22/24 0651]   BP Pulse Resp Temp SpO2   (!) 150/90 63 14 98 °F (36.7 °C) 99 %      MAP       --         Physical Exam    Nursing note and vitals reviewed.  Constitutional: She appears well-developed and well-nourished. No distress.   HENT:   Head: Normocephalic and atraumatic.   Eyes: Conjunctivae are normal.   Neck:   Normal range of motion.  Cardiovascular:  Normal rate, regular rhythm and normal heart sounds.           No murmur heard.  Pulmonary/Chest: Breath sounds normal. No respiratory distress.   Musculoskeletal:         General: No tenderness or edema. Normal range of motion.      Cervical back: Normal range of motion.      Comments: No redness, warmth, swelling or effusion to left knee. Full ROM of left knee and left ankle.      Neurological: She is alert and oriented to person, place, and time. GCS score is 15. GCS eye subscore is 4. GCS verbal subscore is 5. GCS motor subscore is 6.   Skin: Skin is warm and dry.   Psychiatric: She has a normal mood and affect. Her behavior is normal.         ED Course    Procedures  Labs Reviewed   POCT URINE PREGNANCY          Imaging Results              X-Ray Knee 3 View Left (Final result)  Result time 05/22/24 07:57:47      Final result by Nathen Barrow MD (05/22/24 07:57:47)                   Impression:      As above.      Electronically signed by: Nathen Barrow MD  Date:    05/22/2024  Time:    07:57               Narrative:    EXAMINATION:  XR KNEE 3 VIEW LEFT    CLINICAL HISTORY:  Pain in left knee    TECHNIQUE:  AP, lateral, and Merchant views of the left knee were performed.    FINDINGS:  There is no fracture, dislocation, or bony erosion.                                       Medications - No data to display  Medical Decision Making  43 y.o. female, w/ a PMHx of cardiomyopathy, HTN, presents to the ED w/ left knee pain x3 days. Patient reports pain begins above her knee and radiates to the back of her knee and medial knee. She reports pain is worst on the medial side. She denies recent abnormal activities, exercise, new shoes, falls, injuries or trauma. She reports attempted Tx with voltaren gel, heating pad and tylenol w/o relief. No other exacerbating or alleviating factors. Denies fever, numbness, weakness or other associated symptoms. NKDA. On exam, there is no redness, warmth, swelling or effusion to left knee. Full ROM of left knee and left ankle. In shared decision making with patient, will order UPT and x-ray. Xray with no acute bony abnormalities. Suspect inflammatory process - will treat with NSAID. Follow up with Ortho as needed.       Amount and/or Complexity of Data Reviewed  Labs: ordered. Decision-making details documented in ED Course.  Radiology: ordered. Decision-making details documented in ED Course.    Risk  Prescription drug management.            Scribe Attestation:   Scribe #1: I performed the above scribed service and the documentation accurately describes the services I performed. I attest to the accuracy of the note.                              I, Dr. Sandra Milan, personally performed the services described in this documentation.   All medical record entries made by the scribe were at my direction and in my presence.   I have reviewed the chart and agree that the record is accurate and complete.   Sandra Milan MD.  7:39 AM 05/22/2024     Clinical Impression:  Final diagnoses:  [M25.562] Left knee pain (Primary)          ED Disposition Condition    Discharge Stable          ED Prescriptions       Medication Sig Dispense Start Date End Date Auth. Provider    indomethacin (INDOCIN) 50 MG capsule Take 1 capsule (50 mg total) by mouth 2 (two) times daily with meals. for 5 days 10 capsule 5/22/2024 5/27/2024 Sandra Milan MD          Follow-up Information       Follow up With Specialties Details Why Contact Info    [facility]OPED[facility] Orthopedics Schedule an appointment as soon as possible for a visit  If symptoms worsen [address] [facility] [place] [id number] [phone]    Markos Monet MD Orthopedic Surgery Schedule an appointment as soon as possible for a visit  If symptoms worsen [address] [place] [address]  [phone]               Sandra Milan MD  05/22/24 2882

## 2024-06-14 ENCOUNTER — CLINICAL SUPPORT (OUTPATIENT)
Dept: CARDIOLOGY | Facility: HOSPITAL | Age: 44
End: 2024-06-14
Attending: INTERNAL MEDICINE
Payer: MEDICAID

## 2024-06-14 ENCOUNTER — CLINICAL SUPPORT (OUTPATIENT)
Dept: CARDIOLOGY | Facility: HOSPITAL | Age: 44
End: 2024-06-14
Payer: MEDICAID

## 2024-06-14 DIAGNOSIS — Z95.810 PRESENCE OF AUTOMATIC (IMPLANTABLE) CARDIAC DEFIBRILLATOR: ICD-10-CM

## 2024-06-14 DIAGNOSIS — I44.7 LEFT BUNDLE-BRANCH BLOCK, UNSPECIFIED: ICD-10-CM

## 2024-06-14 PROCEDURE — 93295 DEV INTERROG REMOTE 1/2/MLT: CPT | Mod: ,,, | Performed by: INTERNAL MEDICINE

## 2024-06-14 PROCEDURE — 93296 REM INTERROG EVL PM/IDS: CPT | Performed by: INTERNAL MEDICINE

## 2024-06-18 DIAGNOSIS — I42.8 NONISCHEMIC CARDIOMYOPATHY: ICD-10-CM

## 2024-06-19 RX ORDER — SACUBITRIL AND VALSARTAN 97; 103 MG/1; MG/1
TABLET, FILM COATED ORAL
Qty: 180 TABLET | Refills: 3 | Status: SHIPPED | OUTPATIENT
Start: 2024-06-19

## 2024-06-19 RX ORDER — METOPROLOL SUCCINATE 200 MG/1
400 TABLET, EXTENDED RELEASE ORAL
Qty: 180 TABLET | Refills: 3 | Status: SHIPPED | OUTPATIENT
Start: 2024-06-19

## 2024-06-25 ENCOUNTER — PATIENT MESSAGE (OUTPATIENT)
Dept: CARDIOLOGY | Facility: CLINIC | Age: 44
End: 2024-06-25
Payer: MEDICAID

## 2024-06-26 LAB
OHS CV AF BURDEN PERCENT: < 1
OHS CV BIV PACING PERCENT: 99 %
OHS CV DC REMOTE DEVICE TYPE: NORMAL

## 2024-09-10 DIAGNOSIS — S39.012A BACK STRAIN, INITIAL ENCOUNTER: Primary | ICD-10-CM

## 2024-09-10 RX ORDER — TIZANIDINE 4 MG/1
4 TABLET ORAL EVERY 6 HOURS PRN
Qty: 30 TABLET | Refills: 0 | Status: SHIPPED | OUTPATIENT
Start: 2024-09-10 | End: 2024-09-20

## 2024-09-10 RX ORDER — METHYLPREDNISOLONE 4 MG/1
TABLET ORAL
Qty: 21 EACH | Refills: 0 | Status: SHIPPED | OUTPATIENT
Start: 2024-09-10

## 2024-09-13 ENCOUNTER — CLINICAL SUPPORT (OUTPATIENT)
Dept: CARDIOLOGY | Facility: HOSPITAL | Age: 44
End: 2024-09-13
Attending: INTERNAL MEDICINE
Payer: MEDICAID

## 2024-09-13 ENCOUNTER — CLINICAL SUPPORT (OUTPATIENT)
Dept: CARDIOLOGY | Facility: HOSPITAL | Age: 44
End: 2024-09-13
Payer: MEDICAID

## 2024-09-13 DIAGNOSIS — Z95.810 PRESENCE OF AUTOMATIC (IMPLANTABLE) CARDIAC DEFIBRILLATOR: ICD-10-CM

## 2024-09-13 DIAGNOSIS — I44.7 LEFT BUNDLE-BRANCH BLOCK, UNSPECIFIED: ICD-10-CM

## 2024-09-13 PROCEDURE — 93296 REM INTERROG EVL PM/IDS: CPT | Performed by: INTERNAL MEDICINE

## 2024-09-13 PROCEDURE — 93295 DEV INTERROG REMOTE 1/2/MLT: CPT | Mod: ,,, | Performed by: INTERNAL MEDICINE

## 2024-09-24 ENCOUNTER — PATIENT MESSAGE (OUTPATIENT)
Dept: FAMILY MEDICINE | Facility: CLINIC | Age: 44
End: 2024-09-24

## 2024-09-24 ENCOUNTER — E-VISIT (OUTPATIENT)
Dept: INTERNAL MEDICINE | Facility: CLINIC | Age: 44
End: 2024-09-24
Payer: MEDICAID

## 2024-09-24 DIAGNOSIS — J02.9 SORE THROAT: Primary | ICD-10-CM

## 2024-09-24 LAB
OHS CV AF BURDEN PERCENT: < 1
OHS CV BIV PACING PERCENT: 99 %
OHS CV DC REMOTE DEVICE TYPE: NORMAL

## 2024-09-24 NOTE — PROGRESS NOTES
Patient ID: Hollie Saldana is a 43 y.o. female.    Chief Complaint: General Illness (Entered automatically based on patient selection in [employer].)    The patient initiated a request through [employer] on 9/24/2024 for evaluation and management with a chief complaint of General Illness (Entered automatically based on patient selection in [employer].)     I evaluated the questionnaire submission on 9/24/24.    [facility] Peq Evisit Supergroup-Cough And Cold    9/24/2024  1:49 PM CDT - Filed by Patient   What do you need help with? Cough, Cold, Sore Throat   Do you agree to participate in an E-Visit? Yes   If you have any of the following symptoms, go to your local emergency room or call 911: I acknowledge   Are you pregnant, could you be pregnant, or are you breast feeding? None of the above   What is the main issue you would like addressed today? Sore throat   Do you think you might have COVID or the Flu? No   Have you tested positive for COVID or Flu? No   What symptoms do you currently have?  Chills;  Cough;  Headache;  Sore throat   Describe your cough: Dry   Have you had any of the following? None of the above   Have you ever smoked? I have never smoked   Have you had a fever? No   When did your symptoms first appear? 9/23/2024   In the last two weeks, have you been in close contact with someone who has COVID-19 or the Flu? Yes , Covid-19   List what you have done or taken to help your symptoms. Nothing   How severe are your symptoms? Mild   Have your symptoms gotten better or worse since they started?  Worse   Do you have transportation to get testing if it is needed and ordered for you at an [facility] location? Yes   Provide any additional information you feel is important. NA   Please attach any relevant images or files    Are you able to take your vital signs? No         Encounter Diagnosis   Name Primary?    Sore throat Yes        Orders Placed This Encounter   Procedures    SARS Coronavirus 2 Antigen, POCT Manual Read     POCT Influenza A/B Rapid Antigen    POCT Strep A, Molecular      Medications Ordered This Encounter   Medications    nirmatrelvir-ritonavir 300 mg (150 mg x 2)-100 mg copackaged tablets (EUA)     Sig: Take 3 tablets by mouth 2 (two) times daily for 5 days. Each dose contains 2 nirmatrelvir (pink tablets) and 1 ritonavir (white tablet). Take all 3 tablets together     Dispense:  30 tablet     Refill:  0        No follow-ups on file.      E-Visit Time Tracking:    Day 1 Time (in minutes): 5    Total Time (in minutes): 5

## 2024-09-25 ENCOUNTER — CLINICAL SUPPORT (OUTPATIENT)
Dept: FAMILY MEDICINE | Facility: CLINIC | Age: 44
End: 2024-09-25
Payer: MEDICAID

## 2024-09-25 DIAGNOSIS — U07.1 COVID-19 VIRUS DETECTED: ICD-10-CM

## 2024-09-25 DIAGNOSIS — R05.9 COUGH, UNSPECIFIED TYPE: Primary | ICD-10-CM

## 2024-09-25 LAB — SARS-COV-2 RNA RESP QL NAA+PROBE: DETECTED

## 2024-09-25 PROCEDURE — 87635 SARS-COV-2 COVID-19 AMP PRB: CPT

## 2024-09-25 NOTE — PROGRESS NOTES
Swabbed patient for covid, patient is aware that results take 24 hours to process. Specimen sent down to the lab with label and requisition attached.

## 2024-09-26 DIAGNOSIS — U07.1 COVID: Primary | ICD-10-CM

## 2024-09-26 RX ORDER — FLUTICASONE PROPIONATE 50 MCG
2 SPRAY, SUSPENSION (ML) NASAL DAILY
Qty: 11.1 ML | Refills: 1 | Status: SHIPPED | OUTPATIENT
Start: 2024-09-26

## 2024-10-17 ENCOUNTER — OFFICE VISIT (OUTPATIENT)
Dept: CARDIOLOGY | Facility: CLINIC | Age: 44
End: 2024-10-17
Payer: MEDICAID

## 2024-10-17 VITALS
HEIGHT: 66 IN | OXYGEN SATURATION: 97 % | SYSTOLIC BLOOD PRESSURE: 142 MMHG | RESPIRATION RATE: 15 BRPM | DIASTOLIC BLOOD PRESSURE: 90 MMHG | WEIGHT: 220.13 LBS | HEART RATE: 60 BPM | BODY MASS INDEX: 35.38 KG/M2

## 2024-10-17 DIAGNOSIS — Z95.810 BIVENTRICULAR ICD (IMPLANTABLE CARDIOVERTER-DEFIBRILLATOR) IN PLACE: ICD-10-CM

## 2024-10-17 DIAGNOSIS — I10 ESSENTIAL HYPERTENSION: ICD-10-CM

## 2024-10-17 DIAGNOSIS — I42.8 NONISCHEMIC CARDIOMYOPATHY: Primary | ICD-10-CM

## 2024-10-17 DIAGNOSIS — E66.9 NON MORBID OBESITY, UNSPECIFIED OBESITY TYPE: ICD-10-CM

## 2024-10-17 DIAGNOSIS — G47.33 OSA (OBSTRUCTIVE SLEEP APNEA): ICD-10-CM

## 2024-10-17 DIAGNOSIS — Z95.810 PRESENCE OF AUTOMATIC (IMPLANTABLE) CARDIAC DEFIBRILLATOR: ICD-10-CM

## 2024-10-17 DIAGNOSIS — I44.7 LBBB (LEFT BUNDLE BRANCH BLOCK): ICD-10-CM

## 2024-10-17 PROCEDURE — 3008F BODY MASS INDEX DOCD: CPT | Mod: CPTII,,, | Performed by: INTERNAL MEDICINE

## 2024-10-17 PROCEDURE — 99999 PR PBB SHADOW E&M-EST. PATIENT-LVL V: CPT | Mod: PBBFAC,,, | Performed by: INTERNAL MEDICINE

## 2024-10-17 PROCEDURE — 1160F RVW MEDS BY RX/DR IN RCRD: CPT | Mod: CPTII,,, | Performed by: INTERNAL MEDICINE

## 2024-10-17 PROCEDURE — 1159F MED LIST DOCD IN RCRD: CPT | Mod: CPTII,,, | Performed by: INTERNAL MEDICINE

## 2024-10-17 PROCEDURE — 3044F HG A1C LEVEL LT 7.0%: CPT | Mod: CPTII,,, | Performed by: INTERNAL MEDICINE

## 2024-10-17 PROCEDURE — G2211 COMPLEX E/M VISIT ADD ON: HCPCS | Mod: S$PBB,,, | Performed by: INTERNAL MEDICINE

## 2024-10-17 PROCEDURE — 4010F ACE/ARB THERAPY RXD/TAKEN: CPT | Mod: CPTII,,, | Performed by: INTERNAL MEDICINE

## 2024-10-17 PROCEDURE — 3077F SYST BP >= 140 MM HG: CPT | Mod: CPTII,,, | Performed by: INTERNAL MEDICINE

## 2024-10-17 PROCEDURE — 99214 OFFICE O/P EST MOD 30 MIN: CPT | Mod: S$PBB,,, | Performed by: INTERNAL MEDICINE

## 2024-10-17 PROCEDURE — 99215 OFFICE O/P EST HI 40 MIN: CPT | Mod: PBBFAC | Performed by: INTERNAL MEDICINE

## 2024-10-17 PROCEDURE — 3080F DIAST BP >= 90 MM HG: CPT | Mod: CPTII,,, | Performed by: INTERNAL MEDICINE

## 2024-10-17 RX ORDER — HYDRALAZINE HYDROCHLORIDE 100 MG/1
100 TABLET, FILM COATED ORAL 2 TIMES DAILY
Qty: 180 TABLET | Refills: 3 | Status: SHIPPED | OUTPATIENT
Start: 2024-10-17 | End: 2025-10-17

## 2024-10-17 NOTE — PROGRESS NOTES
CARDIOVASCULAR PROGRESS NOTE    REASON FOR CONSULT:   Hollie Saldana is a 43 y.o. female who presents for follow up of NICM, HTN, LBBB.    PCP: Joao  EP: Adry  Sleep: Pedro Luis  HISTORY OF PRESENT ILLNESS:   The patient returns for follow up.  She denies intercurrent angina, dyspnea, palpitations, or syncope.  There has been no PND, orthopnea, melena, hematuria, or claudication symptoms.  She had a sleep study (home sleep test) which was abnormal.  She has not had follow up with sleep Medicine.  She also tells me she was contemplating breast reduction surgery.  She is at low cardiac risk for this procedure and associated anesthesia.  Review of her most recent echocardiogram for earlier this year notes normalization of her LV function.    CARDIOVASCULAR HISTORY:   LBBB (normal cath 5/2017)  NICM, EF 45->25%->55% (echo 3/2024).  Cath 11/2022 neg.  [facility] BiVICD (6/12/23, Dr. Rider)  TAHIR, +HST 1/2024    PAST MEDICAL HISTORY:     Past Medical History:   Diagnosis Date    Cardiomyopathy     History of tubal ligation     Hypertension     LBBB (left bundle branch block)     Nonischemic cardiomyopathy 10/23/2020       PAST SURGICAL HISTORY:     Past Surgical History:   Procedure Laterality Date    CARDIAC CATHETERIZATION  2017    CATHETERIZATION OF BOTH LEFT AND RIGHT HEART Bilateral 11/29/2022    Procedure: CATHETERIZATION, HEART, BOTH LEFT AND RIGHT;  Surgeon: Dez Hayden MD;  Location: [facility] CATH LAB;  Service: Cardiology;  Laterality: Bilateral;  RFA/V access, 4Fr art sheath  RN PREOP 11/23/2022    INSERTION OF BIVENTRICULAR IMPLANTABLE CARDIOVERTER-DEFIBRILLATOR (ICD) Left 6/12/2023    Procedure: INSERTION, ICD, BIVENTRICULAR;  Surgeon: Magdiel Rider MD;  Location: [facility] EP LAB;  Service: Cardiology;  Laterality: Left;  NICM,CRT-D, SJM, MAC, DM, 3 Prep       ALLERGIES AND MEDICATION:   Review of patient's allergies indicates:  No Known Allergies  Previous Medications    BENZOCAINE-MENTHOL 6-10 MG LOZENGE     Take 1 lozenge by mouth every 2 (two) hours as needed for Pain.    DICLOFENAC SODIUM (VOLTAREN) 1 % GEL    Apply 2 g topically 4 (four) times daily. for 10 days    ENTRESTO  MG PER TABLET    TAKE 1 TABLET BY MOUTH TWICE A DAY FOR heart    FAMOTIDINE (PEPCID) 20 MG TABLET    Take 1 tablet (20 mg total) by mouth nightly as needed for Heartburn.    FLUTICASONE PROPIONATE (FLONASE) 50 MCG/ACTUATION NASAL SPRAY    2 sprays (100 mcg total) by Each Nostril route once daily.    HYDRALAZINE (APRESOLINE) 50 MG TABLET    Take 1 tablet (50 mg total) by mouth 2 (two) times a day.    LEVOCETIRIZINE (XYZAL) 5 MG TABLET    Take 5 mg by mouth.    METHYLPREDNISOLONE (MEDROL DOSEPACK) 4 MG TABLET    use as directed    METOPROLOL SUCCINATE (TOPROL-XL) 200 MG 24 HR TABLET    TAKE 2 TABLETS BY MOUTH ONCE A DAY    SCOPOLAMINE (TRANSDERM-SCOP) 1.3-1.5 MG (1 MG OVER 3 DAYS)    Place 1 patch onto the skin every 72 hours.    SPIRONOLACTONE (ALDACTONE) 25 MG TABLET    TAKE 1 TABLET BY MOUTH ONCE A DAY    TOPIRAMATE (TOPAMAX) 50 MG TABLET    Take 1 tablet (50 mg total) by mouth every evening.       SOCIAL HISTORY:     Social History     Socioeconomic History    Marital status: Single   Tobacco Use    Smoking status: Never    Smokeless tobacco: Never   Substance and Sexual Activity    Alcohol use: Yes     Comment: Occasionally    Drug use: No     Social Drivers of Health     Financial Resource Strain: Low Risk  (10/17/2024)    Overall Financial Resource Strain (CARDIA)     Difficulty of Paying Living Expenses: Not hard at all   Food Insecurity: No Food Insecurity (10/17/2024)    Hunger Vital Sign     Worried About Running Out of Food in the Last Year: Never true     Ran Out of Food in the Last Year: Never true   Physical Activity: Sufficiently Active (10/17/2024)    Exercise Vital Sign     Days of Exercise per Week: 2 days     Minutes of Exercise per Session: 120 min   Stress: Stress Concern Present (10/17/2024)    [place] of  "Occupational Health - Occupational Stress Questionnaire     Feeling of Stress : To some extent   Housing Stability: Unknown (10/17/2024)    Housing Stability Vital Sign     Unable to Pay for Housing in the Last Year: No       FAMILY HISTORY:     Family History   Problem Relation Name Age of Onset    Hypertension Mother      Thyroid disease Mother      Appendicitis Mother      Hypertension Father         REVIEW OF SYSTEMS:   Review of Systems   Constitutional:  Negative for chills, diaphoresis, fever and malaise/fatigue.   HENT:  Negative for nosebleeds.    Eyes:  Negative for blurred vision, double vision and photophobia.   Respiratory:  Negative for hemoptysis, shortness of breath and wheezing.    Cardiovascular:  Negative for chest pain, palpitations, orthopnea, claudication, leg swelling and PND.   Gastrointestinal:  Negative for abdominal pain, blood in stool, heartburn, melena, nausea and vomiting.   Genitourinary:  Negative for flank pain and hematuria.   Musculoskeletal:  Negative for falls, myalgias and neck pain.   Skin:  Negative for rash.   Neurological:  Negative for dizziness, seizures, loss of consciousness, weakness and headaches.   Endo/Heme/Allergies:  Negative for polydipsia. Does not bruise/bleed easily.   Psychiatric/Behavioral:  Negative for depression and memory loss. The patient is not nervous/anxious.        PHYSICAL EXAM:     Vitals:    10/17/24 1513   BP: (!) 142/90   Pulse: 60   Resp: 15    Body mass index is 35.53 kg/m².  Weight: 99.9 kg (220 lb 2.1 oz)   Height: 5' 6" (167.6 cm)     Physical Exam  Vitals reviewed.   Constitutional:       General: She is not in acute distress.     Appearance: She is well-developed. She is obese. She is not ill-appearing, toxic-appearing or diaphoretic.   HENT:      Head: Normocephalic and atraumatic.   Eyes:      General: No scleral icterus.     Extraocular Movements: Extraocular movements intact.      Conjunctiva/sclera: Conjunctivae normal.      Pupils: " Pupils are equal, round, and reactive to light.   Neck:      Thyroid: No thyromegaly.      Vascular: Normal carotid pulses. No carotid bruit or JVD.      Trachea: Trachea normal.   Cardiovascular:      Rate and Rhythm: Normal rate and regular rhythm.      Pulses:           Carotid pulses are 2+ on the right side and 2+ on the left side.     Heart sounds: S1 normal and S2 normal. No murmur heard.     No friction rub. No gallop.      Comments: L infraclavicular ICD incision well healed  Pulmonary:      Effort: Pulmonary effort is normal. No respiratory distress.      Breath sounds: Normal breath sounds. No stridor. No wheezing, rhonchi or rales.   Chest:      Chest wall: No tenderness.   Abdominal:      General: There is no distension.      Palpations: Abdomen is soft.   Musculoskeletal:         General: No swelling or tenderness. Normal range of motion.      Cervical back: Normal range of motion and neck supple. No edema or rigidity.      Right lower leg: No edema.      Left lower leg: No edema.   Feet:      Right foot:      Skin integrity: No ulcer.      Left foot:      Skin integrity: No ulcer.   Skin:     General: Skin is warm and dry.      Coloration: Skin is not jaundiced.   Neurological:      General: No focal deficit present.      Mental Status: She is alert and oriented to person, place, and time.      Cranial Nerves: No cranial nerve deficit.   Psychiatric:         Mood and Affect: Mood normal.         Speech: Speech normal.         Behavior: Behavior normal. Behavior is cooperative.         DATA:   EKG: (personally reviewed tracing(s))  10/17/24 SR 60 (?AP-BiV pacing)    Laboratory:  CBC:  Recent Labs   Lab 02/14/23  0939 06/05/23  1544 05/17/24  0720   WBC 5.21 4.87 6.09   Hemoglobin 11.7 L 11.7 L 12.0   Hematocrit 37.9 36.5 L 38.5   Platelets 318 284 302       CHEMISTRIES:  Recent Labs   Lab 02/09/22  0700 11/23/22  1450 03/15/23  1556 06/05/23  1544 05/17/24  0720   Glucose 89   < > 70 78 86   Sodium 134  L   < > 137 139 138   Potassium 3.8   < > 3.9 3.9 4.5   BUN 9   < > 11 13 14   Creatinine 0.8   < > 0.8 0.9 1.0   eGFR if African American >60.0  --   --   --   --    eGFR if non  >60.0  --   --   --   --    Calcium 9.4   < > 9.1 9.2 9.3    < > = values in this interval not displayed.       CARDIAC BIOMARKERS:          COAGS:  Recent Labs   Lab 11/23/22  1450 01/26/23  2036 06/05/23  1544   INR 0.9 1.2 1.0       LIPIDS/LFTS:  Recent Labs   Lab 02/09/22  0700 02/14/23  0939 05/17/24  0720   Cholesterol 173 187 190   Triglycerides 54 76 64   HDL 67 55 72   LDL Cholesterol 95.2 116.8 105.2   Non-HDL Cholesterol 106 132 118   AST 15 13 21   ALT 10 9 L 23     Lab Results   Component Value Date    TSH 0.779 05/17/2024         Cardiovascular Testing:  Echo 3/22/24    Left Ventricle: The left ventricle is normal in size. Mildly increased wall thickness. There is concentric hypertrophy. There is normal systolic function with a visually estimated ejection fraction of 55 %. Grade I diastolic dysfunction.    Right Ventricle: Normal right ventricular cavity size. Systolic function is normal.device lead seen in rv    Left Atrium: Left atrium is moderately dilated.    Right Atrium: Right atrium is mildly dilated.    Tricuspid Valve: There is mild regurgitation.    Pulmonary Artery: The estimated pulmonary artery systolic pressure is 24 mmHg.    IVC/SVC: Normal venous pressure at 3 mmHg.    Cath 11/25/22  RA 3  RV 24/5  PA 20/5/10  PAWP 6  /13  Ao 115/69/89  Sats:  HRA 69.2  LRA 73.1  RV 72.6  PA 71.5  FA 93.4   CO 5.4 L/min (thermal)  LVEF: 25-30% by echo  Dominance: Right  LM: normal  LAD: normal  LCx: normal  RCA: normal  Hemostasis:  RFA angioseal  RFV man comp  Impression:  LBBB/CMP  Normal cors  Normal hemodynamics  No evidence of intracardiac shunt  Above consistent with NICM.  Plan:  Cont med rx  Stop amlod  Inc entresto 49/51mg bid  Cont metoprolol  Eventual aldactone  Cont lifevest  Repeat echo in 3  months (late Feb 2023) for reassessment of LVEF    Holter 11/7/22  Sinus rhythm with heart rates varying between 75 and 138 BPM with an average of 94BPM.  There were very rare PACs totalling 7 and averaging 0.29 per hour.  The diary was not returned.    Renal art US 11/10/20  There is insignificant stenosis (0-59%) in the Right Renal Artery.  Right kidney 10.70 cm.  There is insignificant stenosis (0-59%) in the Left Renal Artery.  Left kidney 10.80 cm.      ASSESSMENT:   # NICM, EF 45%->25%->55% on echo 3/2024.  NICM without shunt on cath 11/2022.    # [facility] BiVICD 6/2023 (Dr. Rider), following with [facility].  # HTN, uncontrolled.  Renal art US 11/2020 neg.  # hx LBBB  # TAHIR, +HST 1/29/24, not on CPAP.    PLAN:   Cont med rx  Cont GDMT  Inc hydrala 100mg bid, titrate prn, next drug: Imdur +/- HCTZ  RN BP check 2 weeks (Dr. Shepherd)  Sleep med eval for possible CPAP (+HST 1/2024)  Follow up with [facility] as planned for device check  RTC 1 year (Oct 2024)  Consider as a candidate for renal denervation in the future.  The patient is contemplating possible breast reduction surgery.  There is no cardiac contraindication to this procedure or associated anesthesia.  No further preoperative cardiac testing is required.    The above documents medical care services that are part of ongoing care related to this patient's serious/complex condition (Code ) (NICM/HTN/ICD/TAHIR).        Dez Hayden MD, [facility]

## 2024-10-27 ENCOUNTER — HOSPITAL ENCOUNTER (EMERGENCY)
Facility: HOSPITAL | Age: 44
Discharge: HOME | End: 2024-10-27
Attending: STUDENT IN AN ORGANIZED HEALTH CARE EDUCATION/TRAINING PROGRAM
Payer: MEDICAID

## 2024-10-27 VITALS
HEART RATE: 88 BPM | WEIGHT: 213 LBS | BODY MASS INDEX: 34.23 KG/M2 | RESPIRATION RATE: 16 BRPM | DIASTOLIC BLOOD PRESSURE: 87 MMHG | TEMPERATURE: 98 F | SYSTOLIC BLOOD PRESSURE: 148 MMHG | OXYGEN SATURATION: 99 % | HEIGHT: 66 IN

## 2024-10-27 DIAGNOSIS — S73.109A SPRAIN AND STRAIN OF HIP: Primary | ICD-10-CM

## 2024-10-27 DIAGNOSIS — S76.019A SPRAIN AND STRAIN OF HIP: Primary | ICD-10-CM

## 2024-10-27 LAB
B-HCG UR QL: NEGATIVE
CTP QC/QA: YES

## 2024-10-27 PROCEDURE — 81025 URINE PREGNANCY TEST: CPT | Mod: ER | Performed by: STUDENT IN AN ORGANIZED HEALTH CARE EDUCATION/TRAINING PROGRAM

## 2024-10-27 PROCEDURE — 81025 URINE PREGNANCY TEST: CPT | Mod: ER

## 2024-10-27 PROCEDURE — 99284 EMERGENCY DEPT VISIT MOD MDM: CPT | Mod: 25,ER

## 2024-10-27 PROCEDURE — 96372 THER/PROPH/DIAG INJ SC/IM: CPT | Performed by: STUDENT IN AN ORGANIZED HEALTH CARE EDUCATION/TRAINING PROGRAM

## 2024-10-27 PROCEDURE — 63600175 PHARM REV CODE 636 W HCPCS: Mod: ER | Performed by: STUDENT IN AN ORGANIZED HEALTH CARE EDUCATION/TRAINING PROGRAM

## 2024-10-27 RX ORDER — KETOROLAC TROMETHAMINE 30 MG/ML
15 INJECTION, SOLUTION INTRAMUSCULAR; INTRAVENOUS
Status: COMPLETED | OUTPATIENT
Start: 2024-10-27 | End: 2024-10-27

## 2024-10-27 RX ORDER — IBUPROFEN 600 MG/1
600 TABLET ORAL EVERY 6 HOURS PRN
Qty: 20 TABLET | Refills: 0 | Status: SHIPPED | OUTPATIENT
Start: 2024-10-27

## 2024-10-27 RX ORDER — ACETAMINOPHEN 500 MG
500 TABLET ORAL EVERY 6 HOURS PRN
Qty: 20 TABLET | Refills: 0 | Status: SHIPPED | OUTPATIENT
Start: 2024-10-27

## 2024-10-27 RX ORDER — METHOCARBAMOL 500 MG/1
1000 TABLET, FILM COATED ORAL 3 TIMES DAILY
Qty: 30 TABLET | Refills: 0 | Status: SHIPPED | OUTPATIENT
Start: 2024-10-27 | End: 2024-11-01

## 2024-10-27 RX ADMIN — KETOROLAC TROMETHAMINE 15 MG: 30 INJECTION, SOLUTION INTRAMUSCULAR at 09:10

## 2024-10-27 RX ADMIN — DEXAMETHASONE 10 MG: 2 TABLET ORAL at 09:10

## 2024-11-19 LAB
HPV APTIMA: NEGATIVE
PAP RECOMMENDATION EXT: NORMAL
PAP SMEAR: NORMAL

## 2024-12-13 ENCOUNTER — CLINICAL SUPPORT (OUTPATIENT)
Dept: CARDIOLOGY | Facility: HOSPITAL | Age: 44
End: 2024-12-13

## 2024-12-13 ENCOUNTER — CLINICAL SUPPORT (OUTPATIENT)
Dept: CARDIOLOGY | Facility: HOSPITAL | Age: 44
End: 2024-12-13
Attending: INTERNAL MEDICINE
Payer: MEDICAID

## 2024-12-13 DIAGNOSIS — I44.7 LEFT BUNDLE-BRANCH BLOCK, UNSPECIFIED: ICD-10-CM

## 2024-12-13 DIAGNOSIS — Z95.810 PRESENCE OF AUTOMATIC (IMPLANTABLE) CARDIAC DEFIBRILLATOR: ICD-10-CM

## 2024-12-13 PROCEDURE — 93295 DEV INTERROG REMOTE 1/2/MLT: CPT | Mod: ,,, | Performed by: INTERNAL MEDICINE

## 2024-12-13 PROCEDURE — 93296 REM INTERROG EVL PM/IDS: CPT | Performed by: INTERNAL MEDICINE

## 2024-12-31 ENCOUNTER — HOSPITAL ENCOUNTER (EMERGENCY)
Facility: HOSPITAL | Age: 44
Discharge: HOME | End: 2024-12-31
Attending: EMERGENCY MEDICINE
Payer: MEDICAID

## 2024-12-31 VITALS
SYSTOLIC BLOOD PRESSURE: 126 MMHG | OXYGEN SATURATION: 100 % | TEMPERATURE: 100 F | WEIGHT: 209 LBS | DIASTOLIC BLOOD PRESSURE: 77 MMHG | HEART RATE: 91 BPM | HEIGHT: 66 IN | BODY MASS INDEX: 33.59 KG/M2 | RESPIRATION RATE: 20 BRPM

## 2024-12-31 DIAGNOSIS — R07.9 CHEST PAIN: ICD-10-CM

## 2024-12-31 DIAGNOSIS — R05.9 COUGH, UNSPECIFIED TYPE: ICD-10-CM

## 2024-12-31 DIAGNOSIS — J11.1 INFLUENZA: Primary | ICD-10-CM

## 2024-12-31 LAB
ALBUMIN SERPL-MCNC: 3.8 G/DL (ref 3.3–5.5)
ALP SERPL-CCNC: 65 U/L (ref 42–141)
B-HCG UR QL: NEGATIVE
BILIRUB SERPL-MCNC: 0.6 MG/DL (ref 0.2–1.6)
BUN SERPL-MCNC: 12 MG/DL (ref 7–22)
CALCIUM SERPL-MCNC: 9.2 MG/DL (ref 8–10.3)
CHLORIDE SERPL-SCNC: 111 MMOL/L (ref 98–108)
CREAT SERPL-MCNC: 1 MG/DL (ref 0.6–1.2)
CTP QC/QA: YES
GLUCOSE SERPL-MCNC: 100 MG/DL (ref 73–118)
HCT, POC: NORMAL
HGB, POC: NORMAL (ref 14–18)
INFLUENZA A ANTIGEN, POC: POSITIVE
INFLUENZA B ANTIGEN, POC: NEGATIVE
MCH, POC: NORMAL
MCHC, POC: NORMAL
MCV, POC: NORMAL
MPV, POC: NORMAL
POC ALT (SGPT): 16 U/L (ref 10–47)
POC AST (SGOT): 24 U/L (ref 11–38)
POC CARDIAC TROPONIN I: 0 NG/ML (ref 0–0.08)
POC PLATELET COUNT: NORMAL
POC TCO2: 25 MMOL/L (ref 18–33)
POTASSIUM BLD-SCNC: 4.2 MMOL/L (ref 3.6–5.1)
PROTEIN, POC: 7.8 G/DL (ref 6.4–8.1)
RBC, POC: NORMAL
RDW, POC: NORMAL
SAMPLE: NORMAL
SODIUM BLD-SCNC: 144 MMOL/L (ref 128–145)
WBC, POC: NORMAL

## 2024-12-31 PROCEDURE — 93005 ELECTROCARDIOGRAM TRACING: CPT | Mod: ER

## 2024-12-31 PROCEDURE — 99284 EMERGENCY DEPT VISIT MOD MDM: CPT | Mod: 25,ER

## 2024-12-31 PROCEDURE — 80053 COMPREHEN METABOLIC PANEL: CPT | Mod: ER

## 2024-12-31 PROCEDURE — 93010 ELECTROCARDIOGRAM REPORT: CPT | Mod: ,,, | Performed by: INTERNAL MEDICINE

## 2024-12-31 PROCEDURE — 87804 INFLUENZA ASSAY W/OPTIC: CPT | Mod: ER

## 2024-12-31 PROCEDURE — 81025 URINE PREGNANCY TEST: CPT | Mod: ER | Performed by: EMERGENCY MEDICINE

## 2024-12-31 PROCEDURE — 96374 THER/PROPH/DIAG INJ IV PUSH: CPT | Mod: ER

## 2024-12-31 PROCEDURE — 85025 COMPLETE CBC W/AUTO DIFF WBC: CPT | Mod: ER

## 2024-12-31 PROCEDURE — 81025 URINE PREGNANCY TEST: CPT | Mod: ER

## 2024-12-31 PROCEDURE — 25000003 PHARM REV CODE 250: Mod: ER | Performed by: EMERGENCY MEDICINE

## 2024-12-31 PROCEDURE — 84484 ASSAY OF TROPONIN QUANT: CPT | Mod: ER

## 2024-12-31 PROCEDURE — 63600175 PHARM REV CODE 636 W HCPCS: Mod: ER | Performed by: EMERGENCY MEDICINE

## 2024-12-31 RX ORDER — BENZONATATE 100 MG/1
100 CAPSULE ORAL 3 TIMES DAILY PRN
Qty: 30 CAPSULE | Refills: 0 | Status: SHIPPED | OUTPATIENT
Start: 2024-12-31 | End: 2025-01-10

## 2024-12-31 RX ORDER — OSELTAMIVIR PHOSPHATE 75 MG/1
75 CAPSULE ORAL
Status: COMPLETED | OUTPATIENT
Start: 2024-12-31 | End: 2024-12-31

## 2024-12-31 RX ORDER — OSELTAMIVIR PHOSPHATE 75 MG/1
75 CAPSULE ORAL 2 TIMES DAILY
Qty: 10 CAPSULE | Refills: 0 | Status: SHIPPED | OUTPATIENT
Start: 2024-12-31 | End: 2025-01-05

## 2024-12-31 RX ORDER — ONDANSETRON 4 MG/1
4 TABLET, ORALLY DISINTEGRATING ORAL EVERY 6 HOURS PRN
Qty: 20 TABLET | Refills: 0 | Status: SHIPPED | OUTPATIENT
Start: 2024-12-31

## 2024-12-31 RX ORDER — GUAIFENESIN 600 MG/1
1200 TABLET, EXTENDED RELEASE ORAL 2 TIMES DAILY
Qty: 40 TABLET | Refills: 0 | Status: SHIPPED | OUTPATIENT
Start: 2024-12-31 | End: 2025-01-10

## 2024-12-31 RX ORDER — ONDANSETRON HYDROCHLORIDE 2 MG/ML
4 INJECTION, SOLUTION INTRAVENOUS
Status: COMPLETED | OUTPATIENT
Start: 2024-12-31 | End: 2024-12-31

## 2024-12-31 RX ADMIN — ONDANSETRON 4 MG: 2 INJECTION INTRAMUSCULAR; INTRAVENOUS at 09:12

## 2024-12-31 RX ADMIN — OSELTAMIVIR PHOSPHATE 75 MG: 75 CAPSULE ORAL at 09:12

## 2025-01-01 LAB
OHS QRS DURATION: 82 MS
OHS QTC CALCULATION: 427 MS

## 2025-01-01 NOTE — ED PROVIDER NOTES
Encounter Date: 12/31/2024    SCRIBE #1 NOTE: I, Anaid Alex, am scribing for, and in the presence of,  Maurilio Herbert MD. I have scribed the following portions of the note - Other sections scribed: HPI,ROS,PE.       History     Chief Complaint   Patient presents with    General Illness     Cough since this AM w/ worsening body aches through day today.      Hollie Saldana is a 44 y.o. female, with a PMHx of nonischemic cardiomyopathy with ICD placement, tubal ligation, HTN, LBBB, who presents to the ED with complaint of cough with associated posttussis emesis, generalized myalgias, headache, and chest pain that began this AM. Patient denies nausea currently. Patient reports history of cardiomyopathy and reports she has a ICD in place. No other exacerbating or alleviating factors. Denies dizziness, lightheadedness, leg swelling, dyspnea, or other associated symptoms.       The history is provided by the patient. No  was used.     Review of patient's allergies indicates:   Allergen Reactions    Oxycodone Itching     Past Medical History:   Diagnosis Date    Cardiomyopathy     History of tubal ligation     Hypertension     LBBB (left bundle branch block)     Nonischemic cardiomyopathy 10/23/2020     Past Surgical History:   Procedure Laterality Date    CARDIAC CATHETERIZATION  2017    CATHETERIZATION OF BOTH LEFT AND RIGHT HEART Bilateral 11/29/2022    Procedure: CATHETERIZATION, HEART, BOTH LEFT AND RIGHT;  Surgeon: Dez Hayden MD;  Location: [facility] CATH LAB;  Service: Cardiology;  Laterality: Bilateral;  RFA/V access, 4Fr art sheath  RN PREOP 11/23/2022    INSERTION OF BIVENTRICULAR IMPLANTABLE CARDIOVERTER-DEFIBRILLATOR (ICD) Left 6/12/2023    Procedure: INSERTION, ICD, BIVENTRICULAR;  Surgeon: Magdiel Rider MD;  Location: [facility] EP LAB;  Service: Cardiology;  Laterality: Left;  NICM,CRT-D, SJM, MAC, DM, 3 Prep     Family History   Problem Relation Name Age of Onset    Hypertension  Mother      Thyroid disease Mother      Appendicitis Mother      Hypertension Father       Social History     Tobacco Use    Smoking status: Never    Smokeless tobacco: Never   Substance Use Topics    Alcohol use: Yes     Comment: Occasionally    Drug use: No     Review of Systems   Respiratory:  Positive for cough. Negative for shortness of breath.    Cardiovascular:  Positive for chest pain. Negative for leg swelling.   Gastrointestinal:  Positive for vomiting (postussive). Negative for nausea.   Musculoskeletal:  Positive for myalgias (generalized).   Neurological:  Negative for dizziness and light-headedness.       Physical Exam     Initial Vitals [12/31/24 2031]   BP Pulse Resp Temp SpO2   (!) 153/99 108 20 99.6 °F (37.6 °C) 98 %      MAP       --         Physical Exam    Nursing note and vitals reviewed.  Constitutional: She appears well-developed and well-nourished. She does not appear ill. No distress.   HENT:   Head: Normocephalic.   Eyes: EOM are normal.   Neck:   Normal range of motion.  Cardiovascular:  Normal rate, regular rhythm and normal heart sounds.           No murmur heard.  Pulmonary/Chest: Breath sounds normal. No respiratory distress. She has no wheezes.   Lungs are clear to auscultation    Abdominal: Abdomen is soft. There is no abdominal tenderness.   Musculoskeletal:         General: No edema.      Cervical back: Normal range of motion.      Comments: No lower extremity edema or tenderness present.      Neurological: She is alert. GCS eye subscore is 4. GCS verbal subscore is 5. GCS motor subscore is 6.   Skin: Skin is warm.         ED Course   Procedures  Labs Reviewed   POCT CMP - Abnormal       Result Value    Albumin, POC 3.8      Alkaline Phosphatase, POC 65      ALT (SGPT), POC 16      AST (SGOT), POC 24      POC BUN 12      Calcium, POC 9.2      POC Chloride 111 (*)     POC Creatinine 1.0      POC Glucose 100      POC Potassium 4.2      POC Sodium 144      Bilirubin, POC 0.6      POC  TCO2 25      Protein, POC 7.8     POCT RAPID INFLUENZA A/B - Abnormal    Influenza B Ag negative      Inflenza A Ag positive (*)    TROPONIN ISTAT    POC Cardiac Troponin I 0.00      Sample unknown     POCT URINE PREGNANCY    POC Preg Test, Ur Negative       Acceptable Yes     POCT CBC    Hematocrit        Hemoglobin        RBC        WBC        MCV        MCH, POC        MCHC        RDW-CV        Platelet Count, POC        MPV       POCT INFLUENZA A/B MOLECULAR   POCT CMP   POCT TROPONIN          Imaging Results              X-Ray Chest AP Portable (Final result)  Result time 12/31/24 21:50:22      Final result by Roscoe Monet MD (12/31/24 21:50:22)                   Impression:      No acute cardiopulmonary process identified.      Electronically signed by: Roscoe Monet MD  Date:    12/31/2024  Time:    21:50               Narrative:    EXAMINATION:  XR CHEST AP PORTABLE    CLINICAL HISTORY:  Chest Pain;    TECHNIQUE:  Single frontal view of the chest was performed.    COMPARISON:  04/18/2024.    FINDINGS:  Cardiac silhouette is normal in size.  Left-sided pacer device is seen.  Lungs are symmetrically expanded.  No evidence of focal consolidative process, pneumothorax, or significant pleural effusion.  No acute osseous abnormality identified.                                       Medications   oseltamivir capsule 75 mg (75 mg Oral Given 12/31/24 2156)   ondansetron injection 4 mg (4 mg Intravenous Given 12/31/24 2152)     Medical Decision Making    44-year-old female presenting with cough, chest discomfort, nausea.  Due to history of cardiomyopathy workup obtained for suspected URI.  Patient found to be influenza screen positive.  No infiltrates noted on chest x-ray.  No arrhythmia noted.  Patient is started on Tamiflu due to risk factors.  Patient provided antiemetics as needed.  Follow up with primary care recommended.          Medical Decision Making:     A. Problem List:  1.  Influenza  2. URI  3. Nausea     B. Differential diagnosis:    URI, lower respiratory tract infection including pneumonia, electrolyte abnormality     ECG:  Please check workup area for ECG interpretation.    Part of the note was done using electronic dictation services.           Amount and/or Complexity of Data Reviewed  Labs: ordered. Decision-making details documented in ED Course.  Radiology: ordered. Decision-making details documented in ED Course.  ECG/medicine tests:  Decision-making details documented in ED Course.    Risk  OTC drugs.  Prescription drug management.            Scribe Attestation:   Scribe #1: I performed the above scribed service and the documentation accurately describes the services I performed. I attest to the accuracy of the note.        ED Course as of 12/31/24 2304 Tue Dec 31, 2024   2231 Inflenza A Ag(!): positive [JM]   2302 EKG 12-lead  Time 9:45 p.m.     Rate 92, atrial paced, regular rhythm, normal axis.   QRS 82 .  No ST elevation or depression.  T-wave inversion lead 3, AVF, V3.  No ST changes noted.      Atrial paced rhythm with nonspecific T-wave inversion. [JM]      ED Course User Index  [JM] Maurilio Herbert MD                           I, Maurilio Herbert, personally performed the services described in this documentation. All medical record entries made by the scribe were at my direction and in my presence. I have reviewed the chart and agree that the record reflects my personal performance and is accurate and complete.      DISCLAIMER: This note was prepared with AGLOGIC voice recognition transcription software. Garbled syntax, mangled pronouns, and other bizarre constructions may be attributed to that software system.    Clinical Impression:  Final diagnoses:  [R07.9] Chest pain  [J11.1] Influenza (Primary)  [R05.9] Cough, unspecified type          ED Disposition Condition    Discharge Stable          ED Prescriptions       Medication Sig Dispense Start Date  End Date Auth. Provider    oseltamivir (TAMIFLU) 75 MG capsule Take 1 capsule (75 mg total) by mouth 2 (two) times daily. for 5 days 10 capsule 12/31/2024 1/5/2025 Maurilio Herbert MD    benzonatate (TESSALON) 100 MG capsule Take 1 capsule (100 mg total) by mouth 3 (three) times daily as needed. 30 capsule 12/31/2024 1/10/2025 Maurilio Herbert MD    guaiFENesin (MUCINEX) 600 mg 12 hr tablet Take 2 tablets (1,200 mg total) by mouth 2 (two) times daily. for 10 days 40 tablet 12/31/2024 1/10/2025 Maurilio Herbert MD    ondansetron (ZOFRAN-ODT) 4 MG TbDL Take 1 tablet (4 mg total) by mouth every 6 (six) hours as needed. 20 tablet 12/31/2024 -- Maurilio Herbert MD          Follow-up Information       Follow up With Specialties Details Why Contact Info    Hebert Shepherd MD Family Medicine Schedule an appointment as soon as possible for a visit   [address] [facility] [address] [phone]      [facility] ED Emergency Medicine  If symptoms worsen [address] [facility] [address] [facility] [address] [phone]             Maurilio Herbert MD  12/31/24 2781

## 2025-01-01 NOTE — DISCHARGE INSTRUCTIONS
Recommend Tylenol as needed for fever or body aches.    Follow up with the your primary care provider for re-evaluation within the next 1 week.    Seek medical care for worsening symptoms or concerns.

## 2025-01-14 LAB
OHS CV AF BURDEN PERCENT: < 1
OHS CV BIV PACING PERCENT: 99 %
OHS CV DC REMOTE DEVICE TYPE: NORMAL

## 2025-01-24 DIAGNOSIS — I50.22 CHF (CONGESTIVE HEART FAILURE), NYHA CLASS III, CHRONIC, SYSTOLIC: Primary | ICD-10-CM

## 2025-01-29 ENCOUNTER — OFFICE VISIT (OUTPATIENT)
Dept: CARDIOLOGY | Facility: CLINIC | Age: 45
End: 2025-01-29
Payer: COMMERCIAL

## 2025-01-29 VITALS
DIASTOLIC BLOOD PRESSURE: 72 MMHG | OXYGEN SATURATION: 99 % | WEIGHT: 209 LBS | SYSTOLIC BLOOD PRESSURE: 138 MMHG | HEIGHT: 66 IN | BODY MASS INDEX: 33.59 KG/M2 | RESPIRATION RATE: 18 BRPM | HEART RATE: 125 BPM

## 2025-01-29 DIAGNOSIS — I42.8 NONISCHEMIC CARDIOMYOPATHY: Primary | ICD-10-CM

## 2025-01-29 DIAGNOSIS — G47.33 OSA (OBSTRUCTIVE SLEEP APNEA): ICD-10-CM

## 2025-01-29 DIAGNOSIS — Z95.810 PRESENCE OF AUTOMATIC (IMPLANTABLE) CARDIAC DEFIBRILLATOR: ICD-10-CM

## 2025-01-29 DIAGNOSIS — R00.2 PALPITATIONS: ICD-10-CM

## 2025-01-29 DIAGNOSIS — I10 ESSENTIAL HYPERTENSION: ICD-10-CM

## 2025-01-29 DIAGNOSIS — I44.7 LBBB (LEFT BUNDLE BRANCH BLOCK): ICD-10-CM

## 2025-01-29 DIAGNOSIS — E66.9 NON MORBID OBESITY, UNSPECIFIED OBESITY TYPE: ICD-10-CM

## 2025-01-29 DIAGNOSIS — Z95.810 BIVENTRICULAR ICD (IMPLANTABLE CARDIOVERTER-DEFIBRILLATOR) IN PLACE: ICD-10-CM

## 2025-01-29 PROCEDURE — 1159F MED LIST DOCD IN RCRD: CPT | Mod: CPTII,S$GLB,, | Performed by: INTERNAL MEDICINE

## 2025-01-29 PROCEDURE — 99999 PR PBB SHADOW E&M-EST. PATIENT-LVL V: CPT | Mod: PBBFAC,,, | Performed by: INTERNAL MEDICINE

## 2025-01-29 PROCEDURE — G2211 COMPLEX E/M VISIT ADD ON: HCPCS | Mod: S$GLB,,, | Performed by: INTERNAL MEDICINE

## 2025-01-29 PROCEDURE — 1160F RVW MEDS BY RX/DR IN RCRD: CPT | Mod: CPTII,S$GLB,, | Performed by: INTERNAL MEDICINE

## 2025-01-29 PROCEDURE — 3008F BODY MASS INDEX DOCD: CPT | Mod: CPTII,S$GLB,, | Performed by: INTERNAL MEDICINE

## 2025-01-29 PROCEDURE — 3075F SYST BP GE 130 - 139MM HG: CPT | Mod: CPTII,S$GLB,, | Performed by: INTERNAL MEDICINE

## 2025-01-29 PROCEDURE — 99214 OFFICE O/P EST MOD 30 MIN: CPT | Mod: S$GLB,,, | Performed by: INTERNAL MEDICINE

## 2025-01-29 PROCEDURE — 3078F DIAST BP <80 MM HG: CPT | Mod: CPTII,S$GLB,, | Performed by: INTERNAL MEDICINE

## 2025-01-29 PROCEDURE — 93000 ELECTROCARDIOGRAM COMPLETE: CPT | Mod: S$GLB,,, | Performed by: INTERNAL MEDICINE

## 2025-01-29 PROCEDURE — 4010F ACE/ARB THERAPY RXD/TAKEN: CPT | Mod: CPTII,S$GLB,, | Performed by: INTERNAL MEDICINE

## 2025-01-29 NOTE — PROGRESS NOTES
CARDIOVASCULAR PROGRESS NOTE    REASON FOR CONSULT:   Hollie Saldana is a 44 y.o. female who presents for follow up of NICM, HTN, LBBB.    PCP: Joao  EP: Adry  Sleep: Pedro Luis  HISTORY OF PRESENT ILLNESS:   The patient comes in today at her urgent request due to malaise and tachycardia.  She apparently ran out of her metoprolol and has not taken it for the past 2-3 days.  She is in a sinus tachycardia at 116 beats per minute with biventricular pacing.  This is similar (aside from heart rate) to her prior EKG.  I believe her tachycardia and symptoms are likely due to beta blocker withdrawal.  The patient will get her metoprolol and start taking it immediately.  She otherwise denies angina, dyspnea, or syncope.  There has been no defibrillator discharges.  She denies any PND, orthopnea, melena, hematuria, or claudication symptoms.  She has not yet followed up with sleep medicine and needs to get this rescheduled.    CARDIOVASCULAR HISTORY:   LBBB (normal cath 5/2017)  NICM, EF 45->25%->55% (echo 3/2024).  Cath 11/2022 neg.  [facility] BiVICD (6/12/23, Dr. Rider)  TAHIR, +HST 1/2024    PAST MEDICAL HISTORY:     Past Medical History:   Diagnosis Date    Cardiomyopathy     History of tubal ligation     Hypertension     LBBB (left bundle branch block)     Nonischemic cardiomyopathy 10/23/2020       PAST SURGICAL HISTORY:     Past Surgical History:   Procedure Laterality Date    CARDIAC CATHETERIZATION  2017    CATHETERIZATION OF BOTH LEFT AND RIGHT HEART Bilateral 11/29/2022    Procedure: CATHETERIZATION, HEART, BOTH LEFT AND RIGHT;  Surgeon: Dez Hayden MD;  Location: [facility] CATH LAB;  Service: Cardiology;  Laterality: Bilateral;  RFA/V access, 4Fr art sheath  RN PREOP 11/23/2022    INSERTION OF BIVENTRICULAR IMPLANTABLE CARDIOVERTER-DEFIBRILLATOR (ICD) Left 6/12/2023    Procedure: INSERTION, ICD, BIVENTRICULAR;  Surgeon: Magdiel Rider MD;  Location: [facility] EP LAB;  Service: Cardiology;  Laterality: Left;   NICM,CRT-D, SJM, MAC, DM, 3 Prep       ALLERGIES AND MEDICATION:   Review of patient's allergies indicates:  No Known Allergies  Previous Medications    ENTRESTO  MG PER TABLET    TAKE 1 TABLET BY MOUTH TWICE A DAY FOR heart    FAMOTIDINE (PEPCID) 20 MG TABLET    Take 1 tablet (20 mg total) by mouth nightly as needed for Heartburn.    FLUTICASONE PROPIONATE (FLONASE) 50 MCG/ACTUATION NASAL SPRAY    2 sprays (100 mcg total) by Each Nostril route once daily.    HYDRALAZINE (APRESOLINE) 100 MG TABLET    Take 1 tablet (100 mg total) by mouth 2 (two) times a day.    IBUPROFEN (ADVIL,MOTRIN) 600 MG TABLET    Take 1 tablet (600 mg total) by mouth every 6 (six) hours as needed for Pain.    LEVOCETIRIZINE (XYZAL) 5 MG TABLET    Take 5 mg by mouth.    METOPROLOL SUCCINATE (TOPROL-XL) 200 MG 24 HR TABLET    TAKE 2 TABLETS BY MOUTH ONCE A DAY    ONDANSETRON (ZOFRAN-ODT) 4 MG TBDL    Take 1 tablet (4 mg total) by mouth every 6 (six) hours as needed.    SPIRONOLACTONE (ALDACTONE) 25 MG TABLET    TAKE 1 TABLET BY MOUTH ONCE A DAY    TOPIRAMATE (TOPAMAX) 50 MG TABLET    Take ONE tablet BY MOUTH EVERY EVENING       SOCIAL HISTORY:     Social History     Socioeconomic History    Marital status: Single   Tobacco Use    Smoking status: Never    Smokeless tobacco: Never   Substance and Sexual Activity    Alcohol use: Yes     Comment: Occasionally    Drug use: No     Social Drivers of Health     Financial Resource Strain: Low Risk  (10/17/2024)    Overall Financial Resource Strain ([facility])     Difficulty of Paying Living Expenses: Not hard at all   Food Insecurity: No Food Insecurity (10/17/2024)    Hunger Vital Sign     Worried About Running Out of Food in the Last Year: Never true     Ran Out of Food in the Last Year: Never true   Physical Activity: Sufficiently Active (10/17/2024)    Exercise Vital Sign     Days of Exercise per Week: 2 days     Minutes of Exercise per Session: 120 min   Stress: Stress Concern Present  "(10/17/2024)    [employer] [place] [employer] - Occupational Stress Questionnaire     Feeling of Stress : To some extent   Housing Stability: Unknown (10/17/2024)    Housing Stability Vital Sign     Unable to Pay for Housing in the Last Year: No       FAMILY HISTORY:     Family History   Problem Relation Name Age of Onset    Hypertension Mother      Thyroid disease Mother      Appendicitis Mother      Hypertension Father         REVIEW OF SYSTEMS:   Review of Systems   Constitutional:  Positive for malaise/fatigue. Negative for chills, diaphoresis and fever.   HENT:  Negative for nosebleeds.    Eyes:  Negative for blurred vision, double vision and photophobia.   Respiratory:  Negative for hemoptysis, shortness of breath and wheezing.    Cardiovascular:  Negative for chest pain, palpitations, orthopnea, claudication, leg swelling and PND.   Gastrointestinal:  Negative for abdominal pain, blood in stool, heartburn, melena, nausea and vomiting.   Genitourinary:  Negative for flank pain and hematuria.   Musculoskeletal:  Negative for falls, myalgias and neck pain.   Skin:  Negative for rash.   Neurological:  Negative for dizziness, seizures, loss of consciousness, weakness and headaches.   Endo/Heme/Allergies:  Negative for polydipsia. Does not bruise/bleed easily.   Psychiatric/Behavioral:  Negative for depression and memory loss. The patient is not nervous/anxious.        PHYSICAL EXAM:     Vitals:    01/29/25 0921   BP: 138/72   Pulse: (!) 125   Resp: 18    Body mass index is 33.73 kg/m².  Weight: 94.8 kg (209 lb)   Height: 5' 6" (167.6 cm)     Physical Exam  Vitals reviewed.   Constitutional:       General: She is not in acute distress.     Appearance: She is well-developed. She is obese. She is not ill-appearing, toxic-appearing or diaphoretic.   HENT:      Head: Normocephalic and atraumatic.   Eyes:      General: No scleral icterus.     Extraocular Movements: Extraocular movements intact.      " Conjunctiva/sclera: Conjunctivae normal.      Pupils: Pupils are equal, round, and reactive to light.   Neck:      Thyroid: No thyromegaly.      Vascular: Normal carotid pulses. No carotid bruit or JVD.      Trachea: Trachea normal.   Cardiovascular:      Rate and Rhythm: Regular rhythm. Tachycardia present.      Pulses:           Carotid pulses are 2+ on the right side and 2+ on the left side.     Heart sounds: S1 normal and S2 normal. No murmur heard.     No friction rub. No gallop.      Comments: L infraclavicular ICD incision well healed  Pulmonary:      Effort: Pulmonary effort is normal. No respiratory distress.      Breath sounds: Normal breath sounds. No stridor. No wheezing, rhonchi or rales.   Chest:      Chest wall: No tenderness.   Abdominal:      General: There is no distension.      Palpations: Abdomen is soft.   Musculoskeletal:         General: No swelling or tenderness. Normal range of motion.      Cervical back: Normal range of motion and neck supple. No edema or rigidity.      Right lower leg: No edema.      Left lower leg: No edema.   Feet:      Right foot:      Skin integrity: No ulcer.      Left foot:      Skin integrity: No ulcer.   Skin:     General: Skin is warm and dry.      Coloration: Skin is not jaundiced.   Neurological:      General: No focal deficit present.      Mental Status: She is alert and oriented to person, place, and time.      Cranial Nerves: No cranial nerve deficit.   Psychiatric:         Mood and Affect: Mood normal.         Speech: Speech normal.         Behavior: Behavior normal. Behavior is cooperative.         DATA:   EKG: (personally reviewed tracing(s))  1/29/25 , ?BiV paced    Laboratory:  CBC:  Recent Labs   Lab 02/14/23  0939 06/05/23  1544 05/17/24  0720   WBC 5.21 4.87 6.09   Hemoglobin 11.7 L 11.7 L 12.0   Hematocrit 37.9 36.5 L 38.5   Platelets 318 284 302       CHEMISTRIES:  Recent Labs   Lab 02/09/22  0700 11/23/22  1450 03/15/23  1556 06/05/23  1544  05/17/24  0720   Glucose 89   < > 70 78 86   Sodium 134 L   < > 137 139 138   Potassium 3.8   < > 3.9 3.9 4.5   BUN 9   < > 11 13 14   Creatinine 0.8   < > 0.8 0.9 1.0   eGFR if African American >60.0  --   --   --   --    eGFR if non  >60.0  --   --   --   --    Calcium 9.4   < > 9.1 9.2 9.3    < > = values in this interval not displayed.       CARDIAC BIOMARKERS:          COAGS:  Recent Labs   Lab 11/23/22  1450 01/26/23  2036 06/05/23  1544   INR 0.9 1.2 1.0       LIPIDS/LFTS:  Recent Labs   Lab 02/09/22  0700 02/14/23  0939 05/17/24  0720   Cholesterol 173 187 190   Triglycerides 54 76 64   HDL 67 55 72   LDL Cholesterol 95.2 116.8 105.2   Non-HDL Cholesterol 106 132 118   AST 15 13 21   ALT 10 9 L 23     Lab Results   Component Value Date    TSH 0.779 05/17/2024         Cardiovascular Testing:  Echo 3/22/24    Left Ventricle: The left ventricle is normal in size. Mildly increased wall thickness. There is concentric hypertrophy. There is normal systolic function with a visually estimated ejection fraction of 55 %. Grade I diastolic dysfunction.    Right Ventricle: Normal right ventricular cavity size. Systolic function is normal.device lead seen in rv    Left Atrium: Left atrium is moderately dilated.    Right Atrium: Right atrium is mildly dilated.    Tricuspid Valve: There is mild regurgitation.    Pulmonary Artery: The estimated pulmonary artery systolic pressure is 24 mmHg.    IVC/SVC: Normal venous pressure at 3 mmHg.    Cath 11/25/22  RA 3  RV 24/5  PA 20/5/10  PAWP 6  /13  Ao 115/69/89  Sats:  HRA 69.2  LRA 73.1  RV 72.6  PA 71.5  FA 93.4   CO 5.4 L/min (thermal)  LVEF: 25-30% by echo  Dominance: Right  LM: normal  LAD: normal  LCx: normal  RCA: normal  Hemostasis:  RFA angioseal  RFV man comp  Impression:  LBBB/CMP  Normal cors  Normal hemodynamics  No evidence of intracardiac shunt  Above consistent with NICM.  Plan:  Cont med rx  Stop amlod  Inc entresto 49/51mg bid  Cont  metoprolol  Eventual aldactone  Cont lifevest  Repeat echo in 3 months (late Feb 2023) for reassessment of LVEF    Holter 11/7/22  Sinus rhythm with heart rates varying between 75 and 138 BPM with an average of 94BPM.  There were very rare PACs totalling 7 and averaging 0.29 per hour.  The diary was not returned.    Renal art US 11/10/20  There is insignificant stenosis (0-59%) in the Right Renal Artery.  Right kidney 10.70 cm.  There is insignificant stenosis (0-59%) in the Left Renal Artery.  Left kidney 10.80 cm.      ASSESSMENT:   # NICM, EF 45%->25%->55% on echo 3/2024.  NICM without shunt on cath 11/2022.  Now with malaise in setting of nonadherence with metoprolol and sinus tachycardia.  # [facility] BiVICD 6/2023 (Dr. Rider), following with [facility].  # HTN, controlled.  Renal art US 11/2020 neg.  # hx LBBB  # TAHIR, +HST 1/29/24, not on CPAP.    PLAN:   Cont med rx  Cont GDMT  Resume metoprolol ER asap.  Sleep med eval for possible CPAP (+HST 1/2024)  Follow up with [facility] EPS as planned for device check  RTC 1 year (Oct 2024)  Consider as a candidate for renal denervation in the future prn.    The above documents medical care services that are part of ongoing care related to this patient's serious/complex condition (Code ) (NICM/HTN/ICD/TAHIR).        Dez Hayden MD, FACC

## 2025-01-30 LAB
OHS QRS DURATION: 130 MS
OHS QTC CALCULATION: 508 MS

## 2025-02-07 ENCOUNTER — OFFICE VISIT (OUTPATIENT)
Dept: FAMILY MEDICINE | Facility: CLINIC | Age: 45
End: 2025-02-07
Payer: COMMERCIAL

## 2025-02-07 VITALS
BODY MASS INDEX: 34.84 KG/M2 | WEIGHT: 215.81 LBS | OXYGEN SATURATION: 99 % | SYSTOLIC BLOOD PRESSURE: 140 MMHG | DIASTOLIC BLOOD PRESSURE: 82 MMHG | HEART RATE: 78 BPM

## 2025-02-07 DIAGNOSIS — I42.8 NONISCHEMIC CARDIOMYOPATHY: ICD-10-CM

## 2025-02-07 DIAGNOSIS — I44.7 LBBB (LEFT BUNDLE BRANCH BLOCK): Primary | ICD-10-CM

## 2025-02-07 DIAGNOSIS — E66.09 CLASS 1 OBESITY DUE TO EXCESS CALORIES WITH SERIOUS COMORBIDITY AND BODY MASS INDEX (BMI) OF 32.0 TO 32.9 IN ADULT: ICD-10-CM

## 2025-02-07 DIAGNOSIS — E66.811 CLASS 1 OBESITY DUE TO EXCESS CALORIES WITH SERIOUS COMORBIDITY AND BODY MASS INDEX (BMI) OF 32.0 TO 32.9 IN ADULT: ICD-10-CM

## 2025-02-07 PROCEDURE — 99999 PR PBB SHADOW E&M-EST. PATIENT-LVL III: CPT | Mod: PBBFAC,,, | Performed by: FAMILY MEDICINE

## 2025-02-10 ENCOUNTER — PATIENT MESSAGE (OUTPATIENT)
Dept: CARDIOLOGY | Facility: CLINIC | Age: 45
End: 2025-02-10
Payer: COMMERCIAL

## 2025-02-14 DIAGNOSIS — B96.89 ACUTE BACTERIAL SINUSITIS: Primary | ICD-10-CM

## 2025-02-14 DIAGNOSIS — J01.90 ACUTE BACTERIAL SINUSITIS: Primary | ICD-10-CM

## 2025-02-14 RX ORDER — CETIRIZINE HYDROCHLORIDE 10 MG/1
10 TABLET ORAL DAILY
Qty: 90 TABLET | Refills: 3 | Status: SHIPPED | OUTPATIENT
Start: 2025-02-14 | End: 2026-02-14

## 2025-02-14 RX ORDER — AMOXICILLIN AND CLAVULANATE POTASSIUM 875; 125 MG/1; MG/1
1 TABLET, FILM COATED ORAL 2 TIMES DAILY
Qty: 20 TABLET | Refills: 0 | Status: SHIPPED | OUTPATIENT
Start: 2025-02-14 | End: 2025-02-24

## 2025-03-11 DIAGNOSIS — Z01.818 PRE-OP EXAM: Primary | ICD-10-CM

## 2025-03-11 NOTE — PROGRESS NOTES
Primary Care Pre-Operative Assessment  For bilateral breast reduction scheduled to be performed on 4/8/2025    Impression:   Patient is at above-average risk for this moderate risk procedure  - see ACS Surgical Risk Calculator results below.  -- Optimized from a primary care standpoint.  Okay to proceed with surgery as scheduled.   -- cardiologist saw the patient (Dr. Hayden on 1/29/2025) and has cleared her for surgery  -- Pt maintains having had prior surgery without any perioperative complication.    -- I recommend use of standard pre-op and post-op precautions for this patient. We discussed the benefits of early mobilization and deep breathing after surgery.    -- patient has symptoms at are somewhat concerning for sleep apnea, pending workup, please make anesthesia aware  -- See note below for complete assessment    ACS risk calculator results:      Wt Readings from Last 3 Encounters:   03/13/25 96.2 kg (212 lb 3.1 oz)   02/07/25 97.9 kg (215 lb 13.3 oz)   01/30/25 97.3 kg (214 lb 6.4 oz)     Temp Readings from Last 3 Encounters:   03/13/25 98.2 °F (36.8 °C) (Oral)   01/30/25 97 °F (36.1 °C) (Temporal)   12/31/24 99.6 °F (37.6 °C) (Oral)     BP Readings from Last 3 Encounters:   03/13/25 (!) 140/82   02/07/25 (!) 140/82   01/30/25 126/70     Pulse Readings from Last 3 Encounters:   03/13/25 84   02/07/25 78   01/30/25 85     Resp Readings from Last 3 Encounters:   01/30/25 19   01/29/25 18   12/31/24 20     PF Readings from Last 3 Encounters:   No data found for PF     SpO2 Readings from Last 3 Encounters:   03/13/25 100%   02/07/25 99%   01/30/25 97%        Lab Results   Component Value Date    HGBA1C 5.5 05/17/2024    HGBA1C 5.1 02/14/2023    HGBA1C 5.4 02/09/2022     Lab Results   Component Value Date    LDLCALC 105.2 05/17/2024    CREATININE 0.9 03/18/2025       Patient denies any symptoms (as per HPI) concerning for undiagnosed lung disease including TAHIR - Sleep Study 3/27/2025.  Noted above, please  make anesthesia aware    Screened patient for alcohol misuse, use of illicit drugs, and personal or family history of anesthetic complications or bleeding diathesis and no substantial concerns were identified.     Labs reviewed:  Creatinine is below 2 mg/dl.    All current medications were reviewed and at this time no changes to medications are recommended prior to surgery.     Advised healthy diet/exercise/weight loss in anticipation of surgery  Hold ASA, Plavix, NSAIDs for 5 days prior to surgery  Hold Ozempic about 10 days prior to surgery.        Assessment & Plan     Pre-op exam  Assessment & Plan:  No major issues, patient is doing well.  Denies any chest pain, shortness of breath, dyspnea, dyspnea on exertion, palpitations.  No prior issues with anesthesia.    Orders:  -     CBC Auto Differential; Future; Expected date: 03/13/2025  -     Comprehensive Metabolic Panel; Future; Expected date: 03/13/2025  -     IN OFFICE EKG 12-LEAD (to Juanito)    Suspected sleep apnea  Assessment & Plan:  Patient has snoring without significant daytime somnolence, but in the setting of obesity this is concerning for possible sleep apnea.  Patient understands risks of untreated sleep apnea, but will defer workup to a later date.      Vitamin D deficiency  Assessment & Plan:  Historic mild vitamin-D deficiency, most recent labs with very mild hypocalcemia.  Prescribed vitamin-D today and advised patient to take Tums after meals as needed for acid reflux, which should also help to correct calcium deficiency    Orders:  -     vitamin D (VITAMIN D3) 1000 units Tab; Take 1 tablet (1,000 Units total) by mouth once daily.  Dispense: 90 tablet; Refill: 3  -     calcium carbonate (CALCIUM 500) 500 mg calcium (1,250 mg) chewable tablet; Take 1 tablet (500 mg total) by mouth once daily. After meals as needed, certainly after last meal of the day  Dispense: 90 tablet; Refill: 3    Gastroesophageal reflux disease, unspecified whether  esophagitis present  Assessment & Plan:  Endorses chronic mild yet poorly controlled acid reflux.  We will start with Tums p.r.n. PC and monitor for improvement.             Physical Exam   Vital signs reviewed.   Body mass index is 34.25 kg/m².  General:  Well-developed, well-nourished. NAD.   Skin:  Warm, dry.  No rashes or lesions noted.  No palmar erythema.  Cap refill <2s bilaterally  Head:  NC/AT   Eyes:  Conjunctivae w/o exudates or hemorrhage.  Non-icteric sclerae.  Ears:  External ears w/o swelling or erythema.  Nose:  Nares are patent bilaterally w/o rhinorrhea or epistaxis  Mouth:  Mucosa is pink and moist.  No nodules or lesions noted.  No tonsillar swelling or exudates.   Neck:  Trachea is midline.  No carotid bruit appreciated.  No cervical adenopathy appreciated.    Lungs:  CTAB without rales, rhonchi or wheezing.   Breathing comfortably on RA.  Heart:  Normal S1 & S2.  No extra heart sounds.   No pulsus alternans.  Abdomen:  Symmetric, non-distended, non-tender  Extremities:  Radial pulses 2+ and symmetric.    UE & LE are atraumatic without swelling, erythema, tenderness or deformity.    Neuro:  A&O4.  No obvious focal deficits. Negative Nye's sign.   strength 5/5 bilaterally.  Psychiatric:  Appropriate affect.          History     Past Medical History:  Past Medical History:   Diagnosis Date    Cardiomyopathy     History of tubal ligation     Hypertension     LBBB (left bundle branch block)     Nonischemic cardiomyopathy 10/23/2020       Past Surgical History:  Past Surgical History:   Procedure Laterality Date    CARDIAC CATHETERIZATION  2017    CATHETERIZATION OF BOTH LEFT AND RIGHT HEART Bilateral 11/29/2022    Procedure: CATHETERIZATION, HEART, BOTH LEFT AND RIGHT;  Surgeon: Dez Hayden MD;  Location: [facility] CATH LAB;  Service: Cardiology;  Laterality: Bilateral;  RFA/V access, 4Fr art sheath  RN PREOP 11/23/2022    INSERTION OF BIVENTRICULAR IMPLANTABLE CARDIOVERTER-DEFIBRILLATOR  (ICD) Left 6/12/2023    Procedure: INSERTION, ICD, BIVENTRICULAR;  Surgeon: Magdiel Rider MD;  Location: [facility] LAB;  Service: Cardiology;  Laterality: Left;  NICM,CRT-D, SJM, MAC, DM, 3 Prep       Social History:  Social History[1]    Family History:  Family History   Problem Relation Name Age of Onset    Hypertension Mother      Thyroid disease Mother      Appendicitis Mother      Hypertension Father         Allergies and Medications: (updated and reviewed)  Review of patient's allergies indicates:   Allergen Reactions    Oxycodone Itching     Current Medications[2]    Patient Care Team:  Hebert Shepherd MD as PCP - General (Family Medicine)        Finn London PA-C  [facility]  [facility]         - The patient is given an After Visit Summary that lists all medications with directions, allergies, education, orders placed during this encounter and follow-up instructions.      - I have reviewed the patient's medical information including past medical, family, and social history sections including the medications and allergies.      - We discussed the patient's current medications.     This note was created by combination of typed  and MModal dictation.  Transcription errors may be present.  If there are any questions, please contact me.                     [1]   Social History  Socioeconomic History    Marital status: Single   Tobacco Use    Smoking status: Never    Smokeless tobacco: Never   Substance and Sexual Activity    Alcohol use: Yes     Comment: Occasionally    Drug use: No     Social Drivers of Health     Financial Resource Strain: Low Risk  (10/17/2024)    Overall Financial Resource Strain (CARDIA)     Difficulty of Paying Living Expenses: Not hard at all   Food Insecurity: No Food Insecurity (10/17/2024)    Hunger Vital Sign     Worried About Running Out of Food in the Last Year: Never true     Ran Out of Food in the Last Year: Never true   Physical Activity:  Sufficiently Active (10/17/2024)    Exercise Vital Sign     Days of Exercise per Week: 2 days     Minutes of Exercise per Session: 120 min   Stress: Stress Concern Present (10/17/2024)    [employer] [place] [employer] - Occupational Stress Questionnaire     Feeling of Stress : To some extent   Housing Stability: Unknown (10/17/2024)    Housing Stability Vital Sign     Unable to Pay for Housing in the Last Year: No   [2]   Current Outpatient Medications   Medication Sig Dispense Refill    cetirizine (ZYRTEC) 10 MG tablet Take 1 tablet (10 mg total) by mouth once daily. 90 tablet 3    ENTRESTO  mg per tablet TAKE 1 TABLET BY MOUTH TWICE A DAY FOR heart 180 tablet 3    fluticasone propionate (FLONASE) 50 mcg/actuation nasal spray 2 sprays (100 mcg total) by Each Nostril route once daily. 11.1 mL 1    hydrALAZINE (APRESOLINE) 100 MG tablet Take 1 tablet (100 mg total) by mouth 2 (two) times a day. 180 tablet 3    metoprolol succinate (TOPROL-XL) 200 MG 24 hr tablet TAKE 2 TABLETS BY MOUTH ONCE A  tablet 3    semaglutide (OZEMPIC) 0.25 mg or 0.5 mg (2 mg/3 mL) pen injector Inject 0.5 mg into the skin every 7 days. Ok to change brand name. Or compound with b12. 4 each 1    spironolactone (ALDACTONE) 25 MG tablet TAKE 1 TABLET BY MOUTH ONCE A DAY 90 tablet 0    calcium carbonate (CALCIUM 500) 500 mg calcium (1,250 mg) chewable tablet Take 1 tablet (500 mg total) by mouth once daily. After meals as needed, certainly after last meal of the day 90 tablet 3    famotidine (PEPCID) 20 MG tablet Take 1 tablet (20 mg total) by mouth nightly as needed for Heartburn. 30 tablet 0    topiramate (TOPAMAX) 50 MG tablet Take ONE tablet BY MOUTH EVERY EVENING (Patient not taking: Reported on 3/13/2025) 60 tablet 0    vitamin D (VITAMIN D3) 1000 units Tab Take 1 tablet (1,000 Units total) by mouth once daily. 90 tablet 3     No current facility-administered medications for this visit.

## 2025-03-12 PROCEDURE — 93010 ELECTROCARDIOGRAM REPORT: CPT | Mod: S$GLB,,, | Performed by: INTERNAL MEDICINE

## 2025-03-12 PROCEDURE — 93005 ELECTROCARDIOGRAM TRACING: CPT | Mod: S$GLB,,,

## 2025-03-13 ENCOUNTER — OFFICE VISIT (OUTPATIENT)
Dept: FAMILY MEDICINE | Facility: CLINIC | Age: 45
End: 2025-03-13
Payer: COMMERCIAL

## 2025-03-13 VITALS
BODY MASS INDEX: 34.1 KG/M2 | HEIGHT: 66 IN | TEMPERATURE: 98 F | HEART RATE: 84 BPM | DIASTOLIC BLOOD PRESSURE: 82 MMHG | OXYGEN SATURATION: 100 % | WEIGHT: 212.19 LBS | SYSTOLIC BLOOD PRESSURE: 140 MMHG

## 2025-03-13 DIAGNOSIS — R29.818 SUSPECTED SLEEP APNEA: ICD-10-CM

## 2025-03-13 DIAGNOSIS — K21.9 GASTROESOPHAGEAL REFLUX DISEASE, UNSPECIFIED WHETHER ESOPHAGITIS PRESENT: ICD-10-CM

## 2025-03-13 DIAGNOSIS — Z01.818 PRE-OP EXAM: Primary | ICD-10-CM

## 2025-03-13 DIAGNOSIS — E55.9 VITAMIN D DEFICIENCY: ICD-10-CM

## 2025-03-13 PROCEDURE — 99214 OFFICE O/P EST MOD 30 MIN: CPT | Mod: S$GLB,,,

## 2025-03-13 PROCEDURE — 3079F DIAST BP 80-89 MM HG: CPT | Mod: CPTII,S$GLB,,

## 2025-03-13 PROCEDURE — 3077F SYST BP >= 140 MM HG: CPT | Mod: CPTII,S$GLB,,

## 2025-03-13 PROCEDURE — 3008F BODY MASS INDEX DOCD: CPT | Mod: CPTII,S$GLB,,

## 2025-03-13 PROCEDURE — 1159F MED LIST DOCD IN RCRD: CPT | Mod: CPTII,S$GLB,,

## 2025-03-13 PROCEDURE — 1160F RVW MEDS BY RX/DR IN RCRD: CPT | Mod: CPTII,S$GLB,,

## 2025-03-13 PROCEDURE — 4010F ACE/ARB THERAPY RXD/TAKEN: CPT | Mod: CPTII,S$GLB,,

## 2025-03-13 PROCEDURE — G2211 COMPLEX E/M VISIT ADD ON: HCPCS | Mod: S$GLB,,,

## 2025-03-13 PROCEDURE — 99999 PR PBB SHADOW E&M-EST. PATIENT-LVL IV: CPT | Mod: PBBFAC,,,

## 2025-03-13 NOTE — PATIENT INSTRUCTIONS
Thank you for seeing me today.    Advised healthy diet/exercise/weight loss in anticipation of surgery  Hold ASA, Plavix, NSAIDs for 5 days prior to surgery  Hold Ozempic about 10 days prior to surgery.     Please don't hesitate to seek emergency care if you develop any new or worsening symptoms.    GERD  -- Ensure to avoid lying down for at least 2 hours following your last meal  -- May elevate the head of your bed (or sleep on an extra pillow) to minimize gravity-driven reflux of food from your stomach up into your esophagus.  -- May take 1/2 or full dose of Tums after each meal to neutralize stomach acidity.     Dietary changes to minimize symptoms of GERD:  certain foods can trigger symptoms like heartburn and acid reflux. Heres a list of foods commonly recommended to avoid:    1. **Spicy foods** - Such as chili peppers, hot sauces, and spicy seasonings.  2. **Citrus fruits** - Including oranges, bebeto, grapefruit, and limes, as their acidity can exacerbate GERD symptoms.  3. **Tomato-based products** - Tomato sauces, ketchup, and other foods with high tomato content can trigger heartburn due to their acidity.  4. **Fried or fatty foods** - These foods can relax the lower esophageal sphincter (LES), allowing stomach acid to rise.  5. **Chocolate** - Contains caffeine and other compounds that may weaken the LES and trigger reflux.  6. **Caffeinated beverages** - Coffee, tea, and some soft drinks can increase acid production.  7. **Alcohol** - Especially wine and beer, can irritate the esophagus and relax the LES.  8. **Carbonated beverages** - These can cause bloating and put pressure on the LES, increasing the chance of acid reflux.  9. **Onions and garlic** - Both raw and cooked can be triggers for many GERD sufferers.  10. **Peppermint and spearmint** - These can relax the LES, leading to more frequent reflux episodes.    Modifying your diet by avoiding these foods and incorporating low-acid, non-spicy, and  less fatty alternatives can help manage GERD symptoms.

## 2025-03-14 ENCOUNTER — CLINICAL SUPPORT (OUTPATIENT)
Dept: CARDIOLOGY | Facility: HOSPITAL | Age: 45
End: 2025-03-14
Payer: COMMERCIAL

## 2025-03-14 ENCOUNTER — CLINICAL SUPPORT (OUTPATIENT)
Dept: CARDIOLOGY | Facility: HOSPITAL | Age: 45
End: 2025-03-14
Attending: INTERNAL MEDICINE
Payer: COMMERCIAL

## 2025-03-14 DIAGNOSIS — I44.7 LEFT BUNDLE-BRANCH BLOCK, UNSPECIFIED: ICD-10-CM

## 2025-03-14 DIAGNOSIS — Z95.810 PRESENCE OF AUTOMATIC (IMPLANTABLE) CARDIAC DEFIBRILLATOR: ICD-10-CM

## 2025-03-14 PROCEDURE — 93295 DEV INTERROG REMOTE 1/2/MLT: CPT | Mod: ,,, | Performed by: INTERNAL MEDICINE

## 2025-03-14 PROCEDURE — 93296 REM INTERROG EVL PM/IDS: CPT | Performed by: INTERNAL MEDICINE

## 2025-03-17 LAB
OHS QRS DURATION: 110 MS
OHS QTC CALCULATION: 442 MS

## 2025-03-18 ENCOUNTER — LAB VISIT (OUTPATIENT)
Dept: LAB | Facility: HOSPITAL | Age: 45
End: 2025-03-18
Payer: COMMERCIAL

## 2025-03-18 DIAGNOSIS — Z01.818 PRE-OP EXAM: ICD-10-CM

## 2025-03-18 LAB
ALBUMIN SERPL BCP-MCNC: 3.5 G/DL (ref 3.5–5.2)
ALP SERPL-CCNC: 72 U/L (ref 40–150)
ALT SERPL W/O P-5'-P-CCNC: 20 U/L (ref 10–44)
ANION GAP SERPL CALC-SCNC: 11 MMOL/L (ref 8–16)
AST SERPL-CCNC: 20 U/L (ref 10–40)
BASOPHILS # BLD AUTO: 0.04 K/UL (ref 0–0.2)
BASOPHILS NFR BLD: 0.7 % (ref 0–1.9)
BILIRUB SERPL-MCNC: 0.2 MG/DL (ref 0.1–1)
BUN SERPL-MCNC: 9 MG/DL (ref 6–20)
CALCIUM SERPL-MCNC: 8.4 MG/DL (ref 8.7–10.5)
CHLORIDE SERPL-SCNC: 108 MMOL/L (ref 95–110)
CO2 SERPL-SCNC: 20 MMOL/L (ref 23–29)
CREAT SERPL-MCNC: 0.9 MG/DL (ref 0.5–1.4)
DIFFERENTIAL METHOD BLD: ABNORMAL
EOSINOPHIL # BLD AUTO: 0.3 K/UL (ref 0–0.5)
EOSINOPHIL NFR BLD: 4.2 % (ref 0–8)
ERYTHROCYTE [DISTWIDTH] IN BLOOD BY AUTOMATED COUNT: 15.7 % (ref 11.5–14.5)
EST. GFR  (NO RACE VARIABLE): >60 ML/MIN/1.73 M^2
GLUCOSE SERPL-MCNC: 100 MG/DL (ref 70–110)
HCT VFR BLD AUTO: 36.1 % (ref 37–48.5)
HGB BLD-MCNC: 11.5 G/DL (ref 12–16)
IMM GRANULOCYTES # BLD AUTO: 0.03 K/UL (ref 0–0.04)
IMM GRANULOCYTES NFR BLD AUTO: 0.5 % (ref 0–0.5)
LYMPHOCYTES # BLD AUTO: 2.4 K/UL (ref 1–4.8)
LYMPHOCYTES NFR BLD: 40.2 % (ref 18–48)
MCH RBC QN AUTO: 27.8 PG (ref 27–31)
MCHC RBC AUTO-ENTMCNC: 31.9 G/DL (ref 32–36)
MCV RBC AUTO: 87 FL (ref 82–98)
MONOCYTES # BLD AUTO: 0.4 K/UL (ref 0.3–1)
MONOCYTES NFR BLD: 7 % (ref 4–15)
NEUTROPHILS # BLD AUTO: 2.9 K/UL (ref 1.8–7.7)
NEUTROPHILS NFR BLD: 47.4 % (ref 38–73)
NRBC BLD-RTO: 0 /100 WBC
PLATELET # BLD AUTO: 314 K/UL (ref 150–450)
PMV BLD AUTO: 10.3 FL (ref 9.2–12.9)
POTASSIUM SERPL-SCNC: 3.6 MMOL/L (ref 3.5–5.1)
PROT SERPL-MCNC: 7.2 G/DL (ref 6–8.4)
RBC # BLD AUTO: 4.13 M/UL (ref 4–5.4)
SODIUM SERPL-SCNC: 139 MMOL/L (ref 136–145)
WBC # BLD AUTO: 6 K/UL (ref 3.9–12.7)

## 2025-03-18 PROCEDURE — 36415 COLL VENOUS BLD VENIPUNCTURE: CPT | Mod: PO

## 2025-03-18 PROCEDURE — 85025 COMPLETE CBC W/AUTO DIFF WBC: CPT

## 2025-03-18 PROCEDURE — 80053 COMPREHEN METABOLIC PANEL: CPT

## 2025-03-19 ENCOUNTER — RESULTS FOLLOW-UP (OUTPATIENT)
Dept: FAMILY MEDICINE | Facility: CLINIC | Age: 45
End: 2025-03-19

## 2025-03-19 PROBLEM — E55.9 VITAMIN D DEFICIENCY: Status: ACTIVE | Noted: 2025-03-19

## 2025-03-19 PROBLEM — E83.51 HYPOCALCEMIA: Status: ACTIVE | Noted: 2025-03-19

## 2025-03-19 PROBLEM — E83.51 HYPOCALCEMIA: Status: RESOLVED | Noted: 2025-03-19 | Resolved: 2025-03-19

## 2025-03-19 PROBLEM — R29.818 SUSPECTED SLEEP APNEA: Status: ACTIVE | Noted: 2024-02-07

## 2025-03-19 PROBLEM — K21.9 GERD (GASTROESOPHAGEAL REFLUX DISEASE): Status: ACTIVE | Noted: 2025-03-19

## 2025-03-19 RX ORDER — CHOLECALCIFEROL (VITAMIN D3) 25 MCG
1000 TABLET ORAL DAILY
Qty: 90 TABLET | Refills: 3 | Status: SHIPPED | OUTPATIENT
Start: 2025-03-19

## 2025-03-19 RX ORDER — CALCIUM CARBONATE 500(1250)
1 TABLET,CHEWABLE ORAL DAILY
Qty: 90 TABLET | Refills: 3 | Status: SHIPPED | OUTPATIENT
Start: 2025-03-19 | End: 2026-03-19

## 2025-03-19 NOTE — ASSESSMENT & PLAN NOTE
Endorses chronic mild yet poorly controlled acid reflux.  We will start with Tums p.r.n. PC and monitor for improvement.

## 2025-03-19 NOTE — ASSESSMENT & PLAN NOTE
Patient has snoring without significant daytime somnolence, but in the setting of obesity this is concerning for possible sleep apnea.  Patient understands risks of untreated sleep apnea, but will defer workup to a later date.

## 2025-03-19 NOTE — ASSESSMENT & PLAN NOTE
Historic mild vitamin-D deficiency, most recent labs with very mild hypocalcemia.  Prescribed vitamin-D today and advised patient to take Tums after meals as needed for acid reflux, which should also help to correct calcium deficiency

## 2025-03-19 NOTE — ASSESSMENT & PLAN NOTE
No major issues, patient is doing well.  Denies any chest pain, shortness of breath, dyspnea, dyspnea on exertion, palpitations.  No prior issues with anesthesia.

## 2025-03-22 LAB
OHS CV AF BURDEN PERCENT: < 1
OHS CV BIV PACING PERCENT: 99 %
OHS CV DC REMOTE DEVICE TYPE: NORMAL

## 2025-03-27 ENCOUNTER — OFFICE VISIT (OUTPATIENT)
Dept: PULMONOLOGY | Facility: CLINIC | Age: 45
End: 2025-03-27
Payer: COMMERCIAL

## 2025-03-27 VITALS
DIASTOLIC BLOOD PRESSURE: 104 MMHG | SYSTOLIC BLOOD PRESSURE: 156 MMHG | HEART RATE: 75 BPM | OXYGEN SATURATION: 97 % | BODY MASS INDEX: 34.33 KG/M2 | HEIGHT: 66 IN | WEIGHT: 213.63 LBS

## 2025-03-27 DIAGNOSIS — E66.9 NON MORBID OBESITY, UNSPECIFIED OBESITY TYPE: Primary | ICD-10-CM

## 2025-03-27 DIAGNOSIS — G47.33 OSA (OBSTRUCTIVE SLEEP APNEA): ICD-10-CM

## 2025-03-27 PROCEDURE — 99999 PR PBB SHADOW E&M-EST. PATIENT-LVL IV: CPT | Mod: PBBFAC,,, | Performed by: INTERNAL MEDICINE

## 2025-03-27 RX ORDER — SPIRONOLACTONE 25 MG/1
25 TABLET ORAL
Qty: 90 TABLET | Refills: 3 | Status: SHIPPED | OUTPATIENT
Start: 2025-03-27

## 2025-03-27 NOTE — PROGRESS NOTES
Hollie Saldana  was seen as a new patient to me at the request of  Dez Hayden MD for the evaluation of  tahir.    CHIEF COMPLAINT:  No chief complaint on file.      HISTORY OF PRESENT ILLNESS: Hollie Saldana is a 44 y.o. female is here for sleep evaluation.   Patient was seen by Dr. Cloud for tahir evaluation in 2023.    Hsat 2/7/24 with ahi of 15.  Order was placed but patient did not get set up.  Per patient, sleep during hsat was poor.   Machine disrupting sleep the entire night.      STOPBANG during initial visit:    Snore:  y  Tire:  y  Observed apnea:  y  Pressure (HTN):  y    BMI:  Body mass index is 34.48 kg/m².   Age:  44 y.o.  Neck (inch):  n/a  Gender:  female    Total STOP BANG score = 4/8  Low risk TAHIR: 0-2, Intermediate risk TAHIR: 3-4, High risk TAHIR: 5+    Other sleep ROS:  no parasomnia.  No cataplexy.  No rls symptoms.      [place] Sleepiness Scale score during initial sleep evaluation was 8.    SLEEP ROUTINE:  Activity the hour prior to sleep: watch tv     Bed partner:  boyfriend  Time to bed:  9-11 pm   Lights off:  tv is on   Sleep onset latency:  watch tv until asleep.  ~20 minutes         Disruptions or awakenings:    once (no difficulty going back to sleep)    Wakeup time:      6 am   Perceived sleep quality:  tire        Daytime naps:      denied  Weekend sleep routine:      same   Caffeine use: 1 cup of coffee per day  exercise habit:   denied      PAST MEDICAL HISTORY:    Active Ambulatory Problems     Diagnosis Date Noted    Benign hypertensive heart disease without heart failure 09/02/2014    Hypertensive urgency 05/15/2017    LBBB (left bundle branch block) 05/15/2017    Essential hypertension 10/23/2020    Non morbid obesity, unspecified obesity type 10/23/2020    Nonischemic cardiomyopathy 10/23/2020    S/P tubal ligation 09/18/2019    Precordial pain 11/29/2022    CHF (congestive heart failure), NYHA class III, chronic, systolic 06/12/2023    Suspected sleep apnea 02/07/2024     Chronic systolic congestive heart failure, NYHA class 1 03/22/2024    Pre-op exam 03/11/2025    Vitamin D deficiency 03/19/2025    GERD (gastroesophageal reflux disease) 03/19/2025    TAHIR (obstructive sleep apnea) 03/27/2025     Resolved Ambulatory Problems     Diagnosis Date Noted    Hypocalcemia 03/19/2025     Past Medical History:   Diagnosis Date    Cardiomyopathy     History of tubal ligation     Hypertension                 PAST SURGICAL HISTORY:    Past Surgical History:   Procedure Laterality Date    CARDIAC CATHETERIZATION  2017    CATHETERIZATION OF BOTH LEFT AND RIGHT HEART Bilateral 11/29/2022    Procedure: CATHETERIZATION, HEART, BOTH LEFT AND RIGHT;  Surgeon: Dez Hayden MD;  Location: [facility] CATH LAB;  Service: Cardiology;  Laterality: Bilateral;  RFA/V access, 4Fr art sheath  RN PREOP 11/23/2022    INSERTION OF BIVENTRICULAR IMPLANTABLE CARDIOVERTER-DEFIBRILLATOR (ICD) Left 6/12/2023    Procedure: INSERTION, ICD, BIVENTRICULAR;  Surgeon: Magdiel Rider MD;  Location: [facility] EP LAB;  Service: Cardiology;  Laterality: Left;  NICM,CRT-D, SJM, MAC, DM, 3 Prep         FAMILY HISTORY:                Family History   Problem Relation Name Age of Onset    Hypertension Mother      Thyroid disease Mother      Appendicitis Mother      Hypertension Father         SOCIAL HISTORY:          Tobacco: Tobacco Use History[1]    alcohol use:    Social History     Substance and Sexual Activity   Alcohol Use Yes    Comment: Occasionally                 Occupation:  medical assistant    ALLERGIES:    Review of patient's allergies indicates:   Allergen Reactions    Oxycodone Itching       CURRENT MEDICATIONS:  Current Medications[2]               REVIEW OF SYSTEMS:     Sleep related symptoms as per HPI.  CONST:Denies weight gain    HEENT: Denies sinus congestion  PULM: Denies dyspnea  CARD:  Denies palpitations   GI: intermittent acid reflux  : Denies polyuria  NEURO: Denies headaches  PSYCH: Denies mood  "disturbance  HEME: Denies anemia   Otherwise, a balance of systems reviewed is negative.          PHYSICAL EXAM:  Vitals:    03/27/25 1253   BP: (!) 156/104   Pulse: 75   SpO2: 97%   Weight: 96.9 kg (213 lb 10 oz)   Height: 5' 6" (1.676 m)   PainSc: 0-No pain     Body mass index is 34.48 kg/m².     GENERAL: Normal development, well groomed  HEENT:  Conjunctivae are non-erythematous; Pupils equal, round, and reactive to light; Nose is symmetrical; Nasal mucosa is pink and moist; Septum is midline; Inferior turbinates are normal; Nasal airflow is normal; Posterior pharynx is pink; Modified Mallampati: 3; Posterior palate is normal; Tonsils +1; Uvula is normal and pink;Tongue is normal; Dentition is fair; No TMJ tenderness; Jaw opening and protrusion without click and without discomfort.  NECK: Supple.  No thyromegaly. No palpable nodes.     SKIN: On face and neck: No abrasions, no rashes, no lesions.  No subcutaneous nodules are palpable.  RESPIRATORY: Chest is clear to auscultation.  Normal chest expansion and non-labored breathing at rest.  CARDIOVASCULAR: Normal S1, S2.  No murmurs, gallops or rubs. No carotid bruits bilaterally.  EXTREMITIES: No edema. No clubbing. No cyanosis. Station normal. Gait normal.        NEURO/PSYCH: Oriented to time, place and person. Normal attention span and concentration. Affect is full. Mood is normal.                                              DATA  Hsat 2/7/24 with ahi of 15    Echo 3/22/24    Left Ventricle: The left ventricle is normal in size. Mildly increased wall thickness. There is concentric hypertrophy. There is normal systolic function with a visually estimated ejection fraction of 55 %. Grade I diastolic dysfunction.    Right Ventricle: Normal right ventricular cavity size. Systolic function is normal.device lead seen in rv    Left Atrium: Left atrium is moderately dilated.    Right Atrium: Right atrium is mildly dilated.    Tricuspid Valve: There is mild " regurgitation.    Pulmonary Artery: The estimated pulmonary artery systolic pressure is 24 mmHg.    IVC/SVC: Normal venous pressure at 3 mmHg.    Lab Results   Component Value Date    TSH 0.779 05/17/2024       ASSESSMENT/PLAN    Problem List Items Addressed This Visit       Non morbid obesity, unspecified obesity type - Primary    Overview   Have not exercised routinely  Currently on GLP1 injection         TAHIR (obstructive sleep apnea)    Overview   Ahi of 15  TST during initial hsat was 2.3 hours  Given low TST, will set up for another hsat.  Apap if tahir is reconfirmed.           Relevant Orders    Home Sleep Study             Education: During our discussion today, we talked about the etiology of obstructive sleep apnea as well as the potential ramifications of untreated sleep apnea, which could include daytime sleepiness, hypertension, heart disease and/or stroke.     Precautions: The patient was advised to abstain from driving should they feel sleepy or drowsy.       Thank you for allowing me the opportunity to participate in the care of your patient.    Patient will No follow-ups on file.    Please cc note to  Dez Hayden MD.    25 minutes of total time spent on the encounter, which includes face to face time and non-face to face time preparing to see the patient (eg, review of tests), Obtaining and/or reviewing separately obtained history, documenting clinical information in the electronic or other health record, independently interpreting results (not separately reported) and communicating results to the patient/family/caregiver, or Care coordination (not separately reported).      Visit today included increased complexity associated with the care of the episodic problem tahir  addressed and managing the longitudinal care of the patient due to the serious and/or complex managed problem(s) tahir.          [1]   Social History  Tobacco Use   Smoking Status Never   Smokeless Tobacco Never   [2]   Current  Outpatient Medications   Medication Sig Dispense Refill    calcium carbonate (CALCIUM 500) 500 mg calcium (1,250 mg) chewable tablet Take 1 tablet (500 mg total) by mouth once daily. After meals as needed, certainly after last meal of the day 90 tablet 3    cetirizine (ZYRTEC) 10 MG tablet Take 1 tablet (10 mg total) by mouth once daily. 90 tablet 3    ENTRESTO  mg per tablet TAKE 1 TABLET BY MOUTH TWICE A DAY FOR heart 180 tablet 3    famotidine (PEPCID) 20 MG tablet Take 1 tablet (20 mg total) by mouth nightly as needed for Heartburn. 30 tablet 0    fluticasone propionate (FLONASE) 50 mcg/actuation nasal spray 2 sprays (100 mcg total) by Each Nostril route once daily. 11.1 mL 1    hydrALAZINE (APRESOLINE) 100 MG tablet Take 1 tablet (100 mg total) by mouth 2 (two) times a day. 180 tablet 3    metoprolol succinate (TOPROL-XL) 200 MG 24 hr tablet TAKE 2 TABLETS BY MOUTH ONCE A  tablet 3    semaglutide (OZEMPIC) 0.25 mg or 0.5 mg (2 mg/3 mL) pen injector Inject 0.5 mg into the skin every 7 days. Ok to change brand name. Or compound with b12. 4 each 1    spironolactone (ALDACTONE) 25 MG tablet TAKE 1 TABLET BY MOUTH ONCE A DAY 90 tablet 3    topiramate (TOPAMAX) 50 MG tablet Take ONE tablet BY MOUTH EVERY EVENING (Patient not taking: Reported on 3/13/2025) 60 tablet 0    vitamin D (VITAMIN D3) 1000 units Tab Take 1 tablet (1,000 Units total) by mouth once daily. 90 tablet 3     No current facility-administered medications for this visit.

## 2025-04-07 ENCOUNTER — TELEPHONE (OUTPATIENT)
Dept: CARDIOLOGY | Facility: HOSPITAL | Age: 45
End: 2025-04-07
Payer: COMMERCIAL

## 2025-04-07 ENCOUNTER — TELEPHONE (OUTPATIENT)
Dept: CARDIOLOGY | Facility: CLINIC | Age: 45
End: 2025-04-07
Payer: COMMERCIAL

## 2025-04-07 NOTE — TELEPHONE ENCOUNTER
----- Message from Zoey sent at 4/7/2025  1:36 PM CDT -----  Regarding: Device clearance  Dr. Renzo Askew [phone] with [facility] at [facility] in [place] is calling to see if a magnet can be placed on the patient AICD for surgery or if it can be reprogrammed before surgery? The patient is having a bilateral breast reduction on tomorrow.Thanks

## 2025-04-07 NOTE — TELEPHONE ENCOUNTER
----- Message from Mingo sent at 4/7/2025 12:44 PM CDT -----  Contact: Gela Dee  Type:  Patient CallWho Called: [facility] -  Gela Dee Does the patient know what this is regarding?: Requesting a call back about the patient defibrillator ; pt is scheduled for surgery on tomorrow and the anaesthesiologist have question ; pleaser advise Best Call Back Number:[phone] Ext [phone] Additional Information:

## 2025-04-07 NOTE — TELEPHONE ENCOUNTER
Spoke with Gela and she states that patient is having a procedure tomorrow and that the anesthesiologist wants to know if he can use a magnet on patient's defibrillator. He want's to know Dr. Hayden's recommendation. Please advise

## 2025-04-07 NOTE — TELEPHONE ENCOUNTER
Returned Dr Askew' call on this afternoon. Advised him that this office is unable to give recommendations for surgery at an outside Facility.  He stated he just needed to know if the pt is Pacemaker Dependent.  Advised that pt's last in clinic ICD ck was on 3/22/2024 and on that date pt's underlying rhythm was SB @ 56 bpm. As well as pt is scheduled on 5/2/25 for annual ck up with an in clinic ICD ck.  Understanding was verbalized.  Dr. Garcia's thanked me for the return call.

## 2025-04-08 NOTE — TELEPHONE ENCOUNTER
Returned Gela's call to inform her recommendations for magnet and patient's defibrillator. danny

## 2025-04-09 ENCOUNTER — TELEPHONE (OUTPATIENT)
Dept: ELECTROPHYSIOLOGY | Facility: CLINIC | Age: 45
End: 2025-04-09
Payer: COMMERCIAL

## 2025-04-09 ENCOUNTER — CLINICAL SUPPORT (OUTPATIENT)
Dept: CARDIOLOGY | Facility: HOSPITAL | Age: 45
End: 2025-04-09
Attending: STUDENT IN AN ORGANIZED HEALTH CARE EDUCATION/TRAINING PROGRAM
Payer: COMMERCIAL

## 2025-04-09 ENCOUNTER — CLINICAL SUPPORT (OUTPATIENT)
Dept: CARDIOLOGY | Facility: HOSPITAL | Age: 45
End: 2025-04-09
Payer: COMMERCIAL

## 2025-04-09 DIAGNOSIS — Z95.810 PRESENCE OF AUTOMATIC (IMPLANTABLE) CARDIAC DEFIBRILLATOR: ICD-10-CM

## 2025-04-09 DIAGNOSIS — I44.7 LEFT BUNDLE-BRANCH BLOCK, UNSPECIFIED: ICD-10-CM

## 2025-04-09 NOTE — TELEPHONE ENCOUNTER
[facility] alert received for multiple AICD shocks the morning of 4/8/25  Planned breast reduction surgery at an outside facility [place]   Electrograms are c/w RAVI (probably due to electrocautery use intra-op)  No arrhythmias appreciated    Former patient of Dr. Rider, last seen 3/22/24  Pt is scheduled to establish care with Dr. Giraldo 5/2/2025    Will update MD on findings of inappropriate ICD shocks intra-op breast surgery

## 2025-04-10 LAB
OHS CV AF BURDEN PERCENT: < 1
OHS CV BIV PACING PERCENT: 99 %
OHS CV DC REMOTE DEVICE TYPE: NORMAL
OHS CV ICD SHOCK: YES

## 2025-04-11 ENCOUNTER — PATIENT MESSAGE (OUTPATIENT)
Dept: ELECTROPHYSIOLOGY | Facility: CLINIC | Age: 45
End: 2025-04-11
Payer: COMMERCIAL

## 2025-04-11 ENCOUNTER — TELEPHONE (OUTPATIENT)
Dept: CARDIOLOGY | Facility: HOSPITAL | Age: 45
End: 2025-04-11
Payer: COMMERCIAL

## 2025-04-11 NOTE — TELEPHONE ENCOUNTER
Spoke with pt and informed her that she was shocked by her device inappropriately due to intra-op RAVI during breast surgery. Pt stated her device was vibrating because her monitor was trying to communicate. Pt is currently feeling fine.

## 2025-04-16 NOTE — PROGRESS NOTES
Patient ID: Hollie Saldana is a 44 y.o. Black or  female    Subjective  Chief Complaint: patient presents for medical weight loss management.    Contraindications to GLP-1 receptor agonist therapy:   Denies personal or family history of MTC and personal history of MEN2     Pregnancy Status:   - Pt denies current pregnancy, breastfeeding, or plans to become pregnant.  - Pt denies current use of oral hormonal contraception.     Co-morbidities: HTN, TAHIR    History of weight loss therapy:  Pt has been on therapy with compounded semaglutide for 2 months and is on the 0.5 mg dose. Pt declines to start therapy with [facility] MTM at this time due to having Medicaid and the cost of medication.    Tolerance to current therapy:  Denies nausea, vomiting, diarrhea, constipation, abdominal pain    Weight loss history:  Starting weight:    3/10/2025   Recent Readings    Weight (lbs) 211 lb    BMI 34.05 BMI        Objective  Lab Results   Component Value Date     03/18/2025     05/17/2024     06/05/2023     Lab Results   Component Value Date    K 3.6 03/18/2025    K 4.5 05/17/2024    K 3.9 06/05/2023     Lab Results   Component Value Date     03/18/2025     05/17/2024     06/05/2023     Lab Results   Component Value Date    CO2 20 (L) 03/18/2025    CO2 21 (L) 05/17/2024    CO2 25 06/05/2023     Lab Results   Component Value Date    BUN 9 03/18/2025    BUN 14 05/17/2024    BUN 13 06/05/2023     Lab Results   Component Value Date     03/18/2025    GLU 86 05/17/2024    GLU 78 06/05/2023     Lab Results   Component Value Date    CALCIUM 8.4 (L) 03/18/2025    CALCIUM 9.3 05/17/2024    CALCIUM 9.2 06/05/2023     Lab Results   Component Value Date    PROT 7.2 03/18/2025    PROT 7.4 05/17/2024    PROT 7.1 02/14/2023     Lab Results   Component Value Date    ALBUMIN 3.5 03/18/2025    ALBUMIN 3.6 05/17/2024    ALBUMIN 3.7 02/14/2023     Lab Results   Component Value Date    BILITOT 0.2  03/18/2025    BILITOT 0.4 05/17/2024    BILITOT 0.6 02/14/2023     Lab Results   Component Value Date    AST 20 03/18/2025    AST 21 05/17/2024    AST 13 02/14/2023     Lab Results   Component Value Date    ALT 20 03/18/2025    ALT 23 05/17/2024    ALT 9 (L) 02/14/2023     Lab Results   Component Value Date    ANIONGAP 11 03/18/2025    ANIONGAP 9 05/17/2024    ANIONGAP 5 (L) 06/05/2023     Lab Results   Component Value Date    CREATININE 0.9 03/18/2025    CREATININE 1.0 05/17/2024    CREATININE 0.9 06/05/2023     Lab Results   Component Value Date    EGFRNORACEVR >60.0 03/18/2025    EGFRNORACEVR >60 05/17/2024    EGFRNORACEVR >60.0 06/05/2023     Assessment/Plan  -Pt declines to starting therapy at this time due to financial burden.  - Pt encouraged to follow with DM program for support with lifestyle interventions      Patient consented to pharmacist management via collaborative practice.

## 2025-04-17 ENCOUNTER — OFFICE VISIT (OUTPATIENT)
Dept: INTERNAL MEDICINE | Facility: CLINIC | Age: 45
End: 2025-04-17
Payer: COMMERCIAL

## 2025-04-17 DIAGNOSIS — E66.811 OBESITY, CLASS I, BMI 30-34.9: Primary | ICD-10-CM

## 2025-05-02 ENCOUNTER — HOSPITAL ENCOUNTER (OUTPATIENT)
Dept: CARDIOLOGY | Facility: CLINIC | Age: 45
Discharge: HOME | End: 2025-05-02
Payer: COMMERCIAL

## 2025-05-02 ENCOUNTER — OFFICE VISIT (OUTPATIENT)
Dept: ELECTROPHYSIOLOGY | Facility: CLINIC | Age: 45
End: 2025-05-02
Payer: COMMERCIAL

## 2025-05-02 ENCOUNTER — CLINICAL SUPPORT (OUTPATIENT)
Dept: CARDIOLOGY | Facility: HOSPITAL | Age: 45
End: 2025-05-02
Attending: STUDENT IN AN ORGANIZED HEALTH CARE EDUCATION/TRAINING PROGRAM
Payer: COMMERCIAL

## 2025-05-02 VITALS
WEIGHT: 207.25 LBS | SYSTOLIC BLOOD PRESSURE: 149 MMHG | HEIGHT: 66 IN | BODY MASS INDEX: 33.31 KG/M2 | DIASTOLIC BLOOD PRESSURE: 111 MMHG | HEART RATE: 80 BPM

## 2025-05-02 DIAGNOSIS — Z95.810 BIVENTRICULAR ICD (IMPLANTABLE CARDIOVERTER-DEFIBRILLATOR) IN PLACE: ICD-10-CM

## 2025-05-02 DIAGNOSIS — Z95.810 PRESENCE OF CARDIAC RESYNCHRONIZATION THERAPY DEFIBRILLATOR (CRT-D): ICD-10-CM

## 2025-05-02 DIAGNOSIS — I44.7 LBBB (LEFT BUNDLE BRANCH BLOCK): ICD-10-CM

## 2025-05-02 DIAGNOSIS — I50.22 CHF (CONGESTIVE HEART FAILURE), NYHA CLASS III, CHRONIC, SYSTOLIC: ICD-10-CM

## 2025-05-02 DIAGNOSIS — E78.2 MIXED HYPERLIPIDEMIA: ICD-10-CM

## 2025-05-02 DIAGNOSIS — Z95.810 ICD (IMPLANTABLE CARDIOVERTER-DEFIBRILLATOR) IN PLACE: ICD-10-CM

## 2025-05-02 DIAGNOSIS — I42.8 NONISCHEMIC CARDIOMYOPATHY: Primary | ICD-10-CM

## 2025-05-02 DIAGNOSIS — K21.9 GASTROESOPHAGEAL REFLUX DISEASE, UNSPECIFIED WHETHER ESOPHAGITIS PRESENT: ICD-10-CM

## 2025-05-02 DIAGNOSIS — I50.32 HEART FAILURE WITH RECOVERED EJECTION FRACTION (HFRECEF): ICD-10-CM

## 2025-05-02 DIAGNOSIS — G47.33 OSA (OBSTRUCTIVE SLEEP APNEA): ICD-10-CM

## 2025-05-02 DIAGNOSIS — I10 PRIMARY HYPERTENSION: ICD-10-CM

## 2025-05-02 DIAGNOSIS — I50.42 CHRONIC COMBINED SYSTOLIC AND DIASTOLIC HEART FAILURE: ICD-10-CM

## 2025-05-02 DIAGNOSIS — E66.811 CLASS 1 OBESITY WITH BODY MASS INDEX (BMI) OF 32.0 TO 32.9 IN ADULT, UNSPECIFIED OBESITY TYPE, UNSPECIFIED WHETHER SERIOUS COMORBIDITY PRESENT: ICD-10-CM

## 2025-05-02 LAB
OHS QRS DURATION: 96 MS
OHS QTC CALCULATION: 447 MS

## 2025-05-02 PROCEDURE — 93010 ELECTROCARDIOGRAM REPORT: CPT | Mod: S$GLB,,, | Performed by: STUDENT IN AN ORGANIZED HEALTH CARE EDUCATION/TRAINING PROGRAM

## 2025-05-02 PROCEDURE — 93284 PRGRMG EVAL IMPLANTABLE DFB: CPT | Mod: 26,,, | Performed by: STUDENT IN AN ORGANIZED HEALTH CARE EDUCATION/TRAINING PROGRAM

## 2025-05-02 PROCEDURE — 99999 PR PBB SHADOW E&M-EST. PATIENT-LVL III: CPT | Mod: PBBFAC,,, | Performed by: STUDENT IN AN ORGANIZED HEALTH CARE EDUCATION/TRAINING PROGRAM

## 2025-05-02 PROCEDURE — 93284 PRGRMG EVAL IMPLANTABLE DFB: CPT

## 2025-05-02 PROCEDURE — 93005 ELECTROCARDIOGRAM TRACING: CPT | Mod: S$GLB,,, | Performed by: STUDENT IN AN ORGANIZED HEALTH CARE EDUCATION/TRAINING PROGRAM

## 2025-05-02 NOTE — PROGRESS NOTES
Electrophysiology Clinic Note    Reason for follow-up patient visit: Ongoing evaluation and routine device surveillance of a [employer] CRT-D, implanted on 6/12/2023 in the setting of nonischemic cardiomyopathy with persistently reduced systolic function, daphne LVEF 25%, s/p recovery with most recent LVEF 55%, and baseline LBBB.     PRESENTING HISTORY:     History of Present Illness:  Ms. Hollie Saldana is a sheila 44-year-old woman who returns to clinic today for ongoing evaluation and routine device surveillance of a [employer] CRT-D, implanted on 6/12/2023 in the setting of nonischemic cardiomyopathy with persistently reduced systolic function, daphne LVEF 25%, s/p recovery with most recent LVEF 55%, and baseline LBBB. She has a past medical history significant for nonischemic cardiomyopathy with persistently reduced systolic function, daphne LVEF 25%, baseline LBBB, s/p implantation of a [employer] CRT-D on 6/12/2023, subsequent recovery with her most recent LVEF 55%, hypertension, hyperlipidemia, GERD, TAHIR, and obesity.     She was previously followed in EP clinic with Dr. Rider and was previously seen on 3/22/2024. At that encounter, they continued to discuss her history of nonischemic cardiomyopathy with baseline LBBB. 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 She was initially diagnosed with nonischemic cardiomyopathy in 5/2017 with an estimated LVEF at that time of 45%. She underwent a coronary angiogram on 5/15/2017 that revealed normal coronary arteries. Unfortunately, her systolic function continues to decline, with a daphne LVEF of 25% on a resting 2D transthoracic echocardiogram obtained on 2/17/2023. She was initiated on guideline-directed medical therapy; however, her systolic function remained reduced. She underwent successful implantation of a [employer] CRT-D utilizing a left bundle branch area pacing (LBBAP) lead on  2023. She has no personal history of sudden cardiac death, but her aunt  of sudden cardiac death. Since undergoing successful device implantation, she has recovered well and has resumed her baseline degree of physical activity with no symptoms reported. On evaluation of her most recent resting 2D transthoracic echocardiogram, her systolic function has recovered with an estimated LVEF of 55%, GIDD, moderate dilation of the atria, and mild tricuspid valve regurgitation. She has excellent LBBAP-paced percentages at 99%. On ECG assessment today she remains in sinus rhythm, AS-LBBAP-paced, with a LBBAP-paced QRSd of 96ms. She has previously recorded events of intraoperative RAVI during a breast reconstruction surgery on 2025, with no tachytherapy delivery since this event.      In discussion with Ms. Saldana today, she tells me that she is feeling overall quite well. She denies any episodes of dizziness, lightheadedness, syncope/presyncope, chest pain or chest discomfort, palpitations, nausea or vomiting, orthopnea, or PND. She reports baseline mild shortness of breath and dyspnea with exertion that she feels has remained stable. She can climb one flight of stairs prior to needing to take a break and continues to attempt to increase her level of physical activity.     Review of Systems:  Review of Systems   Constitutional:  Negative for activity change.   HENT:  Negative for nasal congestion, nosebleeds, postnasal drip, rhinorrhea, sinus pressure/congestion, sneezing and sore throat.    Respiratory:  Positive for shortness of breath. Negative for apnea, cough, chest tightness and wheezing.    Cardiovascular:  Negative for chest pain, palpitations and leg swelling.   Gastrointestinal:  Negative for abdominal distention, abdominal pain, blood in stool, change in bowel habit, constipation, diarrhea, nausea and vomiting.   Genitourinary:  Negative for dysuria and hematuria.   Musculoskeletal:  Positive for  "arthralgias and back pain. Negative for gait problem.   Neurological:  Negative for dizziness, seizures, syncope, weakness, light-headedness, headaches and coordination difficulties.        PAST HISTORY:     Past Medical History:   Diagnosis Date    Cardiomyopathy     History of tubal ligation     Hypertension     LBBB (left bundle branch block)     Nonischemic cardiomyopathy 10/23/2020       Past Surgical History:   Procedure Laterality Date    CARDIAC CATHETERIZATION  2017    CATHETERIZATION OF BOTH LEFT AND RIGHT HEART Bilateral 11/29/2022    Procedure: CATHETERIZATION, HEART, BOTH LEFT AND RIGHT;  Surgeon: Dez Hayden MD;  Location: [facility] CATH LAB;  Service: Cardiology;  Laterality: Bilateral;  RFA/V access, 4Fr art sheath  RN PREOP 11/23/2022    INSERTION OF BIVENTRICULAR IMPLANTABLE CARDIOVERTER-DEFIBRILLATOR (ICD) Left 6/12/2023    Procedure: INSERTION, ICD, BIVENTRICULAR;  Surgeon: Magdiel Rider MD;  Location: [facility] EP LAB;  Service: Cardiology;  Laterality: Left;  NICM,CRT-D, SJM, MAC, DM, 3 Prep       Family History:  Family History   Problem Relation Name Age of Onset    Hypertension Mother      Thyroid disease Mother      Appendicitis Mother      Hypertension Father         Social History:  She  reports that she has never smoked. She has never used smokeless tobacco. She reports current alcohol use. She reports that she does not use drugs.      MEDICATIONS & ALLERGIES:     Review of patient's allergies indicates:   Allergen Reactions    Oxycodone Itching       Medications Ordered Prior to Encounter[1]     OBJECTIVE:     Vital Signs:  BP (!) 149/111 (Patient Position: Sitting)   Pulse 80   Ht 5' 6" (1.676 m)   Wt 94 kg (207 lb 3.7 oz)   BMI 33.45 kg/m²     Physical Exam:  Physical Exam  Constitutional:       General: She is not in acute distress.     Appearance: Normal appearance. She is obese. She is not ill-appearing or diaphoretic.      Comments: Well-appearing woman in NAD.   HENT:      " Head: Normocephalic and atraumatic.      Nose: Nose normal.      Mouth/Throat:      Mouth: Mucous membranes are moist.      Pharynx: Oropharynx is clear.   Eyes:      Pupils: Pupils are equal, round, and reactive to light.   Cardiovascular:      Rate and Rhythm: Normal rate and regular rhythm.      Pulses: Normal pulses.      Heart sounds: Normal heart sounds. No murmur heard.     No friction rub. No gallop.      Comments: Left anterior chest wall incision site is in excellent repair with no evidence of device adhesion or erosion.   Pulmonary:      Effort: Pulmonary effort is normal. No respiratory distress.      Breath sounds: Normal breath sounds. No wheezing, rhonchi or rales.   Chest:      Chest wall: No tenderness.   Abdominal:      General: There is no distension.      Palpations: Abdomen is soft.      Tenderness: There is no abdominal tenderness.   Musculoskeletal:         General: No swelling or tenderness.      Cervical back: Normal range of motion.      Right lower leg: No edema.      Left lower leg: No edema.   Skin:     General: Skin is warm and dry.      Findings: No erythema, lesion or rash.   Neurological:      General: No focal deficit present.      Mental Status: She is alert and oriented to person, place, and time. Mental status is at baseline.      Motor: No weakness.      Gait: Gait normal.   Psychiatric:         Mood and Affect: Mood normal.         Behavior: Behavior normal.          Laboratory Data:  Lab Results   Component Value Date    WBC 6.00 03/18/2025    HGB 11.5 (L) 03/18/2025    HCT 36.1 (L) 03/18/2025    MCV 87 03/18/2025     03/18/2025     Lab Results   Component Value Date     03/18/2025     03/18/2025    K 3.6 03/18/2025     03/18/2025    CO2 20 (L) 03/18/2025    BUN 9 03/18/2025    CREATININE 0.9 03/18/2025    CALCIUM 8.4 (L) 03/18/2025     Lab Results   Component Value Date    INR 1.0 06/05/2023    INR 1.2 01/26/2023    INR 0.9 11/23/2022       Pertinent  Cardiac Data:  Personal interpretation of today's ECG: Normal sinus rhythm with AS-LBBAP-paced rhythm, rate of 80 bpm, AV delay 154 ms, LBBAP-paced QRS 96 ms, QT/QTc 388/447 ms.     Resting 2D Transthoracic Echocardiogram - 11/10/2020:  The left ventricle is normal in size with moderately decreased systolic function. The estimated ejection fraction is 35%.  There is abnormal septal wall motion consistent with left bundle branch block. Global hypokinesis.  There is mild left ventricular concentric hypertrophy.  Grade I diastolic dysfunction.  Normal right ventricular size with normal right ventricular systolic function.  Mild mitral regurgitation.  The estimated PA systolic pressure is 24 mmHg.    Resting 2D Transthoracic Echocardiogram - 2/9/2021:  Mildly decreased systolic function. The estimated ejection fraction is 45%  Moderate left atrial enlargement.  Grade I left ventricular diastolic dysfunction.  Normal right ventricular size with normal right ventricular systolic function.  Mild mitral regurgitation.  Mild pulmonic regurgitation.  Mild tricuspid regurgitation.  The estimated PA systolic pressure is 27 mmHg.  Intermediate central venous pressure (8 mmHg).  Mild right atrial enlargement.    24-Hour Holter Monitor - 11/7/2022:  Sinus rhythm with heart rates varying between 75 and 138 BPM with an average of 94BPM.  There were very rare PACs totalling 7 and averaging 0.29 per hour.  The diary was not returned.    Resting 2D Transthoracic Echocardiogram - 11/7/2022:  The left ventricle is normal in size with severely decreased systolic function.  The estimated ejection fraction is 25-30%.  There is abnormal septal wall motion consistent with left bundle branch block.  Grade I left ventricular diastolic dysfunction.  Normal right ventricular size with normal right ventricular systolic function.  Mild mitral regurgitation.    Coronary Angiogram - 11/29/2022:  Impression:  LBBB/CMP  Normal cors  Normal  hemodynamics  No evidence of intracardiac shunt  Above consistent with NICM.    Resting 2D Transthoracic Echocardiogram - 2/17/2023:  The left ventricle is mildly enlarged with eccentric hypertrophy and severely decreased systolic function.  The estimated ejection fraction is 25%.  Grade I left ventricular diastolic dysfunction.  Normal right ventricular size with normal right ventricular systolic function.  Mild left atrial enlargement.  Mild mitral regurgitation.  Normal central venous pressure (3 mmHg).  The estimated PA systolic pressure is 22 mmHg.    Cardiopulmonary Metabolic Exercise Stress Test - 4/20/2023:  Severe functional impairment associated with a normal breathing reserve, normal oxygen stauration, poor effort, and a reduced AT. These findings are indicative of functional impairment secondary to circulatory insufficiency, poor effort.  There were no arrhythmias during stress.  There was no ST segment deviation noted during stress.  The test was stopped because the patient experienced fatigue.  The ECG portion of this study is negative for myocardial ischemia.    Resting 2D Transthoracic Echocardiogram - 3/22/2024:    Left Ventricle: The left ventricle is normal in size. Mildly increased wall thickness. There is concentric hypertrophy. There is normal systolic function with a visually estimated ejection fraction of 55 %. Grade I diastolic dysfunction.    Right Ventricle: Normal right ventricular cavity size. Systolic function is normal.device lead seen in rv    Left Atrium: Left atrium is moderately dilated.    Right Atrium: Right atrium is mildly dilated.    Tricuspid Valve: There is mild regurgitation.    Pulmonary Artery: The estimated pulmonary artery systolic pressure is 24 mmHg.    IVC/SVC: Normal venous pressure at 3 mmHg.    Personal interpretation of today's Device Interrogation: Personal review of her device interrogation report ([employer] CRT-D, implanted  "6/12/2023) reveals adequate battery longevity with an estimated 5.5 years of battery life remaining. Her leads were interrogated with acceptable and stable lead parameters. She is presently AS-LBBAP-paced, with underlying sinus rhythm with LBBB at a rate of 81 bpm. She is right atrially paced 1%, and LBBAP-paced >99%. She recorded multiple events of "VF" that are more consistent with periods of RAVI that occurred during a breast reconstruction surgery on 4/9/2025 resulting in inappropriate tachytherapy, with no previous or subsequent tachytherapy delivery. There are no concerning AHR or VHR episodes detected. She has never required appropriate tachytherapies over the lifetime of the device.       ASSESSMENT & PLAN:   Ms. Hollie Saldana is a sheila 44-year-old woman who returns to clinic today for ongoing evaluation and routine device surveillance of a [employer] CRT-D, implanted on 6/12/2023 in the setting of nonischemic cardiomyopathy with persistently reduced systolic function, daphne LVEF 25%, s/p recovery with most recent LVEF 55%, and baseline LBBB. She has a past medical history significant for nonischemic cardiomyopathy with persistently reduced systolic function, daphne LVEF 25%, baseline LBBB, s/p implantation of a [employer] CRT-D on 6/12/2023, subsequent recovery with her most recent LVEF 55%, hypertension, hyperlipidemia, GERD, TAHIR, and obesity.     - We continue to discuss the risk of potential ventricular arrhythmias with systolic dysfunction, and reviewed her indication for implantation of a primary prevention CRT-D. Since undergoing device implantation, she has recovered well and reports improved shortness of breath and improved exercise capacity.   - On device interrogation in-office today, she has a normally functioning [employer] CRT-D. She has adequate battery longevity with an estimated 5.5 years of battery life remaining. Her leads were interrogated with " "acceptable and stable lead parameters. She is presently AS-LBBAP-paced, with underlying sinus rhythm with LBBB at a rate of 81 bpm. She is right atrially paced 1%, and LBBAP-paced >99%. She recorded multiple events of "VF" that are more consistent with periods of RAVI that occurred during a breast reconstruction surgery on 4/9/2025 resulting in inappropriate tachytherapy, with no previous or subsequent tachytherapy delivery. There are no concerning AHR or VHR episodes detected. She has never required appropriate tachytherapies over the lifetime of the device. Her LBBAP-paced QRSd remains very narrow at 96ms.   - We reviewed the results of her repeat a resting echocardiogram revealing recovery of her systolic function with an estimated LVEF of 55%, GIDD, moderate dilation of the atria, and mild tricuspid valve regurgitation, indicating an excellent response to cardiac resynchronization therapy.    - She remains on metoprolol succinate 200mg po daily, in addition to guideline-directed medical therapy.  - She will continue to follow with her PCP, general cardiology, and Advanced Heart Failure team for ongoing evaluation and recommendations regarding her comorbid conditions.        This patient will return to clinic with me in six months with continued remote transmissions in the interim. She will continue to follow with her PCP, her general cardiologist, and her Advanced Heart Failure team in the interim. All questions and concerns were addressed at this encounter. Total time spent in this patient encounter: 43 minutes.     Signing Physician:       PIERRE Giraldo MD  Electrophysiology Attending         [1]   Current Outpatient Medications on File Prior to Visit   Medication Sig Dispense Refill    butalbital-acetaminophen-caffeine -40 mg (FIORICET, ESGIC) -40 mg per tablet Take 1 tablet by mouth every 24 hours as needed for Pain. 30 tablet 0    calcium carbonate (CALCIUM 500) 500 mg calcium (1,250 mg) " chewable tablet Take 1 tablet (500 mg total) by mouth once daily. After meals as needed, certainly after last meal of the day 90 tablet 3    cetirizine (ZYRTEC) 10 MG tablet Take 1 tablet (10 mg total) by mouth once daily. 90 tablet 3    ENTRESTO  mg per tablet TAKE 1 TABLET BY MOUTH TWICE A DAY FOR heart 180 tablet 3    famotidine (PEPCID) 20 MG tablet Take 1 tablet (20 mg total) by mouth nightly as needed for Heartburn. 30 tablet 0    fluticasone propionate (FLONASE) 50 mcg/actuation nasal spray 2 sprays (100 mcg total) by Each Nostril route once daily. 11.1 mL 1    hydrALAZINE (APRESOLINE) 100 MG tablet Take 1 tablet (100 mg total) by mouth 2 (two) times a day. 180 tablet 3    metoprolol succinate (TOPROL-XL) 200 MG 24 hr tablet TAKE 2 TABLETS BY MOUTH ONCE A  tablet 3    semaglutide (OZEMPIC) 0.25 mg or 0.5 mg (2 mg/3 mL) pen injector Inject 0.5 mg into the skin every 7 days. Ok to change brand name. Or compound with b12. 4 each 1    semaglutide (OZEMPIC) 1 mg/dose (4 mg/3 mL) Inject 1 mg into the skin every 7 days. 3 mL 3    spironolactone (ALDACTONE) 25 MG tablet TAKE 1 TABLET BY MOUTH ONCE A DAY 90 tablet 3    spironolactone (ALDACTONE) 25 MG tablet Take 1 tablet (25 mg total) by mouth once daily. 90 tablet 3    topiramate (TOPAMAX) 50 MG tablet Take ONE tablet BY MOUTH EVERY EVENING (Patient not taking: Reported on 3/13/2025) 60 tablet 0    vitamin D (VITAMIN D3) 1000 units Tab Take 1 tablet (1,000 Units total) by mouth once daily. 90 tablet 3     No current facility-administered medications on file prior to visit.

## 2025-05-05 ENCOUNTER — OFFICE VISIT (OUTPATIENT)
Dept: FAMILY MEDICINE | Facility: CLINIC | Age: 45
End: 2025-05-05
Payer: COMMERCIAL

## 2025-05-05 VITALS
DIASTOLIC BLOOD PRESSURE: 86 MMHG | HEART RATE: 75 BPM | WEIGHT: 203.94 LBS | OXYGEN SATURATION: 99 % | TEMPERATURE: 98 F | SYSTOLIC BLOOD PRESSURE: 142 MMHG | BODY MASS INDEX: 32.91 KG/M2

## 2025-05-05 DIAGNOSIS — I44.7 LBBB (LEFT BUNDLE BRANCH BLOCK): ICD-10-CM

## 2025-05-05 DIAGNOSIS — I50.22 CHRONIC SYSTOLIC CONGESTIVE HEART FAILURE, NYHA CLASS 1: ICD-10-CM

## 2025-05-05 DIAGNOSIS — Z76.89 ENCOUNTER TO ESTABLISH CARE: Primary | ICD-10-CM

## 2025-05-05 DIAGNOSIS — I10 ESSENTIAL HYPERTENSION: ICD-10-CM

## 2025-05-05 PROCEDURE — 99999 PR PBB SHADOW E&M-EST. PATIENT-LVL IV: CPT | Mod: PBBFAC,,, | Performed by: FAMILY MEDICINE

## 2025-05-05 PROCEDURE — 99499 UNLISTED E&M SERVICE: CPT | Mod: S$GLB,,, | Performed by: FAMILY MEDICINE

## 2025-05-05 NOTE — PROGRESS NOTES
Assessment & Plan  Problem List Items Addressed This Visit          Cardiac/Vascular    Essential hypertension - Primary    Relevant Orders    Hypertension Digital Medicine (HDMP) Enrollment Order (Completed)    Re-establish into digital medicine program.   Checking blood pressure regularly   Avoid excess sodium and MSG   Consider calcium score for evaluation.  Concerned for repeated exposure to radiation   Pt is currently stable on medication regimen.  Continue current therapy as scheduled.  Contact office with any questions about adjustments on medications.     Assessment & Plan      Results        Health Maintenance reviewed.      ______________________________________________________________________    Chief Complaint  Chief Complaint   Patient presents with    [facility] Care       HPI  Hollie Saldana is a 44 y.o. female with multiple medical diagnoses as listed in the medical history and problem list that presents for establish care.  Pt is known to me with last appointment 2/7/2025 .    History of Present Illness      Noted cardiovascular concerns.       Dinner: chicken quesadilla  Lunch: skipped   Breakfast:       PAST MEDICAL HISTORY:  Past Medical History:   Diagnosis Date    Cardiomyopathy     History of tubal ligation     Hypertension     LBBB (left bundle branch block)     Nonischemic cardiomyopathy 10/23/2020       PAST SURGICAL HISTORY:  Past Surgical History:   Procedure Laterality Date    CARDIAC CATHETERIZATION  2017    CATHETERIZATION OF BOTH LEFT AND RIGHT HEART Bilateral 11/29/2022    Procedure: CATHETERIZATION, HEART, BOTH LEFT AND RIGHT;  Surgeon: Dez Hayden MD;  Location: [facility] CATH LAB;  Service: Cardiology;  Laterality: Bilateral;  RFA/V access, 4Fr art sheath  RN PREOP 11/23/2022    INSERTION OF BIVENTRICULAR IMPLANTABLE CARDIOVERTER-DEFIBRILLATOR (ICD) Left 6/12/2023    Procedure: INSERTION, ICD, BIVENTRICULAR;  Surgeon: Magdiel Rider MD;  Location: [facility] EP LAB;  Service:  Cardiology;  Laterality: Left;  NICM,CRT-D, SJM, MAC, DM, 3 Prep       SOCIAL HISTORY:  Social History[1]    FAMILY HISTORY:  Family History   Problem Relation Name Age of Onset    Hypertension Mother      Thyroid disease Mother      Appendicitis Mother      Hypertension Father      Heart disease Maternal Grandmother         ALLERGIES AND MEDICATIONS: updated and reviewed.  Review of patient's allergies indicates:   Allergen Reactions    Oxycodone Itching     Current Medications[2]      ROS  Review of Systems   Constitutional:  Negative for activity change, appetite change, fatigue, fever and unexpected weight change.   HENT: Negative.  Negative for ear discharge, ear pain, rhinorrhea and sore throat.    Eyes: Negative.    Respiratory:  Negative for apnea, cough, chest tightness, shortness of breath and wheezing.    Cardiovascular:  Negative for chest pain, palpitations and leg swelling.   Gastrointestinal:  Negative for abdominal distention, abdominal pain, constipation, diarrhea and vomiting.   Endocrine: Negative for cold intolerance, heat intolerance, polydipsia and polyuria.   Genitourinary:  Negative for decreased urine volume and urgency.   Musculoskeletal: Negative.    Skin:  Negative for rash.   Neurological:  Negative for dizziness and headaches.   Hematological:  Does not bruise/bleed easily.   Psychiatric/Behavioral:  Negative for agitation, sleep disturbance and suicidal ideas.            Physical Exam  Vitals:    05/05/25 1505   BP: (!) 142/86   BP Location: Left arm   Patient Position: Sitting   Pulse: 75   Temp: 97.6 °F (36.4 °C)   SpO2: 99%   Weight: 92.5 kg (203 lb 14.8 oz)    Body mass index is 32.91 kg/m².  Weight: 92.5 kg (203 lb 14.8 oz)       Physical Exam  Constitutional:       Appearance: Normal appearance. She is well-developed.   HENT:      Head: Normocephalic and atraumatic.      Right Ear: External ear normal.      Left Ear: External ear normal.      Nose: Nose normal.   Eyes:       Extraocular Movements: Extraocular movements intact.      Conjunctiva/sclera: Conjunctivae normal.   Cardiovascular:      Comments: Heart beating spontaneously  Pulmonary:      Effort: Pulmonary effort is normal.   Neurological:      Mental Status: She is alert and oriented to person, place, and time.   Psychiatric:         Mood and Affect: Mood normal.         Behavior: Behavior normal.        Physical Exam           Health Maintenance         Date Due Completion Date    Cervical Cancer Screening Never done ---    COVID-19 Vaccine (4 - 2024-25 season) 09/01/2024 1/17/2022    Mammogram 11/20/2025 11/20/2024    Hemoglobin A1c (Diabetic Prevention Screening) 05/17/2027 5/17/2024    TETANUS VACCINE 05/19/2032 5/19/2022    RSV Vaccine (Age 60+ and Pregnant patients) (1 - 1-dose 75+ series) 12/08/2056 ---                Patient note was created using [employer].  Any errors in syntax or even information may not have been identified and edited on initial review prior to signing this note.         [1]   Social History  Socioeconomic History    Marital status: Single   Tobacco Use    Smoking status: Never    Smokeless tobacco: Never   Substance and Sexual Activity    Alcohol use: Yes     Comment: Occasionally    Drug use: No     Social Drivers of Health     Financial Resource Strain: Low Risk  (3/31/2025)    [place]    Overall Financial Resource Strain (CARDIA)     Difficulty of Paying Living Expenses: Not very hard   Food Insecurity: No Food Insecurity (3/31/2025)    [place]    Hunger Vital Sign     Worried About Running Out of Food in the Last Year: Never true     Ran Out of Food in the Last Year: Never true   Transportation Needs: No Transportation Needs (3/31/2025)    [place]    PRAPARE - Transportation     Lack of Transportation (Medical): No     Lack of Transportation (Non-Medical): No   Physical Activity: Sufficiently Active (10/17/2024)    Exercise  Vital Sign     Days of Exercise per Week: 2 days     Minutes of Exercise per Session: 120 min   Stress: Stress Concern Present (10/17/2024)    [employer] [place] [employer] - Occupational Stress Questionnaire     Feeling of Stress : To some extent   Housing Stability: Unknown (3/31/2025)    Received from [facility]    Housing Stability Vital Sign     Unable to Pay for Housing in the Last Year: No     Homeless in the Last Year: No   [2]   Current Outpatient Medications   Medication Sig Dispense Refill    butalbital-acetaminophen-caffeine -40 mg (FIORICET, ESGIC) -40 mg per tablet Take 1 tablet by mouth every 24 hours as needed for Pain. (Patient not taking: Reported on 5/2/2025) 30 tablet 0    calcium carbonate (CALCIUM 500) 500 mg calcium (1,250 mg) chewable tablet Take 1 tablet (500 mg total) by mouth once daily. After meals as needed, certainly after last meal of the day 90 tablet 3    cetirizine (ZYRTEC) 10 MG tablet Take 1 tablet (10 mg total) by mouth once daily. 90 tablet 3    ENTRESTO  mg per tablet TAKE 1 TABLET BY MOUTH TWICE A DAY FOR heart 180 tablet 3    famotidine (PEPCID) 20 MG tablet Take 1 tablet (20 mg total) by mouth nightly as needed for Heartburn. (Patient not taking: Reported on 5/2/2025) 30 tablet 0    fluticasone propionate (FLONASE) 50 mcg/actuation nasal spray 2 sprays (100 mcg total) by Each Nostril route once daily. (Patient not taking: Reported on 5/2/2025) 11.1 mL 1    hydrALAZINE (APRESOLINE) 100 MG tablet Take 1 tablet (100 mg total) by mouth 2 (two) times a day. 180 tablet 3    metoprolol succinate (TOPROL-XL) 200 MG 24 hr tablet TAKE 2 TABLETS BY MOUTH ONCE A  tablet 3    semaglutide (OZEMPIC) 0.25 mg or 0.5 mg (2 mg/3 mL) pen injector Inject 0.5 mg into the skin every 7 days. Ok to change brand name. Or compound with b12. (Patient not taking: Reported on 5/2/2025) 4 each 1    semaglutide (OZEMPIC) 1 mg/dose (4 mg/3 mL) Inject 1 mg into the  skin every 7 days. (Patient not taking: Reported on 5/2/2025) 3 mL 3    spironolactone (ALDACTONE) 25 MG tablet TAKE 1 TABLET BY MOUTH ONCE A DAY 90 tablet 3    spironolactone (ALDACTONE) 25 MG tablet Take 1 tablet (25 mg total) by mouth once daily. 90 tablet 3    topiramate (TOPAMAX) 50 MG tablet Take ONE tablet BY MOUTH EVERY EVENING (Patient not taking: Reported on 5/2/2025) 60 tablet 0    vitamin D (VITAMIN D3) 1000 units Tab Take 1 tablet (1,000 Units total) by mouth once daily. 90 tablet 3     No current facility-administered medications for this visit.

## 2025-05-09 LAB
OHS CV AF BURDEN PERCENT: < 1
OHS CV BIV PACING PERCENT: 99
OHS CV DC REMOTE DEVICE TYPE: NORMAL

## 2025-05-15 NOTE — PROGRESS NOTES
Assessment & Plan  Problem List Items Addressed This Visit          Cardiac/Vascular    Nonischemic cardiomyopathy    LBBB (left bundle branch block) - Primary     Other Visit Diagnoses         Class 1 obesity due to excess calories with serious comorbidity and body mass index (BMI) of 32.0 to 32.9 in adult               Assessment & Plan      Results        Health Maintenance reviewed.      ______________________________________________________________________    Chief Complaint  Chief Complaint   Patient presents with    Weight Check       HPI  Hollie Saldana is a 44 y.o. female with multiple medical diagnoses as listed in the medical history and problem list that presents for weight check.  Pt is known to me with last appointment 5/17/2024 .    History of Present Illness       Appt to discuss options for weight loss.  Known significant heart history.   Patient denies any new symptoms including chest pain, SOB, blurry vision, N/V, diarrhea.        PAST MEDICAL HISTORY:  Past Medical History:   Diagnosis Date    Cardiomyopathy     History of tubal ligation     Hypertension     LBBB (left bundle branch block)     Nonischemic cardiomyopathy 10/23/2020       PAST SURGICAL HISTORY:  Past Surgical History:   Procedure Laterality Date    CARDIAC CATHETERIZATION  2017    CATHETERIZATION OF BOTH LEFT AND RIGHT HEART Bilateral 11/29/2022    Procedure: CATHETERIZATION, HEART, BOTH LEFT AND RIGHT;  Surgeon: Dez Hayden MD;  Location: [facility] CATH LAB;  Service: Cardiology;  Laterality: Bilateral;  RFA/V access, 4Fr art sheath  RN PREOP 11/23/2022    INSERTION OF BIVENTRICULAR IMPLANTABLE CARDIOVERTER-DEFIBRILLATOR (ICD) Left 6/12/2023    Procedure: INSERTION, ICD, BIVENTRICULAR;  Surgeon: Magdiel Rider MD;  Location: [facility] EP LAB;  Service: Cardiology;  Laterality: Left;  NICM,CRT-D, SJM, MAC, DM, 3 Prep       SOCIAL HISTORY:  Social History[1]    FAMILY HISTORY:  Family History   Problem Relation Name Age of  Onset    Hypertension Mother      Thyroid disease Mother      Appendicitis Mother      Hypertension Father      Heart disease Maternal Grandmother         ALLERGIES AND MEDICATIONS: updated and reviewed.  Review of patient's allergies indicates:   Allergen Reactions    Oxycodone Itching     Current Medications[2]      ROS  Review of Systems   Constitutional:  Negative for activity change, appetite change, fatigue, fever and unexpected weight change.   HENT: Negative.  Negative for ear discharge, ear pain, rhinorrhea and sore throat.    Eyes: Negative.    Respiratory:  Negative for apnea, cough, chest tightness, shortness of breath and wheezing.    Cardiovascular:  Negative for chest pain, palpitations and leg swelling.   Gastrointestinal:  Negative for abdominal distention, abdominal pain, constipation, diarrhea and vomiting.   Endocrine: Negative for cold intolerance, heat intolerance, polydipsia and polyuria.   Genitourinary:  Negative for decreased urine volume and urgency.   Musculoskeletal: Negative.    Skin:  Negative for rash.   Neurological:  Negative for dizziness and headaches.   Hematological:  Does not bruise/bleed easily.   Psychiatric/Behavioral:  Negative for agitation, sleep disturbance and suicidal ideas.            Physical Exam  Vitals:    02/07/25 1456   BP: (!) 140/82   BP Location: Left arm   Patient Position: Sitting   Pulse: 78   SpO2: 99%   Weight: 97.9 kg (215 lb 13.3 oz)    Body mass index is 34.84 kg/m².  Weight: 97.9 kg (215 lb 13.3 oz)       Physical Exam  Constitutional:       Appearance: Normal appearance. She is well-developed.   HENT:      Head: Normocephalic and atraumatic.      Right Ear: External ear normal.      Left Ear: External ear normal.      Nose: Nose normal.   Eyes:      Extraocular Movements: Extraocular movements intact.      Conjunctiva/sclera: Conjunctivae normal.   Cardiovascular:      Comments: Heart beating spontaneously  Pulmonary:      Effort: Pulmonary effort  is normal.   Neurological:      Mental Status: She is alert and oriented to person, place, and time.   Psychiatric:         Mood and Affect: Mood normal.         Behavior: Behavior normal.        Physical Exam           Health Maintenance         Date Due Completion Date    Cervical Cancer Screening Never done ---    COVID-19 Vaccine (4 - 2024-25 season) 09/01/2024 1/17/2022    Mammogram 11/20/2025 11/20/2024    Hemoglobin A1c (Diabetic Prevention Screening) 05/17/2027 5/17/2024    TETANUS VACCINE 05/19/2032 5/19/2022    RSV Vaccine (Age 60+ and Pregnant patients) (1 - 1-dose 75+ series) 12/08/2056 ---                Patient note was created using [email or web address].  Any errors in syntax or even information may not have been identified and edited on initial review prior to signing this note.       [1]   Social History  Socioeconomic History    Marital status: Single   Tobacco Use    Smoking status: Never    Smokeless tobacco: Never   Substance and Sexual Activity    Alcohol use: Yes     Comment: Occasionally    Drug use: No     Social Drivers of Health     Financial Resource Strain: Low Risk  (3/31/2025)    Received from [facility]    Overall Financial Resource Strain (CARDIA)     Difficulty of Paying Living Expenses: Not very hard   Food Insecurity: No Food Insecurity (3/31/2025)    Received from [facility]    Hunger Vital Sign     Worried About Running Out of Food in the Last Year: Never true     Ran Out of Food in the Last Year: Never true   Transportation Needs: No Transportation Needs (3/31/2025)    Received from [facility]    PRAPARE - Transportation     Lack of Transportation (Medical): No     Lack of Transportation (Non-Medical): No   Physical Activity: Sufficiently Active (10/17/2024)    Exercise Vital Sign     Days of Exercise per Week: 2 days     Minutes of Exercise per Session: 120 min   Stress: Stress Concern Present (10/17/2024)    [employer] [place] [employer] - Occupational Stress  Questionnaire     Feeling of Stress : To some extent   Housing Stability: Unknown (3/31/2025)    Received from [facility]    Housing Stability Vital Sign     Unable to Pay for Housing in the Last Year: No     Homeless in the Last Year: No   [2]   Current Outpatient Medications   Medication Sig Dispense Refill    ENTRESTO  mg per tablet TAKE 1 TABLET BY MOUTH TWICE A DAY FOR heart 180 tablet 3    fluticasone propionate (FLONASE) 50 mcg/actuation nasal spray 2 sprays (100 mcg total) by Each Nostril route once daily. (Patient not taking: Reported on 5/2/2025) 11.1 mL 1    hydrALAZINE (APRESOLINE) 100 MG tablet Take 1 tablet (100 mg total) by mouth 2 (two) times a day. 180 tablet 3    metoprolol succinate (TOPROL-XL) 200 MG 24 hr tablet TAKE 2 TABLETS BY MOUTH ONCE A  tablet 3    topiramate (TOPAMAX) 50 MG tablet Take ONE tablet BY MOUTH EVERY EVENING (Patient not taking: Reported on 5/2/2025) 60 tablet 0    butalbital-acetaminophen-caffeine -40 mg (FIORICET, ESGIC) -40 mg per tablet Take 1 tablet by mouth every 24 hours as needed for Pain. (Patient not taking: Reported on 5/2/2025) 30 tablet 0    calcium carbonate (CALCIUM 500) 500 mg calcium (1,250 mg) chewable tablet Take 1 tablet (500 mg total) by mouth once daily. After meals as needed, certainly after last meal of the day 90 tablet 3    cetirizine (ZYRTEC) 10 MG tablet Take 1 tablet (10 mg total) by mouth once daily. 90 tablet 3    famotidine (PEPCID) 20 MG tablet Take 1 tablet (20 mg total) by mouth nightly as needed for Heartburn. (Patient not taking: Reported on 5/2/2025) 30 tablet 0    semaglutide (OZEMPIC) 0.25 mg or 0.5 mg (2 mg/3 mL) pen injector Inject 0.5 mg into the skin every 7 days. Ok to change brand name. Or compound with b12. (Patient not taking: Reported on 5/2/2025) 4 each 1    semaglutide (OZEMPIC) 1 mg/dose (4 mg/3 mL) Inject 1 mg into the skin every 7 days. (Patient not taking: Reported on 5/2/2025) 3 mL 3     spironolactone (ALDACTONE) 25 MG tablet TAKE 1 TABLET BY MOUTH ONCE A DAY 90 tablet 3    spironolactone (ALDACTONE) 25 MG tablet Take 1 tablet (25 mg total) by mouth once daily. 90 tablet 3    vitamin D (VITAMIN D3) 1000 units Tab Take 1 tablet (1,000 Units total) by mouth once daily. 90 tablet 3     No current facility-administered medications for this visit.

## 2025-05-29 ENCOUNTER — PATIENT OUTREACH (OUTPATIENT)
Dept: ADMINISTRATIVE | Facility: HOSPITAL | Age: 45
End: 2025-05-29
Payer: COMMERCIAL

## 2025-05-29 RX ORDER — CALCIUM CARBONATE 500(1250)
TABLET ORAL
COMMUNITY
Start: 2025-03-19

## 2025-05-29 RX ORDER — ONDANSETRON 4 MG/1
TABLET, ORALLY DISINTEGRATING ORAL
COMMUNITY
Start: 2025-04-02

## 2025-05-29 RX ORDER — SPIRONOLACTONE 25 MG/1
1 TABLET ORAL
COMMUNITY
Start: 2025-03-27

## 2025-05-29 RX ORDER — METOPROLOL SUCCINATE 200 MG/1
2 TABLET, EXTENDED RELEASE ORAL
COMMUNITY

## 2025-05-29 RX ORDER — SEMAGLUTIDE 0.68 MG/ML
0.25 INJECTION, SOLUTION SUBCUTANEOUS
COMMUNITY
Start: 2025-01-30

## 2025-05-29 RX ORDER — CYCLOSPORINE 0.5 MG/ML
EMULSION OPHTHALMIC
COMMUNITY
Start: 2025-05-20

## 2025-05-29 RX ORDER — HYDROCODONE BITARTRATE AND ACETAMINOPHEN 7.5; 325 MG/1; MG/1
1 TABLET ORAL EVERY 6 HOURS PRN
COMMUNITY
Start: 2025-04-02

## 2025-05-29 NOTE — PROGRESS NOTES
Overdue Pap Smear, SDOH ( 2025 ), Medication Adherence, Diabetes Education ( not diabetic ), Digital Medicine ( active HTN ), -    Non-Compliant Blood Pressure Reading - the patient a compliant blood pressure reading of 110/68/ and pulse = 72 taken on 2025. BP recorded.    Care Coordination Encounter Details:       MyChart Portal Status:         [x]  Reviewed MyChart Portal Status offered / enrolled if applicable        Additional Notes:     MyChart Outcomes: Pt is enrolled & active          Updates Requested / Reviewed:        Updated Care Coordination Note, Care Everywhere, , Care Team Updated, Removed  or Duplicate Orders, and Immunizations Reconciliation Completed or Queried: [place]         Health Maintenance Screening(s) Due:      Health Maintenance Topics Overdue:      VBHM Score: 1     Uncontrolled BP  Cervical Cancer Screening                       Health Maintenance Topic(s) Outreach Outcomes & Actions Taken:      Cervical Cancer Screening - Outreach Outcomes & Actions Taken  : External Records Uploaded & Care Team Updated if Applicable    Blood Pressure - Outreach Outcomes & Actions Taken  : Remote Blood Pressure Reading Captured    Medication Adherence / Statins - Outreach Outcomes & Actions Taken  : The patient is compliant with her HTN medications. No DM or Statin Therapy medicatio           Additional Notes:             Chronic Disease Management:     Diabetes Measures        Lab Results   Component Value Date    HGBA1C 5.5 2024           [x]  Reviewed chart for active Diabetes diagnosis     []  Scheduled necessary follow up appointments if needed         Additional Notes:  The patient is not diabetic           Hypertension Measures        BP Readings from Last 1 Encounters:   25 (!) 142/86           [x]  Reviewed chart for active Hypertension diagnosis     [x]  Reviewed & documented Home BP Cuff     [x]  Documented a Remote BP if needed & applicable      []  Scheduled necessary follow up appointments with Primary Care if needed         Additional Notes:  Remote reading captured / patient is active on Digital HTN           Provider Team Continuity:     Last PCP Visit Date: 5/5/2025          [x]  Reviewed Primary Care Provider Visits, Annual Wellness Visit, and Future          Appointments to ensure appointments have been scheduled and/or           completed        Additional Notes:  PCP Visit completed on 05/05/2025  eAWV - the patient is not eligible           Social Determinants of Health          [x]  Reviewed, completed, and/or updated the following sections:                  Food Insecurity, Transportation Needs, Financial Resource Strain,                 Tobacco Use        Additional Notes:  SDOH updated [facility] on 03/31/2025           Care Management, Digital Medicine, and/or Education Referrals    OPCM Risk Score: 81.8         Next Steps - Referral Actions: No action needed        Additional Notes:  Digital Medicine DM ( not diabetic ) & HTN ( active )  Diabetes Education - the patient is not diabetic

## 2025-05-30 VITALS — DIASTOLIC BLOOD PRESSURE: 68 MMHG | SYSTOLIC BLOOD PRESSURE: 110 MMHG

## 2025-06-02 DIAGNOSIS — E66.811 CLASS 1 OBESITY DUE TO EXCESS CALORIES WITH SERIOUS COMORBIDITY AND BODY MASS INDEX (BMI) OF 30.0 TO 30.9 IN ADULT: Primary | ICD-10-CM

## 2025-06-02 DIAGNOSIS — E66.09 CLASS 1 OBESITY DUE TO EXCESS CALORIES WITH SERIOUS COMORBIDITY AND BODY MASS INDEX (BMI) OF 30.0 TO 30.9 IN ADULT: Primary | ICD-10-CM

## 2025-06-11 ENCOUNTER — PATIENT MESSAGE (OUTPATIENT)
Dept: FAMILY MEDICINE | Facility: CLINIC | Age: 45
End: 2025-06-11
Payer: COMMERCIAL

## 2025-06-11 DIAGNOSIS — L29.9 ITCHING: Primary | ICD-10-CM

## 2025-06-12 RX ORDER — HYDROXYZINE HYDROCHLORIDE 25 MG/1
25 TABLET, FILM COATED ORAL 3 TIMES DAILY PRN
Qty: 30 TABLET | Refills: 2 | Status: SHIPPED | OUTPATIENT
Start: 2025-06-12

## 2025-06-13 ENCOUNTER — CLINICAL SUPPORT (OUTPATIENT)
Dept: CARDIOLOGY | Facility: HOSPITAL | Age: 45
End: 2025-06-13
Payer: COMMERCIAL

## 2025-06-13 ENCOUNTER — CLINICAL SUPPORT (OUTPATIENT)
Dept: CARDIOLOGY | Facility: HOSPITAL | Age: 45
End: 2025-06-13
Attending: STUDENT IN AN ORGANIZED HEALTH CARE EDUCATION/TRAINING PROGRAM
Payer: COMMERCIAL

## 2025-06-13 DIAGNOSIS — Z95.810 PRESENCE OF AUTOMATIC (IMPLANTABLE) CARDIAC DEFIBRILLATOR: ICD-10-CM

## 2025-06-13 DIAGNOSIS — I44.7 LEFT BUNDLE-BRANCH BLOCK, UNSPECIFIED: ICD-10-CM

## 2025-06-13 PROCEDURE — 93295 DEV INTERROG REMOTE 1/2/MLT: CPT | Mod: ,,, | Performed by: STUDENT IN AN ORGANIZED HEALTH CARE EDUCATION/TRAINING PROGRAM

## 2025-06-13 PROCEDURE — 93296 REM INTERROG EVL PM/IDS: CPT | Performed by: STUDENT IN AN ORGANIZED HEALTH CARE EDUCATION/TRAINING PROGRAM

## 2025-06-17 DIAGNOSIS — I42.8 NONISCHEMIC CARDIOMYOPATHY: ICD-10-CM

## 2025-06-17 RX ORDER — SACUBITRIL AND VALSARTAN 97; 103 MG/1; MG/1
TABLET, FILM COATED ORAL
Qty: 180 TABLET | Refills: 3 | Status: SHIPPED | OUTPATIENT
Start: 2025-06-17

## 2025-06-17 RX ORDER — METOPROLOL SUCCINATE 200 MG/1
400 TABLET, EXTENDED RELEASE ORAL
Qty: 180 TABLET | Refills: 3 | Status: SHIPPED | OUTPATIENT
Start: 2025-06-17

## 2025-06-26 DIAGNOSIS — E66.811 CLASS 1 OBESITY DUE TO EXCESS CALORIES WITH SERIOUS COMORBIDITY AND BODY MASS INDEX (BMI) OF 30.0 TO 30.9 IN ADULT: ICD-10-CM

## 2025-06-26 DIAGNOSIS — E66.09 CLASS 1 OBESITY DUE TO EXCESS CALORIES WITH SERIOUS COMORBIDITY AND BODY MASS INDEX (BMI) OF 30.0 TO 30.9 IN ADULT: ICD-10-CM

## 2025-06-27 NOTE — TELEPHONE ENCOUNTER
Refill Routing Note   Medication(s) are not appropriate for processing by [facility] for the following reason(s):        Required labs outdated    [facility] action(s):  Defer   Requires labs : Yes             Appointments  past 12m or future 3m with PCP    Date Provider   Last Visit   5/5/2025 Latanya Li MD   Next Visit   10/1/2025 Latanya Li MD   ED visits in past 90 days: 0        Note composed:4:07 PM 06/27/2025

## 2025-06-27 NOTE — TELEPHONE ENCOUNTER
Care Due:                  Date            Visit Type   Department     Provider  --------------------------------------------------------------------------------                                             [facility] FAMILY MED                                           / INTERNAL MED  Last Visit: 05-      JHOANA          / TORIN Cervantes  Next Visit: None Scheduled  None         None Found                                                            Last  Test          Frequency    Reason                     Performed    Due Date  --------------------------------------------------------------------------------    HBA1C.......  6 months...  semaglutide,.............  05- 11-    [facility] Embedded Care Due Messages. Reference number: [id number].   6/26/2025 9:40:52 PM CDT

## 2025-06-28 PROBLEM — Z95.810 PRESENCE OF CARDIAC RESYNCHRONIZATION THERAPY DEFIBRILLATOR (CRT-D): Status: ACTIVE | Noted: 2025-06-28

## 2025-06-28 PROBLEM — E78.2 MIXED HYPERLIPIDEMIA: Status: ACTIVE | Noted: 2025-06-28

## 2025-06-28 PROBLEM — I50.32 HEART FAILURE WITH RECOVERED EJECTION FRACTION (HFRECEF): Status: ACTIVE | Noted: 2025-06-28

## 2025-06-28 LAB
OHS CV AF BURDEN PERCENT: < 1
OHS CV BIV PACING PERCENT: 99 %
OHS CV DC REMOTE DEVICE TYPE: NORMAL

## 2025-07-24 ENCOUNTER — HOSPITAL ENCOUNTER (OUTPATIENT)
Dept: RADIOLOGY | Facility: HOSPITAL | Age: 45
Discharge: HOME | End: 2025-07-24
Attending: FAMILY MEDICINE
Payer: COMMERCIAL

## 2025-07-24 ENCOUNTER — TELEPHONE (OUTPATIENT)
Dept: FAMILY MEDICINE | Facility: CLINIC | Age: 45
End: 2025-07-24
Payer: COMMERCIAL

## 2025-07-24 ENCOUNTER — PATIENT MESSAGE (OUTPATIENT)
Dept: FAMILY MEDICINE | Facility: CLINIC | Age: 45
End: 2025-07-24
Payer: COMMERCIAL

## 2025-07-24 DIAGNOSIS — M25.532 ACUTE PAIN OF LEFT WRIST: Primary | ICD-10-CM

## 2025-07-24 DIAGNOSIS — M25.531 RIGHT WRIST PAIN: Primary | ICD-10-CM

## 2025-07-24 DIAGNOSIS — M25.532 ACUTE PAIN OF LEFT WRIST: ICD-10-CM

## 2025-07-24 NOTE — TELEPHONE ENCOUNTER
Patient sent virtual message stating she had an injury to her left wrist. Patient is requesting x-ray order.

## 2025-07-25 ENCOUNTER — HOSPITAL ENCOUNTER (OUTPATIENT)
Dept: RADIOLOGY | Facility: HOSPITAL | Age: 45
Discharge: HOME | End: 2025-07-25
Attending: FAMILY MEDICINE
Payer: COMMERCIAL

## 2025-07-25 DIAGNOSIS — M25.531 RIGHT WRIST PAIN: ICD-10-CM

## 2025-07-25 DIAGNOSIS — M25.531 RIGHT WRIST PAIN: Primary | ICD-10-CM

## 2025-07-25 PROCEDURE — 73110 X-RAY EXAM OF WRIST: CPT | Mod: 26,RT,, | Performed by: RADIOLOGY

## 2025-07-25 PROCEDURE — 73110 X-RAY EXAM OF WRIST: CPT | Mod: TC,PO,RT

## 2025-07-25 NOTE — PROGRESS NOTES
Patient with recurrent chronic right wrist pain. Had xrays done today, no acute findings. Pain to both ulnar and radial styloids with palpation. Decreased ROM and pain with lifting. No swelling. Recommended RICE, will refer to Ortho for further eval.

## 2025-07-28 ENCOUNTER — PATIENT MESSAGE (OUTPATIENT)
Dept: CARDIOLOGY | Facility: CLINIC | Age: 45
End: 2025-07-28
Payer: COMMERCIAL

## 2025-08-01 ENCOUNTER — OFFICE VISIT (OUTPATIENT)
Dept: SPORTS MEDICINE | Facility: CLINIC | Age: 45
End: 2025-08-01
Payer: COMMERCIAL

## 2025-08-01 VITALS
WEIGHT: 203 LBS | HEART RATE: 74 BPM | HEIGHT: 66 IN | DIASTOLIC BLOOD PRESSURE: 81 MMHG | BODY MASS INDEX: 32.62 KG/M2 | SYSTOLIC BLOOD PRESSURE: 127 MMHG

## 2025-08-01 DIAGNOSIS — S63.501A SPRAIN OF RIGHT WRIST, INITIAL ENCOUNTER: Primary | ICD-10-CM

## 2025-08-01 DIAGNOSIS — M25.531 RIGHT WRIST PAIN: ICD-10-CM

## 2025-08-01 PROCEDURE — 99999 PR PBB SHADOW E&M-EST. PATIENT-LVL V: CPT | Mod: PBBFAC,,, | Performed by: STUDENT IN AN ORGANIZED HEALTH CARE EDUCATION/TRAINING PROGRAM

## 2025-08-01 NOTE — PROGRESS NOTES
CC: right wrist pain    44 y.o. Female presents today for evaluation of her right wrist pain. Pt reports she injured her wrist about 1 yr ago. Pt states she was helping a friend who fell, and states her friend fell on her, causing her wrist to be bent in an uncomfortable position. Pt reports she initially had pain, then pain resolved. Pt reports that about 5 months ago, pain returned and has become intermittent (last a few weeks at a time). Pt localizes pain to proximal medial aspect of palm and medial aspect of the wrist. Pt states that she had lateral wrist pain following initial injury, but that pain has not returned recently. Pt reports pain is 4/10 today, and presents with brace on her wrist. Pt denies mechanical symptoms. Pt denies numbness/tingling. Pt is right hand dominant.     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  Eyes: Patient denies eye pain or vision changes.  HEENT: Patient denies ear pain, sore throat, or nasal discharge.  CVS: Patient denies chest pain.  Lungs: Patient denies shortness of breath or cough.  Abdomen: Patient denies any stomach pain, nausea, vomiting, or diarrhea  Skin: Patient denies skin rash or itching.    Musculoskeletal: Patient denies recent injuries or trauma.  Neuro: Patient denies any numbness or tingling in upper or lower extremities.  Psych: Patient denies any current anxiety or nervousness.    PAST MEDICAL HISTORY:   Past Medical History:   Diagnosis Date    Cardiomyopathy     History of tubal ligation     Hypertension     LBBB (left bundle branch block)     Nonischemic cardiomyopathy 10/23/2020       PAST SURGICAL  "HISTORY:  Past Surgical History:   Procedure Laterality Date    CARDIAC CATHETERIZATION  2017    CATHETERIZATION OF BOTH LEFT AND RIGHT HEART Bilateral 11/29/2022    Procedure: CATHETERIZATION, HEART, BOTH LEFT AND RIGHT;  Surgeon: Dez Hayden MD;  Location: [facility] CATH LAB;  Service: Cardiology;  Laterality: Bilateral;  RFA/V access, 4Fr art sheath  RN PREOP 11/23/2022    INSERTION OF BIVENTRICULAR IMPLANTABLE CARDIOVERTER-DEFIBRILLATOR (ICD) Left 6/12/2023    Procedure: INSERTION, ICD, BIVENTRICULAR;  Surgeon: Magdiel Rider MD;  Location: [facility] EP LAB;  Service: Cardiology;  Laterality: Left;  NICM,CRT-D, SJM, MAC, DM, 3 Prep       FAMILY HISTORY:  Family History   Problem Relation Name Age of Onset    Hypertension Mother      Thyroid disease Mother      Appendicitis Mother      Hypertension Father      Heart disease Maternal Grandmother         SOCIAL HISTORY:  Social History[1]    MEDICATIONS:   Current Medications[2]    ALLERGIES:   Review of patient's allergies indicates:   Allergen Reactions    Oxycodone Itching        PHYSICAL EXAMINATION:  /81   Pulse 74   Ht 5' 6" (1.676 m)   Wt 92.1 kg (203 lb)   BMI 32.77 kg/m²   Vitals signs and nursing note have been reviewed.    General: In no acute distress, well developed, well nourished, no diaphoresis  Eyes: EOM full and smooth, no eye redness or discharge  HEENT: normocephalic and atraumatic, neck supple, trachea midline  Cardiovascular: no LE edema  Lungs: respirations non-labored, no conversational dyspnea   Neuro: AAOx3, CN2-12 grossly intact  Skin: No rashes, warm and dry  Psychiatric: cooperative, pleasant, mood and affect appropriate for age    Right Wrist   Inspection/Palpation:   +TTP medial wrist at carpal region  -Deformity  -Ecchymosis  -Thenar or hypothenar atrophy throughout hand   +Radial pulse     Sensation:    Intact to LT in all finger pads    Intact to LT in radial, ulnar, median distributions     ROM (* = with pain):   " Flexion: WNL   Extension: WNL   Supination: WNL   Pronation:  WNL    Strength (* = with pain):    5/5 in finger abduction    5/5 in thumb extension    5/5 in opposition   5/5 in intrinsics   5/5  strength     Other:  -Tinnel's   -Phalen's   -Finkelstein's  -Fovea  -Piano Key Sign    Circ:   WWP   Pulses 2+    IMAGIN. Wrist X-ray ordered due to right wrist pain. 3 views taken on 25.   2. X-ray images were reviewed personally by me and then directly with patient.  3. FINDINGS: No fracture or dislocation. No bone destruction identified     4. IMPRESSION:  See above     ASSESSMENT:      ICD-10-CM ICD-9-CM   1. Sprain of right wrist, initial encounter  S63.501A 842.00   2. Right wrist pain  M25.531 719.43         PLAN:  Based on patient history, physical exam findings, and imaging I believe we are dealing with a chronic wrist sprain.  I believe patient likely injured her wrist a year ago and has not done any rehab since then to promote healing, thus we will refer patient to occupational therapy.      All questions were answered to the best of my ability and all concerns were addressed at this time.    Follow up in 8 weeks if no improvement, or sooner if need be.    This note is dictated using the M*Modal Fluency Direct word recognition program. There are word recognition mistakes that are occasionally missed on review.               [1]   Social History  Socioeconomic History    Marital status: Single   Tobacco Use    Smoking status: Never    Smokeless tobacco: Never   Substance and Sexual Activity    Alcohol use: Yes     Comment: Occasionally    Drug use: No     Social Drivers of Health     Financial Resource Strain: Low Risk  (3/31/2025)    Received from [facility]    Overall Financial Resource Strain (CARDIA)     Difficulty of Paying Living Expenses: Not very hard   Food Insecurity: No Food Insecurity (3/31/2025)    Received from [facility]    Hunger Vital Sign     Worried About Running Out  of Food in the Last Year: Never true     Ran Out of Food in the Last Year: Never true   Transportation Needs: No Transportation Needs (3/31/2025)    Received from [facility]    PRAPARE - Transportation     Lack of Transportation (Medical): No     Lack of Transportation (Non-Medical): No   Physical Activity: Sufficiently Active (10/17/2024)    Exercise Vital Sign     Days of Exercise per Week: 2 days     Minutes of Exercise per Session: 120 min   Stress: Stress Concern Present (10/17/2024)    [employer] - Occupational Stress Questionnaire     Feeling of Stress : To some extent   Housing Stability: Unknown (3/31/2025)    Received from [facility]    Housing Stability Vital Sign     Unable to Pay for Housing in the Last Year: No     Homeless in the Last Year: No   [2]   Current Outpatient Medications:     calcium carbonate (CALCIUM 500) 500 mg calcium (1,250 mg) chewable tablet, Take 1 tablet (500 mg total) by mouth once daily. After meals as needed, certainly after last meal of the day, Disp: 90 tablet, Rfl: 3    calcium carbonate (OS-LEWIS) 500 mg calcium (1,250 mg) tablet, Take ONE tablet BY MOUTH ONCE A DAY after last meal of the day, Disp: , Rfl:     cetirizine (ZYRTEC) 10 MG tablet, Take 1 tablet (10 mg total) by mouth once daily., Disp: 90 tablet, Rfl: 3    cycloSPORINE (RESTASIS) 0.05 % ophthalmic emulsion, , Disp: , Rfl:     ENTRESTO  mg per tablet, TAKE 1 TABLET BY MOUTH TWICE A DAY FOR heart, Disp: 180 tablet, Rfl: 3    hydrALAZINE (APRESOLINE) 100 MG tablet, Take 1 tablet (100 mg total) by mouth 2 (two) times a day., Disp: 180 tablet, Rfl: 3    hydrOXYzine HCL (ATARAX) 25 MG tablet, Take 1 tablet (25 mg total) by mouth 3 (three) times daily as needed for Itching., Disp: 30 tablet, Rfl: 2    metoprolol succinate (TOPROL-XL) 200 MG 24 hr tablet, TAKE 2 TABLETS BY MOUTH ONCE A DAY, Disp: 180 tablet, Rfl: 3    ondansetron (ZOFRAN-ODT) 4 MG TbDL, Dissolve one tablet  on the tongue every 6 hours as needed for nausea, Disp: , Rfl:     semaglutide, weight loss, 0.25 mg/0.5 mL PnIj, Inject 0.25 mg into the skin every 7 days., Disp: 1 mL, Rfl: 0    spironolactone (ALDACTONE) 25 MG tablet, TAKE 1 TABLET BY MOUTH ONCE A DAY, Disp: 90 tablet, Rfl: 3    spironolactone (ALDACTONE) 25 MG tablet, Take 1 tablet (25 mg total) by mouth once daily., Disp: 90 tablet, Rfl: 3    vitamin D (VITAMIN D3) 1000 units Tab, Take 1 tablet (1,000 Units total) by mouth once daily., Disp: 90 tablet, Rfl: 3    HYDROcodone-acetaminophen (NORCO) 7.5-325 mg per tablet, Take 1 tablet by mouth every 6 (six) hours as needed., Disp: , Rfl:     semaglutide (OZEMPIC) 0.25 mg or 0.5 mg (2 mg/3 mL) pen injector, Inject 0.5 mg into the skin every 7 days. Ok to change brand name. Or compound with b12. (Patient not taking: Reported on 8/1/2025), Disp: 4 each, Rfl: 1    semaglutide (OZEMPIC) 0.25 mg or 0.5 mg (2 mg/3 mL) pen injector, Inject 0.25 mg into the skin., Disp: , Rfl:     semaglutide (OZEMPIC) 1 mg/dose (4 mg/3 mL), Inject 1 mg into the skin every 7 days. (Patient not taking: Reported on 8/1/2025), Disp: 3 mL, Rfl: 3    topiramate (TOPAMAX) 50 MG tablet, Take ONE tablet BY MOUTH EVERY EVENING (Patient not taking: Reported on 3/13/2025), Disp: 60 tablet, Rfl: 0    UNABLE TO FIND, Inject 0.25 ml (0.5mg) 25 Units subcutaneously once weekly, Disp: , Rfl:

## 2025-08-13 ENCOUNTER — OFFICE VISIT (OUTPATIENT)
Dept: INTERNAL MEDICINE | Facility: CLINIC | Age: 45
End: 2025-08-13
Payer: COMMERCIAL

## 2025-08-13 ENCOUNTER — PATIENT MESSAGE (OUTPATIENT)
Dept: INTERNAL MEDICINE | Facility: CLINIC | Age: 45
End: 2025-08-13

## 2025-08-13 DIAGNOSIS — E66.811 OBESITY, CLASS I, BMI 30-34.9: Primary | ICD-10-CM

## 2025-08-13 PROCEDURE — 99499 UNLISTED E&M SERVICE: CPT | Mod: 95,,, | Performed by: PHARMACIST

## 2025-08-15 ENCOUNTER — OFFICE VISIT (OUTPATIENT)
Dept: CARDIOLOGY | Facility: CLINIC | Age: 45
End: 2025-08-15
Payer: COMMERCIAL

## 2025-08-15 VITALS
BODY MASS INDEX: 33.66 KG/M2 | HEIGHT: 66 IN | DIASTOLIC BLOOD PRESSURE: 86 MMHG | HEART RATE: 66 BPM | OXYGEN SATURATION: 100 % | SYSTOLIC BLOOD PRESSURE: 134 MMHG | RESPIRATION RATE: 15 BRPM | WEIGHT: 209.44 LBS

## 2025-08-15 DIAGNOSIS — R07.2 PRECORDIAL PAIN: Primary | ICD-10-CM

## 2025-08-15 DIAGNOSIS — Z95.810 BIVENTRICULAR ICD (IMPLANTABLE CARDIOVERTER-DEFIBRILLATOR) IN PLACE: ICD-10-CM

## 2025-08-15 DIAGNOSIS — G47.33 OSA (OBSTRUCTIVE SLEEP APNEA): ICD-10-CM

## 2025-08-15 DIAGNOSIS — Z98.51 HISTORY OF TUBAL LIGATION: ICD-10-CM

## 2025-08-15 DIAGNOSIS — I42.8 NONISCHEMIC CARDIOMYOPATHY: ICD-10-CM

## 2025-08-15 DIAGNOSIS — I10 ESSENTIAL HYPERTENSION: ICD-10-CM

## 2025-08-15 DIAGNOSIS — E66.9 NON MORBID OBESITY, UNSPECIFIED OBESITY TYPE: ICD-10-CM

## 2025-08-15 DIAGNOSIS — Z86.79 HISTORY OF LEFT BUNDLE BRANCH BLOCK (LBBB): ICD-10-CM

## 2025-08-15 DIAGNOSIS — Z95.810 PRESENCE OF AUTOMATIC (IMPLANTABLE) CARDIAC DEFIBRILLATOR: ICD-10-CM

## 2025-08-15 LAB
OHS QRS DURATION: 88 MS
OHS QTC CALCULATION: 412 MS

## 2025-08-15 PROCEDURE — 99999 PR PBB SHADOW E&M-EST. PATIENT-LVL V: CPT | Mod: PBBFAC,,, | Performed by: INTERNAL MEDICINE

## 2025-08-20 ENCOUNTER — HOSPITAL ENCOUNTER (OUTPATIENT)
Dept: CARDIOLOGY | Facility: HOSPITAL | Age: 45
Discharge: HOME | End: 2025-08-20
Attending: INTERNAL MEDICINE
Payer: COMMERCIAL

## 2025-08-20 ENCOUNTER — HOSPITAL ENCOUNTER (OUTPATIENT)
Dept: RADIOLOGY | Facility: HOSPITAL | Age: 45
Discharge: HOME | End: 2025-08-20
Attending: INTERNAL MEDICINE
Payer: COMMERCIAL

## 2025-08-20 DIAGNOSIS — I10 ESSENTIAL HYPERTENSION: ICD-10-CM

## 2025-08-20 DIAGNOSIS — Z98.51 HISTORY OF TUBAL LIGATION: ICD-10-CM

## 2025-08-20 DIAGNOSIS — I42.8 NONISCHEMIC CARDIOMYOPATHY: ICD-10-CM

## 2025-08-20 DIAGNOSIS — Z95.810 BIVENTRICULAR ICD (IMPLANTABLE CARDIOVERTER-DEFIBRILLATOR) IN PLACE: ICD-10-CM

## 2025-08-20 DIAGNOSIS — R07.2 PRECORDIAL PAIN: ICD-10-CM

## 2025-08-20 DIAGNOSIS — Z86.79 HISTORY OF LEFT BUNDLE BRANCH BLOCK (LBBB): ICD-10-CM

## 2025-08-20 LAB
ASCENDING AORTA: 2.7 CM
AV INDEX (PROSTH): 0.81
AV MEAN GRADIENT: 5 MMHG
AV PEAK GRADIENT: 9 MMHG
AV VALVE AREA BY VELOCITY RATIO: 2.7 CM²
AV VALVE AREA: 2.6 CM²
AV VELOCITY RATIO: 0.87
CV ECHO LV RWT: 0.55 CM
CV STRESS BASE HR: 60 BPM
DIASTOLIC BLOOD PRESSURE: 96 MMHG
DOP CALC AO PEAK VEL: 1.5 M/S
DOP CALC AO VTI: 35 CM
DOP CALC LVOT AREA: 3.1 CM2
DOP CALC LVOT DIAMETER: 2 CM
DOP CALC LVOT PEAK VEL: 1.3 M/S
DOP CALC MV VTI: 27.7 CM
DOP CALCLVOT PEAK VEL VTI: 28.5 CM
E WAVE DECELERATION TIME: 167 MSEC
E/A RATIO: 1.42
E/E' RATIO: 7 M/S
ECHO LV POSTERIOR WALL: 1.2 CM (ref 0.6–1.1)
FRACTIONAL SHORTENING: 29.5 % (ref 28–44)
INTERVENTRICULAR SEPTUM: 1.2 CM (ref 0.6–1.1)
IVC DIAMETER: 1.88 CM
LA MAJOR: 5.8 CM
LA MINOR: 5 CM
LA WIDTH: 4.2 CM
LEFT ATRIUM SIZE: 3.7 CM
LEFT ATRIUM VOLUME: 71 CM3
LEFT INTERNAL DIMENSION IN SYSTOLE: 3.1 CM (ref 2.1–4)
LEFT VENTRICLE DIASTOLIC VOLUME: 87 ML
LEFT VENTRICLE SYSTOLIC VOLUME: 37 ML
LEFT VENTRICULAR INTERNAL DIMENSION IN DIASTOLE: 4.4 CM (ref 3.5–6)
LEFT VENTRICULAR MASS: 191.3 G
LV LATERAL E/E' RATIO: 5.8 M/S
LV SEPTAL E/E' RATIO: 9 M/S
LVED V (TEICH): 86.65 ML
LVES V (TEICH): 36.57 ML
LVOT MG: 3.78 MMHG
LVOT MV: 0.91 CM/S
MV MEAN GRADIENT: 1 MMHG
MV PEAK A VEL: 0.57 M/S
MV PEAK E VEL: 0.81 M/S
MV PEAK GRADIENT: 3 MMHG
MV STENOSIS PRESSURE HALF TIME: 48.38 MS
MV VALVE AREA BY CONTINUITY EQUATION: 3.23 CM2
MV VALVE AREA P 1/2 METHOD: 4.55 CM2
NUC STRESS DIASTOLIC VOLUME INDEX: 98
NUC STRESS EJECTION FRACTION: 55 %
NUC STRESS SYSTOLIC VOLUME INDEX: 45
OHS CV CPX 85 PERCENT MAX PREDICTED HEART RATE MALE: 150
OHS CV CPX MAX PREDICTED HEART RATE: 177
OHS CV CPX PATIENT IS FEMALE: 1
OHS CV CPX PATIENT IS MALE: 0
OHS CV CPX PEAK DIASTOLIC BLOOD PRESSURE: 85 MMHG
OHS CV CPX PEAK HEAR RATE: 100 BPM
OHS CV CPX PEAK RATE PRESSURE PRODUCT: NORMAL
OHS CV CPX PEAK SYSTOLIC BLOOD PRESSURE: 142 MMHG
OHS CV CPX PERCENT MAX PREDICTED HEART RATE ACHIEVED: 59
OHS CV CPX RATE PRESSURE PRODUCT PRESENTING: 7740
OHS CV INITIAL DOSE: 10.2 MCG/KG/MIN
OHS CV PEAK DOSE: 30.4 MCG/KG/MIN
OHS CV RV/LV RATIO: 0.8 CM
PISA TR MAX VEL: 2.4 M/S
PULM VEIN S/D RATIO: 1.87
PV PEAK D VEL: 0.31 M/S
PV PEAK GRADIENT: 4 MMHG
PV PEAK S VEL: 0.58 M/S
PV PEAK VELOCITY: 1 M/S
RA MAJOR: 4.87 CM
RA PRESSURE ESTIMATED: 8 MMHG
RA WIDTH: 3.5 CM
RIGHT VENTRICLE DIASTOLIC BASEL DIMENSION: 3.5 CM
RIGHT VENTRICULAR END-DIASTOLIC DIMENSION: 3.47 CM
RV TB RVSP: 10 MMHG
RV TISSUE DOPPLER FREE WALL SYSTOLIC VELOCITY 1 (APICAL 4 CHAMBER VIEW): 11.2 CM/S
SINUS: 2.8 CM
STJ: 2.4 CM
SYSTOLIC BLOOD PRESSURE: 129 MMHG
TDI LATERAL: 0.14 M/S
TDI SEPTAL: 0.09 M/S
TDI: 0.12 M/S
TR MAX PG: 23 MMHG
TRICUSPID ANNULAR PLANE SYSTOLIC EXCURSION: 2.4 CM
TV REST PULMONARY ARTERY PRESSURE: 31 MMHG

## 2025-08-20 PROCEDURE — A9502 TC99M TETROFOSMIN: HCPCS | Performed by: INTERNAL MEDICINE

## 2025-08-20 PROCEDURE — 93016 CV STRESS TEST SUPVJ ONLY: CPT | Mod: ,,, | Performed by: INTERNAL MEDICINE

## 2025-08-20 PROCEDURE — 93306 TTE W/DOPPLER COMPLETE: CPT | Mod: 26,,, | Performed by: INTERNAL MEDICINE

## 2025-08-20 PROCEDURE — 93018 CV STRESS TEST I&R ONLY: CPT | Mod: ,,, | Performed by: INTERNAL MEDICINE

## 2025-08-20 PROCEDURE — 78452 HT MUSCLE IMAGE SPECT MULT: CPT

## 2025-08-20 PROCEDURE — 63600175 PHARM REV CODE 636 W HCPCS: Performed by: INTERNAL MEDICINE

## 2025-08-20 PROCEDURE — 93306 TTE W/DOPPLER COMPLETE: CPT

## 2025-08-20 PROCEDURE — 78452 HT MUSCLE IMAGE SPECT MULT: CPT | Mod: 26,,, | Performed by: INTERNAL MEDICINE

## 2025-08-20 PROCEDURE — 93017 CV STRESS TEST TRACING ONLY: CPT

## 2025-08-20 RX ORDER — REGADENOSON 0.08 MG/ML
0.4 INJECTION, SOLUTION INTRAVENOUS ONCE
Status: DISCONTINUED | OUTPATIENT
Start: 2025-08-20 | End: 2025-08-20

## 2025-08-20 RX ORDER — REGADENOSON 0.08 MG/ML
0.4 INJECTION, SOLUTION INTRAVENOUS ONCE
Status: COMPLETED | OUTPATIENT
Start: 2025-08-20 | End: 2025-08-20

## 2025-08-20 RX ADMIN — TETROFOSMIN 30.4 MILLICURIE: 1.38 INJECTION, POWDER, LYOPHILIZED, FOR SOLUTION INTRAVENOUS at 09:08

## 2025-08-20 RX ADMIN — REGADENOSON 0.4 MG: 0.08 INJECTION, SOLUTION INTRAVENOUS at 09:08

## 2025-08-20 RX ADMIN — TETROFOSMIN 10.2 MILLICURIE: 1.38 INJECTION, POWDER, LYOPHILIZED, FOR SOLUTION INTRAVENOUS at 07:08

## (undated) DEVICE — SHEATH INTRO PINNACLE 7F 10CM

## (undated) DEVICE — SLING SWATHE UNIVERSAL FOAM

## (undated) DEVICE — SAFESHEATH II 8FR 13CM

## (undated) DEVICE — PAD DEFIB CADENCE ADULT R2

## (undated) DEVICE — SET CO-SET CLOSED INJ

## (undated) DEVICE — CATH 5FR AL2.0

## (undated) DEVICE — CATH INFINITI 4F JL4 .042X100

## (undated) DEVICE — GUIDE CATH CPS DIRECT 54CM 135

## (undated) DEVICE — KIT SELECTRA ACCESSORY

## (undated) DEVICE — KIT PROBE COVER WITH GEL

## (undated) DEVICE — WIRE WHISPER MS 014 X 190

## (undated) DEVICE — KIT MANIFOLD LOW PRESS TUBING

## (undated) DEVICE — PACK PACER PERMANENT OMC

## (undated) DEVICE — CATH SWAN GANZ 7FR 110CM

## (undated) DEVICE — ELECTRODE REM PLYHSV RETURN 9

## (undated) DEVICE — CATH 5FR MULTI MPA2

## (undated) DEVICE — GUIDEWIRE STF .035X180CM ANG

## (undated) DEVICE — CATH BLLN COR SINUS VENOGRAPHY

## (undated) DEVICE — Device

## (undated) DEVICE — SHEATH INTRODUCER 4FR PINNACLE

## (undated) DEVICE — SHEATH SAFESHEATH II ULTRA 6FR

## (undated) DEVICE — PACK CATH LAB

## (undated) DEVICE — GUIDEWIRE SAFE T J TIP 145X2.5

## (undated) DEVICE — WIRE GUIDE WHOLEY MOD J .035

## (undated) DEVICE — CATH MPA2 INFINITI 4FR 100CM

## (undated) DEVICE — SHEATH SAFE ULTRA 9FR

## (undated) DEVICE — OMNIPAQUE 350MG 150ML VIAL

## (undated) DEVICE — KIT HAND CONTROL HIGH PRESSUR

## (undated) DEVICE — ADHESIVE DERMABOND ADVANCED

## (undated) DEVICE — CATH ELECTRD DECAPOLAR 6F

## (undated) DEVICE — SELECTRA 3D-55-42

## (undated) DEVICE — CATH INFINITI JUDKINS JR4

## (undated) DEVICE — DRAPE INCISE IOBAN 2 23X17IN

## (undated) DEVICE — KIT SYR REUSABLE